# Patient Record
Sex: FEMALE | Race: WHITE | NOT HISPANIC OR LATINO | Employment: PART TIME | URBAN - METROPOLITAN AREA
[De-identification: names, ages, dates, MRNs, and addresses within clinical notes are randomized per-mention and may not be internally consistent; named-entity substitution may affect disease eponyms.]

---

## 2017-01-04 ENCOUNTER — AMBULATORY - HEALTHEAST (OUTPATIENT)
Dept: PHYSICAL MEDICINE AND REHAB | Facility: CLINIC | Age: 52
End: 2017-01-04

## 2017-01-09 ENCOUNTER — HOSPITAL ENCOUNTER (OUTPATIENT)
Dept: PHYSICAL MEDICINE AND REHAB | Facility: CLINIC | Age: 52
Discharge: HOME | End: 2017-01-09
Attending: PHYSICAL MEDICINE & REHABILITATION

## 2017-01-09 DIAGNOSIS — M79.18 MYOFASCIAL MUSCLE PAIN: ICD-10-CM

## 2017-01-09 DIAGNOSIS — M48.061 FORAMINAL STENOSIS OF LUMBAR REGION: ICD-10-CM

## 2017-01-09 DIAGNOSIS — M51.9 ANNULAR DISC TEAR: ICD-10-CM

## 2017-01-09 DIAGNOSIS — M54.50 LUMBAR SPINE PAIN: ICD-10-CM

## 2017-01-09 DIAGNOSIS — M79.604 RIGHT LEG PAIN: ICD-10-CM

## 2017-01-09 ASSESSMENT — MIFFLIN-ST. JEOR: SCORE: 1787.91

## 2017-01-15 ENCOUNTER — COMMUNICATION - HEALTHEAST (OUTPATIENT)
Dept: FAMILY MEDICINE | Facility: CLINIC | Age: 52
End: 2017-01-15

## 2017-01-15 DIAGNOSIS — K21.00 REFLUX ESOPHAGITIS: ICD-10-CM

## 2017-01-18 ENCOUNTER — HOSPITAL ENCOUNTER (OUTPATIENT)
Dept: PHYSICAL MEDICINE AND REHAB | Facility: CLINIC | Age: 52
Discharge: HOME | End: 2017-01-18
Attending: PHYSICAL MEDICINE & REHABILITATION

## 2017-01-18 DIAGNOSIS — M54.50 LUMBAR SPINE PAIN: ICD-10-CM

## 2017-01-18 DIAGNOSIS — M54.16 LUMBAR RADICULAR PAIN: ICD-10-CM

## 2017-01-18 DIAGNOSIS — M48.061 FORAMINAL STENOSIS OF LUMBAR REGION: ICD-10-CM

## 2017-01-18 DIAGNOSIS — M79.605 LEG PAIN, DIFFUSE, LEFT: ICD-10-CM

## 2017-01-18 DIAGNOSIS — M79.18 MYOFASCIAL MUSCLE PAIN: ICD-10-CM

## 2017-01-18 DIAGNOSIS — R09.89 DIMINISHED PULSE: ICD-10-CM

## 2017-01-18 ASSESSMENT — MIFFLIN-ST. JEOR: SCORE: 1787.91

## 2017-01-25 ENCOUNTER — HOSPITAL ENCOUNTER (OUTPATIENT)
Dept: PHYSICAL MEDICINE AND REHAB | Facility: CLINIC | Age: 52
Discharge: HOME | End: 2017-01-25
Attending: PHYSICAL MEDICINE & REHABILITATION

## 2017-01-25 DIAGNOSIS — M79.604 RIGHT LEG PAIN: ICD-10-CM

## 2017-01-25 DIAGNOSIS — M79.605 LEG PAIN, DIFFUSE, LEFT: ICD-10-CM

## 2017-01-25 DIAGNOSIS — M51.9 ANNULAR DISC TEAR: ICD-10-CM

## 2017-01-25 DIAGNOSIS — M79.18 MYOFASCIAL MUSCLE PAIN: ICD-10-CM

## 2017-01-25 DIAGNOSIS — M54.50 LUMBAR SPINE PAIN: ICD-10-CM

## 2017-01-25 ASSESSMENT — MIFFLIN-ST. JEOR: SCORE: 1787.91

## 2017-01-26 ENCOUNTER — HOSPITAL ENCOUNTER (OUTPATIENT)
Dept: ULTRASOUND IMAGING | Facility: HOSPITAL | Age: 52
Discharge: HOME | End: 2017-01-26
Attending: PHYSICAL MEDICINE & REHABILITATION

## 2017-01-26 DIAGNOSIS — M79.605 LEG PAIN, DIFFUSE, LEFT: ICD-10-CM

## 2017-01-26 DIAGNOSIS — R09.89 DIMINISHED PULSE: ICD-10-CM

## 2017-01-27 ENCOUNTER — COMMUNICATION - HEALTHEAST (OUTPATIENT)
Dept: PHYSICAL MEDICINE AND REHAB | Facility: CLINIC | Age: 52
End: 2017-01-27

## 2017-01-27 ENCOUNTER — HOSPITAL ENCOUNTER (OUTPATIENT)
Dept: PHYSICAL MEDICINE AND REHAB | Facility: CLINIC | Age: 52
Discharge: HOME | End: 2017-01-27
Attending: PHYSICAL MEDICINE & REHABILITATION

## 2017-01-27 DIAGNOSIS — M99.83 OTHER BIOMECHANICAL LESIONS OF LUMBAR REGION: ICD-10-CM

## 2017-01-27 DIAGNOSIS — M48.061 FORAMINAL STENOSIS OF LUMBAR REGION: ICD-10-CM

## 2017-01-27 DIAGNOSIS — M51.9 ANNULAR DISC TEAR: ICD-10-CM

## 2017-01-27 DIAGNOSIS — M54.50 LUMBAR SPINE PAIN: ICD-10-CM

## 2017-01-27 ASSESSMENT — MIFFLIN-ST. JEOR: SCORE: 1764.32

## 2017-02-20 ENCOUNTER — HOSPITAL ENCOUNTER (OUTPATIENT)
Dept: PHYSICAL MEDICINE AND REHAB | Facility: CLINIC | Age: 52
Discharge: HOME | End: 2017-02-20
Attending: PHYSICAL MEDICINE & REHABILITATION

## 2017-02-20 DIAGNOSIS — M51.9 ANNULAR DISC TEAR: ICD-10-CM

## 2017-02-20 DIAGNOSIS — M54.50 LUMBAR SPINE PAIN: ICD-10-CM

## 2017-02-20 DIAGNOSIS — G47.9 SLEEP DIFFICULTIES: ICD-10-CM

## 2017-02-20 DIAGNOSIS — M47.816 LUMBAR SPONDYLOSIS: ICD-10-CM

## 2017-02-20 DIAGNOSIS — M79.18 MYOFASCIAL MUSCLE PAIN: ICD-10-CM

## 2017-02-20 ASSESSMENT — MIFFLIN-ST. JEOR: SCORE: 1749.35

## 2017-03-10 ENCOUNTER — OFFICE VISIT - HEALTHEAST (OUTPATIENT)
Dept: FAMILY MEDICINE | Facility: CLINIC | Age: 52
End: 2017-03-10

## 2017-03-10 ENCOUNTER — HOSPITAL ENCOUNTER (OUTPATIENT)
Dept: ULTRASOUND IMAGING | Facility: HOSPITAL | Age: 52
Discharge: HOME | End: 2017-03-10
Attending: FAMILY MEDICINE

## 2017-03-10 DIAGNOSIS — R93.429 ABNORMAL MRI, KIDNEY: ICD-10-CM

## 2017-03-10 DIAGNOSIS — Z00.00 ROUTINE GENERAL MEDICAL EXAMINATION AT A HEALTH CARE FACILITY: ICD-10-CM

## 2017-03-10 DIAGNOSIS — I10 ESSENTIAL HYPERTENSION WITH GOAL BLOOD PRESSURE LESS THAN 140/90: ICD-10-CM

## 2017-03-10 DIAGNOSIS — G89.29 CHRONIC PAIN: ICD-10-CM

## 2017-03-10 DIAGNOSIS — E78.2 MIXED HYPERLIPIDEMIA: ICD-10-CM

## 2017-03-10 LAB
CHOLEST SERPL-MCNC: 196 MG/DL
FASTING STATUS PATIENT QL REPORTED: YES
HDLC SERPL-MCNC: 37 MG/DL
LDLC SERPL CALC-MCNC: 101 MG/DL
TRIGL SERPL-MCNC: 288 MG/DL

## 2017-03-10 ASSESSMENT — MIFFLIN-ST. JEOR: SCORE: 1761.25

## 2017-03-28 ENCOUNTER — COMMUNICATION - HEALTHEAST (OUTPATIENT)
Dept: PHYSICAL MEDICINE AND REHAB | Facility: CLINIC | Age: 52
End: 2017-03-28

## 2017-03-28 DIAGNOSIS — G47.9 SLEEP DIFFICULTIES: ICD-10-CM

## 2017-03-28 DIAGNOSIS — M79.18 MYOFASCIAL MUSCLE PAIN: ICD-10-CM

## 2017-06-04 ENCOUNTER — COMMUNICATION - HEALTHEAST (OUTPATIENT)
Dept: FAMILY MEDICINE | Facility: CLINIC | Age: 52
End: 2017-06-04

## 2017-06-04 DIAGNOSIS — E78.5 HYPERLIPIDEMIA: ICD-10-CM

## 2017-07-03 ENCOUNTER — OFFICE VISIT - HEALTHEAST (OUTPATIENT)
Dept: FAMILY MEDICINE | Facility: CLINIC | Age: 52
End: 2017-07-03

## 2017-07-03 ENCOUNTER — RECORDS - HEALTHEAST (OUTPATIENT)
Dept: MAMMOGRAPHY | Facility: CLINIC | Age: 52
End: 2017-07-03

## 2017-07-03 DIAGNOSIS — Z12.31 ENCOUNTER FOR SCREENING MAMMOGRAM FOR MALIGNANT NEOPLASM OF BREAST: ICD-10-CM

## 2017-07-03 DIAGNOSIS — Z00.00 ROUTINE GENERAL MEDICAL EXAMINATION AT A HEALTH CARE FACILITY: ICD-10-CM

## 2017-07-03 DIAGNOSIS — I10 ESSENTIAL HYPERTENSION WITH GOAL BLOOD PRESSURE LESS THAN 140/90: ICD-10-CM

## 2017-07-03 DIAGNOSIS — M51.379 DEGENERATION OF LUMBAR OR LUMBOSACRAL INTERVERTEBRAL DISC: ICD-10-CM

## 2017-07-03 DIAGNOSIS — E55.9 VITAMIN D DEFICIENCY: ICD-10-CM

## 2017-07-03 DIAGNOSIS — E78.2 MIXED HYPERLIPIDEMIA: ICD-10-CM

## 2017-07-03 DIAGNOSIS — R87.619 ABNORMAL PAP SMEAR OF CERVIX: ICD-10-CM

## 2017-07-03 DIAGNOSIS — K21.00 REFLUX ESOPHAGITIS: ICD-10-CM

## 2017-07-03 LAB
CHOLEST SERPL-MCNC: 300 MG/DL
FASTING STATUS PATIENT QL REPORTED: YES
HDLC SERPL-MCNC: 35 MG/DL
LDLC SERPL CALC-MCNC: 197 MG/DL
TRIGL SERPL-MCNC: 338 MG/DL

## 2017-07-03 ASSESSMENT — MIFFLIN-ST. JEOR: SCORE: 1746.51

## 2017-07-18 ENCOUNTER — HOSPITAL ENCOUNTER (OUTPATIENT)
Dept: PHYSICAL MEDICINE AND REHAB | Facility: CLINIC | Age: 52
Discharge: HOME | End: 2017-07-18
Attending: PHYSICAL MEDICINE & REHABILITATION

## 2017-07-18 ENCOUNTER — HOSPITAL ENCOUNTER (OUTPATIENT)
Dept: PHYSICAL MEDICINE AND REHAB | Facility: CLINIC | Age: 52
Discharge: HOME | End: 2017-07-18
Attending: NURSE PRACTITIONER

## 2017-07-18 DIAGNOSIS — M54.16 RIGHT LUMBAR RADICULITIS: ICD-10-CM

## 2017-07-18 DIAGNOSIS — M99.83 OTHER BIOMECHANICAL LESIONS OF LUMBAR REGION: ICD-10-CM

## 2017-07-18 DIAGNOSIS — M48.061 FORAMINAL STENOSIS OF LUMBAR REGION: ICD-10-CM

## 2017-07-18 DIAGNOSIS — M51.9 ANNULAR DISC TEAR: ICD-10-CM

## 2017-08-01 ENCOUNTER — COMMUNICATION - HEALTHEAST (OUTPATIENT)
Dept: PHYSICAL MEDICINE AND REHAB | Facility: CLINIC | Age: 52
End: 2017-08-01

## 2017-08-07 ENCOUNTER — OFFICE VISIT - HEALTHEAST (OUTPATIENT)
Dept: FAMILY MEDICINE | Facility: CLINIC | Age: 52
End: 2017-08-07

## 2017-08-07 DIAGNOSIS — M76.61 ACHILLES TENDINITIS, RIGHT LEG: ICD-10-CM

## 2017-08-28 ENCOUNTER — COMMUNICATION - HEALTHEAST (OUTPATIENT)
Dept: FAMILY MEDICINE | Facility: CLINIC | Age: 52
End: 2017-08-28

## 2017-08-28 DIAGNOSIS — M54.50 LUMBAGO: ICD-10-CM

## 2017-09-11 ENCOUNTER — RECORDS - HEALTHEAST (OUTPATIENT)
Dept: ADMINISTRATIVE | Facility: OTHER | Age: 52
End: 2017-09-11

## 2017-09-11 ENCOUNTER — OFFICE VISIT - HEALTHEAST (OUTPATIENT)
Dept: PODIATRY | Facility: CLINIC | Age: 52
End: 2017-09-11

## 2017-09-11 DIAGNOSIS — M76.61 ACHILLES TENDINITIS OF RIGHT LOWER EXTREMITY: ICD-10-CM

## 2017-09-11 DIAGNOSIS — M77.31 CALCANEAL SPUR, RIGHT: ICD-10-CM

## 2017-09-11 ASSESSMENT — MIFFLIN-ST. JEOR: SCORE: 1755.01

## 2017-09-17 ENCOUNTER — OFFICE VISIT - HEALTHEAST (OUTPATIENT)
Dept: FAMILY MEDICINE | Facility: CLINIC | Age: 52
End: 2017-09-17

## 2017-09-17 DIAGNOSIS — S02.5XXA BROKEN TOOTH: ICD-10-CM

## 2017-09-17 DIAGNOSIS — K04.7 DENTAL INFECTION: ICD-10-CM

## 2017-10-09 ENCOUNTER — COMMUNICATION - HEALTHEAST (OUTPATIENT)
Dept: ADMINISTRATIVE | Facility: CLINIC | Age: 52
End: 2017-10-09

## 2017-10-20 ENCOUNTER — OFFICE VISIT - HEALTHEAST (OUTPATIENT)
Dept: FAMILY MEDICINE | Facility: CLINIC | Age: 52
End: 2017-10-20

## 2017-10-20 ENCOUNTER — RECORDS - HEALTHEAST (OUTPATIENT)
Dept: GENERAL RADIOLOGY | Facility: CLINIC | Age: 52
End: 2017-10-20

## 2017-10-20 DIAGNOSIS — Z01.818 ENCOUNTER FOR OTHER PREPROCEDURAL EXAMINATION: ICD-10-CM

## 2017-10-20 DIAGNOSIS — I10 ESSENTIAL HYPERTENSION: ICD-10-CM

## 2017-10-20 DIAGNOSIS — Z01.818 PREOP EXAMINATION: ICD-10-CM

## 2017-10-20 DIAGNOSIS — Z01.818 PREOP TESTING: ICD-10-CM

## 2017-10-20 LAB
ATRIAL RATE - MUSE: 82 BPM
DIASTOLIC BLOOD PRESSURE - MUSE: NORMAL MMHG
INTERPRETATION ECG - MUSE: NORMAL
P AXIS - MUSE: 46 DEGREES
PR INTERVAL - MUSE: 188 MS
QRS DURATION - MUSE: 90 MS
QT - MUSE: 366 MS
QTC - MUSE: 427 MS
R AXIS - MUSE: 47 DEGREES
SYSTOLIC BLOOD PRESSURE - MUSE: NORMAL MMHG
T AXIS - MUSE: 30 DEGREES
VENTRICULAR RATE- MUSE: 82 BPM

## 2017-10-20 ASSESSMENT — MIFFLIN-ST. JEOR: SCORE: 1773.72

## 2017-11-02 ENCOUNTER — ANESTHESIA - HEALTHEAST (OUTPATIENT)
Dept: SURGERY | Facility: HOSPITAL | Age: 52
End: 2017-11-02

## 2017-11-02 ASSESSMENT — MIFFLIN-ST. JEOR: SCORE: 1773.72

## 2017-11-03 ENCOUNTER — SURGERY - HEALTHEAST (OUTPATIENT)
Dept: SURGERY | Facility: HOSPITAL | Age: 52
End: 2017-11-03

## 2017-11-04 ENCOUNTER — COMMUNICATION - HEALTHEAST (OUTPATIENT)
Dept: FAMILY MEDICINE | Facility: CLINIC | Age: 52
End: 2017-11-04

## 2017-11-05 ENCOUNTER — COMMUNICATION - HEALTHEAST (OUTPATIENT)
Dept: SCHEDULING | Facility: CLINIC | Age: 52
End: 2017-11-05

## 2017-11-20 ENCOUNTER — OFFICE VISIT - HEALTHEAST (OUTPATIENT)
Dept: PODIATRY | Facility: CLINIC | Age: 52
End: 2017-11-20

## 2017-11-20 DIAGNOSIS — M77.31 CALCANEAL SPUR, RIGHT: ICD-10-CM

## 2017-12-04 ENCOUNTER — OFFICE VISIT - HEALTHEAST (OUTPATIENT)
Dept: PODIATRY | Facility: CLINIC | Age: 52
End: 2017-12-04

## 2017-12-04 DIAGNOSIS — M77.31 CALCANEAL SPUR, RIGHT: ICD-10-CM

## 2017-12-04 ASSESSMENT — MIFFLIN-ST. JEOR: SCORE: 1745.94

## 2018-01-02 ENCOUNTER — OFFICE VISIT - HEALTHEAST (OUTPATIENT)
Dept: PHYSICAL THERAPY | Facility: REHABILITATION | Age: 53
End: 2018-01-02

## 2018-01-02 ENCOUNTER — OFFICE VISIT - HEALTHEAST (OUTPATIENT)
Dept: PODIATRY | Age: 53
End: 2018-01-02

## 2018-01-02 DIAGNOSIS — M76.61 ACHILLES TENDINITIS OF RIGHT LOWER EXTREMITY: ICD-10-CM

## 2018-01-02 DIAGNOSIS — M25.571 PAIN IN JOINT INVOLVING RIGHT ANKLE AND FOOT: ICD-10-CM

## 2018-01-02 DIAGNOSIS — R26.89 ANTALGIC GAIT: ICD-10-CM

## 2018-01-02 DIAGNOSIS — E78.2 MIXED HYPERLIPIDEMIA: ICD-10-CM

## 2018-01-04 ENCOUNTER — OFFICE VISIT - HEALTHEAST (OUTPATIENT)
Dept: PHYSICAL THERAPY | Facility: REHABILITATION | Age: 53
End: 2018-01-04

## 2018-01-04 DIAGNOSIS — M25.571 PAIN IN JOINT INVOLVING RIGHT ANKLE AND FOOT: ICD-10-CM

## 2018-01-04 DIAGNOSIS — R26.89 ANTALGIC GAIT: ICD-10-CM

## 2018-01-09 ENCOUNTER — COMMUNICATION - HEALTHEAST (OUTPATIENT)
Dept: FAMILY MEDICINE | Facility: CLINIC | Age: 53
End: 2018-01-09

## 2018-01-09 ENCOUNTER — OFFICE VISIT - HEALTHEAST (OUTPATIENT)
Dept: PHYSICAL THERAPY | Facility: REHABILITATION | Age: 53
End: 2018-01-09

## 2018-01-09 DIAGNOSIS — E78.2 MIXED HYPERLIPIDEMIA: ICD-10-CM

## 2018-01-09 DIAGNOSIS — R26.89 ANTALGIC GAIT: ICD-10-CM

## 2018-01-09 DIAGNOSIS — M25.571 PAIN IN JOINT INVOLVING RIGHT ANKLE AND FOOT: ICD-10-CM

## 2018-01-09 DIAGNOSIS — I10 ESSENTIAL HYPERTENSION: ICD-10-CM

## 2018-01-12 ENCOUNTER — OFFICE VISIT - HEALTHEAST (OUTPATIENT)
Dept: PHYSICAL THERAPY | Facility: REHABILITATION | Age: 53
End: 2018-01-12

## 2018-01-12 DIAGNOSIS — M25.571 PAIN IN JOINT INVOLVING RIGHT ANKLE AND FOOT: ICD-10-CM

## 2018-01-12 DIAGNOSIS — R26.89 ANTALGIC GAIT: ICD-10-CM

## 2018-01-16 ENCOUNTER — OFFICE VISIT - HEALTHEAST (OUTPATIENT)
Dept: PHYSICAL THERAPY | Facility: REHABILITATION | Age: 53
End: 2018-01-16

## 2018-01-16 DIAGNOSIS — E78.2 MIXED HYPERLIPIDEMIA: ICD-10-CM

## 2018-01-16 DIAGNOSIS — R26.89 ANTALGIC GAIT: ICD-10-CM

## 2018-01-16 DIAGNOSIS — M25.571 PAIN IN JOINT INVOLVING RIGHT ANKLE AND FOOT: ICD-10-CM

## 2018-01-18 ENCOUNTER — OFFICE VISIT - HEALTHEAST (OUTPATIENT)
Dept: PHYSICAL THERAPY | Facility: REHABILITATION | Age: 53
End: 2018-01-18

## 2018-01-18 DIAGNOSIS — M62.89 MUSCLE TIGHTNESS: ICD-10-CM

## 2018-01-18 DIAGNOSIS — M25.571 PAIN IN JOINT INVOLVING RIGHT ANKLE AND FOOT: ICD-10-CM

## 2018-01-18 DIAGNOSIS — R26.89 ANTALGIC GAIT: ICD-10-CM

## 2018-01-23 ENCOUNTER — OFFICE VISIT - HEALTHEAST (OUTPATIENT)
Dept: PHYSICAL THERAPY | Facility: REHABILITATION | Age: 53
End: 2018-01-23

## 2018-01-23 DIAGNOSIS — R26.89 ANTALGIC GAIT: ICD-10-CM

## 2018-01-23 DIAGNOSIS — M62.89 MUSCLE TIGHTNESS: ICD-10-CM

## 2018-01-23 DIAGNOSIS — E78.2 MIXED HYPERLIPIDEMIA: ICD-10-CM

## 2018-01-23 DIAGNOSIS — M25.571 PAIN IN JOINT INVOLVING RIGHT ANKLE AND FOOT: ICD-10-CM

## 2018-01-26 ENCOUNTER — OFFICE VISIT - HEALTHEAST (OUTPATIENT)
Dept: PHYSICAL THERAPY | Facility: REHABILITATION | Age: 53
End: 2018-01-26

## 2018-01-26 DIAGNOSIS — R26.89 ANTALGIC GAIT: ICD-10-CM

## 2018-01-26 DIAGNOSIS — M62.89 MUSCLE TIGHTNESS: ICD-10-CM

## 2018-01-26 DIAGNOSIS — M25.571 PAIN IN JOINT INVOLVING RIGHT ANKLE AND FOOT: ICD-10-CM

## 2018-01-26 DIAGNOSIS — M62.81 MUSCLE WEAKNESS (GENERALIZED): ICD-10-CM

## 2018-01-30 ENCOUNTER — OFFICE VISIT - HEALTHEAST (OUTPATIENT)
Dept: PODIATRY | Age: 53
End: 2018-01-30

## 2018-01-30 DIAGNOSIS — M76.61 ACHILLES TENDINITIS OF RIGHT LOWER EXTREMITY: ICD-10-CM

## 2018-01-30 DIAGNOSIS — M77.31 CALCANEAL SPUR, RIGHT: ICD-10-CM

## 2018-02-25 ENCOUNTER — COMMUNICATION - HEALTHEAST (OUTPATIENT)
Dept: FAMILY MEDICINE | Facility: CLINIC | Age: 53
End: 2018-02-25

## 2018-03-23 ENCOUNTER — COMMUNICATION - HEALTHEAST (OUTPATIENT)
Dept: FAMILY MEDICINE | Facility: CLINIC | Age: 53
End: 2018-03-23

## 2018-03-27 ENCOUNTER — OFFICE VISIT - HEALTHEAST (OUTPATIENT)
Dept: PODIATRY | Age: 53
End: 2018-03-27

## 2018-03-27 DIAGNOSIS — M25.571 ACUTE RIGHT ANKLE PAIN: ICD-10-CM

## 2018-03-27 ASSESSMENT — MIFFLIN-ST. JEOR: SCORE: 1745.94

## 2018-03-29 ENCOUNTER — HOSPITAL ENCOUNTER (OUTPATIENT)
Dept: MRI IMAGING | Facility: HOSPITAL | Age: 53
Discharge: HOME | End: 2018-03-29
Attending: PODIATRIST

## 2018-03-29 ENCOUNTER — COMMUNICATION - HEALTHEAST (OUTPATIENT)
Dept: FAMILY MEDICINE | Facility: CLINIC | Age: 53
End: 2018-03-29

## 2018-03-29 DIAGNOSIS — K21.00 REFLUX ESOPHAGITIS: ICD-10-CM

## 2018-03-29 DIAGNOSIS — M25.571 ACUTE RIGHT ANKLE PAIN: ICD-10-CM

## 2018-03-29 DIAGNOSIS — E78.5 HYPERLIPIDEMIA: ICD-10-CM

## 2018-04-06 ENCOUNTER — COMMUNICATION - HEALTHEAST (OUTPATIENT)
Dept: ADMINISTRATIVE | Facility: CLINIC | Age: 53
End: 2018-04-06

## 2018-04-09 ENCOUNTER — OFFICE VISIT - HEALTHEAST (OUTPATIENT)
Dept: FAMILY MEDICINE | Facility: CLINIC | Age: 53
End: 2018-04-09

## 2018-04-09 ENCOUNTER — RECORDS - HEALTHEAST (OUTPATIENT)
Dept: ADMINISTRATIVE | Facility: OTHER | Age: 53
End: 2018-04-09

## 2018-04-09 DIAGNOSIS — I10 ESSENTIAL HYPERTENSION: ICD-10-CM

## 2018-04-09 DIAGNOSIS — E78.5 HYPERLIPIDEMIA: ICD-10-CM

## 2018-04-09 DIAGNOSIS — K21.00 REFLUX ESOPHAGITIS: ICD-10-CM

## 2018-04-30 ENCOUNTER — OFFICE VISIT - HEALTHEAST (OUTPATIENT)
Dept: PODIATRY | Facility: CLINIC | Age: 53
End: 2018-04-30

## 2018-04-30 DIAGNOSIS — G57.91 NEURITIS OF FOOT, RIGHT: ICD-10-CM

## 2018-04-30 ASSESSMENT — MIFFLIN-ST. JEOR: SCORE: 1832.13

## 2018-05-21 ENCOUNTER — OFFICE VISIT - HEALTHEAST (OUTPATIENT)
Dept: PODIATRY | Facility: CLINIC | Age: 53
End: 2018-05-21

## 2018-05-21 DIAGNOSIS — G57.51 TARSAL TUNNEL SYNDROME OF RIGHT SIDE: ICD-10-CM

## 2018-05-21 DIAGNOSIS — G58.9 COMPRESSION NEUROPATHY: ICD-10-CM

## 2018-05-22 ENCOUNTER — RECORDS - HEALTHEAST (OUTPATIENT)
Dept: ADMINISTRATIVE | Facility: OTHER | Age: 53
End: 2018-05-22

## 2018-05-29 ENCOUNTER — OFFICE VISIT - HEALTHEAST (OUTPATIENT)
Dept: PODIATRY | Facility: CLINIC | Age: 53
End: 2018-05-29

## 2018-05-29 ENCOUNTER — COMMUNICATION - HEALTHEAST (OUTPATIENT)
Dept: PODIATRY | Facility: CLINIC | Age: 53
End: 2018-05-29

## 2018-05-29 ENCOUNTER — RECORDS - HEALTHEAST (OUTPATIENT)
Dept: ADMINISTRATIVE | Facility: OTHER | Age: 53
End: 2018-05-29

## 2018-05-29 DIAGNOSIS — G57.51 TARSAL TUNNEL SYNDROME OF RIGHT SIDE: ICD-10-CM

## 2018-05-29 DIAGNOSIS — G58.9 COMPRESSION NEUROPATHY: ICD-10-CM

## 2018-05-29 DIAGNOSIS — G58.9 MONONEURITIS: ICD-10-CM

## 2018-05-30 ENCOUNTER — OFFICE VISIT - HEALTHEAST (OUTPATIENT)
Dept: FAMILY MEDICINE | Facility: CLINIC | Age: 53
End: 2018-05-30

## 2018-05-30 DIAGNOSIS — Z01.818 PREOP EXAMINATION: ICD-10-CM

## 2018-05-30 DIAGNOSIS — I10 ESSENTIAL HYPERTENSION: ICD-10-CM

## 2018-05-30 DIAGNOSIS — R20.0 NUMBNESS OF RIGHT LOWER EXTREMITY: ICD-10-CM

## 2018-05-30 DIAGNOSIS — Z12.11 SCREEN FOR COLON CANCER: ICD-10-CM

## 2018-05-30 LAB
ANION GAP SERPL CALCULATED.3IONS-SCNC: 10 MMOL/L (ref 5–18)
BUN SERPL-MCNC: 8 MG/DL (ref 8–22)
CALCIUM SERPL-MCNC: 9.9 MG/DL (ref 8.5–10.5)
CHLORIDE BLD-SCNC: 108 MMOL/L (ref 98–107)
CO2 SERPL-SCNC: 24 MMOL/L (ref 22–31)
CREAT SERPL-MCNC: 0.81 MG/DL (ref 0.6–1.1)
GFR SERPL CREATININE-BSD FRML MDRD: >60 ML/MIN/1.73M2
GLUCOSE BLD-MCNC: 95 MG/DL (ref 70–125)
HGB BLD-MCNC: 12.3 G/DL (ref 12–16)
POTASSIUM BLD-SCNC: 4.6 MMOL/L (ref 3.5–5)
SODIUM SERPL-SCNC: 142 MMOL/L (ref 136–145)

## 2018-05-30 ASSESSMENT — MIFFLIN-ST. JEOR: SCORE: 1818.52

## 2018-05-31 ASSESSMENT — MIFFLIN-ST. JEOR: SCORE: 1818.52

## 2018-06-04 ENCOUNTER — ANESTHESIA - HEALTHEAST (OUTPATIENT)
Dept: SURGERY | Facility: CLINIC | Age: 53
End: 2018-06-04

## 2018-06-05 ENCOUNTER — SURGERY - HEALTHEAST (OUTPATIENT)
Dept: SURGERY | Facility: CLINIC | Age: 53
End: 2018-06-05

## 2018-06-05 ASSESSMENT — MIFFLIN-ST. JEOR: SCORE: 1818.52

## 2018-06-12 ENCOUNTER — OFFICE VISIT - HEALTHEAST (OUTPATIENT)
Dept: PODIATRY | Facility: CLINIC | Age: 53
End: 2018-06-12

## 2018-06-12 DIAGNOSIS — G58.9 COMPRESSION NEUROPATHY: ICD-10-CM

## 2018-06-12 DIAGNOSIS — G57.51 TARSAL TUNNEL SYNDROME OF RIGHT SIDE: ICD-10-CM

## 2018-06-19 ENCOUNTER — OFFICE VISIT - HEALTHEAST (OUTPATIENT)
Dept: PODIATRY | Facility: CLINIC | Age: 53
End: 2018-06-19

## 2018-06-19 DIAGNOSIS — G57.51 TARSAL TUNNEL SYNDROME OF RIGHT SIDE: ICD-10-CM

## 2018-06-19 DIAGNOSIS — G58.9 COMPRESSION NEUROPATHY: ICD-10-CM

## 2018-06-26 ENCOUNTER — COMMUNICATION - HEALTHEAST (OUTPATIENT)
Dept: PODIATRY | Facility: CLINIC | Age: 53
End: 2018-06-26

## 2018-06-26 ENCOUNTER — OFFICE VISIT - HEALTHEAST (OUTPATIENT)
Dept: PODIATRY | Facility: CLINIC | Age: 53
End: 2018-06-26

## 2018-06-26 DIAGNOSIS — G58.9 COMPRESSION NEUROPATHY: ICD-10-CM

## 2018-06-26 DIAGNOSIS — G57.51 TARSAL TUNNEL SYNDROME OF RIGHT SIDE: ICD-10-CM

## 2018-07-17 ENCOUNTER — OFFICE VISIT - HEALTHEAST (OUTPATIENT)
Dept: PODIATRY | Facility: CLINIC | Age: 53
End: 2018-07-17

## 2018-07-17 DIAGNOSIS — G58.9 MONONEURITIS: ICD-10-CM

## 2018-07-17 DIAGNOSIS — G58.9 COMPRESSION NEUROPATHY: ICD-10-CM

## 2018-07-17 DIAGNOSIS — G57.51 TARSAL TUNNEL SYNDROME OF RIGHT SIDE: ICD-10-CM

## 2018-08-01 ENCOUNTER — COMMUNICATION - HEALTHEAST (OUTPATIENT)
Dept: FAMILY MEDICINE | Facility: CLINIC | Age: 53
End: 2018-08-01

## 2018-08-01 ENCOUNTER — HOSPITAL ENCOUNTER (OUTPATIENT)
Dept: MRI IMAGING | Facility: HOSPITAL | Age: 53
Discharge: HOME | End: 2018-08-01
Attending: PHYSICAL MEDICINE & REHABILITATION

## 2018-08-01 ENCOUNTER — HOSPITAL ENCOUNTER (OUTPATIENT)
Dept: PHYSICAL MEDICINE AND REHAB | Facility: CLINIC | Age: 53
Discharge: HOME | End: 2018-08-01
Attending: PHYSICAL MEDICINE & REHABILITATION

## 2018-08-01 DIAGNOSIS — R29.898 RIGHT LEG WEAKNESS: ICD-10-CM

## 2018-08-01 DIAGNOSIS — M54.16 LUMBAR RADICULAR PAIN: ICD-10-CM

## 2018-08-01 DIAGNOSIS — M54.50 LUMBAR SPINE PAIN: ICD-10-CM

## 2018-08-01 ASSESSMENT — MIFFLIN-ST. JEOR: SCORE: 1818.52

## 2018-08-02 ENCOUNTER — COMMUNICATION - HEALTHEAST (OUTPATIENT)
Dept: FAMILY MEDICINE | Facility: CLINIC | Age: 53
End: 2018-08-02

## 2018-08-02 ENCOUNTER — COMMUNICATION - HEALTHEAST (OUTPATIENT)
Dept: PHYSICAL MEDICINE AND REHAB | Facility: CLINIC | Age: 53
End: 2018-08-02

## 2018-08-06 ENCOUNTER — AMBULATORY - HEALTHEAST (OUTPATIENT)
Dept: FAMILY MEDICINE | Facility: CLINIC | Age: 53
End: 2018-08-06

## 2018-08-06 DIAGNOSIS — M79.7 FIBROMYALGIA: ICD-10-CM

## 2018-08-16 ENCOUNTER — HOSPITAL ENCOUNTER (OUTPATIENT)
Dept: PHYSICAL MEDICINE AND REHAB | Facility: CLINIC | Age: 53
Discharge: HOME | End: 2018-08-16
Attending: NURSE PRACTITIONER

## 2018-08-16 DIAGNOSIS — M79.604 LEG PAIN, DIFFUSE, RIGHT: ICD-10-CM

## 2018-08-16 DIAGNOSIS — R29.898 RIGHT LEG WEAKNESS: ICD-10-CM

## 2018-08-22 ENCOUNTER — OFFICE VISIT - HEALTHEAST (OUTPATIENT)
Dept: FAMILY MEDICINE | Facility: CLINIC | Age: 53
End: 2018-08-22

## 2018-08-22 ENCOUNTER — COMMUNICATION - HEALTHEAST (OUTPATIENT)
Dept: PODIATRY | Facility: CLINIC | Age: 53
End: 2018-08-22

## 2018-08-22 DIAGNOSIS — M79.671 RIGHT FOOT PAIN: ICD-10-CM

## 2018-08-23 ENCOUNTER — RECORDS - HEALTHEAST (OUTPATIENT)
Dept: GENERAL RADIOLOGY | Facility: CLINIC | Age: 53
End: 2018-08-23

## 2018-08-23 ENCOUNTER — OFFICE VISIT - HEALTHEAST (OUTPATIENT)
Dept: PODIATRY | Facility: CLINIC | Age: 53
End: 2018-08-23

## 2018-08-23 DIAGNOSIS — M79.671 PAIN IN RIGHT FOOT: ICD-10-CM

## 2018-08-23 DIAGNOSIS — M79.671 RIGHT FOOT PAIN: ICD-10-CM

## 2018-08-23 DIAGNOSIS — M76.61 ACHILLES TENDINITIS OF RIGHT LOWER EXTREMITY: ICD-10-CM

## 2018-08-28 ENCOUNTER — HOSPITAL ENCOUNTER (OUTPATIENT)
Dept: NUCLEAR MEDICINE | Facility: HOSPITAL | Age: 53
Discharge: HOME | End: 2018-08-28

## 2018-08-28 DIAGNOSIS — M79.604 LEG PAIN, DIFFUSE, RIGHT: ICD-10-CM

## 2018-08-28 DIAGNOSIS — R29.898 RIGHT LEG WEAKNESS: ICD-10-CM

## 2018-08-29 ENCOUNTER — COMMUNICATION - HEALTHEAST (OUTPATIENT)
Dept: PHYSICAL MEDICINE AND REHAB | Facility: CLINIC | Age: 53
End: 2018-08-29

## 2018-09-06 ENCOUNTER — HOSPITAL ENCOUNTER (OUTPATIENT)
Dept: PALLIATIVE MEDICINE | Facility: OTHER | Age: 53
Discharge: HOME | End: 2018-09-06
Attending: PSYCHIATRY & NEUROLOGY

## 2018-09-06 ENCOUNTER — COMMUNICATION - HEALTHEAST (OUTPATIENT)
Dept: PALLIATIVE MEDICINE | Facility: OTHER | Age: 53
End: 2018-09-06

## 2018-09-06 ENCOUNTER — RECORDS - HEALTHEAST (OUTPATIENT)
Dept: ADMINISTRATIVE | Facility: OTHER | Age: 53
End: 2018-09-06

## 2018-09-06 DIAGNOSIS — M79.671 RIGHT FOOT PAIN: ICD-10-CM

## 2018-09-06 DIAGNOSIS — M47.816 LUMBAR FACET ARTHROPATHY: ICD-10-CM

## 2018-09-06 DIAGNOSIS — G89.4 CHRONIC PAIN SYNDROME: ICD-10-CM

## 2018-09-06 ASSESSMENT — MIFFLIN-ST. JEOR: SCORE: 1827.59

## 2018-09-07 ENCOUNTER — HOSPITAL ENCOUNTER (OUTPATIENT)
Dept: PALLIATIVE MEDICINE | Facility: OTHER | Age: 53
Discharge: HOME | End: 2018-09-07
Attending: PSYCHIATRY & NEUROLOGY | Admitting: PSYCHIATRY & NEUROLOGY

## 2018-09-07 DIAGNOSIS — M12.88 OTHER SPECIFIC ARTHROPATHIES, NOT ELSEWHERE CLASSIFIED, OTHER SPECIFIED SITE: ICD-10-CM

## 2018-09-07 DIAGNOSIS — M47.816 LUMBAR FACET ARTHROPATHY: ICD-10-CM

## 2018-09-07 ASSESSMENT — MIFFLIN-ST. JEOR: SCORE: 1827.59

## 2018-09-10 ENCOUNTER — COMMUNICATION - HEALTHEAST (OUTPATIENT)
Dept: PALLIATIVE MEDICINE | Facility: OTHER | Age: 53
End: 2018-09-10

## 2018-09-18 ENCOUNTER — OFFICE VISIT - HEALTHEAST (OUTPATIENT)
Dept: FAMILY MEDICINE | Facility: CLINIC | Age: 53
End: 2018-09-18

## 2018-09-18 DIAGNOSIS — G57.91 COMPRESSION NEUROPATHY OF RIGHT LOWER EXTREMITY: ICD-10-CM

## 2018-09-18 DIAGNOSIS — M79.7 FIBROMYALGIA: ICD-10-CM

## 2018-09-18 DIAGNOSIS — G57.91 MONONEURITIS LOWER LIMB, RIGHT: ICD-10-CM

## 2018-09-18 DIAGNOSIS — I10 ESSENTIAL HYPERTENSION: ICD-10-CM

## 2018-09-18 DIAGNOSIS — M51.379 DEGENERATION OF LUMBAR OR LUMBOSACRAL INTERVERTEBRAL DISC: ICD-10-CM

## 2018-09-25 ENCOUNTER — OFFICE VISIT - HEALTHEAST (OUTPATIENT)
Dept: PODIATRY | Facility: CLINIC | Age: 53
End: 2018-09-25

## 2018-09-25 DIAGNOSIS — G58.9 COMPRESSION NEUROPATHY: ICD-10-CM

## 2018-09-25 DIAGNOSIS — M79.671 RIGHT FOOT PAIN: ICD-10-CM

## 2018-09-26 ENCOUNTER — AMBULATORY - HEALTHEAST (OUTPATIENT)
Dept: VASCULAR SURGERY | Facility: CLINIC | Age: 53
End: 2018-09-26

## 2018-09-26 ENCOUNTER — RECORDS - HEALTHEAST (OUTPATIENT)
Dept: ADMINISTRATIVE | Facility: OTHER | Age: 53
End: 2018-09-26

## 2018-09-27 ENCOUNTER — RECORDS - HEALTHEAST (OUTPATIENT)
Dept: ADMINISTRATIVE | Facility: OTHER | Age: 53
End: 2018-09-27

## 2018-09-28 ENCOUNTER — COMMUNICATION - HEALTHEAST (OUTPATIENT)
Dept: ADMINISTRATIVE | Facility: CLINIC | Age: 53
End: 2018-09-28

## 2018-10-01 ENCOUNTER — OFFICE VISIT - HEALTHEAST (OUTPATIENT)
Dept: FAMILY MEDICINE | Facility: CLINIC | Age: 53
End: 2018-10-01

## 2018-10-01 DIAGNOSIS — Z01.818 PREOP EXAMINATION: ICD-10-CM

## 2018-10-01 DIAGNOSIS — M51.379 DEGENERATION OF LUMBAR OR LUMBOSACRAL INTERVERTEBRAL DISC: ICD-10-CM

## 2018-10-01 DIAGNOSIS — I10 ESSENTIAL HYPERTENSION: ICD-10-CM

## 2018-10-01 DIAGNOSIS — J01.90 ACUTE SINUSITIS: ICD-10-CM

## 2018-10-01 DIAGNOSIS — G57.91 COMPRESSION NEUROPATHY OF RIGHT LOWER EXTREMITY: ICD-10-CM

## 2018-10-01 DIAGNOSIS — E78.2 MIXED HYPERLIPIDEMIA: ICD-10-CM

## 2018-10-01 DIAGNOSIS — G57.51 TARSAL TUNNEL SYNDROME OF RIGHT SIDE: ICD-10-CM

## 2018-10-01 DIAGNOSIS — F17.200 TOBACCO USE DISORDER: ICD-10-CM

## 2018-10-01 DIAGNOSIS — E55.9 VITAMIN D DEFICIENCY: ICD-10-CM

## 2018-10-01 LAB
ALBUMIN SERPL-MCNC: 4.1 G/DL (ref 3.5–5)
ALP SERPL-CCNC: 62 U/L (ref 45–120)
ALT SERPL W P-5'-P-CCNC: 34 U/L (ref 0–45)
ANION GAP SERPL CALCULATED.3IONS-SCNC: 10 MMOL/L (ref 5–18)
AST SERPL W P-5'-P-CCNC: 27 U/L (ref 0–40)
BILIRUB DIRECT SERPL-MCNC: 0.1 MG/DL
BILIRUB SERPL-MCNC: 0.4 MG/DL (ref 0–1)
BUN SERPL-MCNC: 7 MG/DL (ref 8–22)
CALCIUM SERPL-MCNC: 9.8 MG/DL (ref 8.5–10.5)
CHLORIDE BLD-SCNC: 107 MMOL/L (ref 98–107)
CHOLEST SERPL-MCNC: 192 MG/DL
CO2 SERPL-SCNC: 25 MMOL/L (ref 22–31)
CREAT SERPL-MCNC: 0.76 MG/DL (ref 0.6–1.1)
FASTING STATUS PATIENT QL REPORTED: YES
GFR SERPL CREATININE-BSD FRML MDRD: >60 ML/MIN/1.73M2
GLUCOSE BLD-MCNC: 100 MG/DL (ref 70–125)
HDLC SERPL-MCNC: 33 MG/DL
HGB BLD-MCNC: 13.3 G/DL (ref 12–16)
LDLC SERPL CALC-MCNC: 100 MG/DL
POTASSIUM BLD-SCNC: 4.3 MMOL/L (ref 3.5–5)
PROT SERPL-MCNC: 7 G/DL (ref 6–8)
SODIUM SERPL-SCNC: 142 MMOL/L (ref 136–145)
TRIGL SERPL-MCNC: 294 MG/DL

## 2018-10-01 ASSESSMENT — MIFFLIN-ST. JEOR
SCORE: 1831.68
SCORE: 1815.12

## 2018-10-02 ENCOUNTER — COMMUNICATION - HEALTHEAST (OUTPATIENT)
Dept: ADMINISTRATIVE | Facility: CLINIC | Age: 53
End: 2018-10-02

## 2018-10-02 LAB
25(OH)D3 SERPL-MCNC: 31.1 NG/ML (ref 30–80)
25(OH)D3 SERPL-MCNC: 31.1 NG/ML (ref 30–80)

## 2018-10-05 ENCOUNTER — ANESTHESIA - HEALTHEAST (OUTPATIENT)
Dept: SURGERY | Facility: CLINIC | Age: 53
End: 2018-10-05

## 2018-10-05 ENCOUNTER — SURGERY - HEALTHEAST (OUTPATIENT)
Dept: SURGERY | Facility: CLINIC | Age: 53
End: 2018-10-05

## 2018-10-05 ASSESSMENT — MIFFLIN-ST. JEOR: SCORE: 1806.51

## 2018-10-11 ENCOUNTER — OFFICE VISIT - HEALTHEAST (OUTPATIENT)
Dept: PODIATRY | Facility: CLINIC | Age: 53
End: 2018-10-11

## 2018-10-11 DIAGNOSIS — G58.9 COMPRESSION NEUROPATHY: ICD-10-CM

## 2018-10-11 ASSESSMENT — MIFFLIN-ST. JEOR: SCORE: 1821.92

## 2018-10-18 ENCOUNTER — OFFICE VISIT - HEALTHEAST (OUTPATIENT)
Dept: PODIATRY | Facility: CLINIC | Age: 53
End: 2018-10-18

## 2018-10-18 DIAGNOSIS — G58.9 COMPRESSION NEUROPATHY: ICD-10-CM

## 2018-10-18 ASSESSMENT — MIFFLIN-ST. JEOR: SCORE: 1821.92

## 2018-12-07 ENCOUNTER — COMMUNICATION - HEALTHEAST (OUTPATIENT)
Dept: FAMILY MEDICINE | Facility: CLINIC | Age: 53
End: 2018-12-07

## 2018-12-18 ENCOUNTER — OFFICE VISIT - HEALTHEAST (OUTPATIENT)
Dept: FAMILY MEDICINE | Facility: CLINIC | Age: 53
End: 2018-12-18

## 2018-12-18 DIAGNOSIS — Z12.11 COLON CANCER SCREENING: ICD-10-CM

## 2018-12-18 DIAGNOSIS — I10 ESSENTIAL HYPERTENSION: ICD-10-CM

## 2018-12-18 DIAGNOSIS — Z23 NEED FOR TETANUS BOOSTER: ICD-10-CM

## 2018-12-18 ASSESSMENT — MIFFLIN-ST. JEOR: SCORE: 1858.21

## 2019-01-07 ENCOUNTER — RECORDS - HEALTHEAST (OUTPATIENT)
Dept: ADMINISTRATIVE | Facility: OTHER | Age: 54
End: 2019-01-07

## 2019-01-07 LAB — COLOGUARD-ABSTRACT: NEGATIVE

## 2019-01-15 ENCOUNTER — OFFICE VISIT - HEALTHEAST (OUTPATIENT)
Dept: PODIATRY | Facility: CLINIC | Age: 54
End: 2019-01-15

## 2019-01-15 DIAGNOSIS — I89.0 LYMPHEDEMA OF RIGHT LOWER EXTREMITY: ICD-10-CM

## 2019-01-15 DIAGNOSIS — G58.9 COMPRESSION NEUROPATHY: ICD-10-CM

## 2019-01-15 ASSESSMENT — MIFFLIN-ST. JEOR: SCORE: 1858.21

## 2019-02-05 ENCOUNTER — OFFICE VISIT - HEALTHEAST (OUTPATIENT)
Dept: FAMILY MEDICINE | Facility: CLINIC | Age: 54
End: 2019-02-05

## 2019-02-05 DIAGNOSIS — R05.9 COUGH: ICD-10-CM

## 2019-02-05 DIAGNOSIS — J18.9 PNEUMONIA DUE TO INFECTIOUS ORGANISM, UNSPECIFIED LATERALITY, UNSPECIFIED PART OF LUNG: ICD-10-CM

## 2019-02-05 LAB
FLUAV AG SPEC QL IA: NORMAL
FLUBV AG SPEC QL IA: NORMAL

## 2019-02-05 ASSESSMENT — MIFFLIN-ST. JEOR: SCORE: 1862.75

## 2019-03-11 ENCOUNTER — OFFICE VISIT - HEALTHEAST (OUTPATIENT)
Dept: FAMILY MEDICINE | Facility: CLINIC | Age: 54
End: 2019-03-11

## 2019-03-11 DIAGNOSIS — J20.9 ACUTE BRONCHITIS, UNSPECIFIED ORGANISM: ICD-10-CM

## 2019-03-18 ENCOUNTER — RECORDS - HEALTHEAST (OUTPATIENT)
Dept: HEALTH INFORMATION MANAGEMENT | Facility: CLINIC | Age: 54
End: 2019-03-18

## 2019-04-22 ENCOUNTER — COMMUNICATION - HEALTHEAST (OUTPATIENT)
Dept: FAMILY MEDICINE | Facility: CLINIC | Age: 54
End: 2019-04-22

## 2019-04-23 ENCOUNTER — OFFICE VISIT - HEALTHEAST (OUTPATIENT)
Dept: FAMILY MEDICINE | Facility: CLINIC | Age: 54
End: 2019-04-23

## 2019-04-23 DIAGNOSIS — J44.1 COPD EXACERBATION (H): ICD-10-CM

## 2019-04-23 DIAGNOSIS — F17.200 TOBACCO USE DISORDER: ICD-10-CM

## 2019-04-23 DIAGNOSIS — Z12.31 VISIT FOR SCREENING MAMMOGRAM: ICD-10-CM

## 2019-04-23 DIAGNOSIS — E66.01 MORBID OBESITY (H): ICD-10-CM

## 2019-05-07 ENCOUNTER — OFFICE VISIT - HEALTHEAST (OUTPATIENT)
Dept: FAMILY MEDICINE | Facility: CLINIC | Age: 54
End: 2019-05-07

## 2019-05-07 ENCOUNTER — RECORDS - HEALTHEAST (OUTPATIENT)
Dept: MAMMOGRAPHY | Facility: CLINIC | Age: 54
End: 2019-05-07

## 2019-05-07 DIAGNOSIS — R05.9 COUGH: ICD-10-CM

## 2019-05-07 DIAGNOSIS — J20.9 ACUTE BRONCHITIS, UNSPECIFIED ORGANISM: ICD-10-CM

## 2019-05-07 DIAGNOSIS — Z23 NEED FOR IMMUNIZATION AGAINST MEASLES: ICD-10-CM

## 2019-05-07 DIAGNOSIS — Z12.31 ENCOUNTER FOR SCREENING MAMMOGRAM FOR MALIGNANT NEOPLASM OF BREAST: ICD-10-CM

## 2019-05-08 LAB — MEV IGG SER IA-ACNC: POSITIVE

## 2019-05-09 LAB
GAMMA INTERFERON BACKGROUND BLD IA-ACNC: 0.06 IU/ML
M TB IFN-G BLD-IMP: NEGATIVE
MITOGEN IGNF BCKGRD COR BLD-ACNC: 0.02 IU/ML
MITOGEN IGNF BCKGRD COR BLD-ACNC: 0.06 IU/ML
QTF INTERPRETATION: NORMAL
QTF MITOGEN - NIL: 6.81 IU/ML

## 2019-06-11 ENCOUNTER — COMMUNICATION - HEALTHEAST (OUTPATIENT)
Dept: FAMILY MEDICINE | Facility: CLINIC | Age: 54
End: 2019-06-11

## 2019-06-11 DIAGNOSIS — K21.00 REFLUX ESOPHAGITIS: ICD-10-CM

## 2019-06-11 DIAGNOSIS — M79.7 FIBROMYALGIA: ICD-10-CM

## 2019-06-30 ENCOUNTER — COMMUNICATION - HEALTHEAST (OUTPATIENT)
Dept: FAMILY MEDICINE | Facility: CLINIC | Age: 54
End: 2019-06-30

## 2019-06-30 DIAGNOSIS — E78.5 HYPERLIPIDEMIA: ICD-10-CM

## 2019-07-01 ENCOUNTER — COMMUNICATION - HEALTHEAST (OUTPATIENT)
Dept: FAMILY MEDICINE | Facility: CLINIC | Age: 54
End: 2019-07-01

## 2019-07-01 ENCOUNTER — COMMUNICATION - HEALTHEAST (OUTPATIENT)
Dept: SCHEDULING | Facility: CLINIC | Age: 54
End: 2019-07-01

## 2019-07-01 DIAGNOSIS — R42 DIZZINESS: ICD-10-CM

## 2019-07-09 ENCOUNTER — OFFICE VISIT - HEALTHEAST (OUTPATIENT)
Dept: PODIATRY | Facility: CLINIC | Age: 54
End: 2019-07-09

## 2019-07-09 DIAGNOSIS — G57.51 TARSAL TUNNEL SYNDROME OF RIGHT SIDE: ICD-10-CM

## 2019-07-09 DIAGNOSIS — G58.9 COMPRESSION NEUROPATHY: ICD-10-CM

## 2019-07-09 DIAGNOSIS — G58.9 MONONEURITIS: ICD-10-CM

## 2019-07-09 ASSESSMENT — MIFFLIN-ST. JEOR: SCORE: 1867.28

## 2019-09-13 ENCOUNTER — COMMUNICATION - HEALTHEAST (OUTPATIENT)
Dept: FAMILY MEDICINE | Facility: CLINIC | Age: 54
End: 2019-09-13

## 2019-09-13 DIAGNOSIS — M79.7 FIBROMYALGIA: ICD-10-CM

## 2019-09-16 ENCOUNTER — OFFICE VISIT - HEALTHEAST (OUTPATIENT)
Dept: PODIATRY | Facility: CLINIC | Age: 54
End: 2019-09-16

## 2019-09-16 DIAGNOSIS — M21.6X2 PRONATION DEFORMITY OF BOTH FEET: ICD-10-CM

## 2019-09-16 DIAGNOSIS — G58.9 MONONEURITIS: ICD-10-CM

## 2019-09-16 DIAGNOSIS — G58.9 COMPRESSION NEUROPATHY: ICD-10-CM

## 2019-09-16 DIAGNOSIS — G57.51 TARSAL TUNNEL SYNDROME OF RIGHT SIDE: ICD-10-CM

## 2019-09-16 DIAGNOSIS — M21.6X1 PRONATION DEFORMITY OF BOTH FEET: ICD-10-CM

## 2019-09-16 ASSESSMENT — MIFFLIN-ST. JEOR: SCORE: 1867.28

## 2019-09-18 ENCOUNTER — COMMUNICATION - HEALTHEAST (OUTPATIENT)
Dept: OTHER | Facility: CLINIC | Age: 54
End: 2019-09-18

## 2019-09-19 ENCOUNTER — COMMUNICATION - HEALTHEAST (OUTPATIENT)
Dept: OTHER | Facility: CLINIC | Age: 54
End: 2019-09-19

## 2019-12-17 ENCOUNTER — OFFICE VISIT - HEALTHEAST (OUTPATIENT)
Dept: PODIATRY | Facility: CLINIC | Age: 54
End: 2019-12-17

## 2019-12-17 DIAGNOSIS — G57.51 TARSAL TUNNEL SYNDROME OF RIGHT SIDE: ICD-10-CM

## 2019-12-17 DIAGNOSIS — G58.9 COMPRESSION NEUROPATHY: ICD-10-CM

## 2019-12-17 DIAGNOSIS — G58.9 MONONEURITIS: ICD-10-CM

## 2019-12-17 ASSESSMENT — MIFFLIN-ST. JEOR: SCORE: 1867.28

## 2020-01-21 ENCOUNTER — RECORDS - HEALTHEAST (OUTPATIENT)
Dept: GENERAL RADIOLOGY | Facility: CLINIC | Age: 55
End: 2020-01-21

## 2020-01-21 ENCOUNTER — OFFICE VISIT - HEALTHEAST (OUTPATIENT)
Dept: FAMILY MEDICINE | Facility: CLINIC | Age: 55
End: 2020-01-21

## 2020-01-21 DIAGNOSIS — M25.511 CHRONIC RIGHT SHOULDER PAIN: ICD-10-CM

## 2020-01-21 DIAGNOSIS — G89.29 CHRONIC RIGHT SHOULDER PAIN: ICD-10-CM

## 2020-01-21 DIAGNOSIS — M25.511 PAIN IN RIGHT SHOULDER: ICD-10-CM

## 2020-01-21 DIAGNOSIS — M75.41 IMPINGEMENT SYNDROME, SHOULDER, RIGHT: ICD-10-CM

## 2020-01-21 DIAGNOSIS — G89.29 OTHER CHRONIC PAIN: ICD-10-CM

## 2020-01-27 ENCOUNTER — COMMUNICATION - HEALTHEAST (OUTPATIENT)
Dept: FAMILY MEDICINE | Facility: CLINIC | Age: 55
End: 2020-01-27

## 2020-01-27 ENCOUNTER — AMBULATORY - HEALTHEAST (OUTPATIENT)
Dept: FAMILY MEDICINE | Facility: CLINIC | Age: 55
End: 2020-01-27

## 2020-01-27 DIAGNOSIS — M75.41 IMPINGEMENT SYNDROME OF SHOULDER REGION, RIGHT: ICD-10-CM

## 2020-01-27 DIAGNOSIS — M19.011 PRIMARY OSTEOARTHRITIS OF RIGHT SHOULDER: ICD-10-CM

## 2020-02-17 ENCOUNTER — OFFICE VISIT - HEALTHEAST (OUTPATIENT)
Dept: FAMILY MEDICINE | Facility: CLINIC | Age: 55
End: 2020-02-17

## 2020-02-17 DIAGNOSIS — K57.92 ACUTE DIVERTICULITIS: ICD-10-CM

## 2020-02-17 DIAGNOSIS — Z23 NEED FOR TETANUS BOOSTER: ICD-10-CM

## 2020-02-17 DIAGNOSIS — R50.9 FEVER: ICD-10-CM

## 2020-02-17 LAB
BASOPHILS # BLD AUTO: 0.1 THOU/UL (ref 0–0.2)
BASOPHILS NFR BLD AUTO: 1 % (ref 0–2)
EOSINOPHIL # BLD AUTO: 0.2 THOU/UL (ref 0–0.4)
EOSINOPHIL NFR BLD AUTO: 3 % (ref 0–6)
ERYTHROCYTE [DISTWIDTH] IN BLOOD BY AUTOMATED COUNT: 12.5 % (ref 11–14.5)
FLUAV AG SPEC QL IA: NORMAL
FLUBV AG SPEC QL IA: NORMAL
HCT VFR BLD AUTO: 39.8 % (ref 35–47)
HGB BLD-MCNC: 13.1 G/DL (ref 12–16)
LYMPHOCYTES # BLD AUTO: 3.1 THOU/UL (ref 0.8–4.4)
LYMPHOCYTES NFR BLD AUTO: 39 % (ref 20–40)
MCH RBC QN AUTO: 29.2 PG (ref 27–34)
MCHC RBC AUTO-ENTMCNC: 33 G/DL (ref 32–36)
MCV RBC AUTO: 89 FL (ref 80–100)
MONOCYTES # BLD AUTO: 0.5 THOU/UL (ref 0–0.9)
MONOCYTES NFR BLD AUTO: 6 % (ref 2–10)
NEUTROPHILS # BLD AUTO: 4.2 THOU/UL (ref 2–7.7)
NEUTROPHILS NFR BLD AUTO: 52 % (ref 50–70)
PLATELET # BLD AUTO: 241 THOU/UL (ref 140–440)
PMV BLD AUTO: 8.1 FL (ref 7–10)
RBC # BLD AUTO: 4.49 MILL/UL (ref 3.8–5.4)
WBC: 8 THOU/UL (ref 4–11)

## 2020-04-14 ENCOUNTER — COMMUNICATION - HEALTHEAST (OUTPATIENT)
Dept: FAMILY MEDICINE | Facility: CLINIC | Age: 55
End: 2020-04-14

## 2020-04-16 ENCOUNTER — OFFICE VISIT - HEALTHEAST (OUTPATIENT)
Dept: FAMILY MEDICINE | Facility: CLINIC | Age: 55
End: 2020-04-16

## 2020-04-16 DIAGNOSIS — G58.9 COMPRESSION NEUROPATHY: ICD-10-CM

## 2020-04-16 DIAGNOSIS — M75.41 IMPINGEMENT SYNDROME OF SHOULDER REGION, RIGHT: ICD-10-CM

## 2020-04-21 ENCOUNTER — COMMUNICATION - HEALTHEAST (OUTPATIENT)
Dept: FAMILY MEDICINE | Facility: CLINIC | Age: 55
End: 2020-04-21

## 2020-04-30 ENCOUNTER — COMMUNICATION - HEALTHEAST (OUTPATIENT)
Dept: FAMILY MEDICINE | Facility: CLINIC | Age: 55
End: 2020-04-30

## 2020-04-30 DIAGNOSIS — E78.5 HYPERLIPIDEMIA: ICD-10-CM

## 2020-04-30 DIAGNOSIS — M79.7 FIBROMYALGIA: ICD-10-CM

## 2020-04-30 DIAGNOSIS — K21.00 REFLUX ESOPHAGITIS: ICD-10-CM

## 2020-05-01 ENCOUNTER — COMMUNICATION - HEALTHEAST (OUTPATIENT)
Dept: FAMILY MEDICINE | Facility: CLINIC | Age: 55
End: 2020-05-01

## 2020-05-11 ENCOUNTER — OFFICE VISIT - HEALTHEAST (OUTPATIENT)
Dept: FAMILY MEDICINE | Facility: CLINIC | Age: 55
End: 2020-05-11

## 2020-05-11 DIAGNOSIS — J32.0 CHRONIC MAXILLARY SINUSITIS: ICD-10-CM

## 2020-05-18 ENCOUNTER — OFFICE VISIT - HEALTHEAST (OUTPATIENT)
Dept: FAMILY MEDICINE | Facility: CLINIC | Age: 55
End: 2020-05-18

## 2020-05-18 DIAGNOSIS — R05.9 COUGH: ICD-10-CM

## 2020-05-18 DIAGNOSIS — J01.01 ACUTE RECURRENT MAXILLARY SINUSITIS: ICD-10-CM

## 2020-05-18 ASSESSMENT — MIFFLIN-ST. JEOR: SCORE: 1848.57

## 2020-05-20 ENCOUNTER — COMMUNICATION - HEALTHEAST (OUTPATIENT)
Dept: FAMILY MEDICINE | Facility: CLINIC | Age: 55
End: 2020-05-20

## 2020-06-15 ENCOUNTER — COMMUNICATION - HEALTHEAST (OUTPATIENT)
Dept: FAMILY MEDICINE | Facility: CLINIC | Age: 55
End: 2020-06-15

## 2020-06-15 ENCOUNTER — COMMUNICATION - HEALTHEAST (OUTPATIENT)
Dept: PODIATRY | Facility: CLINIC | Age: 55
End: 2020-06-15

## 2020-06-15 ENCOUNTER — RECORDS - HEALTHEAST (OUTPATIENT)
Dept: ADMINISTRATIVE | Facility: OTHER | Age: 55
End: 2020-06-15

## 2020-06-15 ENCOUNTER — OFFICE VISIT - HEALTHEAST (OUTPATIENT)
Dept: FAMILY MEDICINE | Facility: CLINIC | Age: 55
End: 2020-06-15

## 2020-06-15 DIAGNOSIS — S49.91XA SHOULDER INJURY, RIGHT, INITIAL ENCOUNTER: ICD-10-CM

## 2020-06-16 ENCOUNTER — OFFICE VISIT - HEALTHEAST (OUTPATIENT)
Dept: PODIATRY | Facility: CLINIC | Age: 55
End: 2020-06-16

## 2020-06-16 DIAGNOSIS — G58.9 MONONEURITIS: ICD-10-CM

## 2020-06-16 DIAGNOSIS — G58.9 COMPRESSION NEUROPATHY: ICD-10-CM

## 2020-06-16 DIAGNOSIS — G57.51 TARSAL TUNNEL SYNDROME OF RIGHT SIDE: ICD-10-CM

## 2020-06-25 ENCOUNTER — RECORDS - HEALTHEAST (OUTPATIENT)
Dept: ADMINISTRATIVE | Facility: OTHER | Age: 55
End: 2020-06-25

## 2020-07-12 ENCOUNTER — COMMUNICATION - HEALTHEAST (OUTPATIENT)
Dept: FAMILY MEDICINE | Facility: CLINIC | Age: 55
End: 2020-07-12

## 2020-07-12 DIAGNOSIS — M79.7 FIBROMYALGIA: ICD-10-CM

## 2020-07-24 ENCOUNTER — OFFICE VISIT - HEALTHEAST (OUTPATIENT)
Dept: FAMILY MEDICINE | Facility: CLINIC | Age: 55
End: 2020-07-24

## 2020-07-24 DIAGNOSIS — Z01.818 PREOP EXAMINATION: ICD-10-CM

## 2020-07-24 DIAGNOSIS — K21.00 REFLUX ESOPHAGITIS: ICD-10-CM

## 2020-07-24 DIAGNOSIS — I10 ESSENTIAL HYPERTENSION: ICD-10-CM

## 2020-07-24 DIAGNOSIS — M79.7 FIBROMYALGIA: ICD-10-CM

## 2020-07-24 DIAGNOSIS — E78.2 MIXED HYPERLIPIDEMIA: ICD-10-CM

## 2020-07-24 DIAGNOSIS — S49.91XS RIGHT SHOULDER INJURY, SEQUELA: ICD-10-CM

## 2020-07-24 LAB
ALBUMIN SERPL-MCNC: 4.1 G/DL (ref 3.5–5)
ALP SERPL-CCNC: 53 U/L (ref 45–120)
ALT SERPL W P-5'-P-CCNC: 19 U/L (ref 0–45)
ANION GAP SERPL CALCULATED.3IONS-SCNC: 9 MMOL/L (ref 5–18)
AST SERPL W P-5'-P-CCNC: 19 U/L (ref 0–40)
BILIRUB DIRECT SERPL-MCNC: 0.1 MG/DL
BILIRUB SERPL-MCNC: 0.4 MG/DL (ref 0–1)
BUN SERPL-MCNC: 8 MG/DL (ref 8–22)
CALCIUM SERPL-MCNC: 9.6 MG/DL (ref 8.5–10.5)
CHLORIDE BLD-SCNC: 107 MMOL/L (ref 98–107)
CHOLEST SERPL-MCNC: 175 MG/DL
CO2 SERPL-SCNC: 26 MMOL/L (ref 22–31)
CREAT SERPL-MCNC: 0.8 MG/DL (ref 0.6–1.1)
FASTING STATUS PATIENT QL REPORTED: YES
GFR SERPL CREATININE-BSD FRML MDRD: >60 ML/MIN/1.73M2
GLUCOSE BLD-MCNC: 100 MG/DL (ref 70–125)
HDLC SERPL-MCNC: 33 MG/DL
HGB BLD-MCNC: 12.7 G/DL (ref 12–16)
LDLC SERPL CALC-MCNC: 83 MG/DL
POTASSIUM BLD-SCNC: 4.1 MMOL/L (ref 3.5–5)
PROT SERPL-MCNC: 6.8 G/DL (ref 6–8)
SODIUM SERPL-SCNC: 142 MMOL/L (ref 136–145)
TRIGL SERPL-MCNC: 293 MG/DL

## 2020-07-24 ASSESSMENT — MIFFLIN-ST. JEOR: SCORE: 1827.59

## 2020-07-27 ENCOUNTER — RECORDS - HEALTHEAST (OUTPATIENT)
Dept: ADMINISTRATIVE | Facility: OTHER | Age: 55
End: 2020-07-27

## 2020-08-11 ENCOUNTER — RECORDS - HEALTHEAST (OUTPATIENT)
Dept: ADMINISTRATIVE | Facility: OTHER | Age: 55
End: 2020-08-11

## 2020-08-27 ENCOUNTER — COMMUNICATION - HEALTHEAST (OUTPATIENT)
Dept: FAMILY MEDICINE | Facility: CLINIC | Age: 55
End: 2020-08-27

## 2020-08-27 DIAGNOSIS — K21.00 REFLUX ESOPHAGITIS: ICD-10-CM

## 2020-09-08 ENCOUNTER — RECORDS - HEALTHEAST (OUTPATIENT)
Dept: ADMINISTRATIVE | Facility: OTHER | Age: 55
End: 2020-09-08

## 2020-09-22 ENCOUNTER — COMMUNICATION - HEALTHEAST (OUTPATIENT)
Dept: FAMILY MEDICINE | Facility: CLINIC | Age: 55
End: 2020-09-22

## 2020-09-22 ENCOUNTER — AMBULATORY - HEALTHEAST (OUTPATIENT)
Dept: OTHER | Facility: CLINIC | Age: 55
End: 2020-09-22

## 2020-09-22 ENCOUNTER — RECORDS - HEALTHEAST (OUTPATIENT)
Dept: ADMINISTRATIVE | Facility: OTHER | Age: 55
End: 2020-09-22

## 2020-09-22 DIAGNOSIS — E78.5 HYPERLIPIDEMIA: ICD-10-CM

## 2020-10-20 ENCOUNTER — RECORDS - HEALTHEAST (OUTPATIENT)
Dept: ADMINISTRATIVE | Facility: OTHER | Age: 55
End: 2020-10-20

## 2020-11-17 ENCOUNTER — AMBULATORY - HEALTHEAST (OUTPATIENT)
Dept: FAMILY MEDICINE | Facility: CLINIC | Age: 55
End: 2020-11-17

## 2020-11-17 ENCOUNTER — COMMUNICATION - HEALTHEAST (OUTPATIENT)
Dept: FAMILY MEDICINE | Facility: CLINIC | Age: 55
End: 2020-11-17

## 2020-11-17 DIAGNOSIS — Z20.822 EXPOSURE TO COVID-19 VIRUS: ICD-10-CM

## 2020-11-18 ENCOUNTER — AMBULATORY - HEALTHEAST (OUTPATIENT)
Dept: LAB | Facility: CLINIC | Age: 55
End: 2020-11-18

## 2020-11-18 DIAGNOSIS — Z20.822 EXPOSURE TO COVID-19 VIRUS: ICD-10-CM

## 2020-11-20 ENCOUNTER — COMMUNICATION - HEALTHEAST (OUTPATIENT)
Dept: SCHEDULING | Facility: CLINIC | Age: 55
End: 2020-11-20

## 2020-11-23 ENCOUNTER — OFFICE VISIT - HEALTHEAST (OUTPATIENT)
Dept: FAMILY MEDICINE | Facility: CLINIC | Age: 55
End: 2020-11-23

## 2020-11-23 ENCOUNTER — AMBULATORY - HEALTHEAST (OUTPATIENT)
Dept: FAMILY MEDICINE | Facility: CLINIC | Age: 55
End: 2020-11-23

## 2020-11-23 DIAGNOSIS — Z20.822 EXPOSURE TO COVID-19 VIRUS: ICD-10-CM

## 2020-11-23 DIAGNOSIS — M79.7 FIBROMYALGIA: ICD-10-CM

## 2020-11-23 DIAGNOSIS — K21.00 REFLUX ESOPHAGITIS: ICD-10-CM

## 2020-11-23 DIAGNOSIS — J01.00 ACUTE NON-RECURRENT MAXILLARY SINUSITIS: ICD-10-CM

## 2020-11-23 RX ORDER — GABAPENTIN 300 MG/1
CAPSULE ORAL
Qty: 810 CAPSULE | Refills: 2 | Status: SHIPPED | OUTPATIENT
Start: 2020-11-23 | End: 2021-09-22

## 2020-11-23 ASSESSMENT — PATIENT HEALTH QUESTIONNAIRE - PHQ9: SUM OF ALL RESPONSES TO PHQ QUESTIONS 1-9: 2

## 2020-12-02 ENCOUNTER — RECORDS - HEALTHEAST (OUTPATIENT)
Dept: ADMINISTRATIVE | Facility: OTHER | Age: 55
End: 2020-12-02

## 2020-12-09 ENCOUNTER — RECORDS - HEALTHEAST (OUTPATIENT)
Dept: ADMINISTRATIVE | Facility: OTHER | Age: 55
End: 2020-12-09

## 2020-12-21 ENCOUNTER — COMMUNICATION - HEALTHEAST (OUTPATIENT)
Dept: FAMILY MEDICINE | Facility: CLINIC | Age: 55
End: 2020-12-21

## 2020-12-21 DIAGNOSIS — E78.5 HYPERLIPIDEMIA: ICD-10-CM

## 2020-12-23 ENCOUNTER — RECORDS - HEALTHEAST (OUTPATIENT)
Dept: ADMINISTRATIVE | Facility: OTHER | Age: 55
End: 2020-12-23

## 2021-01-19 ENCOUNTER — RECORDS - HEALTHEAST (OUTPATIENT)
Dept: ADMINISTRATIVE | Facility: OTHER | Age: 56
End: 2021-01-19

## 2021-01-20 ENCOUNTER — COMMUNICATION - HEALTHEAST (OUTPATIENT)
Dept: FAMILY MEDICINE | Facility: CLINIC | Age: 56
End: 2021-01-20

## 2021-01-20 DIAGNOSIS — M79.7 FIBROMYALGIA: ICD-10-CM

## 2021-02-09 ENCOUNTER — RECORDS - HEALTHEAST (OUTPATIENT)
Dept: ADMINISTRATIVE | Facility: OTHER | Age: 56
End: 2021-02-09

## 2021-02-22 ENCOUNTER — COMMUNICATION - HEALTHEAST (OUTPATIENT)
Dept: FAMILY MEDICINE | Facility: CLINIC | Age: 56
End: 2021-02-22

## 2021-02-22 DIAGNOSIS — K21.00 REFLUX ESOPHAGITIS: ICD-10-CM

## 2021-02-23 RX ORDER — OMEPRAZOLE 40 MG/1
CAPSULE, DELAYED RELEASE ORAL
Qty: 90 CAPSULE | Refills: 2 | Status: SHIPPED | OUTPATIENT
Start: 2021-02-23 | End: 2021-11-19

## 2021-02-24 ENCOUNTER — OFFICE VISIT - HEALTHEAST (OUTPATIENT)
Dept: FAMILY MEDICINE | Facility: CLINIC | Age: 56
End: 2021-02-24

## 2021-02-24 DIAGNOSIS — Z00.00 ROUTINE GENERAL MEDICAL EXAMINATION AT A HEALTH CARE FACILITY: ICD-10-CM

## 2021-02-24 DIAGNOSIS — Z11.4 SCREENING FOR HUMAN IMMUNODEFICIENCY VIRUS WITHOUT PRESENCE OF RISK FACTORS: ICD-10-CM

## 2021-02-24 DIAGNOSIS — L98.9 SKIN LESION: ICD-10-CM

## 2021-02-24 DIAGNOSIS — E78.2 MIXED HYPERLIPIDEMIA: ICD-10-CM

## 2021-02-24 DIAGNOSIS — Z11.59 ENCOUNTER FOR HCV SCREENING TEST FOR LOW RISK PATIENT: ICD-10-CM

## 2021-02-24 DIAGNOSIS — S49.91XS RIGHT SHOULDER INJURY, SEQUELA: ICD-10-CM

## 2021-02-24 DIAGNOSIS — G58.9 COMPRESSION NEUROPATHY: ICD-10-CM

## 2021-02-24 DIAGNOSIS — E55.9 VITAMIN D DEFICIENCY: ICD-10-CM

## 2021-02-24 LAB
ALBUMIN SERPL-MCNC: 4.2 G/DL (ref 3.5–5)
ALP SERPL-CCNC: 60 U/L (ref 45–120)
ALT SERPL W P-5'-P-CCNC: 19 U/L (ref 0–45)
ANION GAP SERPL CALCULATED.3IONS-SCNC: 14 MMOL/L (ref 5–18)
AST SERPL W P-5'-P-CCNC: 17 U/L (ref 0–40)
BASOPHILS # BLD AUTO: 0.1 THOU/UL (ref 0–0.2)
BASOPHILS NFR BLD AUTO: 0 % (ref 0–2)
BILIRUB SERPL-MCNC: 0.4 MG/DL (ref 0–1)
BUN SERPL-MCNC: 9 MG/DL (ref 8–22)
CALCIUM SERPL-MCNC: 9.4 MG/DL (ref 8.5–10.5)
CHLORIDE BLD-SCNC: 105 MMOL/L (ref 98–107)
CHOLEST SERPL-MCNC: 189 MG/DL
CO2 SERPL-SCNC: 22 MMOL/L (ref 22–31)
CREAT SERPL-MCNC: 0.78 MG/DL (ref 0.6–1.1)
EOSINOPHIL # BLD AUTO: 0.2 THOU/UL (ref 0–0.4)
EOSINOPHIL NFR BLD AUTO: 1 % (ref 0–6)
ERYTHROCYTE [DISTWIDTH] IN BLOOD BY AUTOMATED COUNT: 14.1 % (ref 11–14.5)
FASTING STATUS PATIENT QL REPORTED: YES
GFR SERPL CREATININE-BSD FRML MDRD: >60 ML/MIN/1.73M2
GLUCOSE BLD-MCNC: 103 MG/DL (ref 70–125)
HCT VFR BLD AUTO: 40.5 % (ref 35–47)
HDLC SERPL-MCNC: 36 MG/DL
HGB BLD-MCNC: 13.1 G/DL (ref 12–16)
HIV 1+2 AB+HIV1 P24 AG SERPL QL IA: NEGATIVE
IMM GRANULOCYTES # BLD: 0 THOU/UL
IMM GRANULOCYTES NFR BLD: 0 %
LDLC SERPL CALC-MCNC: 111 MG/DL
LYMPHOCYTES # BLD AUTO: 3.4 THOU/UL (ref 0.8–4.4)
LYMPHOCYTES NFR BLD AUTO: 24 % (ref 20–40)
MCH RBC QN AUTO: 29.1 PG (ref 27–34)
MCHC RBC AUTO-ENTMCNC: 32.3 G/DL (ref 32–36)
MCV RBC AUTO: 90 FL (ref 80–100)
MONOCYTES # BLD AUTO: 0.8 THOU/UL (ref 0–0.9)
MONOCYTES NFR BLD AUTO: 6 % (ref 2–10)
NEUTROPHILS # BLD AUTO: 9.6 THOU/UL (ref 2–7.7)
NEUTROPHILS NFR BLD AUTO: 69 % (ref 50–70)
PLATELET # BLD AUTO: 152 THOU/UL (ref 140–440)
PMV BLD AUTO: 12.2 FL (ref 7–10)
POTASSIUM BLD-SCNC: 4.3 MMOL/L (ref 3.5–5)
PROT SERPL-MCNC: 7.3 G/DL (ref 6–8)
RBC # BLD AUTO: 4.5 MILL/UL (ref 3.8–5.4)
SODIUM SERPL-SCNC: 141 MMOL/L (ref 136–145)
TRIGL SERPL-MCNC: 208 MG/DL
WBC: 14 THOU/UL (ref 4–11)

## 2021-02-24 RX ORDER — BACLOFEN 10 MG/1
10 TABLET ORAL AT BEDTIME
Status: SHIPPED | COMMUNITY
Start: 2021-02-09 | End: 2021-09-22

## 2021-02-24 ASSESSMENT — MIFFLIN-ST. JEOR: SCORE: 1861.16

## 2021-02-25 LAB
25(OH)D3 SERPL-MCNC: 41.6 NG/ML (ref 30–80)
25(OH)D3 SERPL-MCNC: 41.6 NG/ML (ref 30–80)
HCV AB SERPL QL IA: NEGATIVE
HPV SOURCE: NORMAL
HUMAN PAPILLOMA VIRUS 16 DNA: NEGATIVE
HUMAN PAPILLOMA VIRUS 18 DNA: NEGATIVE
HUMAN PAPILLOMA VIRUS FINAL DIAGNOSIS: NORMAL
HUMAN PAPILLOMA VIRUS OTHER HR: NEGATIVE
LAB AP CHARGES (HE HISTORICAL CONVERSION): NORMAL
PATH REPORT.COMMENTS IMP SPEC: NORMAL
PATH REPORT.FINAL DX SPEC: NORMAL
PATH REPORT.GROSS SPEC: NORMAL
PATH REPORT.MICROSCOPIC SPEC OTHER STN: NORMAL
PATH REPORT.RELEVANT HX SPEC: NORMAL
RESULT FLAG (HE HISTORICAL CONVERSION): NORMAL
SPECIMEN DESCRIPTION: NORMAL

## 2021-03-01 ENCOUNTER — OFFICE VISIT - HEALTHEAST (OUTPATIENT)
Dept: FAMILY MEDICINE | Facility: CLINIC | Age: 56
End: 2021-03-01

## 2021-03-01 DIAGNOSIS — M70.61 TROCHANTERIC BURSITIS OF RIGHT HIP: ICD-10-CM

## 2021-03-01 DIAGNOSIS — S76.211A STRAIN OF RIGHT GROIN: ICD-10-CM

## 2021-03-02 LAB
BKR LAB AP ABNORMAL BLEEDING: NO
BKR LAB AP BIRTH CONTROL/HORMONES: NORMAL
BKR LAB AP CERVICAL APPEARANCE: NORMAL
BKR LAB AP GYN ADEQUACY: NORMAL
BKR LAB AP GYN INTERPRETATION: NORMAL
BKR LAB AP HPV REFLEX: NORMAL
BKR LAB AP LMP: NORMAL
BKR LAB AP PATIENT STATUS: NORMAL
BKR LAB AP PREVIOUS ABNORMAL: NORMAL
BKR LAB AP PREVIOUS NORMAL: 2015
HIGH RISK?: NO
PATH REPORT.COMMENTS IMP SPEC: NORMAL
RESULT FLAG (HE HISTORICAL CONVERSION): NORMAL

## 2021-03-09 ENCOUNTER — RECORDS - HEALTHEAST (OUTPATIENT)
Dept: ADMINISTRATIVE | Facility: OTHER | Age: 56
End: 2021-03-09

## 2021-03-17 ENCOUNTER — RECORDS - HEALTHEAST (OUTPATIENT)
Dept: ADMINISTRATIVE | Facility: OTHER | Age: 56
End: 2021-03-17

## 2021-03-24 ENCOUNTER — COMMUNICATION - HEALTHEAST (OUTPATIENT)
Dept: FAMILY MEDICINE | Facility: CLINIC | Age: 56
End: 2021-03-24

## 2021-03-24 DIAGNOSIS — E78.5 HYPERLIPIDEMIA: ICD-10-CM

## 2021-03-25 RX ORDER — SIMVASTATIN 40 MG
TABLET ORAL
Qty: 90 TABLET | Refills: 3 | Status: SHIPPED | OUTPATIENT
Start: 2021-03-25 | End: 2022-03-24

## 2021-04-05 ENCOUNTER — COMMUNICATION - HEALTHEAST (OUTPATIENT)
Dept: FAMILY MEDICINE | Facility: CLINIC | Age: 56
End: 2021-04-05

## 2021-04-05 DIAGNOSIS — M79.7 FIBROMYALGIA: ICD-10-CM

## 2021-04-06 RX ORDER — IBUPROFEN 800 MG/1
TABLET, FILM COATED ORAL
Qty: 100 TABLET | Refills: 0 | Status: SHIPPED | OUTPATIENT
Start: 2021-04-06 | End: 2021-07-16

## 2021-04-07 ENCOUNTER — RECORDS - HEALTHEAST (OUTPATIENT)
Dept: ADMINISTRATIVE | Facility: OTHER | Age: 56
End: 2021-04-07

## 2021-04-09 ENCOUNTER — RECORDS - HEALTHEAST (OUTPATIENT)
Dept: ADMINISTRATIVE | Facility: OTHER | Age: 56
End: 2021-04-09

## 2021-05-04 ENCOUNTER — RECORDS - HEALTHEAST (OUTPATIENT)
Dept: ADMINISTRATIVE | Facility: OTHER | Age: 56
End: 2021-05-04

## 2021-05-24 ENCOUNTER — RECORDS - HEALTHEAST (OUTPATIENT)
Dept: ADMINISTRATIVE | Facility: CLINIC | Age: 56
End: 2021-05-24

## 2021-05-25 ENCOUNTER — RECORDS - HEALTHEAST (OUTPATIENT)
Dept: ADMINISTRATIVE | Facility: CLINIC | Age: 56
End: 2021-05-25

## 2021-05-26 ENCOUNTER — RECORDS - HEALTHEAST (OUTPATIENT)
Dept: ADMINISTRATIVE | Facility: OTHER | Age: 56
End: 2021-05-26

## 2021-05-27 ENCOUNTER — RECORDS - HEALTHEAST (OUTPATIENT)
Dept: ADMINISTRATIVE | Facility: CLINIC | Age: 56
End: 2021-05-27

## 2021-05-27 ASSESSMENT — PATIENT HEALTH QUESTIONNAIRE - PHQ9: SUM OF ALL RESPONSES TO PHQ QUESTIONS 1-9: 2

## 2021-05-28 NOTE — PROGRESS NOTES
ASSESSMENT/PLAN:  1. Cough  QTF-Mycobacterium tuberculosis by QuantiFERON-TB Gold Plus   2. Acute bronchitis, unspecified organism  predniSONE (DELTASONE) 20 MG tablet    azithromycin (ZITHROMAX) 250 MG tablet   3. Need for immunization against measles  Rubeola Antibody, IgG       This is a 54 yo female with cough - exposed to mom who was also seen today.  Both seem to have some sort of infection.  Will treat aggressively so that both are treated at same time.  Patient's mo has apparently had a positive Mantoux/?TB in past.  Will check Quantiferon testing in [place] to see if there is any evidence of exposure.   Return in about 1 month (around 6/7/2019) for if not getting better in 1 month.      Medications Discontinued During This Encounter   Medication Reason     azithromycin (ZITHROMAX) 250 MG tablet Therapy completed     There are no Patient Instructions on file for this visit.    Chief Complaint:  Chief Complaint   Patient presents with     Follow-up     cough       HPI:   Catherine Farrell is a 53 y.o. female c/o  Mom was sick x 2 this spring -   Mom has acute bronchitis - sounds horrible (seen earlier in day)  Now, Catherine is having more cough and productive  Did start Wellbutrin a couple weeks ago - starting to decrease cigarette smoking    Feels like she's trading it off and on with her mom  Ears are still plugged  She does have inhaler        PMH:   Patient Active Problem List    Diagnosis Date Noted     Obesity (BMI 35.0-39.9) with comorbidity (H) 04/23/2019     Right foot pain 09/27/2018     Compression neuropathy 09/27/2018     Tarsal tunnel syndrome of right side 06/04/2018     Compression neuropathy of right lower extremity 06/04/2018     Mononeuritis lower limb, right 06/04/2018     Numbness of right lower extremity 05/30/2018     Calcaneal spur, right 09/11/2017     Vitamin D deficiency 07/03/2017     Chronic pain 03/10/2017     Costochondritis 07/10/2015     Fatigue 07/10/2015     Obsessive compulsive  disorder 2015     Dermatitis 2015     Patellofemoral Syndrome Of The Right Knee  2015     Tension-type headache 2015     Simple (Specific) Phobia      Cough      Nicotine Dependence      Chronic Reflux Esophagitis      Fibromyalgia      Strain Of The Gastrocnemius Muscle Of The Right Leg      Adjustment Disorder With Depressed Mood      Joint Pain, Localized In The Knee      Pain During Urination (Dysuria)      Abdominal Pain      Diverticulitis Of Colon      Trochanteric Bursitis      Sinusitis      Midback Pain      Mixed hyperlipidemia      Abnormal Weight Loss      Abnormal Pap smear of cervix      Adverse Food Reaction (Not Anaphylactic)      Impingement Of The Right Shoulder      Hypertension      Head Injury      Oral Thrush      Lumbar Disc Degeneration      Lower Back Pain Chronic      Carpal Tunnel Syndrome      Past Medical History:   Diagnosis Date     Anemia      Bilateral calcaneal spurs     surgery 2015     Bursitis of hip      Chronic back pain      DDD (degenerative disc disease)      Depression      Diverticulitis      Environmental allergies      Fibromyalgia      History of transfusion     With      HLD (hyperlipidemia)      Obesity      Past Surgical History:   Procedure Laterality Date     ACHILLES TENDON REPAIR Left 2015    Procedure: WITH SECONDARY ACHILLES TENDON REAPIR;  Surgeon: Rob Marion DPM;  Location: [facility] OR;  Service:      ACHILLES TENDON REPAIR Right 11/3/2017    Procedure: WITH SECONDARY ACHILLES TENDON REPAIR;  Surgeon: Rob Marion DPM;  Location: [facility] OR;  Service:      ENDOMETRIAL ABLATION       CT APPENDECTOMY      Description: Appendectomy;  Recorded: 10/18/2008;     CT  DELIVERY ONLY      Description:  Section;  Recorded: 10/18/2008;     CT REMOVAL OF HEEL SPUR Left 2015    Procedure: LEFT POSTERIOR CALCANEAL SPUR RESECTION;  Surgeon: Rob Marion DPM;  Location: [facility]  OR;  Service: Podiatry     CT REMOVAL OF TONSILS,<13 Y/O      Description: Tonsillectomy;  Recorded: 10/18/2008;     Social History     Socioeconomic History     Marital status:      Spouse name: Not on file     Number of children: Not on file     Years of education: Not on file     Highest education level: Not on file   Occupational History     Occupation: supervisor     Employer: [employer]   Social Needs     Financial resource strain: Not on file     Food insecurity:     Worry: Not on file     Inability: Not on file     Transportation needs:     Medical: Not on file     Non-medical: Not on file   Tobacco Use     Smoking status: Current Every Day Smoker     Packs/day: 0.50     Types: Cigarettes     Smokeless tobacco: Never Used     Tobacco comment: Started Zyban 4/23/19   Substance and Sexual Activity     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     Drug use: No     Sexual activity: Yes     Partners: Male   Lifestyle     Physical activity:     Days per week: Not on file     Minutes per session: Not on file     Stress: Not on file   Relationships     Social connections:     Talks on phone: Not on file     Gets together: Not on file     Attends Tenriism service: Not on file     Active member of club or organization: Not on file     Attends meetings of clubs or organizations: Not on file     Relationship status: Not on file     Intimate partner violence:     Fear of current or ex partner: Not on file     Emotionally abused: Not on file     Physically abused: Not on file     Forced sexual activity: Not on file   Other Topics Concern     Not on file   Social History Narrative     Not on file     Family History   Problem Relation Age of Onset     Lung cancer Father      Alcohol abuse Father      Alcohol abuse Son      Hypertension Mother      Stomach cancer Maternal Aunt      Breast cancer Maternal Aunt      Breast cancer Maternal Aunt        Meds:    Current Outpatient Medications:      albuterol (PROAIR  HFA;PROVENTIL HFA;VENTOLIN HFA) 90 mcg/actuation inhaler, Inhale 2 puffs every 6 (six) hours as needed for wheezing., Disp: 18 g, Rfl: 1     buPROPion (WELLBUTRIN SR) 150 MG 12 hr tablet, Take 1 tablet (150 mg total) by mouth 2 (two) times a day. Start 150 mg once daily for 3 days, then increase to 150 mg twice a day, Disp: 60 tablet, Rfl: 0     cholecalciferol, vitamin D3, (VITAMIN D3) 2,000 unit Tab, Take 2,000 Units by mouth at bedtime. , Disp: , Rfl:      gabapentin (NEURONTIN) 300 MG capsule, Take 3 capsules (900 mg total) by mouth 3 (three) times a day. (Patient taking differently: Take 900 mg by mouth at bedtime. ), Disp: 270 capsule, Rfl: 3     HYDROcodone-acetaminophen (NORCO )  mg per tablet, Take 1 tablet by mouth every 6 (six) hours as needed for pain., Disp: 20 tablet, Rfl: 0     ibuprofen (ADVIL,MOTRIN) 800 MG tablet, Take 1 tablet (800 mg total) by mouth every 6 (six) hours as needed for pain., Disp: 100 tablet, Rfl: 3     omeprazole (PRILOSEC) 40 MG capsule, Take 1 capsule (40 mg total) by mouth daily., Disp: 90 capsule, Rfl: 3     simvastatin (ZOCOR) 40 MG tablet, Take 40 mg by mouth at bedtime., Disp: , Rfl:      azithromycin (ZITHROMAX) 250 MG tablet, Take 500 mg (2 x 250 mg tablets) on day 1 followed by 250 mg (1 tablet) on days 2-5., Disp: 6 tablet, Rfl: 0     meclizine (ANTIVERT) 25 mg tablet, Take 25 mg by mouth 3 (three) times a day as needed for dizziness or nausea., Disp: , Rfl:      predniSONE (DELTASONE) 20 MG tablet, Take 40 mg by mouth daily for 5 days., Disp: 10 tablet, Rfl: 0    Allergies:  Allergies   Allergen Reactions     Amoxicillin Hives     Penicillins Swelling and Itching     Annotation: Swelling of face/eyes, itching       Shellfish Containing Products Anaphylaxis       ROS:  Pertinent positives as noted in HPI; otherwise 12 point ROS negative.      Physical Exam:  EXAM:  /80 (Patient Site: Right Arm, Patient Position: Sitting, Cuff Size: Adult Large)    Pulse 100   Temp 98.2  F (36.8  C) (Oral)   Resp 16   Wt (!) 265 lb (120.2 kg)   SpO2 95%   Breastfeeding? No   BMI 38.02 kg/m     Gen:  NAD, appears well, well-hydrated  HEENT:  TMs nl, oropharynx benign, nasal mucosa nl, conjunctiva clear  Neck:  Supple, no adenopathy, no thyromegaly, no carotid bruits, no JVD  Lungs:  Coarse breath sounds bilaterally with frequent harsh cough  Cor:  RRR no murmur  Abd:  Soft, nontender, BS+, no masses, no guarding or rebound, no HSM  Extr:  Neg.  Neuro:  No asymmetry  Skin:  Warm/dry        Results:  Results for orders placed or performed in visit on 05/07/19   QTF-Mycobacterium tuberculosis by QuantiFERON-TB Gold Plus   Result Value Ref Range    QTF RESULT Negative Negative    QTF INTREPRETATION       No interferon-gamma response to M. tuberculosis antigens was detected.  Infecton with M. tuberculosis is unlikely.  A negative result alone does not exclude infection with M. tuberculosis    QTF NIL 0.06 IU/mL    QTF ANTIGEN TB1-NIL 0.02 IU/mL    QTF ANTIGEN TB2 - NIL 0.06 IU/mL    QTF MITOGEN-NIL 6.81 IU/mL   Rubeola Antibody, IgG   Result Value Ref Range    Rubeola Antibody, IgG Positive

## 2021-05-28 NOTE — PROGRESS NOTES
"Office Visit  [facility]  Date of Service: 04/23/2019      Subjective   Catherine Farrell is a 53 y.o. female who presents for Cough (x3 days - Possible Upper Respiratory Infection)    Patient is here today for evaluation of a cough.  She has had similar problems like in this in the past when she gets respiratory infections.  She is a smoker.  She started with an itchy throat on Saturday, running eyes and runny nose.  Then she developed a \"horrible throat.  \"Now, she has a cough, I can hear herself breathing.  Mild shortness of breath.  Similar to previous episodes.  In the past, prednisone has been helpful as has azithromycin.  She has kids who have asthma and is familiar with use of inhalers.  She has used an inhaler in the past as well.    She is thinking about quitting smoking.  Main barrier is cravings and becoming very angry when she does not smoke.  She cannot use the nicotine patch because it irritates her skin.  She does not like the taste of nicotine gum.  She is afraid that Chantix would worsen her depression.  She would be open to therapy.  Zyban sounds interesting.    Objective   /78 (Patient Site: Left Arm, Patient Position: Sitting, Cuff Size: Adult Large)   Pulse 84   Temp 97.4  F (36.3  C) (Oral)   Wt (!) 264 lb (119.7 kg)   BMI 37.88 kg/m     She reports that she has been smoking cigarettes.  She has a 18.00 pack-year smoking history. She has never used smokeless tobacco.    Gen: Alert, no apparent distress.  Ears: canals clear, tympanic membranes clear with mild serous effusion  Eyes: Mild conjunctivitis.   Sinuses: non-tender.  Nose: Pale and boggy mucosa  Mouth: adequate dentition.   Tonsils/oropharynx: no tonsillar hypertrophy, normal oropharynx.   Neck: no significant lymphadenopathy, thyroid not enlarged, not tender.    Heart: Regular rate and rhythm, no murmurs.  Lungs: Scattered expiratory wheezing,, no rhonchi or rales, no increased work of " breathing.  Abdomen: Soft, non-tender, non-distended, bowel sounds normal.  Extremities: No clubbing, cyanosis, edema.    Results for orders placed or performed in visit on 02/05/19   Influenza A/B Rapid Test   Result Value Ref Range    Influenza  A, Rapid Antigen No Influenza A antigen detected No Influenza A antigen detected    Influenza B, Rapid Antigen No Influenza B antigen detected No Influenza B antigen detected     No results found.    Assessment & Plan     1. COPD exacerbation.  Consider underlying asthma as well due to family history of asthma.  Treat with prednisone 40 mg daily for 5 days, azithromycin 500 mg a day #1, then 250 mg daily for 4 additional days.  Use albuterol as needed for wheezing and cough.  To quit smoking.  2. Nicotine dependence.  See discussion above.  Start Zyban.  Follow-up in 2 weeks.  3. Severe obesity.  BMI 37.8 with complication.  Discussed benefit of Zyban for weight.      Order Summary                                                      1. COPD exacerbation (H)  predniSONE (DELTASONE) 20 MG tablet    azithromycin (ZITHROMAX) 250 MG tablet    albuterol (PROAIR HFA;PROVENTIL HFA;VENTOLIN HFA) 90 mcg/actuation inhaler   2. Nicotine Dependence  buPROPion (WELLBUTRIN SR) 150 MG 12 hr tablet   3. Morbid obesity (H)     4. Visit for screening mammogram  Mammo Screening Bilateral      Future Appointments   Date Time Provider Department Center   5/7/2019 10:00 AM Annetta Hernandez MD [facility]   7/9/2019  1:00 PM Mario Ely, DPM [facility]       Completed by: Helen Em M.D., [facility]. 4/23/2019 3:53 PM.  This transcription uses voice recognition software, which may contain typographical errors.

## 2021-05-29 ENCOUNTER — RECORDS - HEALTHEAST (OUTPATIENT)
Dept: ADMINISTRATIVE | Facility: CLINIC | Age: 56
End: 2021-05-29

## 2021-05-29 NOTE — TELEPHONE ENCOUNTER
RN cannot approve Refill Request    RN can NOT refill this medication med is not covered by policy/route to provider.       Karla Wadsworth, [facility] Triage/Med Refill 6/12/2019    Requested Prescriptions   Pending Prescriptions Disp Refills     ibuprofen (ADVIL,MOTRIN) 800 MG tablet [Pharmacy Med Name: IBUPROFEN 800MG     TAB] 100 tablet 3     Sig: TAKE 1 TABLET BY MOUTH EVERY 6 HOURS AS NEEDED FOR PAIN       There is no refill protocol information for this order      Signed Prescriptions Disp Refills    omeprazole (PRILOSEC) 40 MG capsule 90 capsule 3     Sig: TAKE 1 CAPSULE BY MOUTH ONCE DAILY       GI Medications Refill Protocol Passed - 6/11/2019 10:29 PM        Passed - PCP or prescribing provider visit in last 12 or next 3 months.     Last office visit with prescriber/PCP: 5/7/2019 Annetta Hernandez MD OR same dept: 5/7/2019 Annetta Hernandez MD OR same specialty: 5/7/2019 Annetta Hernandez MD  Last physical: 10/1/2018 Last MTM visit: Visit date not found   Next visit within 3 mo: Visit date not found  Next physical within 3 mo: Visit date not found  Prescriber OR PCP: Annetta Hernandez MD  Last diagnosis associated with med order: 1. Chronic Reflux Esophagitis  - omeprazole (PRILOSEC) 40 MG capsule; TAKE 1 CAPSULE BY MOUTH ONCE DAILY  Dispense: 90 capsule; Refill: 3    If protocol passes may refill for 12 months if within 3 months of last provider visit (or a total of 15 months).

## 2021-05-29 NOTE — TELEPHONE ENCOUNTER
Refill Approved    Rx renewed per Medication Renewal Policy. Medication was last renewed on 4/15/18.    Karla Wadsworth, [facility] Triage/Med Refill 6/12/2019     Requested Prescriptions   Pending Prescriptions Disp Refills     ibuprofen (ADVIL,MOTRIN) 800 MG tablet [Pharmacy Med Name: IBUPROFEN 800MG     TAB] 100 tablet 3     Sig: TAKE 1 TABLET BY MOUTH EVERY 6 HOURS AS NEEDED FOR PAIN       There is no refill protocol information for this order        omeprazole (PRILOSEC) 40 MG capsule [Pharmacy Med Name: OMEPRAZOLE DR 40MG  CAP] 90 capsule 3     Sig: TAKE 1 CAPSULE BY MOUTH ONCE DAILY       GI Medications Refill Protocol Passed - 6/11/2019 10:29 PM        Passed - PCP or prescribing provider visit in last 12 or next 3 months.     Last office visit with prescriber/PCP: 5/7/2019 Annetta Hernandez MD OR same dept: 5/7/2019 Annetta Hernandez MD OR same specialty: 5/7/2019 Annetta Hernandez MD  Last physical: 10/1/2018 Last MTM visit: Visit date not found   Next visit within 3 mo: Visit date not found  Next physical within 3 mo: Visit date not found  Prescriber OR PCP: Annetat Hernandez MD  Last diagnosis associated with med order: 1. Chronic Reflux Esophagitis  - omeprazole (PRILOSEC) 40 MG capsule [Pharmacy Med Name: OMEPRAZOLE DR 40MG  CAP]; TAKE 1 CAPSULE BY MOUTH ONCE DAILY  Dispense: 90 capsule; Refill: 3    If protocol passes may refill for 12 months if within 3 months of last provider visit (or a total of 15 months).

## 2021-05-30 ENCOUNTER — RECORDS - HEALTHEAST (OUTPATIENT)
Dept: ADMINISTRATIVE | Facility: CLINIC | Age: 56
End: 2021-05-30

## 2021-05-30 VITALS — HEIGHT: 69 IN | BODY MASS INDEX: 37.18 KG/M2 | WEIGHT: 251 LBS

## 2021-05-30 VITALS — HEIGHT: 70 IN | BODY MASS INDEX: 34.69 KG/M2 | WEIGHT: 242.3 LBS

## 2021-05-30 VITALS — HEIGHT: 70 IN | BODY MASS INDEX: 34.22 KG/M2 | WEIGHT: 239 LBS

## 2021-05-30 VITALS — BODY MASS INDEX: 34.72 KG/M2 | WEIGHT: 242.5 LBS | HEIGHT: 70 IN

## 2021-05-30 VITALS — BODY MASS INDEX: 37.18 KG/M2 | WEIGHT: 251 LBS | HEIGHT: 69 IN

## 2021-05-30 NOTE — TELEPHONE ENCOUNTER
Refilled per protocol, OTC. LAst OV 5/7/19  Marce Ortiz, RN, [facility] Nurse Triage/Med Refills RN

## 2021-05-30 NOTE — PROGRESS NOTES
FOOT AND ANKLE SURGERY/PODIATRY Progress Note        ASSESSMENT:   Compression neuropathy, tarsal tunnel, mononeuritis    HPI: Catherine Farrell was seen again today for follow-up evaluation of right lower extremity.  The patient is 8 months status post nerve decompression of the right lower extremity.  The patient stated that she continues to have some symptoms.  She rates her pain a 6 out of 10 on the VAS.  She stated that prior to the surgery she rated her symptoms a 10 out of 10 on the VAS.  She stated that her symptoms are worse in the evenings.  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  This aggravates her condition.      Past Medical History:   Diagnosis Date     Anemia      Bilateral calcaneal spurs     surgery 2015     Bursitis of hip      Chronic back pain      DDD (degenerative disc disease)      Depression      Diverticulitis      Environmental allergies      Fibromyalgia      History of transfusion     With      HLD (hyperlipidemia)      Obesity        Past Surgical History:   Procedure Laterality Date     ACHILLES TENDON REPAIR Left 2015    Procedure: WITH SECONDARY ACHILLES TENDON REAPIR;  Surgeon: Rob Marion DPM;  Location: [facility];  Service:      ACHILLES TENDON REPAIR Right 11/3/2017    Procedure: WITH SECONDARY ACHILLES TENDON REPAIR;  Surgeon: Rob Marion DPM;  Location: [facility] OR;  Service:      ENDOMETRIAL ABLATION       OH APPENDECTOMY      Description: Appendectomy;  Recorded: 10/18/2008;     OH  DELIVERY ONLY      Description:  Section;  Recorded: 10/18/2008;     OH REMOVAL OF HEEL SPUR Left 2015    Procedure: LEFT POSTERIOR CALCANEAL SPUR RESECTION;  Surgeon: Rob Marion DPM;  Location: [facility];  Service: Podiatry     OH REMOVAL OF TONSILS,<11 Y/O      Description: Tonsillectomy;  Recorded: 10/18/2008;       Allergies   Allergen Reactions     Amoxicillin Hives     Penicillins Swelling and  Itching     Annotation: Swelling of face/eyes, itching       Shellfish Containing Products Anaphylaxis         Current Outpatient Medications:      albuterol (PROAIR HFA;PROVENTIL HFA;VENTOLIN HFA) 90 mcg/actuation inhaler, Inhale 2 puffs every 6 (six) hours as needed for wheezing., Disp: 18 g, Rfl: 1     azithromycin (ZITHROMAX) 250 MG tablet, Take 500 mg (2 x 250 mg tablets) on day 1 followed by 250 mg (1 tablet) on days 2-5., Disp: 6 tablet, Rfl: 0     buPROPion (WELLBUTRIN SR) 150 MG 12 hr tablet, Take 1 tablet (150 mg total) by mouth 2 (two) times a day. Start 150 mg once daily for 3 days, then increase to 150 mg twice a day, Disp: 60 tablet, Rfl: 0     cholecalciferol, vitamin D3, (VITAMIN D3) 2,000 unit Tab, Take 2,000 Units by mouth at bedtime. , Disp: , Rfl:      gabapentin (NEURONTIN) 300 MG capsule, Take 3 capsules (900 mg total) by mouth 3 (three) times a day. (Patient taking differently: Take 900 mg by mouth at bedtime. ), Disp: 270 capsule, Rfl: 3     HYDROcodone-acetaminophen (NORCO )  mg per tablet, Take 1 tablet by mouth every 6 (six) hours as needed for pain., Disp: 20 tablet, Rfl: 0     ibuprofen (ADVIL,MOTRIN) 800 MG tablet, TAKE 1 TABLET BY MOUTH EVERY 6 HOURS AS NEEDED FOR PAIN, Disp: 100 tablet, Rfl: 3     meclizine (ANTIVERT) 25 mg tablet, Take 1 tablet (25 mg total) by mouth 3 (three) times a day as needed for dizziness or nausea., Disp: 100 tablet, Rfl: 0     omeprazole (PRILOSEC) 40 MG capsule, TAKE 1 CAPSULE BY MOUTH ONCE DAILY, Disp: 90 capsule, Rfl: 3     simvastatin (ZOCOR) 40 MG tablet, TAKE 1 TABLET BY MOUTH ONCE DAILY, Disp: 90 tablet, Rfl: 3    Family History   Problem Relation Age of Onset     Lung cancer Father      Alcohol abuse Father      Alcohol abuse Son      Hypertension Mother      Stomach cancer Maternal Aunt      Breast cancer Maternal Aunt      Breast cancer Maternal Aunt        Social History     Socioeconomic History     Marital status:      Spouse  name: Not on file     Number of children: Not on file     Years of education: Not on file     Highest education level: Not on file   Occupational History     Occupation: supervisor     Employer: [employer]   Social Needs     Financial resource strain: Not on file     Food insecurity:     Worry: Not on file     Inability: Not on file     Transportation needs:     Medical: Not on file     Non-medical: Not on file   Tobacco Use     Smoking status: Current Every Day Smoker     Packs/day: 0.50     Types: Cigarettes     Smokeless tobacco: Never Used     Tobacco comment: Started Zyban 4/23/19   Substance and Sexual Activity     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     Drug use: No     Sexual activity: Yes     Partners: Male   Lifestyle     Physical activity:     Days per week: Not on file     Minutes per session: Not on file     Stress: Not on file   Relationships     Social connections:     Talks on phone: Not on file     Gets together: Not on file     Attends Religion service: Not on file     Active member of club or organization: Not on file     Attends meetings of clubs or organizations: Not on file     Relationship status: Not on file     Intimate partner violence:     Fear of current or ex partner: Not on file     Emotionally abused: Not on file     Physically abused: Not on file     Forced sexual activity: Not on file   Other Topics Concern     Not on file   Social History Narrative     Not on file       10 point Review of Systems is negative       Vitals:    07/09/19 1252   Pulse: 79   Temp: 97.8  F (36.6  C)   SpO2: 97%       BMI= Body mass index is 38.02 kg/m .    OBJECTIVE:  General appearance: Patient is alert and fully cooperative with history & exam.  No sign of distress is noted during the visit.  Vascular: Dorsalis pedis and posterior tibial pulses are palpable. There is pedal hair growth bilaterally.  CFT < 3 sec from anterior tibial surface to distal digits bilaterally. There is no appreciable  edema noted.  Dermatologic: Turgor and texture are within normal limits. No coloration or temperature changes. No primary or secondary lesions noted.  Neurologic: All epicritic and proprioceptive sensations are grossly intact bilaterally.  Negative Tinel sign bilaterally  Musculoskeletal: All active and passive ankle, subtalar, midtarsal, and 1st MPJ range of motion are grossly intact without pain or crepitus, with the exception of none. Manual muscle strength is within normal limits bilaterally. All dorsiflexors, plantarflexors, invertors, evertors are intact bilaterally.  No tenderness present to right lower extremity on palpation.  No tenderness to right lower extremity with range of motion. Calf is soft/non-tender without warmth/induration    Imaging:     No results found.         TREATMENT:  I informed the patient that the average patient following this particular procedure typically goes from a 10 out of 10 on the VAS to a 2/10 on the VAS.  She was told that patients continue to improve up to 12 to 18 months following the procedure.  I would like the patient to return to clinic in 5 months for a follow-up visit.        Mario Ely; MARISSA  [facility] Foot & Ankle Surgery/Podiatry

## 2021-05-30 NOTE — TELEPHONE ENCOUNTER
RN cannot approve Refill Request    RN can NOT refill this medication historical medication requested.         Karla Wadsworth, [facility] Triage/Med Refill 7/1/2019    Requested Prescriptions   Pending Prescriptions Disp Refills     simvastatin (ZOCOR) 40 MG tablet [Pharmacy Med Name: SIMVASTATIN 40MG    TAB] 90 tablet 3     Sig: TAKE 1 TABLET BY MOUTH ONCE DAILY       Statins Refill Protocol (Hmg CoA Reductase Inhibitors) Passed - 6/30/2019  9:04 PM        Passed - PCP or prescribing provider visit in past 12 months      Last office visit with prescriber/PCP: 5/7/2019 Annetta Hernandez MD OR same dept: 5/7/2019 Annetta Hernandez MD OR same specialty: 5/7/2019 Annetta Hernandez MD  Last physical: 10/1/2018 Last MTM visit: Visit date not found   Next visit within 3 mo: Visit date not found  Next physical within 3 mo: Visit date not found  Prescriber OR PCP: Annetta Hernandez MD  Last diagnosis associated with med order: 1. Hyperlipidemia  - simvastatin (ZOCOR) 40 MG tablet [Pharmacy Med Name: SIMVASTATIN 40MG    TAB]; TAKE 1 TABLET BY MOUTH ONCE DAILY  Dispense: 90 tablet; Refill: 3    If protocol passes may refill for 12 months if within 3 months of last provider visit (or a total of 15 months).

## 2021-05-31 VITALS — WEIGHT: 241 LBS | BODY MASS INDEX: 35.08 KG/M2

## 2021-05-31 VITALS — HEIGHT: 69 IN | BODY MASS INDEX: 36.58 KG/M2 | WEIGHT: 247 LBS

## 2021-05-31 VITALS — BODY MASS INDEX: 35.83 KG/M2 | WEIGHT: 246.19 LBS

## 2021-05-31 VITALS — WEIGHT: 247 LBS | BODY MASS INDEX: 36.58 KG/M2 | HEIGHT: 69 IN

## 2021-05-31 VITALS — WEIGHT: 241 LBS | BODY MASS INDEX: 35.7 KG/M2 | HEIGHT: 69 IN

## 2021-05-31 VITALS — BODY MASS INDEX: 34.36 KG/M2 | HEIGHT: 70 IN | WEIGHT: 240 LBS

## 2021-05-31 VITALS — WEIGHT: 242.9 LBS | BODY MASS INDEX: 35.36 KG/M2

## 2021-05-31 VITALS — HEIGHT: 70 IN | WEIGHT: 242 LBS | BODY MASS INDEX: 34.65 KG/M2

## 2021-06-01 ENCOUNTER — RECORDS - HEALTHEAST (OUTPATIENT)
Dept: ADMINISTRATIVE | Facility: CLINIC | Age: 56
End: 2021-06-01

## 2021-06-01 VITALS — HEIGHT: 70 IN | WEIGHT: 258 LBS | BODY MASS INDEX: 36.94 KG/M2

## 2021-06-01 VITALS — HEIGHT: 70 IN | BODY MASS INDEX: 37.08 KG/M2 | WEIGHT: 259 LBS

## 2021-06-01 VITALS — WEIGHT: 254.5 LBS | BODY MASS INDEX: 37.04 KG/M2

## 2021-06-01 VITALS — WEIGHT: 255 LBS | BODY MASS INDEX: 36.51 KG/M2 | HEIGHT: 70 IN

## 2021-06-01 VITALS — BODY MASS INDEX: 36.87 KG/M2 | HEIGHT: 70 IN | WEIGHT: 257.56 LBS

## 2021-06-01 VITALS — BODY MASS INDEX: 37.63 KG/M2 | WEIGHT: 258.5 LBS

## 2021-06-01 VITALS — HEIGHT: 69 IN | BODY MASS INDEX: 37.78 KG/M2 | WEIGHT: 255.1 LBS

## 2021-06-01 VITALS — BODY MASS INDEX: 36.65 KG/M2 | WEIGHT: 256 LBS | HEIGHT: 70 IN

## 2021-06-01 VITALS — WEIGHT: 254 LBS | BODY MASS INDEX: 36.97 KG/M2

## 2021-06-01 VITALS — BODY MASS INDEX: 36.65 KG/M2 | HEIGHT: 70 IN | WEIGHT: 256 LBS

## 2021-06-01 VITALS — BODY MASS INDEX: 34.36 KG/M2 | HEIGHT: 70 IN | WEIGHT: 240 LBS

## 2021-06-01 VITALS — WEIGHT: 259 LBS | BODY MASS INDEX: 37.7 KG/M2

## 2021-06-01 VITALS — HEIGHT: 70 IN | BODY MASS INDEX: 36.65 KG/M2 | WEIGHT: 256 LBS

## 2021-06-01 VITALS — HEIGHT: 70 IN | WEIGHT: 255 LBS | BODY MASS INDEX: 36.51 KG/M2

## 2021-06-01 NOTE — PATIENT INSTRUCTIONS - HE
Please call one of the [facility] locations below to schedule an appointment. If you received a prescription please bring it with you to your appointment. Some locations are limited to what they carry.    Office Locations    [facility]  [address] [place] [address]   Orthotics and Prosthetics, [address]   Phone: [phone]    [facility]  Orthotics and Prosthetics ([facility])    [address], [address], [place] [address]  Phone: [phone]    [facility]  [address]   [place] [address]   Phone: [phone]    [facility]ld[facility]  [address]  [place] [address]  Phone: [phone]    [facility]ld[facility]   [address]  [place] [address]  Phone: [phone]    [facility]  [address] [place]  [place] [address]  Phone: [phone]    [facility]  [address]  [address]  [place] [address]  Phone: [phone]    [place]   [address] [place] [address]   Phone: [phone]

## 2021-06-01 NOTE — TELEPHONE ENCOUNTER
Refill Approved    Rx renewed per Medication Renewal Policy. Medication was last renewed on 9/18/18.    Karla Wadsworth, [facility] Triage/Med Refill 9/13/2019     Requested Prescriptions   Pending Prescriptions Disp Refills     gabapentin (NEURONTIN) 300 MG capsule [Pharmacy Med Name: GABAPENTIN 300MG    CAP] 270 capsule 3     Sig: TAKE 3 CAPSULES BY MOUTH THREE TIMES DAILY       Gabapentin/Levetiracetam/Tiagabine Refill Protocol  Passed - 9/13/2019  8:25 PM        Passed - PCP or prescribing provider visit in past 12 months or next 3 months     Last office visit with prescriber/PCP: 5/7/2019 Annetta Hernandez MD OR same dept: 5/7/2019 Annetta Hernandez MD OR same specialty: 5/7/2019 Annetta Hernandez MD  Last physical: 10/1/2018 Last MTM visit: Visit date not found   Next visit within 3 mo: Visit date not found  Next physical within 3 mo: Visit date not found  Prescriber OR PCP: Annetta Hernandez MD  Last diagnosis associated with med order: 1. Fibromyalgia  - gabapentin (NEURONTIN) 300 MG capsule [Pharmacy Med Name: GABAPENTIN 300MG    CAP]; TAKE 3 CAPSULES BY MOUTH THREE TIMES DAILY  Dispense: 270 capsule; Refill: 3    If protocol passes may refill for 12 months if within 3 months of last provider visit (or a total of 15 months).

## 2021-06-01 NOTE — PROGRESS NOTES
FOOT AND ANKLE SURGERY/PODIATRY Progress Note        ASSESSMENT:   Compression neuropathy  Tarsal tunnel  Mononeuropathy     HPI: Catherine Farrell was seen again today for follow-up evaluation of pain involving right lower extremity.  The patient is approximately 1 year status post Dellon quadruple nerve decompression of the right lower extremity.  She stated that she continues to have similar symptoms.  She has tingling on the bottom of her right foot.  The patient stated she continues to have swelling involving the right ankle.  Her pain is aggravated with prolonged weightbearing and ambulation.  She does take one 300 mg tablet of gabapentin at bedtime.  The patient indicated that she cannot tolerate increasing the dosage of the gabapentin.  It makes her very sleepy.    Past Medical History:   Diagnosis Date     Anemia      Bilateral calcaneal spurs     surgery 2015     Bursitis of hip      Chronic back pain      DDD (degenerative disc disease)      Depression      Diverticulitis      Environmental allergies      Fibromyalgia      History of transfusion     With      HLD (hyperlipidemia)      Obesity        Past Surgical History:   Procedure Laterality Date     ACHILLES TENDON REPAIR Left 2015    Procedure: WITH SECONDARY ACHILLES TENDON REAPIR;  Surgeon: Rob Marion DPM;  Location: [facility];  Service:      ACHILLES TENDON REPAIR Right 11/3/2017    Procedure: WITH SECONDARY ACHILLES TENDON REPAIR;  Surgeon: Rob Marion DPM;  Location: [facility] OR;  Service:      ENDOMETRIAL ABLATION       [place] APPENDECTOMY      Description: Appendectomy;  Recorded: 10/18/2008;     [place]  DELIVERY ONLY      Description:  Section;  Recorded: 10/18/2008;     [place] REMOVAL OF HEEL SPUR Left 2015    Procedure: LEFT POSTERIOR CALCANEAL SPUR RESECTION;  Surgeon: Rob Marion DPM;  Location: [facility];  Service: Podiatry     [place] REMOVAL OF TONSILS,<13 Y/O      Description:  Tonsillectomy;  Recorded: 10/18/2008;       Allergies   Allergen Reactions     Amoxicillin Hives     Penicillins Swelling and Itching     Annotation: Swelling of face/eyes, itching       Shellfish Containing Products Anaphylaxis         Current Outpatient Medications:      albuterol (PROAIR HFA;PROVENTIL HFA;VENTOLIN HFA) 90 mcg/actuation inhaler, Inhale 2 puffs every 6 (six) hours as needed for wheezing., Disp: 18 g, Rfl: 1     azithromycin (ZITHROMAX) 250 MG tablet, Take 500 mg (2 x 250 mg tablets) on day 1 followed by 250 mg (1 tablet) on days 2-5., Disp: 6 tablet, Rfl: 0     buPROPion (WELLBUTRIN SR) 150 MG 12 hr tablet, Take 1 tablet (150 mg total) by mouth 2 (two) times a day. Start 150 mg once daily for 3 days, then increase to 150 mg twice a day, Disp: 60 tablet, Rfl: 0     cholecalciferol, vitamin D3, (VITAMIN D3) 2,000 unit Tab, Take 2,000 Units by mouth at bedtime. , Disp: , Rfl:      gabapentin (NEURONTIN) 300 MG capsule, TAKE 3 CAPSULES BY MOUTH THREE TIMES DAILY, Disp: 810 capsule, Rfl: 2     HYDROcodone-acetaminophen (NORCO )  mg per tablet, Take 1 tablet by mouth every 6 (six) hours as needed for pain., Disp: 20 tablet, Rfl: 0     ibuprofen (ADVIL,MOTRIN) 800 MG tablet, TAKE 1 TABLET BY MOUTH EVERY 6 HOURS AS NEEDED FOR PAIN, Disp: 100 tablet, Rfl: 3     meclizine (ANTIVERT) 25 mg tablet, Take 1 tablet (25 mg total) by mouth 3 (three) times a day as needed for dizziness or nausea., Disp: 100 tablet, Rfl: 0     omeprazole (PRILOSEC) 40 MG capsule, TAKE 1 CAPSULE BY MOUTH ONCE DAILY, Disp: 90 capsule, Rfl: 3     simvastatin (ZOCOR) 40 MG tablet, TAKE 1 TABLET BY MOUTH ONCE DAILY, Disp: 90 tablet, Rfl: 3    Family History   Problem Relation Age of Onset     Lung cancer Father      Alcohol abuse Father      Alcohol abuse Son      Hypertension Mother      Stomach cancer Maternal Aunt      Breast cancer Maternal Aunt      Breast cancer Maternal Aunt        Social History     Socioeconomic  History     Marital status:      Spouse name: Not on file     Number of children: Not on file     Years of education: Not on file     Highest education level: Not on file   Occupational History     Occupation: supervisor     Employer: [employer]   Social Needs     Financial resource strain: Not on file     Food insecurity:     Worry: Not on file     Inability: Not on file     Transportation needs:     Medical: Not on file     Non-medical: Not on file   Tobacco Use     Smoking status: Current Every Day Smoker     Packs/day: 0.50     Types: Cigarettes     Smokeless tobacco: Never Used     Tobacco comment: Started Zyban 4/23/19   Substance and Sexual Activity     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     Drug use: No     Sexual activity: Yes     Partners: Male   Lifestyle     Physical activity:     Days per week: Not on file     Minutes per session: Not on file     Stress: Not on file   Relationships     Social connections:     Talks on phone: Not on file     Gets together: Not on file     Attends [employer] service: Not on file     Active member of club or organization: Not on file     Attends meetings of clubs or organizations: Not on file     Relationship status: Not on file     Intimate partner violence:     Fear of current or ex partner: Not on file     Emotionally abused: Not on file     Physically abused: Not on file     Forced sexual activity: Not on file   Other Topics Concern     Not on file   Social History Narrative     Not on file       10 point Review of Systems is negative.       Vitals:    09/16/19 1340   BP: 116/78   SpO2: 97%       BMI= Body mass index is 38.02 kg/m .    OBJECTIVE:  General appearance: Patient is alert and fully cooperative with history & exam.  No sign of distress is noted during the visit.  Vascular: Dorsalis pedis and posterior tibial pulses are palpable. There is no pedal hair growth bilaterally.  CFT < 3 sec from anterior tibial surface to distal digits bilaterally.  There is no appreciable edema noted.  Dermatologic: Turgor and texture are within normal limits. No coloration or temperature changes. No primary or secondary lesions noted.  Neurologic: All epicritic and proprioceptive sensations are grossly managed bilaterally.    Musculoskeletal: All active and passive ankle, subtalar, midtarsal, and 1st MPJ range of motion are grossly intact without pain or crepitus, with the exception of none. Manual muscle strength is within normal limits bilaterally. All dorsiflexors, plantarflexors, invertors, evertors are intact bilaterally. Tenderness present to right lower extremity and right foot on palpation.  Normal tenderness to right foot and right ankle with range of motion. Calf is soft/non-tender without warmth/induration.  There is flattening of the medial longitudinal arch noted bilaterally.    Imaging:     No results found.         TREATMENT:  I informed the patient that if she does not improve within the next 6 months do not believe she will improve much beyond that point.  I am however recommending she try to increase her gabapentin to at least 1 tablet twice daily.  I have also recommended orthotics.        Mario Ely; MARISSA  [facility] Foot & Ankle Surgery/Podiatry

## 2021-06-02 ENCOUNTER — RECORDS - HEALTHEAST (OUTPATIENT)
Dept: ADMINISTRATIVE | Facility: CLINIC | Age: 56
End: 2021-06-02

## 2021-06-02 VITALS — HEIGHT: 70 IN | WEIGHT: 263 LBS | BODY MASS INDEX: 37.65 KG/M2

## 2021-06-02 VITALS — BODY MASS INDEX: 37.74 KG/M2 | WEIGHT: 263 LBS

## 2021-06-02 VITALS — HEIGHT: 70 IN | BODY MASS INDEX: 37.8 KG/M2 | WEIGHT: 264 LBS

## 2021-06-02 VITALS — BODY MASS INDEX: 37.65 KG/M2 | WEIGHT: 263 LBS | HEIGHT: 70 IN

## 2021-06-02 VITALS — BODY MASS INDEX: 38.02 KG/M2 | WEIGHT: 265 LBS

## 2021-06-02 VITALS — WEIGHT: 264 LBS | BODY MASS INDEX: 37.88 KG/M2

## 2021-06-03 VITALS
WEIGHT: 265 LBS | HEART RATE: 90 BPM | DIASTOLIC BLOOD PRESSURE: 88 MMHG | HEIGHT: 70 IN | SYSTOLIC BLOOD PRESSURE: 120 MMHG | BODY MASS INDEX: 37.94 KG/M2 | OXYGEN SATURATION: 99 %

## 2021-06-03 VITALS
HEIGHT: 70 IN | BODY MASS INDEX: 37.94 KG/M2 | SYSTOLIC BLOOD PRESSURE: 116 MMHG | WEIGHT: 265 LBS | DIASTOLIC BLOOD PRESSURE: 78 MMHG | OXYGEN SATURATION: 97 %

## 2021-06-03 VITALS — WEIGHT: 265 LBS | BODY MASS INDEX: 37.94 KG/M2 | HEIGHT: 70 IN

## 2021-06-04 VITALS
HEIGHT: 70 IN | RESPIRATION RATE: 16 BRPM | WEIGHT: 260 LBS | HEART RATE: 72 BPM | TEMPERATURE: 98.7 F | DIASTOLIC BLOOD PRESSURE: 80 MMHG | BODY MASS INDEX: 37.22 KG/M2 | SYSTOLIC BLOOD PRESSURE: 132 MMHG

## 2021-06-04 VITALS
RESPIRATION RATE: 16 BRPM | HEART RATE: 83 BPM | TEMPERATURE: 97.2 F | OXYGEN SATURATION: 98 % | HEIGHT: 70 IN | BODY MASS INDEX: 36.94 KG/M2 | SYSTOLIC BLOOD PRESSURE: 126 MMHG | WEIGHT: 258 LBS | DIASTOLIC BLOOD PRESSURE: 81 MMHG

## 2021-06-04 VITALS
TEMPERATURE: 97.5 F | BODY MASS INDEX: 36.01 KG/M2 | WEIGHT: 251 LBS | DIASTOLIC BLOOD PRESSURE: 90 MMHG | HEART RATE: 74 BPM | RESPIRATION RATE: 16 BRPM | SYSTOLIC BLOOD PRESSURE: 130 MMHG

## 2021-06-04 VITALS
DIASTOLIC BLOOD PRESSURE: 81 MMHG | WEIGHT: 259 LBS | RESPIRATION RATE: 20 BRPM | BODY MASS INDEX: 37.16 KG/M2 | SYSTOLIC BLOOD PRESSURE: 150 MMHG | HEART RATE: 75 BPM

## 2021-06-04 NOTE — PROGRESS NOTES
FOOT AND ANKLE SURGERY/PODIATRY Progress Note        ASSESSMENT:   Compression neuropathy  Tarsal tunnel  Mononeuritis right lower extremity    HPI: Catherine Farrell was seen again today complaining of pain and muscle weakness of the right lower extremity.  The patient was previously surgically treated in an attempt to alleviate her symptoms of neuropathy.  She stated that her symptoms improved from a 10 on the VAS to an 8 on the VAS.  He noticed this she has some cramping of the muscles in the arch of her right foot.  She also has some muscle weakness of the right lower extremity.  She is currently taking 2 300 mg tabs of gabapentin at noon and 3 tablets at bedtime.  She has greater sensation on the bottom of her right foot as a result of her surgical procedures.    Past Medical History:   Diagnosis Date     Anemia      Bilateral calcaneal spurs     surgery 2015     Bursitis of hip      Chronic back pain      DDD (degenerative disc disease)      Depression      Diverticulitis      Environmental allergies      Fibromyalgia      History of transfusion     With      HLD (hyperlipidemia)      Obesity        Past Surgical History:   Procedure Laterality Date     ACHILLES TENDON REPAIR Left 2015    Procedure: WITH SECONDARY ACHILLES TENDON REAPIR;  Surgeon: Rob Marion DPM;  Location: [facility];  Service:      ACHILLES TENDON REPAIR Right 11/3/2017    Procedure: WITH SECONDARY ACHILLES TENDON REPAIR;  Surgeon: Rob Marion DPM;  Location: [facility];  Service:      ENDOMETRIAL ABLATION       IN APPENDECTOMY      Description: Appendectomy;  Recorded: 10/18/2008;     IN  DELIVERY ONLY      Description:  Section;  Recorded: 10/18/2008;     IN REMOVAL OF HEEL SPUR Left 2015    Procedure: LEFT POSTERIOR CALCANEAL SPUR RESECTION;  Surgeon: Rob Marion DPM;  Location: [facility];  Service: Podiatry     IN REMOVAL OF TONSILS,<13 Y/O      Description:  Tonsillectomy;  Recorded: 10/18/2008;       Allergies   Allergen Reactions     Amoxicillin Hives     Penicillins Swelling and Itching     Annotation: Swelling of face/eyes, itching       Shellfish Containing Products Anaphylaxis         Current Outpatient Medications:      acetaminophen-codeine (TYLENOL #3) 300-30 mg per tablet, , Disp: , Rfl:      albuterol (PROAIR HFA;PROVENTIL HFA;VENTOLIN HFA) 90 mcg/actuation inhaler, Inhale 2 puffs every 6 (six) hours as needed for wheezing., Disp: 18 g, Rfl: 1     azithromycin (ZITHROMAX) 250 MG tablet, Take 500 mg (2 x 250 mg tablets) on day 1 followed by 250 mg (1 tablet) on days 2-5., Disp: 6 tablet, Rfl: 0     buPROPion (WELLBUTRIN SR) 150 MG 12 hr tablet, Take 1 tablet (150 mg total) by mouth 2 (two) times a day. Start 150 mg once daily for 3 days, then increase to 150 mg twice a day, Disp: 60 tablet, Rfl: 0     cholecalciferol, vitamin D3, (VITAMIN D3) 2,000 unit Tab, Take 2,000 Units by mouth at bedtime. , Disp: , Rfl:      gabapentin (NEURONTIN) 300 MG capsule, TAKE 3 CAPSULES BY MOUTH THREE TIMES DAILY, Disp: 810 capsule, Rfl: 2     HYDROcodone-acetaminophen (NORCO )  mg per tablet, Take 1 tablet by mouth every 6 (six) hours as needed for pain., Disp: 20 tablet, Rfl: 0     ibuprofen (ADVIL,MOTRIN) 800 MG tablet, TAKE 1 TABLET BY MOUTH EVERY 6 HOURS AS NEEDED FOR PAIN, Disp: 100 tablet, Rfl: 3     meclizine (ANTIVERT) 25 mg tablet, Take 1 tablet (25 mg total) by mouth 3 (three) times a day as needed for dizziness or nausea., Disp: 100 tablet, Rfl: 0     omeprazole (PRILOSEC) 40 MG capsule, TAKE 1 CAPSULE BY MOUTH ONCE DAILY, Disp: 90 capsule, Rfl: 3     simvastatin (ZOCOR) 40 MG tablet, TAKE 1 TABLET BY MOUTH ONCE DAILY, Disp: 90 tablet, Rfl: 3     simvastatin (ZOCOR) 40 MG tablet, , Disp: , Rfl:     Family History   Problem Relation Age of Onset     Lung cancer Father      Alcohol abuse Father      Alcohol abuse Son      Hypertension Mother      Stomach  cancer Maternal Aunt      Breast cancer Maternal Aunt      Breast cancer Maternal Aunt        Social History     Socioeconomic History     Marital status:      Spouse name: Not on file     Number of children: Not on file     Years of education: Not on file     Highest education level: Not on file   Occupational History     Occupation: supervisor     Employer: [employer]   Social Needs     Financial resource strain: Not on file     Food insecurity:     Worry: Not on file     Inability: Not on file     Transportation needs:     Medical: Not on file     Non-medical: Not on file   Tobacco Use     Smoking status: Current Every Day Smoker     Packs/day: 0.50     Types: Cigarettes     Smokeless tobacco: Never Used     Tobacco comment: Started Zyban 4/23/19   Substance and Sexual Activity     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     Drug use: No     Sexual activity: Yes     Partners: Male   Lifestyle     Physical activity:     Days per week: Not on file     Minutes per session: Not on file     Stress: Not on file   Relationships     Social connections:     Talks on phone: Not on file     Gets together: Not on file     Attends Hinduism service: Not on file     Active member of club or organization: Not on file     Attends meetings of clubs or organizations: Not on file     Relationship status: Not on file     Intimate partner violence:     Fear of current or ex partner: Not on file     Emotionally abused: Not on file     Physically abused: Not on file     Forced sexual activity: Not on file   Other Topics Concern     Not on file   Social History Narrative     Not on file       10 point Review of Systems is negative        Vitals:    12/17/19 1328   BP: 120/88   Pulse: 90   SpO2: 99%       BMI= Body mass index is 38.02 kg/m .    OBJECTIVE:  General appearance: Patient is alert and fully cooperative with history & exam.  No sign of distress is noted during the visit.  Vascular: Dorsalis pedis and posterior  tibial pulses are palpable. There is no pedal hair growth bilaterally.  CFT < 3 sec from anterior tibial surface to distal digits bilaterally. There is no appreciable edema noted.  Dermatologic: Turgor and texture are within normal limits. No coloration or temperature changes. No primary or secondary lesions noted.  Neurologic: All epicritic and proprioceptive sensations are grossly diminished bilaterally.    Musculoskeletal: All active and passive ankle, subtalar, midtarsal, and 1st MPJ range of motion are grossly intact without pain or crepitus, with the exception of none. Manual muscle strength is diminished right lower extremity in all compartments. All dorsiflexors, plantarflexors, invertors, evertors are intact bilaterally. Tenderness present to tarsal tunnel right side on palpation.  No tenderness to right foot or ankle with range of motion. Calf is soft/non-tender without warmth/induration    Imaging:     No results found.         TREATMENT:  I informed the patient that I would like to see her in 6 months.  She was told that some patients improve over a 12 to 18-month.  I told the patient that after 18 months if she has not improved she would probably not significantly improve after that point.  I recommend she increase her gabapentin to 600 mg at noon and 600 mg at bedtime.    Mario Ely; MARISSA  [facility] Foot & Ankle Surgery/Podiatry

## 2021-06-05 VITALS
HEART RATE: 99 BPM | WEIGHT: 268 LBS | DIASTOLIC BLOOD PRESSURE: 84 MMHG | BODY MASS INDEX: 37.94 KG/M2 | SYSTOLIC BLOOD PRESSURE: 157 MMHG | RESPIRATION RATE: 12 BRPM | TEMPERATURE: 98 F

## 2021-06-05 VITALS
RESPIRATION RATE: 15 BRPM | HEART RATE: 101 BPM | WEIGHT: 262 LBS | TEMPERATURE: 97.7 F | BODY MASS INDEX: 37.51 KG/M2 | SYSTOLIC BLOOD PRESSURE: 131 MMHG | DIASTOLIC BLOOD PRESSURE: 85 MMHG | HEIGHT: 70 IN

## 2021-06-05 NOTE — PROGRESS NOTES
"ASSESSMENT/PLAN:  1. Chronic right shoulder pain  XR Shoulder Right 2 or More VWS   2. Impingement syndrome, shoulder, right  lidocaine 1%-EPINEPHrine 1:100,000 1 %-1:100,000 injection 1 mL (XYLOCAINE W/EPI)    methylPREDNISolone acetate injection 40 mg (DEPO-MEDROL)    methylPREDNISolone acetate injection 40 mg (DEPO-MEDROL)       This is a 53 yo female with:  1.  Acute on chronic right shoulder pain - now with impingement symptoms - an xray was obtained and personally reviewed with the patient - no sign of significant arthritis - joint space looks well preserved.  WE discussed options - offered cortisone injection.  See procedure note.  WE will keep her out of work the next couple days - and allow her to improve with this injection.       Return in about 3 months (around 4/21/2020) for Recheck.  Administrations This Visit     lidocaine 1%-EPINEPHrine 1:100,000 1 %-1:100,000 injection 1 mL (XYLOCAINE W/EPI)     Admin Date  01/21/2020 Action  Given Dose  1 mL Route  Other Administered By  Michaela Garnett MA          methylPREDNISolone acetate injection 40 mg (DEPO-MEDROL)     Admin Date  01/21/2020 Action  Given Dose  40 mg Route  Intramuscular Administered By  Michaela Garnett MA    Admin Date  01/21/2020 Action  Given Dose  40 mg Route  Intramuscular Administered By  Michaela Garnett MA                There are no discontinued medications.  There are no Patient Instructions on file for this visit.    Chief Complaint:  Chief Complaint   Patient presents with     Shoulder Pain     RIGHT SHOULDER       HPI:   Catherine Farrell is a 54 y.o. female c/o  1. Hand swelling - bilateral    Right shoulder - hurts a lot  Across the deltoid  No specific injury  Does lift at work    2.  Foot stuff - \"nothing else to do\" for the nerve pain  Can't walk right  Doesn't sleep well  Takes Gabapentin in afternoon after work and evening - 300 mg each time  Can't do the morning thing - goes to work at 4:30-5:00 am!  Still working -   Very " velasquez     PMH:   Patient Active Problem List    Diagnosis Date Noted     Obesity (BMI 35.0-39.9) with comorbidity (H) 2019     Right foot pain 2018     Compression neuropathy 2018     Tarsal tunnel syndrome of right side 2018     Compression neuropathy of right lower extremity 2018     Mononeuritis lower limb, right 2018     Numbness of right lower extremity 2018     Calcaneal spur, right 2017     Vitamin D deficiency 2017     Chronic pain 03/10/2017     Costochondritis 07/10/2015     Fatigue 07/10/2015     Obsessive compulsive disorder 2015     Dermatitis 2015     Patellofemoral Syndrome Of The Right Knee  2015     Tension-type headache 2015     Simple (Specific) Phobia      Cough      Nicotine Dependence      Chronic Reflux Esophagitis      Fibromyalgia      Strain Of The Gastrocnemius Muscle Of The Right Leg      Adjustment Disorder With Depressed Mood      Joint Pain, Localized In The Knee      Pain During Urination (Dysuria)      Abdominal Pain      Diverticulitis Of Colon      Trochanteric Bursitis      Sinusitis      Midback Pain      Mixed hyperlipidemia      Abnormal Weight Loss      Abnormal Pap smear of cervix      Adverse Food Reaction (Not Anaphylactic)      Impingement Of The Right Shoulder      Hypertension      Head Injury      Oral Thrush      Lumbar Disc Degeneration      Lower Back Pain Chronic      Carpal Tunnel Syndrome      Past Medical History:   Diagnosis Date     Anemia      Bilateral calcaneal spurs     surgery 2015     Bursitis of hip      Chronic back pain      DDD (degenerative disc disease)      Depression      Diverticulitis      Environmental allergies      Fibromyalgia      History of transfusion     With      HLD (hyperlipidemia)      Obesity      Past Surgical History:   Procedure Laterality Date     ACHILLES TENDON REPAIR Left 2015    Procedure: WITH SECONDARY ACHILLES TENDON  REAPIR;  Surgeon: Rob Marion DPM;  Location: [facility];  Service:      ACHILLES TENDON REPAIR Right 11/3/2017    Procedure: WITH SECONDARY ACHILLES TENDON REPAIR;  Surgeon: Rob Marion DPM;  Location: [facility];  Service:      ENDOMETRIAL ABLATION       MT APPENDECTOMY      Description: Appendectomy;  Recorded: 10/18/2008;     MT  DELIVERY ONLY      Description:  Section;  Recorded: 10/18/2008;     MT REMOVAL OF HEEL SPUR Left 2015    Procedure: LEFT POSTERIOR CALCANEAL SPUR RESECTION;  Surgeon: Rob Marion DPM;  Location: [facility];  Service: Podiatry     MT REMOVAL OF TONSILS,<11 Y/O      Description: Tonsillectomy;  Recorded: 10/18/2008;     Social History     Socioeconomic History     Marital status:      Spouse name: Not on file     Number of children: Not on file     Years of education: Not on file     Highest education level: Not on file   Occupational History     Occupation: supervisor     Employer: [employer]   Social Needs     Financial resource strain: Not on file     Food insecurity:     Worry: Not on file     Inability: Not on file     Transportation needs:     Medical: Not on file     Non-medical: Not on file   Tobacco Use     Smoking status: Current Every Day Smoker     Packs/day: 0.50     Types: Cigarettes     Smokeless tobacco: Never Used     Tobacco comment: Started Zyban 17   Substance and Sexual Activity     Alcohol use: No     Comment: sober since      Drug use: No     Sexual activity: Yes     Partners: Male   Lifestyle     Physical activity:     Days per week: Not on file     Minutes per session: Not on file     Stress: Not on file   Relationships     Social connections:     Talks on phone: Not on file     Gets together: Not on file     Attends Protestant service: Not on file     Active member of club or organization: Not on file     Attends meetings of clubs or organizations: Not on file     Relationship status: Not on file      Intimate partner violence:     Fear of current or ex partner: Not on file     Emotionally abused: Not on file     Physically abused: Not on file     Forced sexual activity: Not on file   Other Topics Concern     Not on file   Social History Narrative     Not on file     Family History   Problem Relation Age of Onset     Lung cancer Father      Alcohol abuse Father      Alcohol abuse Son      Hypertension Mother      Stomach cancer Maternal Aunt      Breast cancer Maternal Aunt      Breast cancer Maternal Aunt        Meds:    Current Outpatient Medications:      acetaminophen-codeine (TYLENOL #3) 300-30 mg per tablet, , Disp: , Rfl:      albuterol (PROAIR HFA;PROVENTIL HFA;VENTOLIN HFA) 90 mcg/actuation inhaler, Inhale 2 puffs every 6 (six) hours as needed for wheezing., Disp: 18 g, Rfl: 1     azithromycin (ZITHROMAX) 250 MG tablet, Take 500 mg (2 x 250 mg tablets) on day 1 followed by 250 mg (1 tablet) on days 2-5., Disp: 6 tablet, Rfl: 0     buPROPion (WELLBUTRIN SR) 150 MG 12 hr tablet, Take 1 tablet (150 mg total) by mouth 2 (two) times a day. Start 150 mg once daily for 3 days, then increase to 150 mg twice a day, Disp: 60 tablet, Rfl: 0     cholecalciferol, vitamin D3, (VITAMIN D3) 2,000 unit Tab, Take 2,000 Units by mouth at bedtime. , Disp: , Rfl:      gabapentin (NEURONTIN) 300 MG capsule, TAKE 3 CAPSULES BY MOUTH THREE TIMES DAILY, Disp: 810 capsule, Rfl: 2     HYDROcodone-acetaminophen (NORCO )  mg per tablet, Take 1 tablet by mouth every 6 (six) hours as needed for pain., Disp: 20 tablet, Rfl: 0     ibuprofen (ADVIL,MOTRIN) 800 MG tablet, TAKE 1 TABLET BY MOUTH EVERY 6 HOURS AS NEEDED FOR PAIN, Disp: 100 tablet, Rfl: 3     meclizine (ANTIVERT) 25 mg tablet, Take 1 tablet (25 mg total) by mouth 3 (three) times a day as needed for dizziness or nausea., Disp: 100 tablet, Rfl: 0     omeprazole (PRILOSEC) 40 MG capsule, TAKE 1 CAPSULE BY MOUTH ONCE DAILY, Disp: 90 capsule, Rfl: 3      simvastatin (ZOCOR) 40 MG tablet, TAKE 1 TABLET BY MOUTH ONCE DAILY, Disp: 90 tablet, Rfl: 3     simvastatin (ZOCOR) 40 MG tablet, , Disp: , Rfl:     Allergies:  Allergies   Allergen Reactions     Amoxicillin Hives     Penicillins Swelling and Itching     Annotation: Swelling of face/eyes, itching       Shellfish Containing Products Anaphylaxis       ROS:  Pertinent positives as noted in HPI; otherwise 12 point ROS negative.      Physical Exam:  EXAM:  /81 (Patient Site: Left Arm, Patient Position: Sitting, Cuff Size: Adult Large)   Pulse 75   Resp 20   Wt (!) 259 lb (117.5 kg)   BMI 37.16 kg/m     Gen:  NAD, appears well, well-hydrated  HEENT:  TMs nl, oropharynx benign, nasal mucosa nl, conjunctiva clear  Neck:  Supple, no adenopathy, no thyromegaly, no carotid bruits, no JVD  Lungs:  Clear to auscultation bilaterally  Cor:  RRR no murmur  Abd:  Soft, nontender, BS+, no masses, no guarding or rebound, no HSM  Extr:  decreased ROM right shoulder with decreased abduction, decreased int/ext rotation .; chronic changes right foot s/p surgery   Neuro:  No asymmetry, Nl motor tone/strength, nl sensation, reflexes =, gait nl, nl coordination, CN intact,   Skin:  Warm/dry        Injection for Right shoulder impingement/pain -  Procedure Note    Catherine Farrell is a 54 y.o. female is here today for right shoulder pain   Indications:   Pain/ right shoulder impingement  Procedure Details   Subacromial bursa identified by palpation as well as anterior shoulder joint.  Both areas swabbed with Betadine swab x 3  With 22 g needle, 2 ml DepoMedrol (total 80 mg) + 1 ml Lidocaine injected - with half from subacromial space and half from anterior joint space    Findings:  None    Specimens: None    Complications:   None    Plan:   Follow up as needed.      Annetta Hernandez

## 2021-06-06 NOTE — PROGRESS NOTES
"ASSESSMENT/PLAN:  1. Fever  Influenza A/B Rapid Test- Nasal Swab    HM1(CBC and Differential)    HM1 (CBC with Diff)   2. Acute diverticulitis  metroNIDAZOLE (FLAGYL) 500 MG tablet    ciprofloxacin HCl (CIPRO) 500 MG tablet   3. Need for tetanus booster  Tdap vaccine,  8yo or older,  IM       This is a 53 yo female with:  1.  Fever, abdominal pain, diarrhea.  Influenza testing is negative.  Hemogram is unremarkable.  Pain is mostly LLQ - patient has h/o diverticulitis -   2.  Acute diverticulitis - will treat for acute diverticulitis - with Metronidazole, Cipro  3.  Due for tetanus booster - ordered today    Return in about 1 week (around 2/24/2020) for if not getting better.      There are no discontinued medications.  There are no Patient Instructions on file for this visit.    Chief Complaint:  Chief Complaint   Patient presents with     Diarrhea     Nausea     Fatigue     Fever       HPI:   Catherine Farrell is a 54 y.o. female c/o  Has been exposed to Norovirus, has been exposed to Influenza A and B  Doing modified BRAT diet - otherwise not able to eat anything  hsa been sick x 1 week - tired, rice, yogurt, toast, applesauce -   Mom is sick, too -   [place] all started - since then people have posted that they are sick    Had a fever - has kept it down   Wore multiple layers to keep her body warm  \"I haven't been this tired in years\"  Can't keep her right foot warm - turning purple              PMH:   Patient Active Problem List    Diagnosis Date Noted     Obesity (BMI 35.0-39.9) with comorbidity (H) 04/23/2019     Right foot pain 09/27/2018     Compression neuropathy 09/27/2018     Tarsal tunnel syndrome of right side 06/04/2018     Compression neuropathy of right lower extremity 06/04/2018     Mononeuritis lower limb, right 06/04/2018     Numbness of right lower extremity 05/30/2018     Calcaneal spur, right 09/11/2017     Vitamin D deficiency 07/03/2017     Chronic pain " 03/10/2017     Costochondritis 07/10/2015     Fatigue 07/10/2015     Obsessive compulsive disorder 2015     Dermatitis 2015     Patellofemoral Syndrome Of The Right Knee  2015     Tension-type headache 2015     Simple (Specific) Phobia      Cough      Nicotine Dependence      Chronic Reflux Esophagitis      Fibromyalgia      Strain Of The Gastrocnemius Muscle Of The Right Leg      Adjustment Disorder With Depressed Mood      Joint Pain, Localized In The Knee      Pain During Urination (Dysuria)      Abdominal Pain      Diverticulitis Of Colon      Trochanteric Bursitis      Sinusitis      Midback Pain      Mixed hyperlipidemia      Abnormal Weight Loss      Abnormal Pap smear of cervix      Adverse Food Reaction (Not Anaphylactic)      Impingement Of The Right Shoulder      Hypertension      Head Injury      Oral Thrush      Lumbar Disc Degeneration      Lower Back Pain Chronic      Carpal Tunnel Syndrome      Past Medical History:   Diagnosis Date     Anemia      Bilateral calcaneal spurs     surgery 2015     Bursitis of hip      Chronic back pain      DDD (degenerative disc disease)      Depression      Diverticulitis      Environmental allergies      Fibromyalgia      History of transfusion     With      HLD (hyperlipidemia)      Obesity      Past Surgical History:   Procedure Laterality Date     ACHILLES TENDON REPAIR Left 2015    Procedure: WITH SECONDARY ACHILLES TENDON REAPIR;  Surgeon: Rob Marion DPM;  Location: [facility];  Service:      ACHILLES TENDON REPAIR Right 11/3/2017    Procedure: WITH SECONDARY ACHILLES TENDON REPAIR;  Surgeon: Rob Marion DPM;  Location: [facility];  Service:      ENDOMETRIAL ABLATION       NM APPENDECTOMY      Description: Appendectomy;  Recorded: 10/18/2008;     NM  DELIVERY ONLY      Description:  Section;  Recorded: 10/18/2008;     NM REMOVAL OF HEEL SPUR Left 2015    Procedure: LEFT  POSTERIOR CALCANEAL SPUR RESECTION;  Surgeon: Rob Marion DPM;  Location: [facility];  Service: Podiatry     RI REMOVAL OF TONSILS,<11 Y/O      Description: Tonsillectomy;  Recorded: 10/18/2008;     Social History     Socioeconomic History     Marital status:      Spouse name: Not on file     Number of children: Not on file     Years of education: Not on file     Highest education level: Not on file   Occupational History     Occupation: supervisor     Employer: [employer]   Social Needs     Financial resource strain: Not on file     Food insecurity:     Worry: Not on file     Inability: Not on file     Transportation needs:     Medical: Not on file     Non-medical: Not on file   Tobacco Use     Smoking status: Current Every Day Smoker     Packs/day: 0.50     Types: Cigarettes     Smokeless tobacco: Never Used     Tobacco comment: Started Zyban 4/23/19   Substance and Sexual Activity     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     Drug use: No     Sexual activity: Yes     Partners: Male   Lifestyle     Physical activity:     Days per week: Not on file     Minutes per session: Not on file     Stress: Not on file   Relationships     Social connections:     Talks on phone: Not on file     Gets together: Not on file     Attends Voodoo service: Not on file     Active member of club or organization: Not on file     Attends meetings of clubs or organizations: Not on file     Relationship status: Not on file     Intimate partner violence:     Fear of current or ex partner: Not on file     Emotionally abused: Not on file     Physically abused: Not on file     Forced sexual activity: Not on file   Other Topics Concern     Not on file   Social History Narrative     Not on file     Family History   Problem Relation Age of Onset     Lung cancer Father      Alcohol abuse Father      Alcohol abuse Son      Hypertension Mother      Stomach cancer Maternal Aunt      Breast cancer Maternal Aunt      Breast  cancer Maternal Aunt        Meds:    Current Outpatient Medications:      acetaminophen-codeine (TYLENOL #3) 300-30 mg per tablet, , Disp: , Rfl:      albuterol (PROAIR HFA;PROVENTIL HFA;VENTOLIN HFA) 90 mcg/actuation inhaler, Inhale 2 puffs every 6 (six) hours as needed for wheezing., Disp: 18 g, Rfl: 1     azithromycin (ZITHROMAX) 250 MG tablet, Take 500 mg (2 x 250 mg tablets) on day 1 followed by 250 mg (1 tablet) on days 2-5., Disp: 6 tablet, Rfl: 0     buPROPion (WELLBUTRIN SR) 150 MG 12 hr tablet, Take 1 tablet (150 mg total) by mouth 2 (two) times a day. Start 150 mg once daily for 3 days, then increase to 150 mg twice a day, Disp: 60 tablet, Rfl: 0     cholecalciferol, vitamin D3, (VITAMIN D3) 2,000 unit Tab, Take 2,000 Units by mouth at bedtime. , Disp: , Rfl:      ciprofloxacin HCl (CIPRO) 500 MG tablet, Take 1 tablet (500 mg total) by mouth 2 (two) times a day for 10 days., Disp: 20 tablet, Rfl: 0     gabapentin (NEURONTIN) 300 MG capsule, TAKE 3 CAPSULES BY MOUTH THREE TIMES DAILY, Disp: 810 capsule, Rfl: 2     HYDROcodone-acetaminophen (NORCO )  mg per tablet, Take 1 tablet by mouth every 6 (six) hours as needed for pain., Disp: 20 tablet, Rfl: 0     ibuprofen (ADVIL,MOTRIN) 800 MG tablet, TAKE 1 TABLET BY MOUTH EVERY 6 HOURS AS NEEDED FOR PAIN, Disp: 100 tablet, Rfl: 3     meclizine (ANTIVERT) 25 mg tablet, Take 1 tablet (25 mg total) by mouth 3 (three) times a day as needed for dizziness or nausea., Disp: 100 tablet, Rfl: 0     metroNIDAZOLE (FLAGYL) 500 MG tablet, Take 1 tablet (500 mg total) by mouth 2 (two) times a day for 10 days., Disp: 20 tablet, Rfl: 0     omeprazole (PRILOSEC) 40 MG capsule, TAKE 1 CAPSULE BY MOUTH ONCE DAILY, Disp: 90 capsule, Rfl: 3     predniSONE (DELTASONE) 10 mg tablet, 6 tabs po daily x 2 d, 5 tabs po daily x 2 d, 4 tabs po daily x 2 d,  3 tabs po daily x 2 d,  2 tabs po daily x 2 d,  1 tab po daily x 2 d., Disp: 42 tablet, Rfl: 0     simvastatin (ZOCOR) 40  MG tablet, TAKE 1 TABLET BY MOUTH ONCE DAILY, Disp: 90 tablet, Rfl: 3     simvastatin (ZOCOR) 40 MG tablet, , Disp: , Rfl:     Allergies:  Allergies   Allergen Reactions     Amoxicillin Hives     Penicillins Swelling and Itching     Annotation: Swelling of face/eyes, itching       Shellfish Containing Products Anaphylaxis       ROS:  Pertinent positives as noted in HPI; otherwise 12 point ROS negative.      Physical Exam:  EXAM:  /90 (Patient Site: Left Arm, Patient Position: Sitting, Cuff Size: Adult Large)   Pulse 74   Temp 97.5  F (36.4  C) (Oral)   Resp 16   Wt (!) 251 lb (113.9 kg)   BMI 36.01 kg/m     Gen:  NAD, appears well, well-hydrated  HEENT:  TMs nl, oropharynx benign, nasal mucosa nl, conjunctiva clear  Neck:  Supple, no adenopathy, no thyromegaly, no carotid bruits, no JVD  Lungs:  Clear to auscultation bilaterally  Cor:  RRR no murmur  Abd:  Soft, tender to palpation at LLQ, BS+, no masses, no guarding or rebound, no HSM  Extr:  Neg.  Neuro:  No asymmetry  Skin:  Warm/dry        Results:  Results for orders placed or performed in visit on 02/17/20   Influenza A/B Rapid Test- Nasal Swab   Result Value Ref Range    Influenza  A, Rapid Antigen No Influenza A antigen detected No Influenza A antigen detected    Influenza B, Rapid Antigen No Influenza B antigen detected No Influenza B antigen detected   HM1 (CBC with Diff)   Result Value Ref Range    WBC 8.0 4.0 - 11.0 thou/uL    RBC 4.49 3.80 - 5.40 mill/uL    Hemoglobin 13.1 12.0 - 16.0 g/dL    Hematocrit 39.8 35.0 - 47.0 %    MCV 89 80 - 100 fL    MCH 29.2 27.0 - 34.0 pg    MCHC 33.0 32.0 - 36.0 g/dL    RDW 12.5 11.0 - 14.5 %    Platelets 241 140 - 440 thou/uL    MPV 8.1 7.0 - 10.0 fL    Neutrophils % 52 50 - 70 %    Lymphocytes % 39 20 - 40 %    Monocytes % 6 2 - 10 %    Eosinophils % 3 0 - 6 %    Basophils % 1 0 - 2 %    Neutrophils Absolute 4.2 2.0 - 7.7 thou/uL    Lymphocytes Absolute 3.1 0.8 - 4.4 thou/uL    Monocytes Absolute 0.5 0.0 -  0.9 thou/uL    Eosinophils Absolute 0.2 0.0 - 0.4 thou/uL    Basophils Absolute 0.1 0.0 - 0.2 thou/uL

## 2021-06-07 NOTE — PROGRESS NOTES
"Catherine Farrell is a 54 y.o. female who is being evaluated via a billable telephone visit.      The patient has been notified of following:     \"This telephone visit will be conducted via a call between you and your physician/provider. We have found that certain health care needs can be provided without the need for a physical exam.  This service lets us provide the care you need with a short phone conversation.  If a prescription is necessary we can send it directly to your pharmacy.  If lab work is needed we can place an order for that and you can then stop by our lab to have the test done at a later time.    Telephone visits are billed at different rates depending on your insurance coverage. During this emergency period, for some insurers they may be billed the same as an in-person visit.  Please reach out to your insurance provider with any questions.    If during the course of the call the physician/provider feels a telephone visit is not appropriate, you will not be charged for this service.\"    Patient has given verbal consent to a Telephone visit? Yes    Patient would like to receive their AVS by AVS Preference: Sree.    Additional provider notes:     Assessment/Plan:      Phone call duration:  15 minutes    [place]  DUDLEY Staples  10:38 AM     Threw out back.   This morning.  Going down leg.    Old problem.  Has had injections     Something as simple as picking something off floor will do it.  Similar to past.   Goes to rear end and right hip.  No stool or urine control issues.    Is worker.  .   [employer]ur[employer]   Limited number of people there.     and needs to move around more.   Needs to bend awkward as is tall      Usually steroid couple wks and pain med.  Vicodin.      8/ 2018  CONCLUSION:  1.  Mild multilevel degenerative changes in the lumbar spine as described, including mild spinal canal stenosis at L4-L5 and mild bilateral foraminal stenosis at L4-L5 and " L5-S1.      ASSESSMENT/PLAN:    Flare of chronic problem/ rx as in past and expect return to baseline.  otherwise follow up     1. Impingement syndrome of shoulder region, right     2. Compression neuropathy  predniSONE (DELTASONE) 10 mg tablet    HYDROcodone-acetaminophen (NORCO )  mg per tablet    Added automatically from request for surgery [id number]     Problem number of new, unstable, or uncontrolled problems above (others are stable):  Exacerbation of 2    Chronic issues stable/ same treatment  Current Outpatient Medications on File Prior to Visit   Medication Sig Dispense Refill     cholecalciferol, vitamin D3, (VITAMIN D3) 2,000 unit Tab Take 2,000 Units by mouth at bedtime.        gabapentin (NEURONTIN) 300 MG capsule TAKE 3 CAPSULES BY MOUTH THREE TIMES DAILY 810 capsule 2     ibuprofen (ADVIL,MOTRIN) 800 MG tablet TAKE 1 TABLET BY MOUTH EVERY 6 HOURS AS NEEDED FOR PAIN 100 tablet 3     simvastatin (ZOCOR) 40 MG tablet TAKE 1 TABLET BY MOUTH ONCE DAILY 90 tablet 3     acetaminophen-codeine (TYLENOL #3) 300-30 mg per tablet        albuterol (PROAIR HFA;PROVENTIL HFA;VENTOLIN HFA) 90 mcg/actuation inhaler Inhale 2 puffs every 6 (six) hours as needed for wheezing. 18 g 1     azithromycin (ZITHROMAX) 250 MG tablet Take 500 mg (2 x 250 mg tablets) on day 1 followed by 250 mg (1 tablet) on days 2-5. 6 tablet 0     buPROPion (WELLBUTRIN SR) 150 MG 12 hr tablet Take 1 tablet (150 mg total) by mouth 2 (two) times a day. Start 150 mg once daily for 3 days, then increase to 150 mg twice a day 60 tablet 0     meclizine (ANTIVERT) 25 mg tablet Take 1 tablet (25 mg total) by mouth 3 (three) times a day as needed for dizziness or nausea. 100 tablet 0     omeprazole (PRILOSEC) 40 MG capsule TAKE 1 CAPSULE BY MOUTH ONCE DAILY 90 capsule 3     simvastatin (ZOCOR) 40 MG tablet        [DISCONTINUED] HYDROcodone-acetaminophen (NORCO )  mg per tablet Take 1 tablet by mouth every 6 (six) hours as needed for  pain. 20 tablet 0     [DISCONTINUED] predniSONE (DELTASONE) 10 mg tablet 6 tabs po daily x 2 d, 5 tabs po daily x 2 d, 4 tabs po daily x 2 d,  3 tabs po daily x 2 d,  2 tabs po daily x 2 d,  1 tab po daily x 2 d. 42 tablet 0     No current facility-administered medications on file prior to visit.

## 2021-06-08 NOTE — PROGRESS NOTES
"Catherine Farrell is a 54 y.o. female who is being evaluated via a billable video visit.      The patient has been notified of following:     \"This video visit will be conducted via a call between you and your physician/provider. We have found that certain health care needs can be provided without the need for an in-person physical exam.  This service lets us provide the care you need with a video conversation.  If a prescription is necessary we can send it directly to your pharmacy.  If lab work is needed we can place an order for that and you can then stop by our lab to have the test done at a later time.    Video visits are billed at different rates depending on your insurance coverage. Please reach out to your insurance provider with any questions.    If during the course of the call the physician/provider feels a video visit is not appropriate, you will not be charged for this service.\"    Patient has given verbal consent to a Video visit? Yes    Will anyone else be joining your video visit? No            Video Start Time: 1319    Additional provider notes:       Video-Visit Details    Type of service:  Video Visit    Video End Time (time video stopped): 1330  Originating Location (pt. Location): Home    Distant Location (provider location):  [facility]     Platform used for Video Visit: Delma Montes DPM    "

## 2021-06-08 NOTE — PROGRESS NOTES
"Catherine Farrell is a 54 y.o. female who is being evaluated via a billable telephone visit.      The patient has been notified of following:     \"This telephone visit will be conducted via a call between you and your physician/provider. We have found that certain health care needs can be provided without the need for a physical exam.  This service lets us provide the care you need with a short phone conversation.  If a prescription is necessary we can send it directly to your pharmacy.  If lab work is needed we can place an order for that and you can then stop by our lab to have the test done at a later time.    Telephone visits are billed at different rates depending on your insurance coverage. During this emergency period, for some insurers they may be billed the same as an in-person visit.  Please reach out to your insurance provider with any questions.    If during the course of the call the physician/provider feels a telephone visit is not appropriate, you will not be charged for this service.\"    Patient has given verbal consent to a Telephone visit? Yes    What phone number would you like to be contacted at? [phone]    Patient would like to receive their AVS by AVS Preference: Sree.    Additional provider notes:    ASSESSMENT/PLAN:  1. Shoulder injury, right, initial encounter  Ambulatory referral to Orthopedics       This is a 55 yo female with right shoulder injury.  This occurred yesterday - while trying to cut down a tree.  She has sent photos to me for review.  With her description, I believe she has internal derangement of the shoulder - perhaps a labral tear or a rotator cuff tear.  She needs Orthopedic evaluation - I have recommended that she be seen in [facility] today - she should remain out of work until she has been seen.    Return in about 2 weeks (around 6/29/2020) for Recheck.      There are no discontinued medications.  There are no Patient Instructions on file for this visit.    Chief " Complaint:  Chief Complaint   Patient presents with     right shoulder pain       HPI:   Catherine Farrell is a 54 y.o. female c/o  Was clipping a tree - pulling down on the tree - but felt a stab and tear in shoulder when she tugged - instant pain  Continues to have severe pain  Has been wearing Lidocaine patches   Hurts   Happened yesterday  Hasn't been seen yet      PMH:   Patient Active Problem List    Diagnosis Date Noted     Obesity (BMI 35.0-39.9) with comorbidity (H) 04/23/2019     Right foot pain 09/27/2018     Compression neuropathy 09/27/2018     Tarsal tunnel syndrome of right side 06/04/2018     Compression neuropathy of right lower extremity 06/04/2018     Mononeuritis lower limb, right 06/04/2018     Numbness of right lower extremity 05/30/2018     Calcaneal spur, right 09/11/2017     Vitamin D deficiency 07/03/2017     Chronic pain 03/10/2017     Costochondritis 07/10/2015     Fatigue 07/10/2015     Obsessive compulsive disorder 03/09/2015     Dermatitis 02/05/2015     Patellofemoral Syndrome Of The Right Knee  02/05/2015     Tension-type headache 02/05/2015     Simple (Specific) Phobia      Cough      Nicotine Dependence      Chronic Reflux Esophagitis      Fibromyalgia      Strain Of The Gastrocnemius Muscle Of The Right Leg      Adjustment Disorder With Depressed Mood      Joint Pain, Localized In The Knee      Pain During Urination (Dysuria)      Abdominal Pain      Diverticulitis Of Colon      Trochanteric Bursitis      Sinusitis      Midback Pain      Mixed hyperlipidemia      Abnormal Weight Loss      Abnormal Pap smear of cervix      Adverse Food Reaction (Not Anaphylactic)      Impingement Of The Right Shoulder      Hypertension      Head Injury      Oral Thrush      Lumbar Disc Degeneration      Lower Back Pain Chronic      Carpal Tunnel Syndrome      Past Medical History:   Diagnosis Date     Anemia      Bilateral calcaneal spurs     surgery Nov. 2015     Bursitis of hip      Chronic back pain       DDD (degenerative disc disease)      Depression      Diverticulitis      Environmental allergies      Fibromyalgia      History of transfusion 1984    With      HLD (hyperlipidemia)      Obesity      Past Surgical History:   Procedure Laterality Date     ACHILLES TENDON REPAIR Left 2015    Procedure: WITH SECONDARY ACHILLES TENDON REAPIR;  Surgeon: Rob Marion DPM;  Location: [facility];  Service:      ACHILLES TENDON REPAIR Right 11/3/2017    Procedure: WITH SECONDARY ACHILLES TENDON REPAIR;  Surgeon: Rob Marion DPM;  Location: [facility];  Service:      ENDOMETRIAL ABLATION       DE APPENDECTOMY      Description: Appendectomy;  Recorded: 10/18/2008;     DE  DELIVERY ONLY      Description:  Section;  Recorded: 10/18/2008;     DE REMOVAL OF HEEL SPUR Left 2015    Procedure: LEFT POSTERIOR CALCANEAL SPUR RESECTION;  Surgeon: Rob Marion DPM;  Location: [facility];  Service: Podiatry     DE REMOVAL OF TONSILS,<13 Y/O      Description: Tonsillectomy;  Recorded: 10/18/2008;     Social History     Socioeconomic History     Marital status:      Spouse name: Not on file     Number of children: Not on file     Years of education: Not on file     Highest education level: Not on file   Occupational History     Occupation: supervisor     Employer: [employer]   Social Needs     Financial resource strain: Not on file     Food insecurity     Worry: Not on file     Inability: Not on file     Transportation needs     Medical: Not on file     Non-medical: Not on file   Tobacco Use     Smoking status: Current Every Day Smoker     Packs/day: 0.50     Types: Cigarettes     Smokeless tobacco: Never Used     Tobacco comment: Started Zyban 19   Substance and Sexual Activity     Alcohol use: No     Comment: sober since      Drug use: No     Sexual activity: Yes     Partners: Male   Lifestyle     Physical activity     Days per week: Not on file      Minutes per session: Not on file     Stress: Not on file   Relationships     Social connections     Talks on phone: Not on file     Gets together: Not on file     Attends Confucianism service: Not on file     Active member of club or organization: Not on file     Attends meetings of clubs or organizations: Not on file     Relationship status: Not on file     Intimate partner violence     Fear of current or ex partner: Not on file     Emotionally abused: Not on file     Physically abused: Not on file     Forced sexual activity: Not on file   Other Topics Concern     Not on file   Social History Narrative     Not on file     Family History   Problem Relation Age of Onset     Lung cancer Father      Alcohol abuse Father      Alcohol abuse Son      Hypertension Mother      Stomach cancer Maternal Aunt      Breast cancer Maternal Aunt      Breast cancer Maternal Aunt        Meds:    Current Outpatient Medications:      cholecalciferol, vitamin D3, (VITAMIN D3) 2,000 unit Tab, Take 2,000 Units by mouth at bedtime. , Disp: , Rfl:      gabapentin (NEURONTIN) 300 MG capsule, TAKE 3 CAPSULES BY MOUTH THREE TIMES DAILY, Disp: 810 capsule, Rfl: 2     HYDROcodone-acetaminophen (NORCO )  mg per tablet, Take 1 tablet by mouth every 6 (six) hours as needed for pain., Disp: 20 tablet, Rfl: 0     ibuprofen (ADVIL,MOTRIN) 800 MG tablet, TAKE 1 TABLET BY MOUTH EVERY 6 HOURS AS NEEDED FOR PAIN, Disp: 100 tablet, Rfl: 0     simvastatin (ZOCOR) 40 MG tablet, Take 1 tablet by mouth once daily, Disp: 90 tablet, Rfl: 0    Allergies:  Allergies   Allergen Reactions     Amoxicillin Hives     Penicillins Swelling and Itching     Annotation: Swelling of face/eyes, itching       Shellfish Containing Products Anaphylaxis       ROS:  Pertinent positives as noted in HPI; otherwise 12 point ROS negative.      Physical Exam:  EXAM:  There were no vitals taken for this visit.     This was a telephone visit   Pictures in SpreadShout message,  however, indicate swelling overlying deltoid in right shoulder      Results:  Results for orders placed or performed in visit on 05/18/20   COVID-19 Virus PCR MRF    Specimen: Oropharengeal Swab; Respiratory   Result Value Ref Range    COVID-19 VIRUS SPECIMEN SOURCE Nasopharyngeal     2019-nCOV Not Detected      Phone call duration: 8 minutes  2:14 - 2:22

## 2021-06-08 NOTE — PROGRESS NOTES
Assessment/Plan:      Diagnoses and all orders for this visit:    Lumbar spine pain  -     OPS TFESI Lumbar Sacral Unilateral; Future; Expected date: 1/9/17  -     diclofenac (VOLTAREN) 50 MG EC tablet; Take 1 tablet (50 mg total) by mouth 3 (three) times a day as needed.  Dispense: 90 tablet; Refill: 1    Right leg pain    Foraminal stenosis of lumbar region  -     OPS TFESI Lumbar Sacral Unilateral; Future; Expected date: 1/9/17  -     TX LSO CORSET FRONT    Myofascial muscle pain  -     TX LSO CORSET FRONT    Annular disc tear  -     OPS TFESI Lumbar Sacral Unilateral; Future; Expected date: 1/9/17  -     TX LSO CORSET FRONT  -     diclofenac (VOLTAREN) 50 MG EC tablet; Take 1 tablet (50 mg total) by mouth 3 (three) times a day as needed.  Dispense: 90 tablet; Refill: 1        Assessment:    1.  Persistent significant right sided lumbar gluteal and lower extremity pain after a fall.  This was at her place of work but she was off duty.  Symptoms are in the low back down the back of the right leg and are radicular in nature.  She has an annular tear at L4-5 centrally with no high-grade disc herniation central stenosis or nerve impingement.  She also has moderate foraminal stenosis on the right at L5-S1 with possible far lateral disc bulge and broad-based component on MRI review today.    2.  Myofascial pain in the lumbar spine and gluteal region.  Carries a diagnosis of fibromyalgia.    3.  Lumbar degenerative disc disease at L4-5 and L5-S1 on MRI.    4.  Poor sleep related to pain      Discussion:    1. I discussed the diagnosis and treatment options.  It has been only 9 days since her injection on the right at L4-5.  She feels that she was in the right spot as she did have some cramping in the distribution of pain after the injection.  She is back to baseline but is still having difficulty with increasing pain.  We discussed options of further injections, therapy, bracing, surgical referral.  She is interested in  "conservative options and not interested in surgery.    2.  I reviewed the MRI today.  There is a broad-based disc bulge at L4-5 with annular tear but no lateral recess stenosis.  At L5-S1 there is potential broad-based disc bulge and far lateral component approximating the exiting L5 nerve and there is moderate foraminal stenosis.  Recommend a right L5-S1 transforaminal epidural steroid injection to see if this will give her some benefit.  If this is not helpful we'll hold off on further lumbar injections    3.  Etodolac was not covered by her insurance.  Will trial diclofenac 50 mg 3 times daily as needed for pain.    4.  We'll provide lumbosacral corset with stays to help with pain.  She can wear this when she is standing at work for extended periods of time.    5.  Will continue work restrictions of 20 pound lift limit and no sitting or standing for more than 2 hours consecutively to give her some more relative rest.  This will be in place until follow-up 1 month.    6.  Follow-up 1 month to reevaluate.  We'll consider pain clinic referral for second opinion or surgical referral for second opinion at that time.      It was our pleasure caring for your patient today, if there any questions or concerns please do not hesitate to contact us.      Subjective:   Patient ID: Catherine Farrell is a 51 y.o. female.    History of Present Illness:Patient presents for evaluation of lumbar spine pain and right leg pain and paresthesias.  Symptoms are not changed since last visit.  She did have a  pain flare after her injection.  The injection was a right L4-5 transforaminal epidural done 9 days ago.  Did well immediately after the injection had increased pain with the injection for a couple of days with spasm and feels that we were in the \"right spot \".  Her symptoms have returned to baseline 6/10 today 10/10 at worst.  Worse with any standing along with twisting bending and reaching.  Nothing really makes it better other than " heat.  She is still working but having a difficult time with lifting more than 20 pounds and the reaching.  She is currently on 2 hours of sitting or standing consecutively without change of position or sitting and a 20 pound lift limit.  Etodolac was not filled as it was not covered by her insurance.    She is having no new symptoms a little painful travels down the leg.  Difficult for her to do any activity.  She is wondering what other options there are.  She would like to keep working.    Imaging:MRI report and images were personally reviewed and discussed with the patient.  A plastic model was utilized during the discussion.  MRI of the lumbar spine from December 2016 reveals multilevel degenerative disc disease most significant in the lower lumbar spine.  At L5-S1 mild broad based disc bulge on my review there is possible right far lateral component.  This results in moderate bilateral foraminal stenosis.  At L4-5 there is a posterior disc bulge with annular tear mild to moderate foraminal stenosis.  No high-grade central stenosis.  No high-grade foraminal stenosis.  On  imaging hips appear unremarkable.    Review of systems:No numbness, tingling or weakness.  No bowel or bladder incontinence.  No headaches, dizziness, nausea, vomiting, blurred vision or balance deficits.    Past Medical History   Diagnosis Date     Anemia      Bilateral calcaneal spurs      surgery Nov. 2015     Bursitis of hip      Chronic back pain      DDD (degenerative disc disease)      Depression      Environmental allergies      Fibromyalgia      HLD (hyperlipidemia)      Obesity        The following portions of the patient's history were reviewed and updated as appropriate: allergies, current medications, past family history, past medical history, past social history, past surgical history and problem list.      Objective:   Physical Exam:    General: Alert and oriented with normal affect. Attention, knowledge, memory, and  language are intact. No acute distress.   Gait:  Nonantalgic.  Full range of motion of the hips and flexion and internal rotation and external rotation with some pain in the low back although negative Parth's test bilaterally today.  Sensation is intact to light touch throughout the   lower extremities.  Reflexes are 1+ patellar and Achilles  .    Manual muscle testing reveals:  Right /Left out of 5     5/5 ankle plantar flexors  5/5 ankle dorsiflexors  5/5  EHL

## 2021-06-08 NOTE — PROGRESS NOTES
"Catherine Farrell is a 54 y.o. female who is being evaluated via a billable video visit.      The patient has been notified of following:     \"This video visit will be conducted via a call between you and your physician/provider. We have found that certain health care needs can be provided without the need for an in-person physical exam.  This service lets us provide the care you need with a video conversation.  If a prescription is necessary we can send it directly to your pharmacy.  If lab work is needed we can place an order for that and you can then stop by our lab to have the test done at a later time.    Video visits are billed at different rates depending on your insurance coverage. Please reach out to your insurance provider with any questions.    If during the course of the call the physician/provider feels a video visit is not appropriate, you will not be charged for this service.\"    Patient has given verbal consent to a Video visit? Yes    Will anyone else be joining your video visit? No    Shi Bangura LPN          Video Start Time: 1319          Video-Visit Details    Type of service:  Video Visit    Video End Time (time video stopped): 1330  Originating Location (pt. Location): Home    Distant Location (provider location):  [facility]     Platform used for Video Visit: DoxOnShift     Subjective findings: The patient return to the clinic today via video visit 2 years status post nerve decompression with external neural lysis of multiple nerves of the right lower extremity.  The patient stated that she has had a significant improvement in her preoperative symptoms.  She indicated that her pain level on the VAS has gone from a 10 to a 6.  She continues to state to take gabapentin.  She stated that she has discontinued taking her narcotic analgesics because she has had some significant improvement following the surgical procedure.  She is struggling to control swelling of her right foot.  She " does wear compression stockings periodically.  She does some ice and elevation at the end of the day to help with her swelling.  She feels this also benefits her remaining symptoms.  She has mostly stiffness and tightness on the bottom of her foot.  She has minimal tingling and shooting pains.  She has been wearing her orthotics consistently.  Overall she stated she is pleased with the outcome of her surgical procedure.  She stated her left lower extremity is doing remarkably well.    Assessment/plan:  Compression neuropathy  Carpal tunnel  Mononeuropathy    Plan: Informed the patient that I feel she has done well.  I was hoping to get a bit more improvement as far as her pain level is concerned.  I told the patient to monitor her condition.  If she feels the need I would recommend increasing her gabapentin.  She is to return to the clinic as needed.

## 2021-06-08 NOTE — PROGRESS NOTES
Assessment/Plan:      Diagnoses and all orders for this visit:    Lumbar spine pain    Leg pain, diffuse, left    Annular disc tear    Right leg pain    Myofascial muscle pain      Assessment:    1.  Lumbar spine pain with right greater than left lower extremity pain and paresthesias.  She was having left leg pain following lumbar epidural that is improved    2. Right sided lumbar gluteal and lower extremity pain after a fall. This was at her place of work but she was off duty. Symptoms are in the low back down the back of the right leg and are radicular in nature. She has an annular tear at L4-5 centrally with no high-grade disc herniation central stenosis or nerve impingement. She also has moderate foraminal stenosis on the right at L5-S1 with possible far lateral disc bulge and broad-based component on MRI.         3. Myofascial pain in the lumbar spine and gluteal region. Carries a diagnosis of fibromyalgia.      4 Lumbar degenerative disc disease at L4-5 and L5-S1 on MRI.      5. Poor sleep related to pain      Discussion:    1.  Her left leg pain has improved some and overall she does not appear to be in as much pain as she was 1-2 weeks ago at her last visit.  We discussed her treatment plan in the upcoming week.    2.  Continue with ankle brachial indexes as planned to evaluate.    3.  Continue with plan for lumbar epidural to help with persistent pain in the right leg.    4.  Has tramadol that she can take at bedtime as needed use this sparingly.    5.  Continue with lumbar brace as needed.    6.  I discussed option of surgical referral for the L4-5 disc.  She'll like to hold off for now.    7.  Continue to work.  She currently is work restrictions of no lifting 20 pounds and no sitting or standing more than 2 hours consecutively ideally in the future I could release her without restrictions.    8.  Can potentially provide a second opinion or surgical consultation would be depending on how she does with the  epidural.  We'll follow-up with her in 2-4 weeks after the injection..       It was our pleasure caring for your patient today, if there any questions or concerns please do not hesitate to contact us.      Subjective:   Patient ID: Catherine Farrell is a 51 y.o. female.    History of Present Illness: *Patient presents for evaluation of low back and a record of left leg pain and paresthesias.  Pain is 4/10 today over 10/10 at worst.  Overall the left leg has calm down since last visit.  She is having a constant pain in the back and down the back of the right greater than left leg.  Taking tramadol only at bedtime as it does help with sleeping makes her drowsy.  Not taking it during the day.  Continues to work during the day but is a little frustrated as if she works she has a flare for pain and is unable to do other activities.  She modifies her activities at home such as elevating her laundry basket to do any work at home.  No new symptoms.  Ankle-brachial indexes will be done tomorrow and she does have epidural scheduled for Friday.    Imaging: MRI of the lumbar spine reviewed.  Degenerative disc disease in the lower lumbar spine most significant finding broad-based L4-5 disc bulge with annular tear.    Review of systems: No bowel or bladder incontinence headache dizziness nausea vomiting blurred vision or balance changes.    Past Medical History   Diagnosis Date     Anemia      Bilateral calcaneal spurs      surgery Nov. 2015     Bursitis of hip      Chronic back pain      DDD (degenerative disc disease)      Depression      Environmental allergies      Fibromyalgia      HLD (hyperlipidemia)      Obesity        The following portions of the patient's history were reviewed and updated as appropriate: allergies, current medications, past family history, past medical history, past social history, past surgical history and problem list.      Objective:   Physical Exam:    General: Alert and oriented with normal affect.  Attention, knowledge, memory, and language are intact. No acute distress.   Gait:  Nonantalgic  Sensation is intact to light touch throughout the   lower extremities.       Manual muscle testing reveals:  Right /Left out of 5     5/5 knee extensors  5/5 ankle plantar flexors  5/5 ankle dorsiflexors  5/5  EHL

## 2021-06-08 NOTE — PROGRESS NOTES
Assessment/Plan:      Diagnoses and all orders for this visit:    Leg pain, diffuse, left  -     traMADol (ULTRAM) 50 mg tablet; Take 1 tablet (50 mg total) by mouth 3 (three) times a day as needed for pain.  Dispense: 30 tablet; Refill: 0  -     US Ankle Brachial Indices; Future; Expected date: 1/18/17    Lumbar spine pain  -     traMADol (ULTRAM) 50 mg tablet; Take 1 tablet (50 mg total) by mouth 3 (three) times a day as needed for pain.  Dispense: 30 tablet; Refill: 0    Lumbar radicular pain  -     traMADol (ULTRAM) 50 mg tablet; Take 1 tablet (50 mg total) by mouth 3 (three) times a day as needed for pain.  Dispense: 30 tablet; Refill: 0    Foraminal stenosis of lumbar region    Myofascial muscle pain    Diminished pulse  -     US Ankle Brachial Indices; Future; Expected date: 1/18/17        Assessment:    1.  Severe lumbar spine pain with left leg pain, decreased pulses and equal sensation.  She does have decreased reflex left versus the right at the Achilles and no significant that she has had Achilles surgery in the past.  This pain has been present for 1 day.    2. right sided lumbar gluteal and lower extremity pain after a fall. This was at her place of work but she was off duty. Symptoms are in the low back down the back of the right leg and are radicular in nature. She has an annular tear at L4-5 centrally with no high-grade disc herniation central stenosis or nerve impingement. She also has moderate foraminal stenosis on the right at L5-S1 with possible far lateral disc bulge and broad-based component on MRI.  Some improvement after her lumbar epidural.     3. Myofascial pain in the lumbar spine and gluteal region. Carries a diagnosis of fibromyalgia.     4 Lumbar degenerative disc disease at L4-5 and L5-S1 on MRI.     5. Poor sleep related to pain      Discussion:    1. I discussed the diagnosis and treatment options.  She has had significant increase in left leg pain today which is new for her.  She  has cold sensation in the foot.  Some decreased dorsalis pedis pulse on examination.  We discussed treatment options.    2.  I will obtain ankle-brachial indexes to see if there is some vascular phenomenon resulting in her pain complaints in both legs.    3.  We'll provide a short course of tramadol for severe pain.    4.  Continue to wear the brace as needed.    5.  She can return to work with her current restrictions.    6.  Follow-up 1-2 weeks    It was our pleasure caring for your patient today, if there any questions or concerns please do not hesitate to contact us.      Subjective:   Patient ID: Catherine Farrell is a 51 y.o. female.    History of Present Illness: Patient presents for evaluation of lumbar spine pain and left leg pain.  Was having low back and right leg pain.  Underwent right lumbar epidural was doing fairly well.  This was 2 weeks ago.  Wearing a lumbar corset and did find work.  Yesterday began having severe pain on the left leg with no trauma.  This started at the end of the day after work.  Her left leg is feeling cold.  Starts in the low back down the back of the left leg to the lateral ankle and entire foot.  Severe pain 8/10 today 10/10 at worst.  Worse with any standing and walking.  Does not feel that she can work today and had to call in to work secondary to the severe pain.  Her left leg feels weak and going on her toes.  She has had left ankle surgery in the past.    Imaging: MRI of the lumbar spine reviewed.  Showed L4-5 broad-based central disc bulge and annular tear.  At L5-S1 mild broad based disc bulge with possible far lateral component on the right.  No high-grade central stenosis.    Review of systems: No bowel or bladder incontinence, headache, dizziness, nausea, vomiting, blurred vision or bowel changes.    Past Medical History   Diagnosis Date     Anemia      Bilateral calcaneal spurs      surgery Nov. 2015     Bursitis of hip      Chronic back pain      DDD (degenerative  disc disease)      Depression      Environmental allergies      Fibromyalgia      HLD (hyperlipidemia)      Obesity      Social history: Patient does smoke.    The following portions of the patient's history were reviewed and updated as appropriate: allergies, current medications, past family history, past medical history, past social history, past surgical history and problem list.      Objective:   Physical Exam:    General: Alert and oriented with normal affect. Attention, knowledge, memory, and language are intact. No acute distress.   Gait:  Cautious.  Difficult to toe stand on the left..   Sensation is decreased to light touch throughout medial and lateral malleolus of the left leg, mild..  Reflexes are 2+ and symmetric in the biceps triceps and brachioradialis with negative Hoffmans. 2+ patellar and trace right, 0 left Achilles  .  Distal pulses: Trace right dorsalis pedis absent left, trace bilateral posterior tibials.  Manual muscle testing reveals:  Right /Left out of 5     5/5 hip flexors  5/5 knee flexors  5/5 knee extensors  5/5 ankle plantar flexors from seated although difficult for her to toe stand.  Unknown if this is from pain.  5/5 ankle dorsiflexors  5/5  EHL

## 2021-06-08 NOTE — PROGRESS NOTES
"Catherine Farrell is a 54 y.o. female who is being evaluated via a billable telephone visit.      The patient has been notified of following:     \"This telephone visit will be conducted via a call between you and your physician/provider. We have found that certain health care needs can be provided without the need for a physical exam.  This service lets us provide the care you need with a short phone conversation.  If a prescription is necessary we can send it directly to your pharmacy.  If lab work is needed we can place an order for that and you can then stop by our lab to have the test done at a later time.    Telephone visits are billed at different rates depending on your insurance coverage. During this emergency period, for some insurers they may be billed the same as an in-person visit.  Please reach out to your insurance provider with any questions.    If during the course of the call the physician/provider feels a telephone visit is not appropriate, you will not be charged for this service.\"    Patient has given verbal consent to a Telephone visit? Yes    What phone number would you like to be contacted at? [phone]    Patient would like to receive their AVS by AVS Preference: Mail a copy.    Additional provider notes:   ASSESSMENT/PLAN:  1. Chronic maxillary sinusitis  DISCONTINUED: azithromycin (ZITHROMAX) 250 MG tablet       This is a 53 yo female with sinus infection symptoms - started after wearing masks at work.  Has symptoms of sinusitis.  Start Azithromycin.    Return in about 2 weeks (around 5/25/2020) for if not getting better.      There are no discontinued medications.  There are no Patient Instructions on file for this visit.    Chief Complaint:  Chief Complaint   Patient presents with     possible sinus infection       HPI:   Catherine Farrell is a 54 y.o. female c/o  Pressure hurts on face  OTC sinus stuff isn't working right now  Sounds horrible  Can't breathe through mask at work  Has pain " "behind eyes  Has 2 people out for testing right now    Allergic to the blue masks - face broke out  Came home with ugly rash on face  They \"okayed\" her to make her own masks - with Pellon in between    Was scared - didn't want it to turn into \"upper respiratory infection\"  Still working - only protection is mask/gloves -     Patient is worried due to her mom - she is the only one that cares for her mother - lives with her mother    \"my whole family is essential employees\" -     Allergic to Penicillins  Using allergy medication - has scratchy/ itchy eyes          PMH:   Patient Active Problem List    Diagnosis Date Noted     Obesity (BMI 35.0-39.9) with comorbidity (H) 04/23/2019     Right foot pain 09/27/2018     Compression neuropathy 09/27/2018     Tarsal tunnel syndrome of right side 06/04/2018     Compression neuropathy of right lower extremity 06/04/2018     Mononeuritis lower limb, right 06/04/2018     Numbness of right lower extremity 05/30/2018     Calcaneal spur, right 09/11/2017     Vitamin D deficiency 07/03/2017     Chronic pain 03/10/2017     Costochondritis 07/10/2015     Fatigue 07/10/2015     Obsessive compulsive disorder 03/09/2015     Dermatitis 02/05/2015     Patellofemoral Syndrome Of The Right Knee  02/05/2015     Tension-type headache 02/05/2015     Simple (Specific) Phobia      Cough      Nicotine Dependence      Chronic Reflux Esophagitis      Fibromyalgia      Strain Of The Gastrocnemius Muscle Of The Right Leg      Adjustment Disorder With Depressed Mood      Joint Pain, Localized In The Knee      Pain During Urination (Dysuria)      Abdominal Pain      Diverticulitis Of Colon      Trochanteric Bursitis      Sinusitis      Midback Pain      Mixed hyperlipidemia      Abnormal Weight Loss      Abnormal Pap smear of cervix      Adverse Food Reaction (Not Anaphylactic)      Impingement Of The Right Shoulder      Hypertension      Head Injury      Oral Thrush      Lumbar Disc Degeneration      " Lower Back Pain Chronic      Carpal Tunnel Syndrome      Past Medical History:   Diagnosis Date     Anemia      Bilateral calcaneal spurs     surgery 2015     Bursitis of hip      Chronic back pain      DDD (degenerative disc disease)      Depression      Diverticulitis      Environmental allergies      Fibromyalgia      History of transfusion     With      HLD (hyperlipidemia)      Obesity      Past Surgical History:   Procedure Laterality Date     ACHILLES TENDON REPAIR Left 2015    Procedure: WITH SECONDARY ACHILLES TENDON REAPIR;  Surgeon: Rob Marion DPM;  Location: [facility];  Service:      ACHILLES TENDON REPAIR Right 11/3/2017    Procedure: WITH SECONDARY ACHILLES TENDON REPAIR;  Surgeon: Rob Marion DPM;  Location: [facility];  Service:      ENDOMETRIAL ABLATION       DC APPENDECTOMY      Description: Appendectomy;  Recorded: 10/18/2008;     DC  DELIVERY ONLY      Description:  Section;  Recorded: 10/18/2008;     DC REMOVAL OF HEEL SPUR Left 2015    Procedure: LEFT POSTERIOR CALCANEAL SPUR RESECTION;  Surgeon: Rob Marion DPM;  Location: [facility];  Service: Podiatry     DC REMOVAL OF TONSILS,<11 Y/O      Description: Tonsillectomy;  Recorded: 10/18/2008;     Social History     Socioeconomic History     Marital status:      Spouse name: Not on file     Number of children: Not on file     Years of education: Not on file     Highest education level: Not on file   Occupational History     Occupation: supervisor     Employer: [employer]   Social Needs     Financial resource strain: Not on file     Food insecurity     Worry: Not on file     Inability: Not on file     Transportation needs     Medical: Not on file     Non-medical: Not on file   Tobacco Use     Smoking status: Current Every Day Smoker     Packs/day: 0.50     Types: Cigarettes     Smokeless tobacco: Never Used     Tobacco comment: Started Zyban 19   Substance  and Sexual Activity     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     Drug use: No     Sexual activity: Yes     Partners: Male   Lifestyle     Physical activity     Days per week: Not on file     Minutes per session: Not on file     Stress: Not on file   Relationships     Social connections     Talks on phone: Not on file     Gets together: Not on file     Attends [employer] service: Not on file     Active member of club or organization: Not on file     Attends meetings of clubs or organizations: Not on file     Relationship status: Not on file     Intimate partner violence     Fear of current or ex partner: Not on file     Emotionally abused: Not on file     Physically abused: Not on file     Forced sexual activity: Not on file   Other Topics Concern     Not on file   Social History Narrative     Not on file     Family History   Problem Relation Age of Onset     Lung cancer Father      Alcohol abuse Father      Alcohol abuse Son      Hypertension Mother      Stomach cancer Maternal Aunt      Breast cancer Maternal Aunt      Breast cancer Maternal Aunt        Meds:    Current Outpatient Medications:      cholecalciferol, vitamin D3, (VITAMIN D3) 2,000 unit Tab, Take 2,000 Units by mouth at bedtime. , Disp: , Rfl:      ibuprofen (ADVIL,MOTRIN) 800 MG tablet, TAKE 1 TABLET BY MOUTH EVERY 6 HOURS AS NEEDED FOR PAIN, Disp: 100 tablet, Rfl: 0     meclizine (ANTIVERT) 25 mg tablet, Take 1 tablet (25 mg total) by mouth 3 (three) times a day as needed for dizziness or nausea., Disp: 100 tablet, Rfl: 0     omeprazole (PRILOSEC) 40 MG capsule, Take 1 capsule by mouth once daily, Disp: 90 capsule, Rfl: 0     simvastatin (ZOCOR) 40 MG tablet, Take 1 tablet by mouth once daily, Disp: 90 tablet, Rfl: 0     buPROPion (WELLBUTRIN SR) 150 MG 12 hr tablet, Take 1 tablet (150 mg total) by mouth 2 (two) times a day. Start 150 mg once daily for 3 days, then increase to 150 mg twice a day, Disp: 60 tablet, Rfl: 0     cefdinir  (OMNICEF) 300 MG capsule, Take 1 capsule (300 mg total) by mouth 2 (two) times a day for 10 days., Disp: 20 capsule, Rfl: 0     gabapentin (NEURONTIN) 300 MG capsule, TAKE 3 CAPSULES BY MOUTH THREE TIMES DAILY, Disp: 810 capsule, Rfl: 2     HYDROcodone-acetaminophen (NORCO )  mg per tablet, Take 1 tablet by mouth every 6 (six) hours as needed for pain., Disp: 20 tablet, Rfl: 0     predniSONE (DELTASONE) 10 mg tablet, 6 tabs po daily x 2 d, 5 tabs po daily x 2 d, 4 tabs po daily x 2 d,  3 tabs po daily x 2 d,  2 tabs po daily x 2 d,  1 tab po daily x 2 d., Disp: 42 tablet, Rfl: 0    Allergies:  Allergies   Allergen Reactions     Amoxicillin Hives     Penicillins Swelling and Itching     Annotation: Swelling of face/eyes, itching       Shellfish Containing Products Anaphylaxis       ROS:  Pertinent positives as noted in HPI; otherwise 12 point ROS negative.      Physical Exam:  EXAM:  There were no vitals taken for this visit.     This is a telephone visit      Results:  Results for orders placed or performed in visit on 02/17/20   Influenza A/B Rapid Test- Nasal Swab    Specimen: Nasal Swab; Nasopharyngeal (Inpt/ED) or Nasal Mucosa (Outpt)   Result Value Ref Range    Influenza  A, Rapid Antigen No Influenza A antigen detected No Influenza A antigen detected    Influenza B, Rapid Antigen No Influenza B antigen detected No Influenza B antigen detected   HM1 (CBC with Diff)   Result Value Ref Range    WBC 8.0 4.0 - 11.0 thou/uL    RBC 4.49 3.80 - 5.40 mill/uL    Hemoglobin 13.1 12.0 - 16.0 g/dL    Hematocrit 39.8 35.0 - 47.0 %    MCV 89 80 - 100 fL    MCH 29.2 27.0 - 34.0 pg    MCHC 33.0 32.0 - 36.0 g/dL    RDW 12.5 11.0 - 14.5 %    Platelets 241 140 - 440 thou/uL    MPV 8.1 7.0 - 10.0 fL    Neutrophils % 52 50 - 70 %    Lymphocytes % 39 20 - 40 %    Monocytes % 6 2 - 10 %    Eosinophils % 3 0 - 6 %    Basophils % 1 0 - 2 %    Neutrophils Absolute 4.2 2.0 - 7.7 thou/uL    Lymphocytes Absolute 3.1 0.8 - 4.4  thou/uL    Monocytes Absolute 0.5 0.0 - 0.9 thou/uL    Eosinophils Absolute 0.2 0.0 - 0.4 thou/uL    Basophils Absolute 0.1 0.0 - 0.2 thou/uL         Phone call duration:  9 minutes  1627   - 1634

## 2021-06-08 NOTE — PROGRESS NOTES
"ASSESSMENT/PLAN:  1. Cough  Symptomatic COVID-19 Virus (CORONAVIRUS) PCR   2. Acute recurrent maxillary sinusitis  predniSONE (DELTASONE) 10 mg tablet    cefdinir (OMNICEF) 300 MG capsule       This is a 53 yo female with:  1.  Cough - this has been fairly persistent - she blames it on allergies (lives with her mom and mom also has slight cough).  She works at a fast [employer] and has had co-workers positive for COVID-19.  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  She has been treated for acute sinusitis - seemed to get better, but 2 days after antibiotic treatment, she got worse again.  She looks miserable and is coughing frequently.  Due to exposure history, I would recommend COVID-19 testing.  I will order.  Reviewed process with patient.  2.  Acute sinusitis.  Patient did respond to antibiotic therapy - will re-treat (watch for signs of allergy to Cefdinir - has tolerated cephalosporins in the past); will add Prednisone (to start only after COVID-19 testing is complete).      Return in about 2 weeks (around 6/1/2020) for if not getting better.     I wore face shield, mask, isolation gown, gloves for this visit.      Medications Discontinued During This Encounter   Medication Reason     albuterol (PROAIR HFA;PROVENTIL HFA;VENTOLIN HFA) 90 mcg/actuation inhaler Therapy completed     azithromycin (ZITHROMAX) 250 MG tablet Therapy completed     acetaminophen-codeine (TYLENOL #3) 300-30 mg per tablet Therapy completed     predniSONE (DELTASONE) 10 mg tablet Reorder     There are no Patient Instructions on file for this visit.    Chief Complaint:  Chief Complaint   Patient presents with     Follow-up     sinus infection       HPI:   Catherine Farrell is a 54 y.o. 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    Feels miserable   Has " been treated for sinuses, but 2 days after antibiotic, feels miserable again  Wears a mask to work  Lives with mother (who is vulnerable, medically)  Fever, ?chills      PMH:   Patient Active Problem List    Diagnosis Date Noted     Obesity (BMI 35.0-39.9) with comorbidity (H) 2019     Right foot pain 2018     Compression neuropathy 2018     Tarsal tunnel syndrome of right side 2018     Compression neuropathy of right lower extremity 2018     Mononeuritis lower limb, right 2018     Numbness of right lower extremity 2018     Calcaneal spur, right 2017     Vitamin D deficiency 2017     Chronic pain 03/10/2017     Costochondritis 07/10/2015     Fatigue 07/10/2015     Obsessive compulsive disorder 2015     Dermatitis 2015     Patellofemoral Syndrome Of The Right Knee  2015     Tension-type headache 2015     Simple (Specific) Phobia      Cough      Nicotine Dependence      Chronic Reflux Esophagitis      Fibromyalgia      Strain Of The Gastrocnemius Muscle Of The Right Leg      Adjustment Disorder With Depressed Mood      Joint Pain, Localized In The Knee      Pain During Urination (Dysuria)      Abdominal Pain      Diverticulitis Of Colon      Trochanteric Bursitis      Sinusitis      Midback Pain      Mixed hyperlipidemia      Abnormal Weight Loss      Abnormal Pap smear of cervix      Adverse Food Reaction (Not Anaphylactic)      Impingement Of The Right Shoulder      Hypertension      Head Injury      Oral Thrush      Lumbar Disc Degeneration      Lower Back Pain Chronic      Carpal Tunnel Syndrome      Past Medical History:   Diagnosis Date     Anemia      Bilateral calcaneal spurs     surgery 2015     Bursitis of hip      Chronic back pain      DDD (degenerative disc disease)      Depression      Diverticulitis      Environmental allergies      Fibromyalgia      History of transfusion     With      HLD (hyperlipidemia)       Obesity      Past Surgical History:   Procedure Laterality Date     ACHILLES TENDON REPAIR Left 2015    Procedure: WITH SECONDARY ACHILLES TENDON REAPIR;  Surgeon: Rob Marion DPM;  Location: [facility];  Service:      ACHILLES TENDON REPAIR Right 11/3/2017    Procedure: WITH SECONDARY ACHILLES TENDON REPAIR;  Surgeon: Rob Marion DPM;  Location: [facility];  Service:      ENDOMETRIAL ABLATION       MO APPENDECTOMY      Description: Appendectomy;  Recorded: 10/18/2008;     MO  DELIVERY ONLY      Description:  Section;  Recorded: 10/18/2008;     MO REMOVAL OF HEEL SPUR Left 2015    Procedure: LEFT POSTERIOR CALCANEAL SPUR RESECTION;  Surgeon: Rob Marion DPM;  Location: [facility];  Service: Podiatry     MO REMOVAL OF TONSILS,<13 Y/O      Description: Tonsillectomy;  Recorded: 10/18/2008;     Social History     Socioeconomic History     Marital status:      Spouse name: Not on file     Number of children: Not on file     Years of education: Not on file     Highest education level: Not on file   Occupational History     Occupation: supervisor     Employer: [employer]   Social Needs     Financial resource strain: Not on file     Food insecurity     Worry: Not on file     Inability: Not on file     Transportation needs     Medical: Not on file     Non-medical: Not on file   Tobacco Use     Smoking status: Current Every Day Smoker     Packs/day: 0.50     Types: Cigarettes     Smokeless tobacco: Never Used     Tobacco comment: Started Zyban 17   Substance and Sexual Activity     Alcohol use: No     Comment: sober since      Drug use: No     Sexual activity: Yes     Partners: Male   Lifestyle     Physical activity     Days per week: Not on file     Minutes per session: Not on file     Stress: Not on file   Relationships     Social connections     Talks on phone: Not on file     Gets together: Not on file     Attends [employer]ism service: Not on file      Active member of club or organization: Not on file     Attends meetings of clubs or organizations: Not on file     Relationship status: Not on file     Intimate partner violence     Fear of current or ex partner: Not on file     Emotionally abused: Not on file     Physically abused: Not on file     Forced sexual activity: Not on file   Other Topics Concern     Not on file   Social History Narrative     Not on file     Family History   Problem Relation Age of Onset     Lung cancer Father      Alcohol abuse Father      Alcohol abuse Son      Hypertension Mother      Stomach cancer Maternal Aunt      Breast cancer Maternal Aunt      Breast cancer Maternal Aunt        Meds:    Current Outpatient Medications:      cholecalciferol, vitamin D3, (VITAMIN D3) 2,000 unit Tab, Take 2,000 Units by mouth at bedtime. , Disp: , Rfl:      gabapentin (NEURONTIN) 300 MG capsule, TAKE 3 CAPSULES BY MOUTH THREE TIMES DAILY, Disp: 810 capsule, Rfl: 2     HYDROcodone-acetaminophen (NORCO )  mg per tablet, Take 1 tablet by mouth every 6 (six) hours as needed for pain., Disp: 20 tablet, Rfl: 0     ibuprofen (ADVIL,MOTRIN) 800 MG tablet, TAKE 1 TABLET BY MOUTH EVERY 6 HOURS AS NEEDED FOR PAIN, Disp: 100 tablet, Rfl: 0     omeprazole (PRILOSEC) 40 MG capsule, Take 1 capsule by mouth once daily, Disp: 90 capsule, Rfl: 0     predniSONE (DELTASONE) 10 mg tablet, 6 tabs po daily x 2 d, 5 tabs po daily x 2 d, 4 tabs po daily x 2 d,  3 tabs po daily x 2 d,  2 tabs po daily x 2 d,  1 tab po daily x 2 d., Disp: 42 tablet, Rfl: 0     simvastatin (ZOCOR) 40 MG tablet, Take 1 tablet by mouth once daily, Disp: 90 tablet, Rfl: 0     buPROPion (WELLBUTRIN SR) 150 MG 12 hr tablet, Take 1 tablet (150 mg total) by mouth 2 (two) times a day. Start 150 mg once daily for 3 days, then increase to 150 mg twice a day, Disp: 60 tablet, Rfl: 0     cefdinir (OMNICEF) 300 MG capsule, Take 1 capsule (300 mg total) by mouth 2 (two) times a day for 10  "days., Disp: 20 capsule, Rfl: 0     meclizine (ANTIVERT) 25 mg tablet, Take 1 tablet (25 mg total) by mouth 3 (three) times a day as needed for dizziness or nausea., Disp: 100 tablet, Rfl: 0    Allergies:  Allergies   Allergen Reactions     Amoxicillin Hives     Penicillins Swelling and Itching     Annotation: Swelling of face/eyes, itching       Shellfish Containing Products Anaphylaxis       ROS:  Pertinent positives as noted in HPI; otherwise 12 point ROS negative.      Physical Exam:  /80 (Patient Site: Right Arm, Patient Position: Sitting, Cuff Size: Adult Regular)   Pulse 72   Temp 98.7  F (37.1  C) (Tympanic)   Resp 16   Ht 5' 10.25\" (1.784 m)   Wt (!) 260 lb (117.9 kg)   BMI 37.04 kg/m     Gen:  NAD, appears ill, hydration okay  HEENT:  TMs nl, oropharynx benign, nasal mucosa congestion; tender to palpation at maxillary sinuses, conjunctiva clear  Neck:  Supple, no adenopathy, no thyromegaly, no carotid bruits, no JVD  Lungs:  Clear to auscultation bilaterally, frequent cough, no dyspnea  Cor:  RRR no murmur  Abd:  Soft, nontender, BS+, no masses, no guarding or rebound, no HSM  Extr:  Neg.  Neuro:  No asymmetry  Skin:  Warm/dry        Results:  Results for orders placed or performed in visit on 02/17/20   Influenza A/B Rapid Test- Nasal Swab    Specimen: Nasal Swab; Nasopharyngeal (Inpt/ED) or Nasal Mucosa (Outpt)   Result Value Ref Range    Influenza  A, Rapid Antigen No Influenza A antigen detected No Influenza A antigen detected    Influenza B, Rapid Antigen No Influenza B antigen detected No Influenza B antigen detected   HM1 (CBC with Diff)   Result Value Ref Range    WBC 8.0 4.0 - 11.0 thou/uL    RBC 4.49 3.80 - 5.40 mill/uL    Hemoglobin 13.1 12.0 - 16.0 g/dL    Hematocrit 39.8 35.0 - 47.0 %    MCV 89 80 - 100 fL    MCH 29.2 27.0 - 34.0 pg    MCHC 33.0 32.0 - 36.0 g/dL    RDW 12.5 11.0 - 14.5 %    Platelets 241 140 - 440 thou/uL    MPV 8.1 7.0 - 10.0 fL    Neutrophils % 52 50 - 70 %    " Lymphocytes % 39 20 - 40 %    Monocytes % 6 2 - 10 %    Eosinophils % 3 0 - 6 %    Basophils % 1 0 - 2 %    Neutrophils Absolute 4.2 2.0 - 7.7 thou/uL    Lymphocytes Absolute 3.1 0.8 - 4.4 thou/uL    Monocytes Absolute 0.5 0.0 - 0.9 thou/uL    Eosinophils Absolute 0.2 0.0 - 0.4 thou/uL    Basophils Absolute 0.1 0.0 - 0.2 thou/uL

## 2021-06-09 NOTE — PROGRESS NOTES
Assessment:      Healthy female exam.    1. Routine general medical examination at a health care facility     2. Abnormal MRI, kidney  US Abdomen Complete   3. Mixed hyperlipidemia  Lipid Profile    Hepatic Profile   4. Essential hypertension with goal blood pressure less than 140/90  Basic Metabolic Panel   5. Chronic pain  CK Total          Plan:      This is a 51-year-old female, seen today for physical exam.  Overall she is doing reasonably well, except for ongoing pain syndrome.  Indeed she sees the spine center, and has been quite reluctant to pursue any sort of surgical interventions although she tells me there becoming quite insistent about her seeing a surgeon.  She tells me they cannot guarantee her surgery is going to do anything, and she does not want to pursue anything about this.  She is very frustrated by her pain, but continues to work more and more hours all the time.  She does note that she gets crabby year with this, and particularly having to work multiple days a week without a break.  She is otherwise reasonably doing well.  On a recent MRI scan of her back, they noted a possible mass or cyst in her kidney.  This deserves follow-up, and we will do an ultrasound of the kidneys to rule out lesions.  Beyond that, her spine folks had suggested that she stop her statin therapy.  She told them that she was not about to do that without light blessing.  We discussed this, and discussed the fact that statins can cause some muscle pain.  We discussed that it may be worthwhile to at least try this, although her symptoms are more spine/radicular related than diffuse myalgias.  She is quite reluctant to stop her statin therapy despite my blessing to do so.  We will check a CK total just to rule this out.  Other labs as indicated above.  We will get back to her with these results once they are available.    Subjective:      Catherine Farrell is a 51 y.o. female who presents for an annual exam. The patient reports  "that there is not domestic violence in her life.     Had slipped and fallen  Had disc  \"torn\"  Has been getting injections for pain  Spine Clinic thinks she should stop her Simvastatin  Off her Gabapentin, now on Topamax and Diclofenac  Will not give pain meds (opiates)    Working 7 days/week    Pap smear done 7/31/15  Mammogram 16  Colonoscopy - still debating -         Healthy Habits:   Regular Exercise: No and does some cultural dance;   Sunscreen Use: Yes  Healthy Diet: Yes  Dental Visits Regularly: No  Seat Belt: Yes  Sexually active: No  Self Breast Exam Monthly:Yes        Immunization History   Administered Date(s) Administered     DT (pediatric) 1998     Td, historic 2008     Tdap 2008     Immunization status: as noted.    No exam data present    Gynecologic History  No LMP recorded. Patient has had an ablation.  Contraception: none  Last Pap: 7/31/15. Results were: normal  Last mammogram: 16. Results were: normal      OB History    Para Term  AB SAB TAB Ectopic Multiple Living   3 2 2  1 1    2      # Outcome Date GA Lbr Davidson/2nd Weight Sex Delivery Anes PTL Lv   3 SAB            2 Term            1 Term                   Current Outpatient Prescriptions   Medication Sig Dispense Refill     cholecalciferol, vitamin D3, 1,000 unit tablet Take 1,000 Units by mouth 2 times a day at 6:00 am and 4:00 pm.       diclofenac (VOLTAREN) 50 MG EC tablet Take 1 tablet (50 mg total) by mouth 3 (three) times a day as needed. 90 tablet 1     meclizine (ANTIVERT) 25 mg tablet TAKE ONE TABLET BY MOUTH THREE TIMES DAILY AS NEEDED FOR DIZZINESS OR NAUSEA 45 tablet 0     omeprazole (PRILOSEC) 40 MG capsule TAKE ONE CAPSULE BY MOUTH ONCE DAILY 90 capsule 2     simvastatin (ZOCOR) 40 MG tablet TAKE ONE TABLET BY MOUTH ONCE DAILY 30 tablet 6     topiramate (TOPAMAX) 25 MG tablet 1 tab at bedtime for 5 days then 1 tab twice daily 60 tablet 1     No current facility-administered medications " for this visit.      Past Medical History:   Diagnosis Date     Anemia      Bilateral calcaneal spurs     surgery 2015     Bursitis of hip      Chronic back pain      DDD (degenerative disc disease)      Depression      Environmental allergies      Fibromyalgia      HLD (hyperlipidemia)      Obesity      Past Surgical History:   Procedure Laterality Date     ACHILLES TENDON REPAIR Left 2015    Procedure: WITH SECONDARY ACHILLES TENDON REAPIR;  Surgeon: Rob Marion DPM;  Location: [facility];  Service:      ENDOMETRIAL ABLATION       CT APPENDECTOMY      Description: Appendectomy;  Recorded: 10/18/2008;     CT  DELIVERY ONLY      Description:  Section;  Recorded: 10/18/2008;     CT REMOVAL OF HEEL SPUR Left 2015    Procedure: LEFT POSTERIOR CALCANEAL SPUR RESECTION;  Surgeon: Rob Marion DPM;  Location: [facility];  Service: Podiatry     CT REMOVAL OF TONSILS,<13 Y/O      Description: Tonsillectomy;  Recorded: 10/18/2008;     Amoxicillin; Penicillins; and Shellfish containing products  Family History   Problem Relation Age of Onset     Lung cancer Father      Alcohol abuse Father      Alcohol abuse Son      Hypertension Mother      Stomach cancer Maternal Aunt      Social History     Social History     Marital status:      Spouse name: N/A     Number of children: N/A     Years of education: N/A     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[employer]     Social History Main Topics     Smoking status: Current Every Day Smoker     Packs/day: 0.50     Years: 36.00     Types: Cigarettes     Smokeless tobacco: Never Used     Alcohol use No      Comment: sober since      Drug use: No     Sexual activity: Yes     Partners: Male     Other Topics Concern     Not on file     Social History Narrative       Review of Systems  Review of Systems     Pertinent positives as noted in HPI; otherwise 12 point ROS negative.  Right radiculopathy          Objective:         Vitals:     "03/10/17 0733   BP: 110/70   Pulse: 80   Resp: 14   Temp: 97.7  F (36.5  C)   TempSrc: Oral   Weight: (!) 242 lb 8 oz (110 kg)   Height: 5' 9.75\" (1.772 m)     Body mass index is 35.05 kg/(m^2).    Physical  Physical Exam     EXAM:  Visit Vitals     /70     Pulse 80     Temp 97.7  F (36.5  C) (Oral)     Resp 14     Ht 5' 9.75\" (1.772 m)     Wt (!) 242 lb 8 oz (110 kg)     BMI 35.05 kg/m2      Gen:  NAD, appears well, well-hydrated  HEENT:  TMs nl, oropharynx benign, nasal mucosa nl, conjunctiva clear  Neck:  Supple, no adenopathy, no thyromegaly, no carotid bruits, no JVD  Lungs:  Clear to auscultation bilaterally  Breast exam:  No breast lumps, no skin changes, no nipple discharge, no axillary adenopathy  Cor:  RRR no murmur  Abd:  Soft, nontender, BS+, no masses, no guarding or rebound, no HSM  Extr:  Neg., pulses full to extremity;   Neuro:  No asymmetry, Nl motor tone/strength, nl sensation, reflexes =, gait nl, nl coordination, CN intact,   Skin:  Warm/dry      "

## 2021-06-09 NOTE — PROGRESS NOTES
Preoperative Exam    Scheduled Procedure: Right Shoulder Arthroscopy, Acromioplasty, DCE, RCR, possible Bicep Tenodesis  Surgery Date:  7/27/2020  Surgery Location: [facility], fax [phone]    Surgeon:  Dr. Kang    Assessment/Plan:     1. Preop examination  Hemoglobin   2. Right shoulder injury, sequela     3. Hypertension  Basic Metabolic Panel   4. Mixed hyperlipidemia  Lipid Cascade FASTING    Hepatic Profile   5. Fibromyalgia  ibuprofen (ADVIL,MOTRIN) 800 MG tablet   6. Chronic Reflux Esophagitis  omeprazole (PRILOSEC) 20 MG capsule     This is a 55 yo female with recent right shoulder injury. She was evaluated by Ortho and has significant derangement of this joint.  She is scheduled for surgery on 7/27/2020.  She is generally low risk for surgery.  Labs ordered as noted.  Reviewed personally by me.  OK for surgery.    1.  Preop - right shoulder injury - as noted.  2.  Hypertension - blood pressure is well controlled.  Continue same regimen.  3.  Hyperlipidemia - check cholesterol numbers - on Simvastatin  4.  Fibromyalgia - chronic pain syndrome  5.  Chronic Reflux esophagitis  - takes Omeprazole    Surgical Procedure Risk: Low (reported cardiac risk generally < 1%)  Have you had prior anesthesia?: Yes  Have you or any family members had a previous anesthesia reaction:  No  Do you or any family members have a history of a clotting or bleeding disorder?: Yes: mother have a blod clot  Cardiac Risk Assessment: no increased risk for major cardiac complications    APPROVAL GIVEN to proceed with proposed procedure, without further diagnostic evaluation    Please Note:  patient is right hand dominant - has difficulty using just her left side    Functional Status: Partially Dependent: family  Patient plans to recover at home with family.     Subjective:      Catherine Farrell is a 54 y.o. female who presents for a preoperative consultation.    Patient was trimming a tree -  "June 13, 2020  Was using a \"pull saw\" - limb snapped and right arm pulled back suddenly  Was seen by Ortho - now scheduled for surgical repair    Hasn't been arranged for COVID testing  Did not request pregnancy test (\"because I haven't had an appointment in 7 years\") nor a hemoglobin      All other systems reviewed and are negative, other than those listed in the HPI.    Pertinent History  Do you have difficulty breathing or chest pain after walking up a flight of stairs: No  History of obstructive sleep apnea: No  Steroid use in the last 6 months: Yes: March or April  Frequent Aspirin/NSAID use: No  Prior Blood Transfusion: Yes: 36 years  Prior Blood Transfusion Reaction: No  If for some reason prior to, during or after the procedure, if it is medically indicated, would you be willing to have a blood transfusion?:  There is no transfusion refusal.    Current Outpatient Medications   Medication Sig Dispense Refill     cholecalciferol, vitamin D3, (VITAMIN D3) 2,000 unit Tab Take 2,000 Units by mouth at bedtime.        gabapentin (NEURONTIN) 300 MG capsule TAKE 3 CAPSULES BY MOUTH THREE TIMES DAILY 810 capsule 2     simvastatin (ZOCOR) 40 MG tablet Take 1 tablet by mouth once daily 90 tablet 0     ibuprofen (ADVIL,MOTRIN) 800 MG tablet Take 1 tablet (800 mg total) by mouth every 6 (six) hours as needed for pain. 100 tablet 0     omeprazole (PRILOSEC) 20 MG capsule Take 1 capsule (20 mg total) by mouth daily before breakfast. 30 capsule 0     No current facility-administered medications for this visit.         Allergies   Allergen Reactions     Amoxicillin Hives     Penicillins Swelling and Itching     Annotation: Swelling of face/eyes, itching       Shellfish Containing Products Anaphylaxis       Patient Active Problem List   Diagnosis     Cough     Nicotine Dependence     Chronic Reflux Esophagitis     Fibromyalgia     Strain Of The Gastrocnemius Muscle Of The Right Leg     Adjustment Disorder With Depressed Mood "     Joint Pain, Localized In The Knee     Pain During Urination (Dysuria)     Abdominal Pain     Diverticulitis Of Colon     Trochanteric Bursitis     Sinusitis     Midback Pain     Mixed hyperlipidemia     Abnormal Weight Loss     Abnormal Pap smear of cervix     Adverse Food Reaction (Not Anaphylactic)     Impingement Of The Right Shoulder     Hypertension     Head Injury     Oral Thrush     Lumbar Disc Degeneration     Lower Back Pain Chronic     Carpal Tunnel Syndrome     Simple (Specific) Phobia     Dermatitis     Patellofemoral Syndrome Of The Right Knee      Tension-type headache     Obsessive compulsive disorder     Costochondritis     Fatigue     Chronic pain     Vitamin D deficiency     Calcaneal spur, right     Numbness of right lower extremity     Tarsal tunnel syndrome of right side     Compression neuropathy of right lower extremity     Mononeuritis lower limb, right     Right foot pain     Compression neuropathy     Obesity (BMI 35.0-39.9) with comorbidity (H)     Right shoulder injury, sequela       Past Medical History:   Diagnosis Date     Anemia      Bilateral calcaneal spurs     surgery 2015     Bursitis of hip      Chronic back pain      DDD (degenerative disc disease)      Depression      Diverticulitis      Environmental allergies      Fibromyalgia      History of transfusion     With      HLD (hyperlipidemia)      Obesity        Past Surgical History:   Procedure Laterality Date     ACHILLES TENDON REPAIR Left 2015    Procedure: WITH SECONDARY ACHILLES TENDON REAPIR;  Surgeon: Rob Marion DPM;  Location: [place] [facility] OR;  Service:      ACHILLES TENDON REPAIR Right 11/3/2017    Procedure: WITH SECONDARY ACHILLES TENDON REPAIR;  Surgeon: Rob Marion DPM;  Location: [facility] Main OR;  Service:      ENDOMETRIAL ABLATION       PA APPENDECTOMY      Description: Appendectomy;  Recorded: 10/18/2008;     PA  DELIVERY ONLY      Description:  Section;   Recorded: 10/18/2008;     [place] REMOVAL OF HEEL SPUR Left 11/6/2015    Procedure: LEFT POSTERIOR CALCANEAL SPUR RESECTION;  Surgeon: Rob Marion DPM;  Location: [place] [facility];  Service: Podiatry     [place] REMOVAL OF TONSILS,<13 Y/O      Description: Tonsillectomy;  Recorded: 10/18/2008;       Social History     Socioeconomic History     Marital status:      Spouse name: Not on file     Number of children: Not on file     Years of education: Not on file     Highest education level: Not on file   Occupational History     Occupation: supervisor     Employer: [employer]   Social Needs     Financial resource strain: Not on file     Food insecurity     Worry: Not on file     Inability: Not on file     Transportation needs     Medical: Not on file     Non-medical: Not on file   Tobacco Use     Smoking status: Current Every Day Smoker     Packs/day: 0.50     Types: Cigarettes     Smokeless tobacco: Never Used     Tobacco comment: Started Zyban 4/23/19   Substance and Sexual Activity     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     Drug use: No     Sexual activity: Yes     Partners: Male   Lifestyle     Physical activity     Days per week: Not on file     Minutes per session: Not on file     Stress: Not on file   Relationships     Social connections     Talks on phone: Not on file     Gets together: Not on file     Attends Confucianism service: Not on file     Active member of club or organization: Not on file     Attends meetings of clubs or organizations: Not on file     Relationship status: Not on file     Intimate partner violence     Fear of current or ex partner: Not on file     Emotionally abused: Not on file     Physically abused: Not on file     Forced sexual activity: Not on file   Other Topics Concern     Not on file   Social History Narrative     Not on file       Patient Care Team:  Annetta Hernandez MD as PCP - General  Annetta Hernandez MD as Assigned PCP          Objective:  "    Vitals:    07/24/20 0806   BP: 126/81   Pulse: 83   Resp: 16   Temp: 97.2  F (36.2  C)   TempSrc: Tympanic   SpO2: 98%   Weight: (!) 258 lb (117 kg)   Height: 5' 9.5\" (1.765 m)         Physical Exam:  Physical Exam     EXAM:  /81 (Patient Site: Right Arm, Patient Position: Sitting, Cuff Size: Adult Large)   Pulse 83   Temp 97.2  F (36.2  C) (Tympanic)   Resp 16   Ht 5' 9.5\" (1.765 m)   Wt (!) 258 lb (117 kg)   SpO2 98%   BMI 37.55 kg/m     Gen:  NAD, appears well, well-hydrated  HEENT:  TMs nl, oropharynx benign, nasal mucosa nl, conjunctiva clear  Neck:  Supple, no adenopathy, no thyromegaly, no carotid bruits, no JVD  Lungs:  Clear to auscultation bilaterally  Cor:  RRR no murmur  Abd:  Soft, nontender, BS+, no masses, no guarding or rebound, no HSM  Extr:  Significantly decreased ROM right shoulder -   Neuro:  No asymmetry  Skin:  Warm/dry        There are no Patient Instructions on file for this visit.        Labs:  Needs COVID-19 testing - to be arranged by surgeon  No other labs requested by surgeon    Results for orders placed or performed in visit on 07/24/20   Hemoglobin   Result Value Ref Range    Hemoglobin 12.7 12.0 - 16.0 g/dL   Lipid Cascade FASTING   Result Value Ref Range    Cholesterol 175 <=199 mg/dL    Triglycerides 293 (H) <=149 mg/dL    HDL Cholesterol 33 (L) >=50 mg/dL    LDL Calculated 83 <=129 mg/dL    Patient Fasting > 8hrs? Yes    Hepatic Profile   Result Value Ref Range    Bilirubin, Total 0.4 0.0 - 1.0 mg/dL    Bilirubin, Direct 0.1 <=0.5 mg/dL    Protein, Total 6.8 6.0 - 8.0 g/dL    Albumin 4.1 3.5 - 5.0 g/dL    Alkaline Phosphatase 53 45 - 120 U/L    AST 19 0 - 40 U/L    ALT 19 0 - 45 U/L   Basic Metabolic Panel   Result Value Ref Range    Sodium 142 136 - 145 mmol/L    Potassium 4.1 3.5 - 5.0 mmol/L    Chloride 107 98 - 107 mmol/L    CO2 26 22 - 31 mmol/L    Anion Gap, Calculation 9 5 - 18 mmol/L    Glucose 100 70 - 125 mg/dL    Calcium 9.6 8.5 - 10.5 mg/dL    BUN 8 8 " - 22 mg/dL    Creatinine 0.80 0.60 - 1.10 mg/dL    GFR MDRD Af Amer >60 >60 mL/min/1.73m2    GFR MDRD Non Af Amer >60 >60 mL/min/1.73m2           Immunization History   Administered Date(s) Administered     DT (pediatric) 04/01/1998     Influenza, inj, historic,unspecified 10/25/2019     Td,adult,historic,unspecified 02/20/2008     Tdap 02/20/2008, 02/17/2020           Electronically signed by Annetta Hernandez MD 07/26/20 8:09 AM

## 2021-06-09 NOTE — TELEPHONE ENCOUNTER
RN cannot approve Refill Request    RN can NOT refill this medication med is not covered by policy/route to provider. Last office visit: 5/18/2020 Annetta Hernandez MD Last Physical: 10/1/2018 Last MTM visit: Visit date not found Last visit same specialty: 5/18/2020 Annetta Hernandez MD.  Next visit within 3 mo: Visit date not found  Next physical within 3 mo: Visit date not found      Rachel Torres, [facility] Triage/Med Refill 7/12/2020    Requested Prescriptions   Pending Prescriptions Disp Refills     ibuprofen (ADVIL,MOTRIN) 800 MG tablet [Pharmacy Med Name: Ibuprofen 800 MG Oral Tablet] 100 tablet 0     Sig: TAKE 1 TABLET BY MOUTH EVERY 6 HOURS AS NEEDED FOR PAIN       There is no refill protocol information for this order

## 2021-06-09 NOTE — PROGRESS NOTES
Assessment/Plan:      Diagnoses and all orders for this visit:    Lumbar spine pain    Annular disc tear    Myofascial muscle pain    Lumbar spondylosis    Sleep difficulties        Assessment:    1.  Right sided lumbar gluteal and lower extremity pain after a fall. This was at her place of work but she was off duty. Symptoms are in the low back down the back of the right leg and are radicular in nature. She has an annular tear at L4-5 centrally with no high-grade disc herniation central stenosis or nerve impingement. She also has moderate foraminal stenosis on the right at L5-S1 with possible far lateral disc bulge and broad-based component on MRI.  I suspect flare of myofascial pain and possibly aggravation of lumbar disc annular tear.  She is back to baseline after 2 lumbar epidurals and physical therapy.      2. Myofascial pain in the lumbar spine and gluteal region. Carries a diagnosis of fibromyalgia.      3. Lumbar degenerative disc disease at L4-5 and L5-S1 on MRI.      4.  There may be a component of statin induced myalgia.    5. Poor sleep related to pain.  Improve the Topamax      Discussion:    1.  Overall doing well after lumbar epidural.  She is back to baseline.  She can continue with the lumbar brace as needed along with her current medications.    2.  Continue Topamax 25 mg at bedtime.    3.  I have restarted her primary care provider as a trial of stopping simvastatin could be helpful for her overall myalgias.  This could be transitioned to a different statin if necessary based on her cholesterol numbers.  I will leave this at the discretion of her primary care provider.    4.  I will release her to work with no restrictions at this time.    5.  I suspect she is back to baseline and she may have flared her underlying fibromyalgia and some of her degenerative changes.  I suspect she will continue to wax and wane just given her natural course with fibromyalgia and myofascial pain.  Return to see me  on an as-needed basis.      It was our pleasure caring for your patient today, if there any questions or concerns please do not hesitate to contact us.      Subjective:   Patient ID: Catherine Farrell is a 51 y.o. female.    History of Present Illness: Patient presents for follow-up evaluation of lumbar spine pain and right leg pain.  Symptoms are back to baseline.  Pain is across the lumbar spine at this time rated at 4/10 today over 10/10 at worst.  Still worse with activities.  She was working in her yard yesterday and has had somewhat of a flare today but during the day she takes diclofenac and Tylenol which seems to help.  She is still taking Topamax at bedtime which helps her sleep.  Uses a lumbar support as needed.  Feels overall she can do her normal activities.  No issues with the injection.    She does carry a diagnosis of fibromyalgia.  She has been on simvastatin for quite some time for cholesterol.    Imaging: I personally reviewed MRI images of the lumbar spine.  This shows degenerative disc disease multiple levels.  Broad based disc bulge L4-5 centrally with annular tear.    Review of systems:No numbness, tingling or weakness.  No bowel or bladder incontinence.  No headaches, dizziness, nausea, vomiting, blurred vision or balance deficits.    Past Medical History:   Diagnosis Date     Anemia      Bilateral calcaneal spurs     surgery Nov. 2015     Bursitis of hip      Chronic back pain      DDD (degenerative disc disease)      Depression      Environmental allergies      Fibromyalgia      HLD (hyperlipidemia)      Obesity        The following portions of the patient's history were reviewed and updated as appropriate: allergies, current medications, past family history, past medical history, past social history, past surgical history and problem list.      Objective:   Physical Exam:    Vitals:    02/20/17 1355   BP: 144/69   Pulse: 73       General: Alert and oriented with normal affect. Attention,  knowledge, memory, and language are intact. No acute distress.  CV: No longer show any edema.  Some generalized tenderness throughout the muscle bellies of the legs.  None in the arms.    Gait: Nonantalgic  Sensation is intact to light touch throughout the lower extremities.       Manual muscle testing reveals:  Right /Left out of 5      5/5 knee extensors  5/5 ankle plantar flexors  5/5 ankle dorsiflexors  5/5 EHL

## 2021-06-11 NOTE — PROGRESS NOTES
Assessment:     Diagnoses and all orders for this visit:    Annular disc tear    Right lumbar radiculitis  -     OPS TFESI Lumbar Sacral Unilateral    Foraminal stenosis of lumbar region  -     OPS TFESI Lumbar Sacral Unilateral       Catherine Farrell is a 51 y.o. y.o. female patient of Dr. Chamorro with past medical history significant for nicotine dependence, fibromyalgia, adjustment disorder with depressed mood, obsessive-compulsive disorder, vitamin D deficiency, trochanteric bursitis, diverticulitis, hypertension, plantar fasciitis, chronic low back pain, patellofemoral syndrome right knee, tension type headaches, who presents today for follow-up regarding ongoing right low back pain that radiates to the right posterior buttock and posterior thigh that is significant ongoing for the last 2 weeks that brought her to the ED on 7/16/2017.  Patient is neurologically intact on exam but does have positive straight leg raise on the right.  She does have moderate foraminal stenosis on the right at L5-S1 from far lateral disc bulge and broad-based component on MRI.     Plan:     A shared decision making plan was used. The patient's values and choices were respected. Prior medical records from 2/20/2017 were reviewed today. The following represents what was discussed and decided upon by the provider and the patient.        -DIAGNOSTIC TESTS: Images were personally reviewed and interpreted.   --Spine MRI December 2016 does reveal multilevel degenerative changes with small diffuse posterior disc bulge at L4-5 with annular fissure, mild to moderate bilateral neuroforaminal stenosis and mild central canal stenosis.  Also patient has moderate bilateral foraminal stenosis at L5-S1 with far lateral disc protrusion on the right.  --Right lower extremity EMG 3/2/2015 unremarkable.    -INTERVENTIONS: Ordered repeat right L5-S1 transfemoral epidural steroid injection and she did have significant relief from January injection.   Recurrent severe pain is been ongoing for 2 weeks.  --Did discuss that if she gets no relief with this injection we could consider surgical referral and updated MRI.    -MEDICATIONS: Advised patient to increase gabapentin up to 3 tablets 3 times a day as tolerated for radicular pain.  -20 tablets Percocet given to patient 7/16/2017 by the ED which she is only taken 1 tablet, advised to continue as needed for severe breakthrough pain if needed.  Discussed side effects of medications and proper use. Patient verbalized understanding.    -PHYSICAL THERAPY: Encouraged patient to continue physical therapy from prior sessions in 2016.  Discussed the importance of core strengthening, ROM, stretching exercises with the patient and how each of these entities is important in decreasing pain.  Explained to the patient that the purpose of physical therapy is to teach the patient a home exercise program.  These exercises need to be performed every day in order to decrease pain and prevent future occurrences of pain.        -PATIENT EDUCATION:  20 minutes of total visit time was spent face to face with the patient today, 60 % of the visit was spent on counseling, education, and coordinating care.   -10 minutes spent outside of visit time, non-face-to-face time, reviewing chart.    -FOLLOW UP: Follow-up for injection with Dr. Guzman than 2 weeks postinjection  Advised to contact clinic if symptoms worsen or change.    Subjective:     Catherine Farrell is a 51 y.o. female who presents today for follow-up regarding return of her right low back pain that radiates to the right posterior buttock and posterior thigh that stops at the knee that has been severe for the last 2 weeks.  She reports that this is the same pain that she has had in the past in which she did get significant relief from right L5-S1 TF LARON.  Patient denies any new trauma, denies any recent trips or falls or balance changes, denies bowel or bladder dysfunction, does  report perceived weakness right lower extremity.  Currently her pain is an 8/10 up to a 10/10 at its worst.    Minimal relief with Medrol Dosepak.    Treatment to Date: No prior spinal surgery.  Previous physical therapy December 2016 low back pain.  She does have lumbar brace that she occasionally uses activity that aggravates her pain with some relief.    She has seen Heather Telles LCSW psychotherapy in the past in 2015.    Right L4-5 TF LARON 3/13/2015 with no relief.  Bilateral L4-5 medial branch block 5/22/2015, unable to find pain diary.  Right L4-5 TF LARON 12/30/2016 with minimal relief.  Right L5-S1 TF LARON 1/27/2017 with significant relief ×6 months.    Medications:  Gabapentin 300 mg 2 tablets at nighttime  Medrol Dosepak ×2 last 7/16/2017 with some relief.  Ibuprofen  Percocet prescribed by ED 7/16/2017 x 20 tablets that she takes infrequently for severe breakthrough pain.    Patient Active Problem List   Diagnosis     Cough     Nicotine Dependence     Chronic Reflux Esophagitis     Fibromyalgia     Fracture Of The Calcaneus     Strain Of The Gastrocnemius Muscle Of The Right Leg     Adjustment Disorder With Depressed Mood     Joint Pain, Localized In The Knee     Edema     Pain During Urination (Dysuria)     Abdominal Pain     Diverticulitis Of Colon     Trochanteric Bursitis     Sinusitis     Midback Pain     Mixed hyperlipidemia     Abnormal Weight Loss     Abnormal Pap smear of cervix     Adverse Food Reaction (Not Anaphylactic)     Impingement Of The Right Shoulder     Hypertension     Head Injury     Oral Thrush     Lumbar Disc Degeneration     Lower Back Pain Chronic     Carpal Tunnel Syndrome     Plantar Fasciitis     Foot Pain (Soft Tissue)     Simple (Specific) Phobia     Dermatitis     Patellofemoral Syndrome Of The Right Knee      Tension-type headache     Obsessive compulsive disorder     Costochondritis     Fatigue     Tendonitis, Achilles, left     Calcaneal spur of left foot     Chronic pain      Vitamin D deficiency       Current Outpatient Prescriptions on File Prior to Encounter   Medication Sig Dispense Refill     cholecalciferol, vitamin D3, 1,000 unit tablet Take 1,000 Units by mouth 2 times a day at 6:00 am and 4:00 pm.       gabapentin (NEURONTIN) 300 MG capsule Take 1 capsule (300 mg total) by mouth daily. Increase by 1 tab every 3 days 30 capsule 0     ibuprofen (ADVIL,MOTRIN) 600 MG tablet Take 1 tablet (600 mg total) by mouth every 6 (six) hours as needed for pain. 30 tablet 0     meclizine (ANTIVERT) 25 mg tablet TAKE ONE TABLET BY MOUTH THREE TIMES DAILY AS NEEDED FOR DIZZINESS OR NAUSEA 45 tablet 0     methylPREDNISolone (MEDROL DOSEPACK) 4 mg tablet follow package directions 21 tablet 0     omeprazole (PRILOSEC) 40 MG capsule TAKE ONE CAPSULE BY MOUTH ONCE DAILY 90 capsule 2     oxyCODONE-acetaminophen (PERCOCET) 5-325 mg per tablet Take 1-2 tablets by mouth every 6 (six) hours as needed. 20 tablet 0     simvastatin (ZOCOR) 40 MG tablet TAKE ONE TABLET BY MOUTH ONCE DAILY 90 tablet 2     topiramate (TOPAMAX) 25 MG tablet Take 1 tablet (25 mg total) by mouth 2 (two) times a day. 60 tablet 2     [DISCONTINUED] predniSONE (DELTASONE) 10 mg tablet 6 tabs po daily x 2 d, 5 tabs po daily x 2 d, 4 tabs po daily x 2 d,  3 tabs po daily x 2 d,  2 tabs po daily x 2 d,  1 tab po daily x 2 d 42 tablet 1     [DISCONTINUED] diclofenac (VOLTAREN) 50 MG EC tablet Take 1 tablet (50 mg total) by mouth 3 (three) times a day as needed. 90 tablet 1     No current facility-administered medications on file prior to encounter.        Allergies   Allergen Reactions     Amoxicillin Hives     Penicillins Swelling and Itching     Annotation: Swelling of face/eyes, itching       Shellfish Containing Products Anaphylaxis       Past Medical History:   Diagnosis Date     Anemia      Bilateral calcaneal spurs     surgery Nov. 2015     Bursitis of hip      Chronic back pain      DDD (degenerative disc disease)       Depression      Environmental allergies      Fibromyalgia      HLD (hyperlipidemia)      Obesity         Review of Systems  ROS: Positive for perceived weakness, nausea/vomiting due to increased pain.  Specifically negative for bowel/bladder dysfunction, balance changes, headache, dizziness, foot drop, fevers, chills, appetite changes, unexplained weight loss. Otherwise 13 systems reviewed are negative. Please see the patient's intake questionnaire from today for details.    Reviewed Social, Family, Past Medical and Past Surgical history with patient, no significant changes noted since prior visit.     Objective:     /73 (Patient Site: Left Arm, Patient Position: Sitting)  Pulse 80  Temp 98.2  F (36.8  C) (Oral)   Wt (!) 241 lb (109.3 kg)  LMP 07/03/2010  SpO2 98%  BMI 35.08 kg/m2    PHYSICAL EXAMINATION:    --CONSTITUTIONAL: Well developed, well nourished, healthy appearing individual.  --PSYCHIATRIC: Appropriate mood and affect. No difficulty interacting due to temper, social withdrawal, or memory issues.  --SKIN: Lumbar region is dry and intact. Sensation to light touch is intact in the bilateral L4, L5, and S1 dermatomes.  --RESPIRATORY: Normal rhythm and effort. No abnormal accessory muscle breathing patterns noted.   --MUSCULOSKELETAL:  Normal lumbar lordosis noted, no lateral shift.  --GROSS MOTOR: Easily arises from a seated position.   --LUMBAR SPINE:  Inspection reveals no evidence of deformity. Range of motion is not limited in lumbar flexion, extension, or lateral rotation. No tenderness to palpation. Straight leg raising in the seated position is negative to radicular pain on the left, positive on the right. Sciatic notch non-tender.   --LOWER EXTREMITY MOTOR TESTING:  Plantar flexion left 5/5, right 5/5   Dorsiflexion left 5/5, right 5/5   Great toe MTP extension left 5/5, right 5/5  Knee flexion left 5/5, right 5/5  Knee extension left 5/5, right 5/5   Hip flexion left 5/5, right 5/5  Hip  abduction left 5/5, right 5/5  Hip adduction left 5/5, right 5/5   --HIPS: Full range of motion bilaterally. Negative FABERs on the involved lower extremity.   --NEUROLOGIC: Bilateral patellar and achilles reflexes are physiologic and symmetric. Lower extremities are intact to light touch.     RESULTS:   Imaging: MRI of the lumbar spine was reviewed today. The images were shown to the patient and the findings were explained using a spine model.        MR LUMBAR SPINE WO CONTRAST  12/27/2016  CONCLUSION:  1. Multilevel degenerative change in the lumbar spine, as described above.  2. Small diffuse posterior disc bulge at L4-L5 with a small centrally located annular fissure. Mild to moderate bilateral neural foraminal stenosis at this level with mild spinal canal stenosis.  3. Moderate bilateral neural foraminal stenosis at L5-S1.

## 2021-06-11 NOTE — PROGRESS NOTES
F/U  --Dr. Chamorro pt  --ED 7/16/17  --C/O midline low back pain, radiates into the right buttock, right anterior/posterior/lateral thigh, mild left inner thigh x 3-4 weeks  --PT x 5 sessions [facility], 12/20/16 for back  --Rates pain 8/10    Medication  --Medrol pack, day 2 (min relief)  --Gabapentin 300 mg 2 caps QHS  --Ibuprofen 600 mg 1 tab Q6H PRN  --Percocet PRN  --Completed Prednisone 10 mg x 12 days

## 2021-06-11 NOTE — PROGRESS NOTES
Assessment:      Healthy female exam.    1. Routine general medical examination at a health care facility     2. Chronic Reflux Esophagitis  omeprazole (PRILOSEC) 40 MG capsule   3. Mixed hyperlipidemia  Lipid Profile    Hepatic Profile   4. Essential hypertension with goal blood pressure less than 140/90  Basic Metabolic Panel   5. Abnormal Pap smear of cervix     6. Vitamin D deficiency  Vitamin D, Total (25-Hydroxy)   7. Lumbar Disc Degeneration  predniSONE (DELTASONE) 10 mg tablet          Plan:      This is a 51-year-old female, seen today for a physical exam.  She is having increasing problems with her low back, with increasing pain and decreasing abilities at work.  She requests some time off of work to see if she can improve symptomatically at this time.  We have given her some prednisone to see if we can decrease her neurologic symptoms, she is having more radicular symptoms today.  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  She has had no specific injury at this time, and he has been working with the spine clinic, but did not find the epidural remotely helpful.  She does have the spine folks looking after her, and will return if symptoms worsen.    She has a history of underlying hypertension, blood pressure is controlled today.  We will continue to follow as she does have family history (mother) of significant hypertension.  She does have a history of hyperlipidemia, and does take simvastatin, although stopped taking this because she thought some of her pain was related to the statin.  We will check labs and make sure that her cholesterol has remained stable, as well as side effects.    We will check labs as indicated above.  She will have a mammogram today in concert with this visit.    Subjective:      Catherine Farrell is a 51 y.o. female who presents for an annual exam. The patient reports that there  is not domestic violence in her life.     Limping gait  Can't feel right toes sometimes  Spine center - thinks she needs fusion; but she doesn't want that  But, doesn't want more shots -     Healthy Habits:   Regular Exercise: no regular exercise; has very physical job  Sunscreen Use: No  Healthy Diet: No  Dental Visits Regularly: No  Seat Belt: Yes  Sexually active: No  Self Breast Exam Monthly:No  Hemoccults: No  Flex Sig: No  Colonoscopy: no - declines        Immunization History   Administered Date(s) Administered     DT (pediatric) 1998     Td, historic 2008     Tdap 2008     Immunization status: reviewed - due for tetanus shot in next year.    No exam data present    Gynecologic History  Patient's last menstrual period was 2010.  Contraception: post menopausal status  Last Pap: 2015. Results were: normal  Last mammogram: 16; today. Results were: normal/pending      OB History    Para Term  AB Living   3 2 2  1 2   SAB TAB Ectopic Multiple Live Births   1          # Outcome Date GA Lbr Davidson/2nd Weight Sex Delivery Anes PTL Lv   3 SAB            2 Term            1 Term                   Current Outpatient Prescriptions   Medication Sig Dispense Refill     omeprazole (PRILOSEC) 40 MG capsule TAKE ONE CAPSULE BY MOUTH ONCE DAILY 90 capsule 2     topiramate (TOPAMAX) 25 MG tablet Take 1 tablet (25 mg total) by mouth 2 (two) times a day. 60 tablet 2     cholecalciferol, vitamin D3, 1,000 unit tablet Take 1,000 Units by mouth 2 times a day at 6:00 am and 4:00 pm.       diclofenac (VOLTAREN) 50 MG EC tablet Take 1 tablet (50 mg total) by mouth 3 (three) times a day as needed. 90 tablet 1     meclizine (ANTIVERT) 25 mg tablet TAKE ONE TABLET BY MOUTH THREE TIMES DAILY AS NEEDED FOR DIZZINESS OR NAUSEA 45 tablet 0     predniSONE (DELTASONE) 10 mg tablet 6 tabs po daily x 2 d, 5 tabs po daily x 2 d, 4 tabs po daily x 2 d,  3 tabs po daily x 2 d,  2 tabs po daily x 2 d,  1  tab po daily x 2 d 42 tablet 1     simvastatin (ZOCOR) 40 MG tablet TAKE ONE TABLET BY MOUTH ONCE DAILY 90 tablet 2     No current facility-administered medications for this visit.      Past Medical History:   Diagnosis Date     Anemia      Bilateral calcaneal spurs     surgery 2015     Bursitis of hip      Chronic back pain      DDD (degenerative disc disease)      Depression      Environmental allergies      Fibromyalgia      HLD (hyperlipidemia)      Obesity      Past Surgical History:   Procedure Laterality Date     ACHILLES TENDON REPAIR Left 2015    Procedure: WITH SECONDARY ACHILLES TENDON REAPIR;  Surgeon: Rob Marion DPM;  Location: [facility];  Service:      ENDOMETRIAL ABLATION       [place] APPENDECTOMY      Description: Appendectomy;  Recorded: 10/18/2008;     [place]  DELIVERY ONLY      Description:  Section;  Recorded: 10/18/2008;     [place] REMOVAL OF HEEL SPUR Left 2015    Procedure: LEFT POSTERIOR CALCANEAL SPUR RESECTION;  Surgeon: Rob Marion DPM;  Location: [facility];  Service: Podiatry     [place] REMOVAL OF TONSILS,<11 Y/O      Description: Tonsillectomy;  Recorded: 10/18/2008;     Amoxicillin; Penicillins; and Shellfish containing products  Family History   Problem Relation Age of Onset     Lung cancer Father      Alcohol abuse Father      Alcohol abuse Son      Hypertension Mother      Stomach cancer Maternal Aunt      Breast cancer Maternal Aunt      Breast cancer Maternal Aunt      Social History     Social History     Marital status:      Spouse name: N/A     Number of children: N/A     Years of education: N/A     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[employer]     Social History Main Topics     Smoking status: Current Every Day Smoker     Packs/day: 0.50     Years: 36.00     Types: Cigarettes     Smokeless tobacco: Never Used     Alcohol use No      Comment: sober since      Drug use: No     Sexual activity: Yes     Partners: Male     Other  "Topics Concern     Not on file     Social History Narrative       Review of Systems  Review of Systems     Pertinent positives as noted in HPI; otherwise 12 point ROS negative.        Objective:         Vitals:    07/03/17 1331   BP: 138/78   Pulse: 80   Resp: 18   Temp: 97.8  F (36.6  C)   TempSrc: Oral   Weight: (!) 241 lb (109.3 kg)   Height: 5' 9.25\" (1.759 m)     Body mass index is 35.33 kg/(m^2).    Physical  Physical Exam    EXAM:  /78 (Patient Site: Right Arm, Patient Position: Sitting, Cuff Size: Adult Large)  Pulse 80  Temp 97.8  F (36.6  C) (Oral)   Resp 18  Ht 5' 9.25\" (1.759 m)  Wt (!) 241 lb (109.3 kg)  LMP 07/03/2010  BMI 35.33 kg/m2   Gen:  NAD, appears well, well-hydrated  HEENT:  TMs nl, oropharynx benign, nasal mucosa nl, conjunctiva clear  Neck:  Supple, no adenopathy, no thyromegaly, no carotid bruits, no JVD  Lungs:  Clear to auscultation bilaterally  Cor:  RRR no murmur  Abd:  Soft, nontender, BS+, no masses, no guarding or rebound, no HSM  Back:  Decreased ROM lumbar spine; limping/lumbering gait  Extr:  Neg.  Neuro:  No asymmetry, Nl motor tone/strength, nl sensation, reflexes =, gait nl, nl coordination, CN intact,   Skin:  Warm/dry        "

## 2021-06-12 NOTE — PROGRESS NOTES
"ASSESSMENT/PLAN:  1. Achilles tendinitis, right leg  Ambulatory referral to Podiatry       This is a 52-year-old female, with previous history of Achilles tendinitis, mostly in the left foot.  Now presents with pain in the right ankle, with pain localizing to the area overlying the right Achilles tendon.  No nodules are appreciated on exam.  She has been treated by podiatry in the past, and it is reasonable to send her back to podiatry for further evaluation.  She is comfortable with this.  Work note was provided as well.        Medications Discontinued During This Encounter   Medication Reason     methylPREDNISolone (MEDROL DOSEPACK) 4 mg tablet Therapy completed     There are no Patient Instructions on file for this visit.    Chief Complaint:  Chief Complaint   Patient presents with     Ankle Pain     right ankle swelling x2 weeks       HPI:   Catherine Farrell is a 52 y.o. female c/o  No injury, pain just started spontaneously  Still working - on her feet a lot at work  No ankle sprains, no inversion/eversion history  \"limping\" makes her back hurt worse      PMH:   Patient Active Problem List    Diagnosis Date Noted     Vitamin D deficiency 07/03/2017     Chronic pain 03/10/2017     Calcaneal spur of left foot 11/06/2015     Tendonitis, Achilles, left 09/28/2015     Costochondritis 07/10/2015     Fatigue 07/10/2015     Obsessive compulsive disorder 03/09/2015     Dermatitis 02/05/2015     Patellofemoral Syndrome Of The Right Knee  02/05/2015     Tension-type headache 02/05/2015     Plantar Fasciitis      Foot Pain (Soft Tissue)      Simple (Specific) Phobia      Cough      Nicotine Dependence      Chronic Reflux Esophagitis      Fibromyalgia      Fracture Of The Calcaneus      Strain Of The Gastrocnemius Muscle Of The Right Leg      Adjustment Disorder With Depressed Mood      Joint Pain, Localized In The Knee      Edema      Pain During Urination (Dysuria)      Abdominal Pain      Diverticulitis Of Colon      " Trochanteric Bursitis      Sinusitis      Midback Pain      Mixed hyperlipidemia      Abnormal Weight Loss      Abnormal Pap smear of cervix      Adverse Food Reaction (Not Anaphylactic)      Impingement Of The Right Shoulder      Hypertension      Head Injury      Oral Thrush      Lumbar Disc Degeneration      Lower Back Pain Chronic      Carpal Tunnel Syndrome      Past Medical History:   Diagnosis Date     Anemia      Bilateral calcaneal spurs     surgery 2015     Bursitis of hip      Chronic back pain      DDD (degenerative disc disease)      Depression      Environmental allergies      Fibromyalgia      HLD (hyperlipidemia)      Obesity      Past Surgical History:   Procedure Laterality Date     ACHILLES TENDON REPAIR Left 2015    Procedure: WITH SECONDARY ACHILLES TENDON REAPIR;  Surgeon: Rob Marion DPM;  Location: [facility];  Service:      ENDOMETRIAL ABLATION       [place] APPENDECTOMY      Description: Appendectomy;  Recorded: 10/18/2008;     [place]  DELIVERY ONLY      Description:  Section;  Recorded: 10/18/2008;     [place] REMOVAL OF HEEL SPUR Left 2015    Procedure: LEFT POSTERIOR CALCANEAL SPUR RESECTION;  Surgeon: Rob Marion DPM;  Location: [facility];  Service: Podiatry     [place] REMOVAL OF TONSILS,<11 Y/O      Description: Tonsillectomy;  Recorded: 10/18/2008;     Social History     Social History     Marital status:      Spouse name: N/A     Number of children: N/A     Years of education: N/A     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[employer]     Social History Main Topics     Smoking status: Current Every Day Smoker     Packs/day: 0.50     Years: 36.00     Types: Cigarettes     Smokeless tobacco: Never Used     Alcohol use No      Comment: sober since      Drug use: No     Sexual activity: Yes     Partners: Male     Other Topics Concern     Not on file     Social History Narrative       Meds:    Current Outpatient Prescriptions:      cholecalciferol,  vitamin D3, 1,000 unit tablet, Take 1,000 Units by mouth 2 times a day at 6:00 am and 4:00 pm., Disp: , Rfl:      gabapentin (NEURONTIN) 300 MG capsule, Take 1 capsule (300 mg total) by mouth daily. Increase by 1 tab every 3 days, Disp: 30 capsule, Rfl: 0     meclizine (ANTIVERT) 25 mg tablet, TAKE ONE TABLET BY MOUTH THREE TIMES DAILY AS NEEDED FOR DIZZINESS OR NAUSEA, Disp: 45 tablet, Rfl: 0     omeprazole (PRILOSEC) 40 MG capsule, TAKE ONE CAPSULE BY MOUTH ONCE DAILY, Disp: 90 capsule, Rfl: 2     simvastatin (ZOCOR) 40 MG tablet, TAKE ONE TABLET BY MOUTH ONCE DAILY, Disp: 90 tablet, Rfl: 2     topiramate (TOPAMAX) 25 MG tablet, Take 1 tablet (25 mg total) by mouth 2 (two) times a day., Disp: 60 tablet, Rfl: 2    Allergies:  Allergies   Allergen Reactions     Amoxicillin Hives     Penicillins Swelling and Itching     Annotation: Swelling of face/eyes, itching       Shellfish Containing Products Anaphylaxis       ROS:  Pertinent positives as noted in HPI; otherwise 12 point ROS negative.      Physical Exam:  EXAM:  /82 (Patient Site: Left Arm, Patient Position: Sitting, Cuff Size: Adult Large)  Pulse 74  Temp 98.3  F (36.8  C) (Oral)   Resp 14  Wt (!) 242 lb 14.4 oz (110.2 kg)  SpO2 99%  BMI 35.36 kg/m2   Gen:  NAD, appears well, well-hydrated  HEENT:  TMs nl, oropharynx benign, nasal mucosa nl, conjunctiva clear  Neck:  Supple, no adenopathy, no thyromegaly, no carotid bruits, no JVD  Lungs:  Clear to auscultation bilaterally  Cor:  RRR no murmur  Abd:  Soft, nontender, BS+, no masses, no guarding or rebound, no HSM  Extr:  Right ankle - swollen santino laterally, able to bear weight, tender overlying Achilles tendon  Neuro:  No asymmetry  Skin:  Warm/dry

## 2021-06-12 NOTE — PROGRESS NOTES
"DIAGNOSIS: Right posterior calcaneal spur with associated Achilles tendinitis    PLAN: No improvement with home exercise program from physical therapy, immobilization or oral steroid treatment.  She is ready to proceed with surgery to remove the spurs.  She will be scheduled for November 3 for a right posterior calcaneal spur resection with secondary repair of the Achilles tendon.  We reviewed what to expect during the procedure and the recovery.  She has been through this before with the left foot.  She will see Dr. Medellin for a preoperative history and physical.  Fifteen minute visit, greater than half our time spent counseling and coordinating medical care.     SUBJECTIVE: The patient returns to the [facility] with right posterior heel pain.  This has been slowly getting worse for a number of months.  X-ray in 2014 showed a posterior calcaneal spur in the area.  Pain is worse with standing and walking.  She has a history of left posterior heel spur resection with secondary Achilles repair.  She was off work for 3 months after that surgery.  She has tried doing physical therapy exercises for her new right heel pain without much improvement.  Immobilization with a walking cast and oral steroids have not been helpful.  She stands at work.    PHYSICAL EXAM:  /80  Pulse 88  Resp 18  Ht 5' 9.5\" (1.765 m)  Wt (!) 242 lb (109.8 kg)  BMI 35.22 kg/m2  General: Pleasant 52 y.o. female in no acute distress.  Vascular: DP pulses are palpable. PT pulses are palpable. Pedal hair is present. Feet are warm to the touch.  Cardiac: Pulse is regular.  Lymphatic: No edema at the ankles.  Neuro: Sensation in the feet is grossly intact to light touch.  Derm: Left heel incision has healed nicely.  No bruising, discoloration or open lesion at the right heel.  Musculoskeletal: Palpable and visible enlargement at the posterior right heel.  Previous x-rays show a large calcaneal spur which is almost certainly larger 3 years " later.

## 2021-06-13 NOTE — ANESTHESIA PROCEDURE NOTES
Peripheral Block    Patient location during procedure: pre-op  Start time: 11/3/2017 7:15 AM  End time: 11/3/2017 7:18 AM  post-op analgesia per surgeon order as noted in medical record  Staffing:  Performing  Anesthesiologist: ZEINAB PONCE  Preanesthetic Checklist  Completed: patient identified, site marked, risks, benefits, and alternatives discussed, timeout performed, consent obtained, airway assessed, oxygen available, suction available, emergency drugs available and hand hygiene performed  Peripheral Block  Block type: saphenous, adductor canal block  Prep: ChloraPrep  Patient position: supine  Patient monitoring: cardiac monitor, continuous pulse oximetry, heart rate and blood pressure  Laterality: right  Injection technique: ultrasound guided    Ultrasound used to visualize needle placement in proximity to nerve being blocked: yes   Permanent ultrasound image captured for medical record    Needle  Needle type: echogenic   Needle gauge: 20G  Needle length: 4 in  no peripheral nerve catheter placed  Assessment  Injection assessment: no difficulty with injection, negative aspiration for heme, no paresthesia on injection and incremental injection  Additional Notes    Right adductor canal block with bupivacaine 0.5% 15 cc.    Zeinab Ponce MD  Staff Anesthesiologist  [facility]

## 2021-06-13 NOTE — TELEPHONE ENCOUNTER
Requested Prescriptions     Pending Prescriptions Disp Refills     simvastatin (ZOCOR) 40 MG tablet [Pharmacy Med Name: Simvastatin 40 MG Oral Tablet] 90 tablet 0     Sig: Take 1 tablet by mouth once daily

## 2021-06-13 NOTE — PROGRESS NOTES
"Catherine Farrell is a 55 y.o. female who is being evaluated via a billable video visit.      The patient has been notified of following:     \"This video visit will be conducted via a call between you and your physician/provider. We have found that certain health care needs can be provided without the need for an in-person physical exam.  This service lets us provide the care you need with a video conversation.  If a prescription is necessary we can send it directly to your pharmacy.  If lab work is needed we can place an order for that and you can then stop by our lab to have the test done at a later time.    Video visits are billed at different rates depending on your insurance coverage. Please reach out to your insurance provider with any questions.    If during the course of the call the physician/provider feels a video visit is not appropriate, you will not be charged for this service.\"    Patient has given verbal consent to a Video visit? Yes  How would you like to obtain your AVS? AVS Preference: [email or web address].  If dropped by the video visit, the video invitation should be sent to: Text to cell phone: [phone]  Will anyone else be joining your video visit? No        Video Start Time: 9:18 AM    Additional provider notes:     Concern for COVID-19  About how many days ago did these symptoms start? > 2 weeks ago  Is this your first visit for this illness? No   How would you describe your symptoms since your last visit? My symptoms have worsened  In the 14 days before your symptoms started, have you had close contact with someone with COVID-19 (Coronavirus)? Yes, I have been in contact with someone who has COVID-19/Coronavirus (confirmed by lab test).  Do you have a fever or chills? No  Are you having new or worsening difficulty breathing? No  Do you have new or worsening cough? Yes, I am coughing up mucus. - green mucus \"from my nose\"  Have you had any new or unexplained body aches? No  Have you experienced any of the " following NEW symptoms?    Headache: YES, by her eyes    Sore throat: No    Loss of taste or smell: No    Chest pain: No    Diarrhea: No    Rash: No  What treatments have you tried? Tylenol  Who do you live with? Elderly mother  Are you, or a household member, a healthcare worker or a ? No  Do you live in a nursing home, group home, or shelter? No  Do you have a way to get food/medications if quarantined? Yes, I have a friend or family member who can help me.        Video-Visit Details    ASSESSMENT/PLAN:  1. Exposure to COVID-19 virus  Symptomatic COVID-19 Virus (CORONAVIRUS) PCR   2. Acute non-recurrent maxillary sinusitis  cefdinir (OMNICEF) 300 MG capsule   3. Fibromyalgia  gabapentin (NEURONTIN) 300 MG capsule   4. Chronic Reflux Esophagitis  omeprazole (PRILOSEC) 40 MG capsule       This is a 54 yo female with:  1.  Exposure to COVID-19 virus - with current URI symptoms - will check COVID-19 testing.   2.  Acute sinusitis - patient does have URI symptoms with concern for acute sinusitis - will treat with Cefdinir  3.  Fibromyalgia - patient has chronic pain related to trigger points/muscle pain - uses Gabapentin - due for refill  4.  Chronic Reflux Esophagitis - uses Omeprazole for symptoms  Return in about 2 weeks (around 12/7/2020) for if not getting better.      Medications Discontinued During This Encounter   Medication Reason     omeprazole (PRILOSEC) 20 MG capsule Formulary change     gabapentin (NEURONTIN) 300 MG capsule Reorder     omeprazole (PRILOSEC) 40 MG capsule Reorder     There are no Patient Instructions on file for this visit.    Chief Complaint:  Chief Complaint   Patient presents with     Sinusitis     pressure around eyes, noses, left ear plug, and a lot of mucus for 2 weeks.        HPI:   Catherine Farrell is a 55 y.o. female c/o  Had negative COVID-19 test on Tuesday 11/17  Last exposure to COVID-19 positive individual would have been 11/13  Has been quarantining    Now with  congestion/stuffiness/cough  No fever, no chills    Had sinus symptoms due to working at [employer] at work - but then when other people were positive -     Doing in home therapy for shoulder - takes Tylenol, Motrin for that  Taking tylenol sinus at night    PMH:   Patient Active Problem List    Diagnosis Date Noted     Right shoulder injury, sequela 07/24/2020     Obesity (BMI 35.0-39.9) with comorbidity (H) 04/23/2019     Right foot pain 09/27/2018     Compression neuropathy 09/27/2018     Tarsal tunnel syndrome of right side 06/04/2018     Compression neuropathy of right lower extremity 06/04/2018     Mononeuritis lower limb, right 06/04/2018     Numbness of right lower extremity 05/30/2018     Calcaneal spur, right 09/11/2017     Vitamin D deficiency 07/03/2017     Chronic pain 03/10/2017     Costochondritis 07/10/2015     Fatigue 07/10/2015     Obsessive compulsive disorder 03/09/2015     Dermatitis 02/05/2015     Patellofemoral Syndrome Of The Right Knee  02/05/2015     Tension-type headache 02/05/2015     Simple (Specific) Phobia      Cough      Nicotine Dependence      Chronic Reflux Esophagitis      Fibromyalgia      Strain Of The Gastrocnemius Muscle Of The Right Leg      Adjustment Disorder With Depressed Mood      Joint Pain, Localized In The Knee      Pain During Urination (Dysuria)      Abdominal Pain      Diverticulitis Of Colon      Trochanteric Bursitis      Sinusitis      Midback Pain      Mixed hyperlipidemia      Abnormal Weight Loss      Abnormal Pap smear of cervix      Adverse Food Reaction (Not Anaphylactic)      Impingement Of The Right Shoulder      Hypertension      Head Injury      Oral Thrush      Lumbar Disc Degeneration      Lower Back Pain Chronic      Carpal Tunnel Syndrome      Past Medical History:   Diagnosis Date     Anemia      Bilateral calcaneal spurs     surgery Nov. 2015     Bursitis of hip      Chronic back pain      DDD (degenerative disc disease)      Depression       Diverticulitis      Environmental allergies      Fibromyalgia      History of transfusion     With      HLD (hyperlipidemia)      Obesity      Past Surgical History:   Procedure Laterality Date     ACHILLES TENDON REPAIR Left 2015    Procedure: WITH SECONDARY ACHILLES TENDON REAPIR;  Surgeon: Rob Marion DPM;  Location: [facility];  Service:      ACHILLES TENDON REPAIR Right 11/3/2017    Procedure: WITH SECONDARY ACHILLES TENDON REPAIR;  Surgeon: Rob Marion DPM;  Location: [facility] OR;  Service:      ENDOMETRIAL ABLATION       SD APPENDECTOMY      Description: Appendectomy;  Recorded: 10/18/2008;     SD  DELIVERY ONLY      Description:  Section;  Recorded: 10/18/2008;     SD REMOVAL OF HEEL SPUR Left 2015    Procedure: LEFT POSTERIOR CALCANEAL SPUR RESECTION;  Surgeon: Rob Marion DPM;  Location: [facility];  Service: Podiatry     SD REMOVAL OF TONSILS,<11 Y/O      Description: Tonsillectomy;  Recorded: 10/18/2008;     Social History     Socioeconomic History     Marital status:      Spouse name: Not on file     Number of children: Not on file     Years of education: Not on file     Highest education level: Not on file   Occupational History     Occupation: supervisor     Employer: [employer]   Social Needs     Financial resource strain: Not on file     Food insecurity     Worry: Not on file     Inability: Not on file     Transportation needs     Medical: Not on file     Non-medical: Not on file   Tobacco Use     Smoking status: Current Every Day Smoker     Packs/day: 0.50     Types: Cigarettes     Smokeless tobacco: Never Used     Tobacco comment: Started Zyban 19   Substance and Sexual Activity     Alcohol use: No     Comment: sober since      Drug use: No     Sexual activity: Yes     Partners: Male   Lifestyle     Physical activity     Days per week: Not on file     Minutes per session: Not on file     Stress: Not on file    Relationships     Social connections     Talks on phone: Not on file     Gets together: Not on file     Attends Mosque service: Not on file     Active member of club or organization: Not on file     Attends meetings of clubs or organizations: Not on file     Relationship status: Not on file     Intimate partner violence     Fear of current or ex partner: Not on file     Emotionally abused: Not on file     Physically abused: Not on file     Forced sexual activity: Not on file   Other Topics Concern     Not on file   Social History Narrative     Not on file     Family History   Problem Relation Age of Onset     Lung cancer Father      Alcohol abuse Father      Alcohol abuse Son      Hypertension Mother      Stomach cancer Maternal Aunt      Breast cancer Maternal Aunt      Breast cancer Maternal Aunt        Meds:    Current Outpatient Medications:      cholecalciferol, vitamin D3, (VITAMIN D3) 2,000 unit Tab, Take 2,000 Units by mouth at bedtime. , Disp: , Rfl:      gabapentin (NEURONTIN) 300 MG capsule, TAKE 3 CAPSULES BY MOUTH THREE TIMES DAILY, Disp: 810 capsule, Rfl: 2     ibuprofen (ADVIL,MOTRIN) 800 MG tablet, Take 1 tablet (800 mg total) by mouth every 6 (six) hours as needed for pain., Disp: 100 tablet, Rfl: 0     simvastatin (ZOCOR) 40 MG tablet, Take 1 tablet by mouth once daily, Disp: 90 tablet, Rfl: 0     cefdinir (OMNICEF) 300 MG capsule, Take 1 capsule (300 mg total) by mouth 2 (two) times a day for 10 days., Disp: 20 capsule, Rfl: 0     omeprazole (PRILOSEC) 40 MG capsule, Take 1 capsule (40 mg total) by mouth daily., Disp: 90 capsule, Rfl: 0    Allergies:  Allergies   Allergen Reactions     Amoxicillin Hives     Penicillins Swelling and Itching     Annotation: Swelling of face/eyes, itching       Shellfish Containing Products Anaphylaxis       ROS:  Pertinent positives as noted in HPI; otherwise 12 point ROS negative.      Physical Exam:  EXAM:  There were no vitals taken for this visit.   Gen:   NAD, appears ill, blowing nose, coughing,   HEENT:  nasal mucosa nl, conjunctiva clear  Neck:  Supple,   Lungs: frequent loose cough  Extr:  Neg.  Neuro:  No asymmetry  Skin:  Warm/dry        Results:  Results for orders placed or performed in visit on 11/18/20   COVID-19 Virus PCR MRF    Specimen: Respiratory   Result Value Ref Range    COVID-19 VIRUS SPECIMEN SOURCE Nasopharyngeal     2019-nCOV Not Detected                Type of service:  Video Visit    Video End Time (time video stopped): 9:31 AM  Originating Location (pt. Location): Home    Distant Location (provider location):  [facility]     Platform used for Video Visit: Liudmila Hernandez MD

## 2021-06-13 NOTE — PROGRESS NOTES
Patient reports close contact to 2 individuals at work with COVID-19 positive tests/symptoms.  She will get COVID testing and quarantine for 14 days.

## 2021-06-13 NOTE — ANESTHESIA PREPROCEDURE EVALUATION
Anesthesia Evaluation      Patient summary reviewed   No history of anesthetic complications     Airway   Mallampati: II  Neck ROM: full   Pulmonary     breath sounds clear to auscultation  (+) a smoker (0.5 ppd x >30 yrs)                         Cardiovascular   Exercise tolerance: > or = 4 METS  (+) hypertension, ,     Rhythm: regular        Neuro/Psych    (+) neuromuscular disease (CTS, chronic back pain),  depression,     Endo/Other    (+) obesity,      GI/Hepatic/Renal    (+) GERD poorly controlled,        Other findings:     NPO 6:30 PM                 Cough     Nicotine Dependence    Chronic Reflux Esophagitis    Fibromyalgia    Strain Of The Gastrocnemius Muscle Of The Right Leg    Adjustment Disorder With Depressed Mood    Joint Pain, Localized In The Knee    Pain During Urination (Dysuria)    Abdominal Pain    Diverticulitis Of Colon    Trochanteric Bursitis    Sinusitis    Midback Pain    Mixed hyperlipidemia    Abnormal Weight Loss    Abnormal Pap smear of cervix    Adverse Food Reaction (Not Anaphylactic)    Impingement Of The Right Shoulder    Hypertension    Head Injury    Oral Thrush    Lumbar Disc Degeneration    Lower Back Pain Chronic    Carpal Tunnel Syndrome    Simple (Specific) Phobia    Dermatitis    Patellofemoral Syndrome Of The Right Knee     Tension-type headache    Obsessive compulsive disorder    Costochondritis    Fatigue    Chronic pain    Vitamin D deficiency    Calcaneal spur, right               Dental    (+) poor dentition    Comment: Multiple teeth missing                       Anesthesia Plan  Planned anesthetic: general endotracheal and peripheral nerve block      Sciatic and adductor canal blocks for post-op pain at surgeons request.      ASA 3   Induction: intravenous   Anesthetic plan and risks discussed with: patient  Anesthesia plan special considerations: antiemetics,   Post-op plan: routine recovery        Zeinab Ponce MD  Staff Anesthesiologist  [facility]  11/3/17

## 2021-06-13 NOTE — PROGRESS NOTES
Assessment/Plan:   Dental infection  Broken tooth, longstanding right upper along with generally poor dentition.  Awaiting affordable dental options.  Onset today of swelling right cheek and tender surrounding gums.  No fluctuance or purulent drainage.  No fever.  Penicillin allergy.   - clindamycin (CLEOCIN) 300 MG capsule; Take 1 capsule (300 mg total) by mouth 4 (four) times a day for 10 days. Take with food. Take probiotic while on antibiotic.  Dispense: 40 capsule; Refill: 0  - HYDROcodone-acetaminophen 5-325 mg per tablet; Take 1 tablet by mouth every 6 (six) hours as needed for pain.  Dispense: 12 tablet; Refill: 0    Ice or warm packs to face as needed  Ibuprofen 600mg eery 6 hours with food  Vicodin at night for severe pain if needed  Clindamycin 3-4 times a day for dental infection with food  Take probiotic or eat yogurt  Follow up with dentist this week    Subjective:      Catherine Farrell is a 52 y.o. female who presents tooth pain.  She actually noticed swelling over her right cheek when she woke this morning which persisted after getting up and washing and eating.  It has also started becoming a little red and warm.  The right upper gum is tender to touch and a little swollen but the face/cheek swelling is not tender. No fever.  No purulent drainage from gums.  She has two broken right upper teeth which is not recent.  She has seen a variety of dentists for her broken teeth and is waiting to find an affordable option.  Unfortunately this happened. She does have some seasonal allergies but no sinus pain or nasal drainage.  No headache, no N/V or vertigo.     Allergies   Allergen Reactions     Amoxicillin Hives     Penicillins Swelling and Itching     Annotation: Swelling of face/eyes, itching       Shellfish Containing Products Anaphylaxis     Patient Active Problem List   Diagnosis     Cough     Nicotine Dependence     Chronic Reflux Esophagitis     Fibromyalgia     Strain Of The Gastrocnemius Muscle Of  The Right Leg     Adjustment Disorder With Depressed Mood     Joint Pain, Localized In The Knee     Pain During Urination (Dysuria)     Abdominal Pain     Diverticulitis Of Colon     Trochanteric Bursitis     Sinusitis     Midback Pain     Mixed hyperlipidemia     Abnormal Weight Loss     Abnormal Pap smear of cervix     Adverse Food Reaction (Not Anaphylactic)     Impingement Of The Right Shoulder     Hypertension     Head Injury     Oral Thrush     Lumbar Disc Degeneration     Lower Back Pain Chronic     Carpal Tunnel Syndrome     Simple (Specific) Phobia     Dermatitis     Patellofemoral Syndrome Of The Right Knee      Tension-type headache     Obsessive compulsive disorder     Costochondritis     Fatigue     Chronic pain     Vitamin D deficiency     Calcaneal spur, right     Current Outpatient Prescriptions on File Prior to Visit   Medication Sig Dispense Refill     cholecalciferol, vitamin D3, 1,000 unit tablet Take 1,000 Units by mouth 2 times a day at 6:00 am and 4:00 pm.       gabapentin (NEURONTIN) 300 MG capsule Take 1 capsule (300 mg total) by mouth daily. Increase by 1 tab every 3 days 30 capsule 0     omeprazole (PRILOSEC) 40 MG capsule TAKE ONE CAPSULE BY MOUTH ONCE DAILY 90 capsule 2     simvastatin (ZOCOR) 40 MG tablet TAKE ONE TABLET BY MOUTH ONCE DAILY 90 tablet 2     meclizine (ANTIVERT) 25 mg tablet TAKE ONE TABLET BY MOUTH THREE TIMES DAILY AS NEEDED FOR DIZZINESS OR NAUSEA 45 tablet 0     No current facility-administered medications on file prior to visit.        Objective:     /78 (Patient Site: Right Arm, Patient Position: Sitting, Cuff Size: Adult Large)  Pulse 100  Temp 98.3  F (36.8  C) (Oral)   Resp 16  Wt (!) 246 lb 3 oz (111.7 kg)  SpO2 96%  BMI 35.83 kg/m2    Physical  General Appearance: Alert, cooperative, no distress  Head: Normocephalic, without obvious abnormality, atraumatic  Eyes: Conjunctivae are normal.  Ears: Normal TMs and external ear canals, both ears  Nose: No  significant congestion, no drainage, no sinus pain with percussion.  There is mild swelling and redness over the right cheek, no pain with palpation, no fluctuance.   Throat: Throat is normal.  No exudate.  Poor dentition.  There are two broken teeth right upper, adjacent.  There gum here is swollen and tender, no fluctuance or purulent drainage.   Neck: No adenopathy  Lungs: Clear to auscultation bilaterally, respirations unlabored  Heart: Regular rate and rhythm  Skin:  no rashes or lesions  Psychiatric: Patient has a normal mood and affect.

## 2021-06-13 NOTE — ANESTHESIA POSTPROCEDURE EVALUATION
Patient: Catherine Farrell  RIGHT POSTERIOR CALCANEAL SPUR RESECTION, WITH SECONDARY ACHILLES TENDON REPAIR  Anesthesia type: general    Patient location: Phase II Recovery  Last vitals:   Vitals:    11/03/17 1008   BP: (P) 131/74   Pulse: (P) 97   Resp: (P) 18   Temp:    SpO2: (P) 97%     Post vital signs: stable  Level of consciousness: awake and responds to simple questions  Post-anesthesia pain: pain controlled  Post-anesthesia nausea and vomiting: no  Pulmonary: unassisted, return to baseline  Cardiovascular: stable and blood pressure at baseline  Hydration: adequate  Anesthetic events: no    QCDR Measures:  ASA# 11 - Annie-op Cardiac Arrest: ASA11B - Patient did NOT experience unanticipated cardiac arrest  ASA# 12 - Annie-op Mortality Rate: ASA12B - Patient did NOT die  ASA# 13 - PACU Re-Intubation Rate: ASA13B - Patient did NOT require a new airway mgmt  ASA# 10 - Composite Anes Safety: ASA10A - No serious adverse event    Additional Notes:

## 2021-06-13 NOTE — ANESTHESIA PROCEDURE NOTES
Peripheral Block    Patient location during procedure: pre-op  Start time: 11/3/2017 7:11 AM  End time: 11/3/2017 7:15 AM  post-op analgesia per surgeon order as noted in medical record  Staffing:  Performing  Anesthesiologist: ZEINAB PONCE  Preanesthetic Checklist  Completed: patient identified, site marked, risks, benefits, and alternatives discussed, timeout performed, consent obtained, airway assessed, oxygen available, suction available, emergency drugs available and hand hygiene performed  Peripheral Block  Block type: sciatic, popliteal  Prep: ChloraPrep  Patient position: supine  Patient monitoring: cardiac monitor, continuous pulse oximetry, heart rate and blood pressure  Laterality: right  Injection technique: ultrasound guided    Ultrasound used to visualize needle placement in proximity to nerve being blocked: yes   Permanent ultrasound image captured for medical record    Needle  Needle type: echogenic   Needle gauge: 20G  Needle length: 4 in  no peripheral nerve catheter placed  Assessment  Injection assessment: no difficulty with injection, negative aspiration for heme, no paresthesia on injection and incremental injection  Additional Notes    Right popliteal block (sciatic) with bupivacaine 0.5% 15 cc.    Zeinab Ponce MD  Staff Anesthesiologist  [facility]

## 2021-06-13 NOTE — PROGRESS NOTES
Assessment/Plan:      Visit for Preoperative Exam.    1. Preop examination  Hemoglobin    XR Chest PA and Lateral   2. Preop testing  Electrocardiogram Perform and Read   3. Hypertension  Basic Metabolic Panel         51 yo female with chronic right heel pain - has large spurs involving the Achilles tendon.  Has failed conservative therapy/injections.  Scheduled for surgery - here for preop.  Labs stable, EKG nl, CXR clear.  OK for surgery as planned.    Subjective:     Scheduled Procedure: right posterior calcaneal spur resection with secondary repair of the Achilles tendon  Surgery Date:  11/3/2017  Surgery Location:  [facility]  Surgeon:  Dr. Marion    51 yo female with right heel pain - swelling /painful to touch - failed conservative therapy  Has had similar surgery (already) on the left heel.  Doing well with left heel now.     Current Outpatient Prescriptions   Medication Sig Dispense Refill     cholecalciferol, vitamin D3, 1,000 unit tablet Take 1,000 Units by mouth 2 times a day at 6:00 am and 4:00 pm.       gabapentin (NEURONTIN) 300 MG capsule Take 1 capsule (300 mg total) by mouth daily. Increase by 1 tab every 3 days 30 capsule 0     ibuprofen (ADVIL,MOTRIN) 200 MG tablet Take 200 mg by mouth every 6 (six) hours as needed for pain.       meclizine (ANTIVERT) 25 mg tablet TAKE ONE TABLET BY MOUTH THREE TIMES DAILY AS NEEDED FOR DIZZINESS OR NAUSEA 45 tablet 0     omeprazole (PRILOSEC) 40 MG capsule TAKE ONE CAPSULE BY MOUTH ONCE DAILY 90 capsule 2     simvastatin (ZOCOR) 40 MG tablet TAKE ONE TABLET BY MOUTH ONCE DAILY 90 tablet 2     No current facility-administered medications for this visit.        Allergies   Allergen Reactions     Amoxicillin Hives     Penicillins Swelling and Itching     Annotation: Swelling of face/eyes, itching       Shellfish Containing Products Anaphylaxis       Immunization History   Administered Date(s) Administered     DT (pediatric) 04/01/1998     Td, historic 02/20/2008      Tdap 02/20/2008       Patient Active Problem List   Diagnosis     Cough     Nicotine Dependence     Chronic Reflux Esophagitis     Fibromyalgia     Strain Of The Gastrocnemius Muscle Of The Right Leg     Adjustment Disorder With Depressed Mood     Joint Pain, Localized In The Knee     Pain During Urination (Dysuria)     Abdominal Pain     Diverticulitis Of Colon     Trochanteric Bursitis     Sinusitis     Midback Pain     Mixed hyperlipidemia     Abnormal Weight Loss     Abnormal Pap smear of cervix     Adverse Food Reaction (Not Anaphylactic)     Impingement Of The Right Shoulder     Hypertension     Head Injury     Oral Thrush     Lumbar Disc Degeneration     Lower Back Pain Chronic     Carpal Tunnel Syndrome     Simple (Specific) Phobia     Dermatitis     Patellofemoral Syndrome Of The Right Knee      Tension-type headache     Obsessive compulsive disorder     Costochondritis     Fatigue     Chronic pain     Vitamin D deficiency     Calcaneal spur, right       Past Medical History:   Diagnosis Date     Anemia      Bilateral calcaneal spurs     surgery Nov. 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     Drug use: No     Sexual activity: Yes     Partners: Male     Other Topics Concern     Not on file     Social History Narrative       Past Surgical History:   Procedure Laterality Date     ACHILLES TENDON REPAIR Left 11/6/2015    Procedure: WITH SECONDARY ACHILLES  TENDON REAPIR;  Surgeon: Rob Marion DPM;  Location: [facility];  Service:      ENDOMETRIAL ABLATION       ID APPENDECTOMY      Description: Appendectomy;  Recorded: 10/18/2008;     ID  DELIVERY ONLY      Description:  Section;  Recorded: 10/18/2008;     ID REMOVAL OF HEEL SPUR Left 2015    Procedure: LEFT POSTERIOR CALCANEAL SPUR RESECTION;  Surgeon: Rob Marion DPM;  Location: [facility];  Service: Podiatry     ID REMOVAL OF TONSILS,<11 Y/O      Description: Tonsillectomy;  Recorded: 10/18/2008;       History of Present Illness  Recent Health  Fever: no  Chills: no  Fatigue: yes  Chest Pain: no  Cough: no  Dyspnea: no  Urinary Frequency: no  Nausea: no  Vomiting: no  Diarrhea: no  Abdominal Pain: no  Easy Bruising: yes  Lower Extremity Swelling: yes  Poor Exercise Tolerance: yes    Most recent Health Maintenance Visit:  3 month(s) ago    Pertinent History  Prior Anesthesia: yes  Previous Anesthesia Reaction:  no  Diabetes: no  Cardiovascular Disease: no  Pulmonary Disease: no  Renal Disease: no  GI Disease: no  Sleep Apnea: no  Thromboembolic Problems: no  Clotting Disorder: no  Bleeding Disorder: no  Transfusion Reaction: yes  Impaired Immunity: no  Steroid use in the last 6 months: no  Frequent Aspirin use: no    Family history of anesthesia reaction, mother,   Family History   Problem Relation Age of Onset     Lung cancer Father      Alcohol abuse Father      Alcohol abuse Son      Hypertension Mother      Stomach cancer Maternal Aunt      Breast cancer Maternal Aunt      Breast cancer Maternal Aunt          Social history of patient does not wear denture or partial plates, there is no transfusion refusal and there are no concerns regarding care after surgery    After surgery, the patient plans to recover at home with family.    Review of Systems  Pertinent positives as noted in HPI; otherwise 12 point ROS negative.              Objective:         Vitals:    10/20/17  "1357   BP: 122/80   Pulse: 93   Resp: 14   Temp: 98.2  F (36.8  C)   TempSrc: Oral   SpO2: 98%   Weight: (!) 247 lb (112 kg)   Height: 5' 9.25\" (1.759 m)       Physical Exam:  EXAM:  /80 (Patient Site: Right Arm, Patient Position: Sitting, Cuff Size: Adult Large)  Pulse 93  Temp 98.2  F (36.8  C) (Oral)   Resp 14  Ht 5' 9.25\" (1.759 m)  Wt (!) 247 lb (112 kg)  SpO2 98%  BMI 36.21 kg/m2   Gen:  NAD, appears well, well-hydrated  HEENT:  TMs nl, oropharynx benign, nasal mucosa nl, conjunctiva clear  Neck:  Supple, no adenopathy, no thyromegaly, no carotid bruits, no JVD  Lungs:  Clear to auscultation bilaterally  Cor:  RRR no murmur  Abd:  Soft, nontender, BS+, no masses, no guarding or rebound, no HSM  Extr:  Right heel - swelling/tender  Neuro:  No asymmetry  Skin:  Warm/dry         Results for orders placed or performed in visit on 10/20/17   Hemoglobin   Result Value Ref Range    Hemoglobin 12.0 12.0 - 16.0 g/dL   Basic Metabolic Panel   Result Value Ref Range    Sodium 142 136 - 145 mmol/L    Potassium 3.8 3.5 - 5.0 mmol/L    Chloride 104 98 - 107 mmol/L    CO2 28 22 - 31 mmol/L    Anion Gap, Calculation 10 5 - 18 mmol/L    Glucose 101 70 - 125 mg/dL    Calcium 10.2 8.5 - 10.5 mg/dL    BUN 10 8 - 22 mg/dL    Creatinine 0.79 0.60 - 1.10 mg/dL    GFR MDRD Af Amer >60 >60 mL/min/1.73m2    GFR MDRD Non Af Amer >60 >60 mL/min/1.73m2   Electrocardiogram Perform and Read   Result Value Ref Range    SYSTOLIC BLOOD PRESSURE  mmHg    DIASTOLIC BLOOD PRESSURE  mmHg    VENTRICULAR RATE 82 BPM    ATRIAL RATE 82 BPM    P-R INTERVAL 188 ms    QRS DURATION 90 ms    Q-T INTERVAL 366 ms    QTC CALCULATION (BEZET) 427 ms    P Axis 46 degrees    R AXIS 47 degrees    T AXIS 30 degrees    MUSE DIAGNOSIS       Normal sinus rhythm  Normal ECG  No previous ECGs available  Confirmed by PEDRO GRAY MD LOC: ([id number]) on 10/20/2017 3:26:18 PM         XR CHEST PA AND LATERAL  10/20/2017 2:58 PM     INDICATION: Encounter for " other preprocedural examination  COMPARISON: XR 07/10/2015     FINDINGS: Negative chest.     This report was electronically interpreted by: Dr. Rubens Glover MD ON 10/20/2017 at 15:27

## 2021-06-13 NOTE — ANESTHESIA CARE TRANSFER NOTE
Last vitals:   Vitals:    11/03/17 0607   BP: 127/77   Pulse: 82   Resp: 18   Temp: 36.9  C (98.5  F)   SpO2: 99%     Patient's level of consciousness is drowsy  Spontaneous respirations: yes  Maintains airway independently: yes  Dentition unchanged: yes  Oropharynx: oropharynx clear of all foreign objects    QCDR Measures:  ASA# 20 - Surgical Safety Checklist: WHO surgical safety checklist completed prior to induction  PQRS# 430 - Adult PONV Prevention: 4558F - Pt received => 2 anti-emetic agents (different classes) preop & intraop  ASA# 8 - Peds PONV Prevention: NA - Not pediatric patient, not GA or 2 or more risk factors NOT present  PQRS# 424 - Annie-op Temp Management: 4559F - At least one body temp DOCUMENTED => 35.5C or 95.9F within required timeframe  PQRS# 426 - PACU Transfer Protocol: - Transfer of care checklist used  ASA# 14 - Acute Post-op Pain: ASA14B - Patient did NOT experience pain >= 7 out of 10

## 2021-06-14 NOTE — TELEPHONE ENCOUNTER
RN cannot approve Refill Request    RN can NOT refill this medication med is not covered by policy/route to provider. Last office visit: 5/18/2020 Annetta Hernandez MD Last Physical: 7/24/2020 Last MTM visit: Visit date not found Last visit same specialty: 5/18/2020 Annetta Hernandez MD.  Next visit within 3 mo: Visit date not found  Next physical within 3 mo: Visit date not found      Karla Wadsworth, [facility] Triage/Med Refill 1/21/2021    Requested Prescriptions   Pending Prescriptions Disp Refills     ibuprofen (ADVIL,MOTRIN) 800 MG tablet [Pharmacy Med Name: Ibuprofen 800 MG Oral Tablet] 100 tablet 0     Sig: TAKE 1 TABLET BY MOUTH EVERY 6 HOURS AS NEEDED FOR PAIN       There is no refill protocol information for this order

## 2021-06-14 NOTE — PROGRESS NOTES
Assessment: 4 weeks status post right posterior calcaneal spur resection with secondary repair of the Achilles tendon.    Plan: Orders written for physical therapy to improve ambulation and balance.  Hopefully they can help foster some nerve healing.  Okay to wean out of the postoperative boot as pain and function allow. I will see her back in 4 weeks.      Subjective: The patient returns to the [facility] for a second postoperative visit. Surgery was about 4 weeks ago on November 3, 2017 at [facility]. The patient denies any falls or new injuries.  She had a popliteal block at the time of surgery.  Numbness has change slightly, but still significant altered sensation and paresthesias in the leg and foot.  She takes gabapentin for back pain, and is not sure it is changing her right foot nerve issues.  She can bear weight at home in the postoperative boot, and out of the boot.  Swelling by the end of the day.    Objective: The incision has healed nicely. Alignment of the foot is good. Minimal edema or erythema noted.  Good plantar flexion against resistance today.

## 2021-06-14 NOTE — PROGRESS NOTES
Assessment: 2 weeks status post right posterior calcaneal spur resection with secondary repair of the Achilles tendon.    Plan: Sutures removed today.  Incisions dressed with Band-Aids.  She can get this wet again.  She was fitted with a postoperative boot.  She will start doing some protected weightbearing in the boot.  She will start doing the homework from previous physical therapy sessions and work her way back to a regular shoe over the next few weeks.  I will see her back at the end of January when it is time to go back to work.      Subjective: The patient returns to the [facility] for a first postoperative visit. Surgery was about 2 weeks ago on November 3, 2017 at [facility]. The patient denies any falls or new injuries.  She had a popliteal block at the time of surgery.  Numbness has persisted since then.  She describes altered sensation on the lateral side of her calf and down to the heel    Objective: The incision is intact without gapping or drainage. Alignment of the foot is good. Minimal edema or erythema noted.  She describes paresthesias.

## 2021-06-15 ENCOUNTER — RECORDS - HEALTHEAST (OUTPATIENT)
Dept: ADMINISTRATIVE | Facility: OTHER | Age: 56
End: 2021-06-15

## 2021-06-15 PROBLEM — G89.29 CHRONIC PAIN: Status: ACTIVE | Noted: 2017-03-10

## 2021-06-15 NOTE — PROGRESS NOTES
Assessment: 3 months status post right posterior calcaneal spur resection with secondary repair of the Achilles tendon; neuritis after popliteal block    Plan: The Achilles is in good shape, but the neuritis is still a significant problem.  Note written for her to work 4 hours per day, 3 days per week.  That expires April 30, 2018.  Reassess then.        Subjective: The patient returns to the [facility] for a 3 month postoperative visit. Surgery was on November 3, 2017 at [facility]. She had a popliteal block at the time of surgery.  Numbness, pain and paresthesias persist.  A little better after a few sessions of physical therapy.  Swelling is better.  Strength and range of motion are good.  She is usually able to wear a regular shoe; some difficulty with swelling.  She is working 4 hours per day, 2 days per week.      Objective: The incision has healed nicely. Alignment of the foot is good. Minimal edema or erythema noted.  Good plantar flexion against resistance today.  Paresthesias down the lateral side of the leg and in the forefoot.

## 2021-06-15 NOTE — PROGRESS NOTES
[facility] Daily Progress     Patient Name: Catherine Farrell  Date: 1/9/2018  Visit #: 3  PTA visit #:    Pronunciation: MARCUS  Date of evaluation: 1/2/2018  Referral Diagnosis: Achilles tendonitis of R LE  Referring provider: Rob Marion DPM  Visit Diagnosis:     ICD-10-CM    1. Pain in joint involving right ankle and foot M25.571    2. Antalgic gait R26.89      Initial Assessment  Catherine Farrell is a 52 y.o. female who presents to therapy today with chief complaints of impaired sensation of her R foot with nn pain. Onset date of sx was 11/3/17.  Pt reports 11/3/17.  Pain symptoms are nerve pain and foot feels like it is a pin cushion.  Functional impairments include walking, stairs, getting out of the bathtub, driving, housework, and getting up off the floor.  Pt demo's signs and sx consistent with impaired sensation R foot/Achilles tendonitis.     Assessment:     HEP/POC compliance is  good .  The patient has a good understanding of her HEP.  She reports improvements in the level of sensitivity when something hits her foot.  She is appropriate to continue with PT services at this time.    Goal Status:  Pt. will be independent with home exercise program in : 6 weeks  Pt will: report an increase in the feeling of her foot by >50%; in 8 weeks  Pt will: be able to wear regular tennis shoes without increase in pain; in 8 weeks  Pt will: walk with a normal gait pattern without pain; in 8 weeks  Pt will: be able to sleep through the night without waking d/t pain; in 8 weeks    Plan / Patient Education:     Continue with initial plan of care.  Progress with home program as tolerated.  Plan for next visit: continue Nn glides.  Progress strengthening for ankle and foot intrinsics, IASTM to calf and plantar surface of the foot as tolerated,   Subjective:     Pain Rating: no pain; no feeling  The patient reports that she is able to move her big toe.  There has not been any change in her sensation.  She will be  starting work tomorrow.    Objective:     Mild positive test for tension of the peroneal nerve on the right side    Appt time: 2:01PM - 2:31PM    Treatment Today     TREATMENT MINUTES COMMENTS   Evaluation     Self-care/ Home management     Manual therapy 15 Prone IASTM to R calf mm with Rockblade   Neuromuscular Re-education     Therapeutic Activity     Therapeutic Exercises 15 NUSTEP x 5 minutes WL 5.0; subjective measures taken  See flowsheet  Review of nn glides as well   Gait training     Modality__________________                Total 30    Blank areas are intentional and mean the treatment did not include these items.       Suyapa Roy, PT, DPT   1/9/2018

## 2021-06-15 NOTE — PROGRESS NOTES
[facility] Daily Progress     Patient Name: Catherine Farrell  Date: 1/4/2018  Visit #: 2  PTA visit #:    Pronunciation: MARCUS  Date of evaluation: 1/2/2018  Referral Diagnosis: Achilles tendonitis of R LE  Referring provider: Rob Marion DPM  Visit Diagnosis:     ICD-10-CM    1. Pain in joint involving right ankle and foot M25.571    2. Antalgic gait R26.89      Initial Assessment  Catherine Farrell is a 52 y.o. female who presents to therapy today with chief complaints of impaired sensation of her R foot with nn pain. Onset date of sx was 11/3/17.  Pt reports 11/3/17.  Pain symptoms are nerve pain and foot feels like it is a pin cushion.  Functional impairments include walking, stairs, getting out of the bathtub, driving, housework, and getting up off the floor.  Pt demo's signs and sx consistent with impaired sensation R foot/Achilles tendonitis.     Assessment:     HEP/POC compliance is  good .  Patient demonstrates understanding/independence with home program.  Patient is benefitting from skilled physical therapy and is making steady progress toward functional goals.  Patient is appropriate to continue with skilled physical therapy intervention, as indicated by initial plan of care.    Goal Status:  Pt. will be independent with home exercise program in : 6 weeks  Pt will: report an increase in the feeling of her foot by >50%; in 8 weeks  Pt will: be able to wear regular tennis shoes without increase in pain; in 8 weeks  Pt will: walk with a normal gait pattern without pain; in 8 weeks  Pt will: be able to sleep through the night without waking d/t pain; in 8 weeks    Plan / Patient Education:     Continue with initial plan of care.  Progress with home program as tolerated.  Plan for next visit: contibue Nn glides.  Progress strengthening for ankle and foot intrinsics, IASTM to calf and plantar surface of the foot as tolerated,   Subjective:     Pain Rating: the same  Patient reports she has been  trying to do the same exercises from the last session on PT when she had the left side done in the past but they are more difficult and a little painful.   She will be going back to work on 1/8/18 - restrictions for 4 hour shifts and 2 days per week initially but she is still waiting to hear from work to see when and wear.       Objective:     Mild positive test for tension of the peroneal nerve on the right side    HEP  - added great toe lifts  - added toe towel curls today     Treatment Today   1/4/17 treatment time 8:00 - 8: 35 am   TREATMENT MINUTES COMMENTS   Evaluation     Self-care/ Home management     Manual therapy     Neuromuscular Re-education 20 Instruction on long sitting peroneal nerve glide - was difficult for pateint and was changed to supine 2 x 15 reps completed today  Instruction on supine tibial nerve flossing with assist for band/towel at home x 15 reps completed today    Discussed using different textures to stimulate the foot and ankle such as soft cotton ball, fleece fabric and progressing to a bristle brush to decrease the sensitivity of the skin on the foot   Therapeutic Activity     Therapeutic Exercises 15 Discussed HEP she is currently doing - instructed patient to continue to roll foot on tennis ball but to decrease the pressure she is applying  Reviewed exercises from last session   - ankle pumps x 10  - ankle alphabets x 1  - ankle circles x 10 each way  Added   - toe towel curls x 10   - great toe lifts x 10 reps   Gait training     Modality__________________                Total 35    Blank areas are intentional and mean the treatment did not include these items.       Gena Winchester, PT, DPT   1/4/2018

## 2021-06-15 NOTE — PROGRESS NOTES
ASSESSMENT/PLAN:  1. Trochanteric bursitis of right hip  Ambulatory referral to PT/OT   2. Strain of right groin  Ambulatory referral to PT/OT       This is a 56 yo female with:  1.  Pain in right hip - consistent with increased trochanteric bursitis - declines injection today  2.  Right groin strain - due to fall on ice - will need to follow symptoms closely ; treat symptomatically, will refer to PT for evaluation///treatment.     Return in about 2 weeks (around 3/15/2021) for if not getting better.      There are no discontinued medications.  There are no Patient Instructions on file for this visit.    Chief Complaint:  Chief Complaint   Patient presents with     Muscle Pain     Right Groin/Hip       HPI:   Catherine Farrell is a 55 y.o. female c/o  Saturday - was near a vehicle - slipped on the ice - twisted at right hip  Has iced/used heat -   [place] -       PMH:   Patient Active Problem List    Diagnosis Date Noted     Right shoulder injury, sequela 07/24/2020     Obesity (BMI 35.0-39.9) with comorbidity (H) 04/23/2019     Right foot pain 09/27/2018     Compression neuropathy 09/27/2018     Tarsal tunnel syndrome of right side 06/04/2018     Compression neuropathy of right lower extremity 06/04/2018     Mononeuritis lower limb, right 06/04/2018     Numbness of right lower extremity 05/30/2018     Calcaneal spur, right 09/11/2017     Vitamin D deficiency 07/03/2017     Chronic pain 03/10/2017     Costochondritis 07/10/2015     Fatigue 07/10/2015     Obsessive compulsive disorder 03/09/2015     Dermatitis 02/05/2015     Patellofemoral Syndrome Of The Right Knee  02/05/2015     Tension-type headache 02/05/2015     Simple (Specific) Phobia      Cough      Nicotine Dependence      Chronic Reflux Esophagitis      Fibromyalgia      Strain Of The Gastrocnemius Muscle Of The Right Leg      Adjustment Disorder With Depressed Mood      Joint Pain, Localized In The Knee      Pain During Urination (Dysuria)      Abdominal Pain       Diverticulitis Of Colon      Trochanteric Bursitis      Sinusitis      Midback Pain      Mixed hyperlipidemia      Abnormal Weight Loss      Abnormal Pap smear of cervix      Adverse Food Reaction (Not Anaphylactic)      Impingement Of The Right Shoulder      Hypertension      Head Injury      Oral Thrush      Lumbar Disc Degeneration      Lower Back Pain Chronic      Carpal Tunnel Syndrome      Past Medical History:   Diagnosis Date     Anemia      Bilateral calcaneal spurs     surgery 2015     Bursitis of hip      Chronic back pain      DDD (degenerative disc disease)      Depression      Diverticulitis      Environmental allergies      Fibromyalgia      History of transfusion     With      HLD (hyperlipidemia)      Obesity      Past Surgical History:   Procedure Laterality Date     ACHILLES TENDON REPAIR Left 2015    Procedure: WITH SECONDARY ACHILLES TENDON REAPIR;  Surgeon: Rob Marion DPM;  Location: [facility];  Service:      ACHILLES TENDON REPAIR Right 11/3/2017    Procedure: WITH SECONDARY ACHILLES TENDON REPAIR;  Surgeon: Rob Marion DPM;  Location: [facility];  Service:      ENDOMETRIAL ABLATION       TX APPENDECTOMY      Description: Appendectomy;  Recorded: 10/18/2008;     TX  DELIVERY ONLY      Description:  Section;  Recorded: 10/18/2008;     TX REMOVAL OF HEEL SPUR Left 2015    Procedure: LEFT POSTERIOR CALCANEAL SPUR RESECTION;  Surgeon: Rob Marion DPM;  Location: [facility];  Service: Podiatry     TX REMOVAL OF TONSILS,<13 Y/O      Description: Tonsillectomy;  Recorded: 10/18/2008;     Social History     Socioeconomic History     Marital status:      Spouse name: Not on file     Number of children: Not on file     Years of education: Not on file     Highest education level: Not on file   Occupational History     Occupation: supervisor     Employer: [employer]   Social Needs     Financial resource strain: Not on  file     Food insecurity     Worry: Not on file     Inability: Not on file     Transportation needs     Medical: Not on file     Non-medical: Not on file   Tobacco Use     Smoking status: Current Every Day Smoker     Packs/day: 0.50     Types: Cigarettes     Smokeless tobacco: Never Used     Tobacco comment: Started Zyban 4/23/19   Substance and Sexual Activity     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     Drug use: No     Sexual activity: Yes     Partners: Male   Lifestyle     Physical activity     Days per week: Not on file     Minutes per session: Not on file     Stress: Not on file   Relationships     Social connections     Talks on phone: Not on file     Gets together: Not on file     Attends Yarsanism service: Not on file     Active member of club or organization: Not on file     Attends meetings of clubs or organizations: Not on file     Relationship status: Not on file     Intimate partner violence     Fear of current or ex partner: Not on file     Emotionally abused: Not on file     Physically abused: Not on file     Forced sexual activity: Not on file   Other Topics Concern     Not on file   Social History Narrative     Not on file     Family History   Problem Relation Age of Onset     Lung cancer Father      Alcohol abuse Father      Alcohol abuse Son      Hypertension Mother      Stomach cancer Maternal Aunt      Breast cancer Maternal Aunt      Breast cancer Maternal Aunt        Meds:    Current Outpatient Medications:      baclofen (LIORESAL) 10 MG tablet, Take 10 mg by mouth at bedtime., Disp: , Rfl:      cholecalciferol, vitamin D3, (VITAMIN D3) 2,000 unit Tab, Take 2,000 Units by mouth at bedtime. , Disp: , Rfl:      gabapentin (NEURONTIN) 300 MG capsule, TAKE 3 CAPSULES BY MOUTH THREE TIMES DAILY, Disp: 810 capsule, Rfl: 2     ibuprofen (ADVIL,MOTRIN) 800 MG tablet, TAKE 1 TABLET BY MOUTH EVERY 6 HOURS AS NEEDED FOR PAIN, Disp: 100 tablet, Rfl: 0     omeprazole (PRILOSEC) 40 MG capsule,  Take 1 capsule by mouth once daily, Disp: 90 capsule, Rfl: 2     simvastatin (ZOCOR) 40 MG tablet, Take 1 tablet by mouth once daily, Disp: 90 tablet, Rfl: 0    Allergies:  Allergies   Allergen Reactions     Amoxicillin Hives     Penicillins Swelling and Itching     Annotation: Swelling of face/eyes, itching       Shellfish Containing Products Anaphylaxis       ROS:  Pertinent positives as noted in HPI; otherwise 12 point ROS negative.      Physical Exam:  EXAM:  /84 (Patient Site: Right Arm, Patient Position: Sitting, Cuff Size: Adult Regular)   Pulse 99   Temp 98  F (36.7  C) (Temporal)   Resp 12   Wt (!) 268 lb (121.6 kg)   BMI 37.94 kg/m     Gen:  NAD, appears well, well-hydrated  HEENT:  TMs nl, oropharynx benign, nasal mucosa nl, conjunctiva clear  Neck:  Supple, no adenopathy, no thyromegaly, no carotid bruits, no JVD  Lungs:  Clear to auscultation bilaterally  Cor:  RRR no murmur  Abd:  Soft, nontender, BS+, no masses, no guarding or rebound, no HSM  Extr:  Tender to palpation at right lateral hip; pain with palpation at right groin but no swelling - pain with flexion/e/xtension right hip  Neuro:  No asymmetry  Skin:  Warm/dry        Results:  Results for orders placed or performed in visit on 02/24/21   Lipid [place] FASTING   Result Value Ref Range    Cholesterol 189 <=199 mg/dL    Triglycerides 208 (H) <=149 mg/dL    HDL Cholesterol 36 (L) >=50 mg/dL    LDL Calculated 111 <=129 mg/dL    Patient Fasting > 8hrs? Yes    Comprehensive Metabolic Panel   Result Value Ref Range    Sodium 141 136 - 145 mmol/L    Potassium 4.3 3.5 - 5.0 mmol/L    Chloride 105 98 - 107 mmol/L    CO2 22 22 - 31 mmol/L    Anion Gap, Calculation 14 5 - 18 mmol/L    Glucose 103 70 - 125 mg/dL    BUN 9 8 - 22 mg/dL    Creatinine 0.78 0.60 - 1.10 mg/dL    GFR MDRD Af Amer >60 >60 mL/min/1.73m2    GFR MDRD Non Af Amer >60 >60 mL/min/1.73m2    Bilirubin, Total 0.4 0.0 - 1.0 mg/dL    Calcium 9.4 8.5 - 10.5 mg/dL    Protein, Total  7.3 6.0 - 8.0 g/dL    Albumin 4.2 3.5 - 5.0 g/dL    Alkaline Phosphatase 60 45 - 120 U/L    AST 17 0 - 40 U/L    ALT 19 0 - 45 U/L   Vitamin D, Total (25-Hydroxy)   Result Value Ref Range    Vitamin D, Total (25-Hydroxy) 41.6 30.0 - 80.0 ng/mL   HM1 (CBC with Diff)   Result Value Ref Range    WBC 14.0 (H) 4.0 - 11.0 thou/uL    RBC 4.50 3.80 - 5.40 mill/uL    Hemoglobin 13.1 12.0 - 16.0 g/dL    Hematocrit 40.5 35.0 - 47.0 %    MCV 90 80 - 100 fL    MCH 29.1 27.0 - 34.0 pg    MCHC 32.3 32.0 - 36.0 g/dL    RDW 14.1 11.0 - 14.5 %    Platelets 152 140 - 440 thou/uL    MPV 12.2 (H) 7.0 - 10.0 fL    Neutrophils % 69 50 - 70 %    Lymphocytes % 24 20 - 40 %    Monocytes % 6 2 - 10 %    Eosinophils % 1 0 - 6 %    Basophils % 0 0 - 2 %    Immature Granulocyte % 0 <=0 %    Neutrophils Absolute 9.6 (H) 2.0 - 7.7 thou/uL    Lymphocytes Absolute 3.4 0.8 - 4.4 thou/uL    Monocytes Absolute 0.8 0.0 - 0.9 thou/uL    Eosinophils Absolute 0.2 0.0 - 0.4 thou/uL    Basophils Absolute 0.1 0.0 - 0.2 thou/uL    Immature Granulocyte Absolute 0.0 <=0.0 thou/uL   Hepatitis C Antibody (Anti-HCV)   Result Value Ref Range    Hepatitis C Ab Negative Negative   HIV Antigen/Antibody Screening [place]   Result Value Ref Range    HIV Antigen / Antibody Negative Negative   Gynecologic Cytology (PAP Smear)   Result Value Ref Range    Case Report       Gynecologic Cytology Report                       Case: [id number]                                   Authorizing Provider:  Mary,         Collected:           02/24/2021 0938                                     MD Annetta                                                                 Ordering Location:     [facility]   Received:            02/24/2021 0939                                     [facility]                                                                  First Screen:          Imelda Loyola, CT                                                                       "         (ASCP)                                                                       Specimen:    SUREPATH PAP, SCREENING, Endocervical/cervical                                             Interpretation  Negative for squamous intraepithelial lesion or malignancy.      Negative for squamous intraepithelial lesion or malignancy    Result Flag Normal Normal    Specimen Adequacy       Satisfactory for evaluation, endocervical/transformation zone component present    HPV Reflex? Yes regardless of result     HIGH RISK No     LMP/Menopause Date ablation - 10 years ago     Abnormal Bleeding No     Pt Status na     Birth Control/Hormones None     Previous Normal/Date 2015     Prev Abn Date/Dx yes -     Cervical Appearance normal    Surgical Pathology Exam   Result Value Ref Range    Case Report       Surgical Pathology Report                         Case: [id number]                                    Authorizing Provider:  Mary,         Collected:           02/24/2021 0938                                     MD Annetta                                                                 Ordering Location:     [facility]   Received:            02/24/2021 [address] [place] [address]                                                                  Pathologist:           Negrito Resendiz MD                                                      Specimen:    Chin, Left chin                                                                            Final Diagnosis       SKIN BIOPSY, LEFT CHIN:     -  VERRUCOID SEBORRHEIC KERATOSIS    Microscopic Description       Microscopic examination performed, substantiating the above diagnosis.    Clinical Information       Clinical history: New lesion left chin    Reason for procedure: Suspicious lesion    Gross Description       The specimen is received in formalin, labeled with the patient's name and designated \"left chin,\" and consists of " two thin strips of granular tan tissue, measuring 5 x 3 x 1 mm and 3 x 1 x 1 mm. The apparent base margin of each tissue fragment is inked blue. The larger fragment is bisected. All tissue is submitted in one cassette.  JPL:aka    Charges CPT: 57194  ICD-10: L82.1     Result Flag     HPV High Risk DNA Cervical   Result Value Ref Range    HPV Source SurePath     HPV16 DNA Negative NEG    HPV18 DNA Negative NEG    Other HR HPV Negative NEG    Final Diagnosis SEE NOTES     Specimen Description Cervical Cells

## 2021-06-15 NOTE — PROGRESS NOTES
"Assessment:      Healthy female exam.    1. Routine general medical examination at a health care facility  Gynecologic Cytology (PAP Smear)    HM1(CBC and Differential)    HPV High Risk DNA Cervical   2. Mixed hyperlipidemia  Lipid [place] FASTING    Comprehensive Metabolic Panel   3. Vitamin D deficiency  Vitamin D, Total (25-Hydroxy)   4. Screening for human immunodeficiency virus without presence of risk factors  HIV Antigen/Antibody Screening [place]   5. Encounter for HCV screening test for low risk patient  Hepatitis C Antibody (Anti-HCV)   6. Skin lesion  Surgical Pathology Exam   7. Right shoulder injury, sequela     8. Compression neuropathy            Plan:       This is a 56 yo female here for physical exam:  1.  Hyperlipidemia - on Simvastatin - tolerating well - check lipids//liver  2.  Vitamin d deficiency - takes supplement - check levels  3.  Right shoulder injury - was healing from surgery, doing well - then fell again on outstretched arm - still trying to re-recover.    4.  Compression neuropathy - right lower extremity - s//p previous surgeries to that foot/ankle.  Continues to have chronic symptoms.  5.  Skin lesion - has lesion - new - unusual - on left chin - hyperkeratotic - will perform shave excision and send for pathology - see procedure note  6.  Health Maintenance - due for cervical cancer screening - pap//HPV done//sent  Discussed recommendation for HIV and hepatitis C screening - ordered      Subjective:      Catherine Farrell is a 55 y.o. female who presents for an annual exam.  The patient reports that there is not domestic violence in her life.     Hasn't quit working -   Transferred to [place] - \"bored out of my mind\"    Had right shoulder surgery - July 2020 -   Doing well - getting ready for release - scheduled for last appointment in December  Fell on the shoulder in early December - fell on extended right arm - hip was black/blue x weeks; right elbow was black/blue x " weeks  So, now with problems in right shoulder again -   Fell at work - slipped on the floor     Healthy Habits:   Regular Exercise: No  Sunscreen Use: No  Healthy Diet: tries  Dental Visits Regularly: No  Seat Belt: Yes  Sexually active: No  Self Breast Exam Monthly:No  Hemoccults: No  Flex Sig: No  Colonoscopy: Cologuard done         Immunization History   Administered Date(s) Administered     DT (pediatric) 1998     Influenza, inj, historic,unspecified 10/25/2019     Td,adult,historic,unspecified 2008     Tdap 2008, 2020     Immunization status: reviewed - declines pneumococcal, shingles - discussed.    No exam data present    Gynecologic History  No LMP recorded. Patient has had an ablation.  Contraception: post menopausal status  Last Pap: 2015. Results were: normal  Last mammogram: 2019. Results were: normal      OB History    Para Term  AB Living   3 2 2   1 2   SAB TAB Ectopic Multiple Live Births   1              # Outcome Date GA Lbr Davidson/2nd Weight Sex Delivery Anes PTL Lv   3 SAB            2 Term            1 Term                Current Outpatient Medications   Medication Sig Dispense Refill     cholecalciferol, vitamin D3, (VITAMIN D3) 2,000 unit Tab Take 2,000 Units by mouth at bedtime.        gabapentin (NEURONTIN) 300 MG capsule TAKE 3 CAPSULES BY MOUTH THREE TIMES DAILY 810 capsule 2     ibuprofen (ADVIL,MOTRIN) 800 MG tablet TAKE 1 TABLET BY MOUTH EVERY 6 HOURS AS NEEDED FOR PAIN 100 tablet 0     omeprazole (PRILOSEC) 40 MG capsule Take 1 capsule by mouth once daily 90 capsule 2     simvastatin (ZOCOR) 40 MG tablet Take 1 tablet by mouth once daily 90 tablet 0     baclofen (LIORESAL) 10 MG tablet Take 10 mg by mouth at bedtime.       No current facility-administered medications for this visit.      Past Medical History:   Diagnosis Date     Anemia      Bilateral calcaneal spurs     surgery 2015     Bursitis of hip      Chronic back pain      DDD  (degenerative disc disease)      Depression      Diverticulitis      Environmental allergies      Fibromyalgia      History of transfusion     With      HLD (hyperlipidemia)      Obesity      Past Surgical History:   Procedure Laterality Date     ACHILLES TENDON REPAIR Left 2015    Procedure: WITH SECONDARY ACHILLES TENDON REAPIR;  Surgeon: Rob Marion DPM;  Location: [facility];  Service:      ACHILLES TENDON REPAIR Right 11/3/2017    Procedure: WITH SECONDARY ACHILLES TENDON REPAIR;  Surgeon: Rob Marion DPM;  Location: [facility];  Service:      ENDOMETRIAL ABLATION       CT APPENDECTOMY      Description: Appendectomy;  Recorded: 10/18/2008;     CT  DELIVERY ONLY      Description:  Section;  Recorded: 10/18/2008;     CT REMOVAL OF HEEL SPUR Left 2015    Procedure: LEFT POSTERIOR CALCANEAL SPUR RESECTION;  Surgeon: Rob Marion DPM;  Location: [facility];  Service: Podiatry     CT REMOVAL OF TONSILS,<13 Y/O      Description: Tonsillectomy;  Recorded: 10/18/2008;     Amoxicillin, Penicillins, and Shellfish containing products  Family History   Problem Relation Age of Onset     Lung cancer Father      Alcohol abuse Father      Alcohol abuse Son      Hypertension Mother      Stomach cancer Maternal Aunt      Breast cancer Maternal Aunt      Breast cancer Maternal Aunt      Social History     Socioeconomic History     Marital status:      Spouse name: Not on file     Number of children: Not on file     Years of education: Not on file     Highest education level: Not on file   Occupational History     Occupation: supervisor     Employer: [employer]   Social Needs     Financial resource strain: Not on file     Food insecurity     Worry: Not on file     Inability: Not on file     Transportation needs     Medical: Not on file     Non-medical: Not on file   Tobacco Use     Smoking status: Current Every Day Smoker     Packs/day: 0.50     Types:  "Cigarettes     Smokeless tobacco: Never Used     Tobacco comment: Started Zyban 4/23/19   Substance and Sexual Activity     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     Drug use: No     Sexual activity: Yes     Partners: Male   Lifestyle     Physical activity     Days per week: Not on file     Minutes per session: Not on file     Stress: Not on file   Relationships     Social connections     Talks on phone: Not on file     Gets together: Not on file     Attends Evangelical service: Not on file     Active member of club or organization: Not on file     Attends meetings of clubs or organizations: Not on file     Relationship status: Not on file     Intimate partner violence     Fear of current or ex partner: Not on file     Emotionally abused: Not on file     Physically abused: Not on file     Forced sexual activity: Not on file   Other Topics Concern     Not on file   Social History Narrative     Not on file       Review of Systems  Review of Systems     Pertinent positives as noted in HPI; otherwise 12 point ROS negative.        Objective:         Vitals:    02/24/21 0834   BP: 131/85   Pulse: (!) 101   Resp: 15   Temp: 97.7  F (36.5  C)   TempSrc: Temporal   Weight: (!) 262 lb (118.8 kg)   Height: 5' 10.47\" (1.79 m)     Body mass index is 37.09 kg/m .    Physical  Physical Exam    EXAM:  /85 (Patient Site: Left Arm, Patient Position: Sitting, Cuff Size: Adult Large)   Pulse (!) 101   Temp 97.7  F (36.5  C) (Temporal)   Resp 15   Ht 5' 10.47\" (1.79 m)   Wt (!) 262 lb (118.8 kg)   BMI 37.09 kg/m     Gen:  NAD, appears well, well-hydrated  HEENT:  TMs nl, oropharynx benign, nasal mucosa nl, conjunctiva clear  Neck:  Supple, no adenopathy, no thyromegaly, no carotid bruits, no JVD  Lungs:  Clear to auscultation bilaterally  Breast exam:  No breast lumps, no skin changes, no nipple discharge, no axillary adenopathy  Cor:  RRR no murmur  Abd:  Soft, nontender, BS+, no masses, no guarding or rebound, no " HSM  Back:  Decreased ROM lumbar spine  Extr:  Decreased ROM right shoulder;   Neuro: decreased sensation right ankle//foot  Skin:  Warm/dry; hyperkeratotic lesion left chin measuring 3 mm diameter      Shave Biopsy Procedure Note    Pre-operative Diagnosis: Suspicious lesion    Post-operative Diagnosis: same    Locations:left chin    Indications: new lesion - suspicious in nature    Anesthesia: Lidocaine 1% with epinephrine     Procedure Details   History of allergy to iodine: no    Patient informed of the risks (including bleeding and infection) and benefits of the   procedure and Verbal informed consent obtained.    The lesion and surrounding area were given a sterile prep using betadyne and draped in the usual sterile fashion. A scalpel was used to shave an area of skin approximately 0.3cm by 0.3cm.  Hemostasis achieved with hyfrecation. Antibiotic ointment and a sterile dressing applied.  The specimen was sent for pathologic examination. The patient tolerated the procedure well.    EBL: <0.5 ml    Findings:  As above - hyperkeratotic lesion left chin    Condition:  Stable    Complications:  none.    Plan:  1. Instructed to keep the wound dry and covered for 24-48h and clean thereafter.  2. Warning signs of infection were reviewed.    3. Recommended that the patient use OTC analgesics as needed for pain.   4. Return in prn

## 2021-06-15 NOTE — PROGRESS NOTES
[facility] Daily Progress     Patient Name: Catherine Farrell  Date: 2018  Visit #: 6  PTA visit #:    Pronunciation: MARCUS  Date of evaluation: 2018  Referral Diagnosis: Achilles tendonitis of R LE  Referring provider: Rob Marion DPM  Visit Diagnosis:     ICD-10-CM    1. Pain in joint involving right ankle and foot M25.571    2. Antalgic gait R26.89    3. Muscle tightness M62.89      Initial Assessment  Catherine Farrell is a 52 y.o. female who presents to therapy today with chief complaints of impaired sensation of her R foot with nn pain. Onset date of sx was 11/3/17.  Pt reports 11/3/17.  Pain symptoms are nerve pain and foot feels like it is a pin cushion.  Functional impairments include walking, stairs, getting out of the bathtub, driving, housework, and getting up off the floor.  Pt demo's signs and sx consistent with impaired sensation R foot/Achilles tendonitis.     Assessment:     HEP/POC compliance is  good .  she continues to be very sore after working 4 hours, sensation remains the same. She has been compliant with her HEP     Goal Status:  Pt. will be independent with home exercise program in : 6 weeks  Pt will: report an increase in the feeling of her foot by >50%; in 8 weeks  Pt will: be able to wear regular tennis shoes without increase in pain; in 8 weeks  Pt will: walk with a normal gait pattern without pain; in 8 weeks  Pt will: be able to sleep through the night without waking d/t pain; in 8 weeks    Plan / Patient Education:     Continue with initial plan of care.  Progress with home program as tolerated.  Plan for next visit: continue Nn glides.  Progress strengthening for ankle and foot intrinsics, IASTM to calf and plantar surface of the foot as tolerated,   Subjective:     Pain Ratin   Patient came from work today and is sore today and sensitive. Patient reports 4 hours is a lot on her foot for working, Pain is not too bad in the morning.     Objective:     Tightness  R proximal medial calf.    Appt time: 12:00PM - 12:29PM    Treatment Today     TREATMENT MINUTES COMMENTS   Evaluation     Self-care/ Home management     Manual therapy 15 Prone IASTM to R calf mm and plantar fascia with Rockblade   Neuromuscular Re-education     Therapeutic Activity     Therapeutic Exercises 14 Subjective measures taken  See flowsheet   Gait training     Modality__________________                Total 29    Blank areas are intentional and mean the treatment did not include these items.     Gena Winchester, PT, DPT   1/18/2018

## 2021-06-15 NOTE — PATIENT INSTRUCTIONS - HE
Call your insurance company (number on back of your card) and ask them:  1.  Does my insurance cover the Shingrix (the new shingles shot)? , and if so, then  2.  Do I need to get that at my doctor's office or at my pharmacy - does it matter?

## 2021-06-15 NOTE — PROGRESS NOTES
[facility]   Foot/Ankle Initial Evaluation    Patient Name: Catherine Farrell   Pronunciation: MARCUS  Date of evaluation: 1/2/2018  Referral Diagnosis: Achilles tendonitis of R LE  Referring provider: Rob Marion DPM  Visit Diagnosis:     ICD-10-CM    1. Pain in joint involving right ankle and foot M25.571    2. Antalgic gait R26.89        Assessment:     Catherine Farrell is a 52 y.o. female who presents to therapy today with chief complaints of impaired sensation of her R foot with nn pain. Onset date of sx was 11/3/17.  Pt reports 11/3/17.  Pain symptoms are nerve pain and foot feels like it is a pin cushion.  Functional impairments include walking, stairs, getting out of the bathtub, driving, housework, and getting up off the floor.  Pt demo's signs and sx consistent with impaired sensation R foot/Achilles tendonitis.     Goals:  Pt. will be independent with home exercise program in : 6 weeks  Pt will: report an increase in the feeling of her foot by >50%; in 8 weeks  Pt will: be able to wear regular tennis shoes without increase in pain; in 8 weeks  Pt will: walk with a normal gait pattern without pain; in 8 weeks  Pt will: be able to sleep through the night without waking d/t pain; in 8 weeks    Barriers to Learning or Achieving Goals:  No Barriers.    Patient's expectations/goals are realistic.       Plan / Patient Instructions:        Plan of Care:   Communication with: Referral Source  Patient Related Instruction: Nature of Condition;Treatment plan and rationale;Self Care instruction;Basis of treatment;Body mechanics;Posture  Times per Week: 1-2  Number of Weeks: 6-12  Number of Visits: 12  Precautions / Restrictions : None  Therapeutic Exercise: ROM;Stretching;Strengthening  Neuromuscular Reeducation: kinesio tape;posture;core;balance/proprioception  Manual Therapy: soft tissue mobilization;myofascial release;joint mobilization;muscle energy;strain counterstrain  Modalities: electrical  stimulation;ultrasound;iontophoresis    POC and pathology of condition were reviewed with patient.  Pt. is in agreement with the Plan of Care  A Home Exercise Program (HEP) was initiated today.  Pt. was instructed in exercises by PT and patient was given a handout with detailed instructions.    Plan for next visit: [place].  Progress strengthening.     Subjective:       The patient reports that she has had this surgery on the other foot as well.  She now can only feel part of her foot.  The surgery was on her Achilles tendon with bone spur removal.  The anesthesia has not worn off since the surgery.  Her toe color varies depending on the temperature.  She uses a cane when she needs to because of the loss of feeling in her foot.  She continue to get swelling in her foot and ankle like she did before the surgery.  Everything seems to affect her foot; the weight of her blankets at night, compression stockings, etc.  She has been FWB since right before [place], around 6 weeks.  She has rolled her ankle quite a few times since her surgery.  She does seem to trip on her feet as well.    Social information:   Living Situation:single family home   Occupation:  at [employer] - been off work since 17    Pain Ratin  Pain rating at best: 3  Pain rating at worst: 10  Pain description: nerve pain, foot is a pin cushion    Functional limitations include:   walking, stairs, getting out of the bathtub, driving, housework, getting up off the floor    Patient reports no benefit from  rest  , movement or exercise , anti-inflammatory, pain medication     Objective:      Note: Items left blank indicates the item was not performed or not indicated at the time of the evaluation.    Patient Outcome Measures :    Lower Extremity Functional Scale (_/80): 30   Scores range from 0-80, where a score of 80 represents maximum function. The minimal clinically important difference is a positive change of 9  points.    Ankle/Foot Examination  1. Pain in joint involving right ankle and foot     2. Antalgic gait       Precautions: None  Involved Side: Right    Posture Observation:   Gait Observation: Antalgic gait R.    Hip assessment: WNL    Knee assessment: WNL    Foot/Ankle ROM:    Date: 01/02/18     Ankle ROM( ) AROM in degrees AROM in degrees AROM in degrees    Right Left Right Left Right Left   Dorsiflexion, gastroc (10 ) 5 5       Dorsiflexion, soleus (20 )         Plantar Flexion (50 ) 34 35       Inversion (45-60 ) 37 32       Eversion (15-30 ) 21 20       Great Toe Extension (70 )         Great Toe Flexion (MTP45 , IP90 )          PROM in degrees PROM in degrees PROM in degrees    Right Left Right Left Right Left   Dorsiflexion, gastroc (10 )         Dorsiflexion, soleus (20 )         Plantar Flexion (50 )         Inversion (45-60 )         Eversion (15-30 )         Great Toe Extension (70 )         Great Toe Flexion (MTP45 , IP90 )           Foot/Ankle Strength:    Date: 01/02/18     Ankle/Foot Strength (/5) MMT MMT MMT    Right Left Right Left Right Left   Dorsiflexion 5 5       Plantarflexion 5 5       Inversion 5 5       Eversion 5 5       Great Toe Extension             Palpation: Tenderness R calf mm significant.  Swelling around lateral malleolus and anterior talocrural joint.  Mild tenderness over Achilles tendon.    Foot/Ankle Special Tests:   Ligament Tests Right (+/-) Left (+/-) Fracture Tests Right (+/-) Left (+/-)   Anterior Drawer Test   [place] Ankle Rule     [place] Tilt Test   [place] Foot Rule       Impingement Tests   Heel Tap ( Bump ) Test       Impingement sign   Squeeze Test     Achilles Tests Right (+/-) Left (+/-) Plantar Fasciitis Tests Right (+/-) Left (+/-)   sIaon s Calf Squeeze test    Windlass (non-WB ing) for plantar fasciitis     Arc Sign    Windlass (WB ing) for plantar fasciitis     Other   Other     Other   Other       Sensation:   Decreased sensation plantar aspect digits 3-5,  along 5th MET, lateral  plantar surface, around fibular head     Appt time: 1:37PM - 2:30PM    Treatment Today:  TREATMENT MINUTES COMMENTS   Evaluation 25 Low complexity foot/ankle evaluation   Self-care/ Home management 8 Patient educated on areas to ice on her R LE; posterior/lateral knee, lateral ankle, and calf mm   Manual therapy     Neuromuscular Re-education     Therapeutic Activity     Therapeutic Exercises 20 Demo/performance of HEP  Patient educated on pathology  Discussed POC   Gait training     Modality__________________                Total 53    Blank areas are intentional and mean the treatment did not include these items.       PT Evaluation Code: (Please list factors)  Patient History/Comorbidities: Fibromyalgia, mixed hyperlipidemia, HTN, OCD, hx of L foot surgery  Examination: Impaired sensation R foot, impaired gait  Clinical Presentation: Stable  Clinical Decision Making: Low complexity    Patient History/  Comorbidities Examination  (body structures and functions, activity limitations, and/or participation restrictions) Clinical Presentation Clinical Decision Making (Complexity)   No documented Comorbidities or personal factors 1-2 Elements Stable and/or uncomplicated Low   1-2 documented comorbidities or personal factor 3 Elements Evolving clinical presentation with changing characteristics Moderate   3-4 documented comorbidities or personal factors 4 or more Unstable and unpredictable High            Suyapa Roy, PT, DPT  1/2/2018  1:35 PM

## 2021-06-15 NOTE — PROGRESS NOTES
"[facility] Daily Progress     Patient Name: Catherine Farrell  Date: 1/23/2018  Visit #: 7  PTA visit #:  NA  Pronunciation: MARCUS  Date of evaluation: 1/2/2018  Referral Diagnosis: Achilles tendonitis of R LE  Referring provider: Rob Marion DPM  Visit Diagnosis:     ICD-10-CM    1. Pain in joint involving right ankle and foot M25.571    2. Antalgic gait R26.89    3. Muscle tightness M62.89      Initial Assessment  Catherine Farrell is a 52 y.o. female who presents to therapy today with chief complaints of impaired sensation of her R foot with nn pain. Onset date of sx was 11/3/17.  Pt reports 11/3/17.  Pain symptoms are nerve pain and foot feels like it is a pin cushion.  Functional impairments include walking, stairs, getting out of the bathtub, driving, housework, and getting up off the floor.  Pt demo's signs and sx consistent with impaired sensation R foot/Achilles tendonitis.     Assessment:     HEP/POC compliance is  good .  The patient demonstrates no significant improvements since last session.  She was able to tolerate treatment well and has one more visit before seeing Dr. Marion.    Goal Status:  Pt. will be independent with home exercise program in : 6 weeks  Pt will: report an increase in the feeling of her foot by >50%; in 8 weeks  Pt will: be able to wear regular tennis shoes without increase in pain; in 8 weeks  Pt will: walk with a normal gait pattern without pain; in 8 weeks  Pt will: be able to sleep through the night without waking d/t pain; in 8 weeks    Plan / Patient Education:     Continue with initial plan of care.  Progress with home program as tolerated.  Plan for next visit: continue Nn glides.  Progress strengthening for ankle and foot intrinsics, IASTM to calf and plantar surface of the foot as tolerated,   Subjective:     Pain Rating: tolerable, \"we're good if I don't bump or touch it\"   Patient came in sore today d/t having to shovel last night and this morning.  There " seems to be swelling at times on the lateral ankle and on top of her foot.    Objective:     Tightness R proximal medial calf.    Exercise #1: Ankle pumps  Comment #1: HEP  Exercise #2: Ankle alphabet   Comment #2: HEP  Exercise #3: Ankle circles  Comment #3: HEP CW/CCW  Exercise #4: Great toe lifts  Comment #4: digits 2-5 toe lifts also HEP  Exercise #5: Towel scrunch  Comment #5: HEP  Exercise #6: Toe splaying  Comment #6: HEP  Exercise #7: Calf stretch  Comment #7: HEP  Exercise #8: BAPS Board L3  Comment #8: Not in clinic  Exercise #9: isometrics   Comment #9: HEP  Exercise #10: Heel raises to neutral on step  Comment #10: x 5    Appt time: 8:30AM - 8:58AM    Treatment Today     TREATMENT MINUTES COMMENTS   Evaluation     Self-care/ Home management     Manual therapy 15 Prone IASTM to R calf mm and medial lower leg with Rockblade   Neuromuscular Re-education     Therapeutic Activity     Therapeutic Exercises 13 NUSTEP x 5 minutes WL 5.0; Subjective measures taken  See flowsheet   Gait training     Modality__________________                Total 28    Blank areas are intentional and mean the treatment did not include these items.     Suyapa Roy, PT, DPT   1/23/2018

## 2021-06-15 NOTE — PROGRESS NOTES
[facility] Daily Progress     Patient Name: Catherine Farrell  Date: 1/12/2018  Visit #: 4  PTA visit #:    Pronunciation: MARCUS  Date of evaluation: 1/2/2018  Referral Diagnosis: Achilles tendonitis of R LE  Referring provider: Rob Marion DPM  Visit Diagnosis:     ICD-10-CM    1. Pain in joint involving right ankle and foot M25.571    2. Antalgic gait R26.89      Initial Assessment  Catherine Farrell is a 52 y.o. female who presents to therapy today with chief complaints of impaired sensation of her R foot with nn pain. Onset date of sx was 11/3/17.  Pt reports 11/3/17.  Pain symptoms are nerve pain and foot feels like it is a pin cushion.  Functional impairments include walking, stairs, getting out of the bathtub, driving, housework, and getting up off the floor.  Pt demo's signs and sx consistent with impaired sensation R foot/Achilles tendonitis.     Assessment:     HEP/POC compliance is  good .  Patient demonstrates understanding/independence with home program.  The patient has a good understanding of her HEP.  She reports improvements in the level of sensitivity when something hits her foot.  She is appropriate to continue with PT services at this time.    Goal Status:  Pt. will be independent with home exercise program in : 6 weeks  Pt will: report an increase in the feeling of her foot by >50%; in 8 weeks  Pt will: be able to wear regular tennis shoes without increase in pain; in 8 weeks  Pt will: walk with a normal gait pattern without pain; in 8 weeks  Pt will: be able to sleep through the night without waking d/t pain; in 8 weeks    Plan / Patient Education:     Continue with initial plan of care.  Progress with home program as tolerated.  Plan for next visit: continue Nn glides.  Progress strengthening for ankle and foot intrinsics, IASTM to calf and plantar surface of the foot as tolerated,   Subjective:     Pain Rating: sore   She is sore today due to having worked the last 2 days. She  states about 30 min into working her foot feels like dead weight.   She states the overall sensitivity of the foot is better but sensation remains the same.     Objective:     Mild positive test for tension of the peroneal nerve on the right side    Appt time: 09:05 - 9:30 AM    Treatment Today     TREATMENT MINUTES COMMENTS   Evaluation     Self-care/ Home management     Manual therapy 15 Prone IASTM to R calf mm and plantar fascia  with Rockblade   Neuromuscular Re-education     Therapeutic Activity     Therapeutic Exercises 10  subjective measures taken  Review of nn glides as well  Reviewed great toe and little toe lifts x 10 reps each  Added toe splaying to HEP    Gait training     Modality__________________                Total 25    Blank areas are intentional and mean the treatment did not include these items.     Gena Winchester, PT, DPT   1/12/2018

## 2021-06-15 NOTE — PROGRESS NOTES
[facility] Daily Progress     Patient Name: Catherine Farrell  Date: 2018  Visit #: 5  PTA visit #:    Pronunciation: MARCUS  Date of evaluation: 2018  Referral Diagnosis: Achilles tendonitis of R LE  Referring provider: Rob Marion DPM  Visit Diagnosis:     ICD-10-CM    1. Pain in joint involving right ankle and foot M25.571    2. Antalgic gait R26.89      Initial Assessment  Catherine Farrell is a 52 y.o. female who presents to therapy today with chief complaints of impaired sensation of her R foot with nn pain. Onset date of sx was 11/3/17.  Pt reports 11/3/17.  Pain symptoms are nerve pain and foot feels like it is a pin cushion.  Functional impairments include walking, stairs, getting out of the bathtub, driving, housework, and getting up off the floor.  Pt demo's signs and sx consistent with impaired sensation R foot/Achilles tendonitis.     Assessment:     HEP/POC compliance is  good .  The patient has not noticed any improvements in her foot sensation.  She has been able to tolerate treatments well and is appropriate to continue with PT services at this time.    Goal Status:  Pt. will be independent with home exercise program in : 6 weeks  Pt will: report an increase in the feeling of her foot by >50%; in 8 weeks  Pt will: be able to wear regular tennis shoes without increase in pain; in 8 weeks  Pt will: walk with a normal gait pattern without pain; in 8 weeks  Pt will: be able to sleep through the night without waking d/t pain; in 8 weeks    Plan / Patient Education:     Continue with initial plan of care.  Progress with home program as tolerated.  Plan for next visit: continue Nn glides.  Progress strengthening for ankle and foot intrinsics, IASTM to calf and plantar surface of the foot as tolerated,   Subjective:     Pain Ratin   The patient reports that the pain from the mm manipulation has been bad.  Sometimes it feels like someone is putting a skewer in her foot.  There has been  change yet in her sensation of her foot.    Objective:     Tightness R proximal medial calf.    Appt time: 12:00PM - 12:27PM    Treatment Today     TREATMENT MINUTES COMMENTS   Evaluation     Self-care/ Home management     Manual therapy 17 Prone IASTM to R calf mm and plantar fascia with Rockblade   Neuromuscular Re-education     Therapeutic Activity     Therapeutic Exercises 10 Subjective measures taken  See flowsheet   Gait training     Modality__________________                Total 27    Blank areas are intentional and mean the treatment did not include these items.     Suyapa Roy, PT, DPT   1/16/2018

## 2021-06-15 NOTE — PROGRESS NOTES
Assessment: 8 weeks status post right posterior calcaneal spur resection with secondary repair of the Achilles tendon.    Plan: Orders written for physical therapy to improve ambulation and balance.  Hopefully they can help foster some nerve healing.  Note written for her to return to work on Monday, January 8, 2018.  Limited to 4 hours of work per day.  Reassess in a month.      Subjective: The patient returns to the [facility] for a third postoperative visit. Surgery was about 8 weeks ago on November 3, 2017 at [facility]. She had a popliteal block at the time of surgery.  Numbness, pain and paresthesias persist.  Physical therapy was ordered at her last visit, but was never scheduled. She takes gabapentin for back pain, and is not sure it is changing her right foot nerve issues.  Swelling makes it difficult to fit in regular shoes.    Objective: The incision has healed nicely. Alignment of the foot is good. Minimal edema or erythema noted.  Good plantar flexion against resistance today.

## 2021-06-15 NOTE — TELEPHONE ENCOUNTER
Refill Approved    Rx renewed per Medication Renewal Policy. Medication was last renewed on 11/23/20.    Travis Hamilton, [facility] Triage/Med Refill 2/23/2021     Requested Prescriptions   Pending Prescriptions Disp Refills     omeprazole (PRILOSEC) 40 MG capsule [Pharmacy Med Name: Omeprazole 40 MG Oral Capsule Delayed Release] 90 capsule 0     Sig: Take 1 capsule by mouth once daily       GI Medications Refill Protocol Passed - 2/22/2021  9:50 PM        Passed - PCP or prescribing provider visit in last 12 or next 3 months.     Last office visit with prescriber/PCP: 5/18/2020 Annetta Hernandez MD OR same dept: 5/18/2020 Annetta Hernandez MD OR same specialty: 5/18/2020 Annetta Hernandez MD  Last physical: 7/24/2020 Last MTM visit: Visit date not found   Next visit within 3 mo: Visit date not found  Next physical within 3 mo: Visit date not found  Prescriber OR PCP: Annetta Hernandez MD  Last diagnosis associated with med order: 1. Chronic Reflux Esophagitis  - omeprazole (PRILOSEC) 40 MG capsule [Pharmacy Med Name: Omeprazole 40 MG Oral Capsule Delayed Release]; Take 1 capsule by mouth once daily  Dispense: 90 capsule; Refill: 0    If protocol passes may refill for 12 months if within 3 months of last provider visit (or a total of 15 months).

## 2021-06-16 PROBLEM — G57.51 TARSAL TUNNEL SYNDROME OF RIGHT SIDE: Status: ACTIVE | Noted: 2018-06-04

## 2021-06-16 PROBLEM — R20.0 NUMBNESS OF RIGHT LOWER EXTREMITY: Status: ACTIVE | Noted: 2018-05-30

## 2021-06-16 PROBLEM — E55.9 VITAMIN D DEFICIENCY: Status: ACTIVE | Noted: 2017-07-03

## 2021-06-16 PROBLEM — G58.9 COMPRESSION NEUROPATHY: Status: ACTIVE | Noted: 2018-09-27

## 2021-06-16 PROBLEM — G57.91 COMPRESSION NEUROPATHY OF RIGHT LOWER EXTREMITY: Status: ACTIVE | Noted: 2018-06-04

## 2021-06-16 PROBLEM — G57.91 MONONEURITIS LOWER LIMB, RIGHT: Status: ACTIVE | Noted: 2018-06-04

## 2021-06-16 PROBLEM — E66.01 MORBID OBESITY (H): Status: ACTIVE | Noted: 2019-04-23

## 2021-06-16 PROBLEM — M77.31 CALCANEAL SPUR, RIGHT: Status: ACTIVE | Noted: 2017-09-11

## 2021-06-16 PROBLEM — M79.671 RIGHT FOOT PAIN: Status: ACTIVE | Noted: 2018-09-27

## 2021-06-16 PROBLEM — S49.91XS RIGHT SHOULDER INJURY, SEQUELA: Status: ACTIVE | Noted: 2020-07-24

## 2021-06-16 NOTE — TELEPHONE ENCOUNTER
RN cannot approve Refill Request    RN can NOT refill this medication med is not covered by policy/route to provider. Last office visit: 3/1/2021 Annetta Hernandez MD Last Physical: 2/24/2021 Last MTM visit: Visit date not found Last visit same specialty: 3/1/2021 Annetta Hernandez MD.  Next visit within 3 mo: Visit date not found  Next physical within 3 mo: Visit date not found      Laura Hartman, [facility] Triage/Med Refill 4/6/2021    Requested Prescriptions   Pending Prescriptions Disp Refills     ibuprofen (ADVIL,MOTRIN) 800 MG tablet [Pharmacy Med Name: Ibuprofen 800 MG Oral Tablet] 100 tablet 0     Sig: TAKE 1 TABLET BY MOUTH EVERY 6 HOURS AS NEEDED FOR PAIN       There is no refill protocol information for this order

## 2021-06-16 NOTE — TELEPHONE ENCOUNTER
Refill Approved    Rx renewed per Medication Renewal Policy. Medication was last renewed on 12/21/20.    Travis Hamilton, [facility] Triage/Med Refill 3/25/2021     Requested Prescriptions   Pending Prescriptions Disp Refills     simvastatin (ZOCOR) 40 MG tablet [Pharmacy Med Name: Simvastatin 40 MG Oral Tablet] 90 tablet 0     Sig: Take 1 tablet by mouth once daily       Statins Refill Protocol (Hmg CoA Reductase Inhibitors) Passed - 3/24/2021  5:27 AM        Passed - PCP or prescribing provider visit in past 12 months      Last office visit with prescriber/PCP: 3/1/2021 Annetta Hernandez MD OR same dept: 3/1/2021 Annetta Hernandez MD OR same specialty: 3/1/2021 Annetta Hernandez MD  Last physical: 2/24/2021 Last MTM visit: Visit date not found   Next visit within 3 mo: Visit date not found  Next physical within 3 mo: Visit date not found  Prescriber OR PCP: Annetta Hernandez MD  Last diagnosis associated with med order: 1. Hyperlipidemia  - simvastatin (ZOCOR) 40 MG tablet [Pharmacy Med Name: Simvastatin 40 MG Oral Tablet]; Take 1 tablet by mouth once daily  Dispense: 90 tablet; Refill: 0    If protocol passes may refill for 12 months if within 3 months of last provider visit (or a total of 15 months).

## 2021-06-16 NOTE — PROGRESS NOTES
"DIAGNOSIS: Right ankle pain    PLAN: X-ray is unremarkable.  I will send her for an MRI.  The Tri-Lock ankle brace was comfortable today.  I will call her with MRI results when they are available.  The Achilles repair feels okay by exam today.  Most of her pain is on the anterior ankle.  Note written to be off work from March 25 - April 7.  Fifteen minute visit, greater than half our time spent counseling and coordinating medical care.     SUBJECTIVE: The patient returns to the [facility] with sharp increase in right ankle pain.  This increased about 2 days ago without injury.  She continues to recover from her right posterior calcaneal spur resection with secondary repair of the Achilles tendon which was done in November 2017.  Prolonged neuritis, likely from the popliteal block persists.  She has been back at work, 3 days a week doing 4 hour shifts.  That seems to exacerbate pain.  She was seen yesterday in the emergency room.  Foot and ankle x-rays showed plantar calcaneal spur and early degenerative changes at the anterior ankle, but no fracture.  She tried wearing the postoperative boot, but the weight of it caused more pain than her regular shoe.  She was given Vicodin in the ER, but only has taken 1.  She continues with gabapentin and ibuprofen.      PHYSICAL EXAM:  /84  Ht 5' 9.5\" (1.765 m)  Wt (!) 240 lb (108.9 kg)  BMI 34.93 kg/m2  General: Pleasant 52 y.o. female in no acute distress.  Vascular: DP pulses are diminished. PT pulses are diminished. Pedal hair is present. Feet are warm to the touch.  Cardiac: Pulse is regular.  Lymphatic: No edema at the ankles.  Neuro: Sensation in the feet is altered. Patient describes paresthesias.  Derm: No open lesions.  Musculoskeletal: Some tenderness posteriorly around the Achilles insertion of the calcaneus.  Increased tenderness with palpation of the anterior ankle.  She can plantar flex against resistance, and the Achilles is intact by " palpation.  X-ray from the ER is reviewed.

## 2021-06-17 NOTE — PROGRESS NOTES
"DIAGNOSIS: Right leg neuritis following popliteal block and right posterior calcaneal spur resection with Achilles tendon repair done in November 2017.    PLAN: The Achilles repair seemed solid, but she still has difficulty with any prolonged standing or walking because of right lower extremity neuritis.  I would like her to see Dr. Ely to see if nerve decompression surgery would be an option here.  I do not expect a visit with neurology to be useful.  She has been through physical therapy, bracing and orthotics without much improvement.  Note written to be off work through the end of May 2018.  Fifteen minute visit, greater than half our time spent counseling and coordinating medical care.     SUBJECTIVE: The patient returns to the [facility] with altered sensation in the right lower leg and foot.  She had a popliteal block with her right posterior calcaneal spur resection and secondary repair of the Achilles tendon in November 2017.  Since the surgery, she has been through physical therapy.  Muscle strength has been improved.  Achilles is functioning well.  She continues to have numbness and pain down the lateral side of the leg and the ball of her foot.  This has prevented her from returning to her standing job.  Sudden increase in ankle pain about a month ago; that has settled down, and she is left with just the nerve pain.  MRI has shown chronic ankle sprains medially and laterally.    PHYSICAL EXAM:  Resp 16  Ht 5' 9.5\" (1.765 m)  Wt (!) 259 lb (117.5 kg)  BMI 37.7 kg/m2  General: Pleasant 52 y.o. female in no acute distress.  Vascular: DP pulses are diminished. PT pulses are diminished. Pedal hair is present. Feet are warm to the touch.  Cardiac: Pulse is regular.  Lymphatic: No edema at the ankles.  Neuro: Sensation in the feet is altered. Patient describes paresthesias.  Derm: No open lesions.  Musculoskeletal: She can plantar flex against resistance, and the Achilles is intact by palpation.  " She walks with a limp.

## 2021-06-17 NOTE — PROGRESS NOTES
"ASSESSMENT/PLAN:  1. Hypertension     2. Chronic Reflux Esophagitis  omeprazole (PRILOSEC) 40 MG capsule   3. Hyperlipidemia  simvastatin (ZOCOR) 40 MG tablet       This is a 53 yo female with:  1.  Hypertension - complains that her BP was noted too high on recent ER visits \"because of pain\" - we discussed that if her blood pressure is that high, then it is too high.    2.  Chronic Reflux - will refill Omeprazole  3.  Hyperlipidemia - refill Simvastatin        Medications Discontinued During This Encounter   Medication Reason     gabapentin (NEURONTIN) 300 MG capsule Reorder     ibuprofen (ADVIL,MOTRIN) 800 MG tablet Reorder     omeprazole (PRILOSEC) 40 MG capsule Reorder     simvastatin (ZOCOR) 40 MG tablet Reorder     There are no Patient Instructions on file for this visit.    Chief Complaint:  Chief Complaint   Patient presents with     Follow-up     blood pressure       HPI:   Catherine Farrell is a 52 y.o. female c/o  Not mobile due to foot pain -   Also has numbness in right foot - \"nerve block never wore off\"  Had (Achilles) tendon surgery November 1, 2017 - now nearly 6 months later, still numb/painful  Went back to work 2/1/18 - 4 hours/day (12 hours/week) - standing makes everything swell -   Numb from proximal fibula to foot - partial feeling in foot  Just had MRI done a week ago    Hasn't worked since October 2017 (except brief stint in February)  Can't do her dancing as well    Has been through therapy - \"medieval torture\"  Wants to try \"natural remedies\" for pain - wants to try turmeric  Can't drink sumac - cross reactivity to penicillin    Constant state of \"in between\" - sometimes pins/needles, sometimes can't feel anything    Can't collect any disability due to pre-existing condition    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 (can't do implants due to bone loss)      Blood pressure high when she has pain  At home, reports BP of 110/70 when she checks          PMH:   Patient " Active Problem List    Diagnosis Date Noted     Calcaneal spur, right 2017     Vitamin D deficiency 2017     Chronic pain 03/10/2017     Costochondritis 07/10/2015     Fatigue 07/10/2015     Obsessive compulsive disorder 2015     Dermatitis 2015     Patellofemoral Syndrome Of The Right Knee  2015     Tension-type headache 2015     Simple (Specific) Phobia      Cough      Nicotine Dependence      Chronic Reflux Esophagitis      Fibromyalgia      Strain Of The Gastrocnemius Muscle Of The Right Leg      Adjustment Disorder With Depressed Mood      Joint Pain, Localized In The Knee      Pain During Urination (Dysuria)      Abdominal Pain      Diverticulitis Of Colon      Trochanteric Bursitis      Sinusitis      Midback Pain      Mixed hyperlipidemia      Abnormal Weight Loss      Abnormal Pap smear of cervix      Adverse Food Reaction (Not Anaphylactic)      Impingement Of The Right Shoulder      Hypertension      Head Injury      Oral Thrush      Lumbar Disc Degeneration      Lower Back Pain Chronic      Carpal Tunnel Syndrome      Past Medical History:   Diagnosis Date     Anemia      Bilateral calcaneal spurs     surgery 2015     Bursitis of hip      Chronic back pain      DDD (degenerative disc disease)      Depression      Environmental allergies      Fibromyalgia      HLD (hyperlipidemia)      Obesity      Past Surgical History:   Procedure Laterality Date     ACHILLES TENDON REPAIR Left 2015    Procedure: WITH SECONDARY ACHILLES TENDON REAPIR;  Surgeon: Rob Marion DPM;  Location: [facility];  Service:      ACHILLES TENDON REPAIR Right 11/3/2017    Procedure: WITH SECONDARY ACHILLES TENDON REPAIR;  Surgeon: Rob Marion DPM;  Location: [facility];  Service:      ENDOMETRIAL ABLATION       MD APPENDECTOMY      Description: Appendectomy;  Recorded: 10/18/2008;     MD  DELIVERY ONLY      Description:  Section;  Recorded:  10/18/2008;     MT REMOVAL OF HEEL SPUR Left 11/6/2015    Procedure: LEFT POSTERIOR CALCANEAL SPUR RESECTION;  Surgeon: Rob Marion DPM;  Location: [facility];  Service: Podiatry     MT REMOVAL OF TONSILS,<13 Y/O      Description: Tonsillectomy;  Recorded: 10/18/2008;     Social History     Social History     Marital status:      Spouse name: N/A     Number of children: N/A     Years of education: N/A     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[employer]     Social History Main Topics     Smoking status: Current Every Day Smoker     Packs/day: 0.50     Years: 36.00     Types: Cigarettes     Smokeless tobacco: Never Used     Alcohol use 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 Take 1 capsule (300 mg total) by mouth daily. Increase by 1 tab every 3 days, Disp: 90 capsule, Rfl: 3     ibuprofen (ADVIL,MOTRIN) 800 MG tablet, Take 1 tablet (800 mg total) by mouth every 6 (six) hours as needed for pain., Disp: 100 tablet, Rfl: 3     omeprazole (PRILOSEC) 40 MG capsule, Take 1 capsule (40 mg total) by mouth daily., Disp: 90 capsule, Rfl: 3     simvastatin (ZOCOR) 40 MG tablet, Take 40 mg by mouth at bedtime., Disp: , Rfl:      HYDROcodone-acetaminophen 5-325 mg per tablet, Take 1 tablet by mouth every 6 (six) hours as needed., Disp: 8 tablet, Rfl: 0     meclizine (ANTIVERT) 25 mg tablet, Take 25 mg by mouth 3 (three) times a day as needed for dizziness or nausea., Disp: , Rfl:      simvastatin (ZOCOR) 40 MG tablet, Take 1 tablet (40 mg total) by mouth daily., Disp: 90 tablet, Rfl: 3    Allergies:  Allergies   Allergen Reactions      Amoxicillin Hives     Penicillins Swelling and Itching     Annotation: Swelling of face/eyes, itching       Shellfish Containing Products Anaphylaxis       ROS:  Pertinent positives as noted in HPI; otherwise 12 point ROS negative.      Physical Exam:  EXAM:  /74 (Patient Site: Right Arm, Patient Position: Sitting, Cuff Size: Adult Large)  Pulse 88  Temp 98  F (36.7  C) (Oral)   Wt (!) 259 lb (117.5 kg)  Breastfeeding? No  BMI 37.7 kg/m2   Gen:  NAD, appears well, well-hydrated  HEENT:  TMs nl, oropharynx benign, nasal mucosa nl, conjunctiva clear  Neck:  Supple, no adenopathy, no thyromegaly, no carotid bruits, no JVD  Lungs:  Clear to auscultation bilaterally  Cor:  RRR no murmur  Abd:  Soft, nontender, BS+, no masses, no guarding or rebound, no HSM  Extr:  Foot/ankle scarring - decreased ROM ankles  Neuro:  No asymmetry  Skin:  Warm/dry        Results:  Results for orders placed or performed during the hospital encounter of 11/03/17   Pregnancy, urine   Result Value Ref Range    Pregnancy Test, Urine Negative Negative    Specific Gravity, UA 1.021 1.001 - 1.030

## 2021-06-18 NOTE — ANESTHESIA CARE TRANSFER NOTE
Last vitals:   Vitals:    06/05/18 0931   BP: 142/65   Pulse: 91   Resp: 14   Temp: 36.6  C (97.8  F)   SpO2: 98%     Patient's level of consciousness is drowsy  Spontaneous respirations: yes  Maintains airway independently: yes  Dentition unchanged: yes  Oropharynx: oropharynx clear of all foreign objects    QCDR Measures:  ASA# 20 - Surgical Safety Checklist: WHO surgical safety checklist completed prior to induction  PQRS# 430 - Adult PONV Prevention: 4558F - Pt received => 2 anti-emetic agents (different classes) preop & intraop  ASA# 8 - Peds PONV Prevention: NA - Not pediatric patient, not GA or 2 or more risk factors NOT present  PQRS# 424 - Annie-op Temp Management: 4559F - At least one body temp DOCUMENTED => 35.5C or 95.9F within required timeframe  PQRS# 426 - PACU Transfer Protocol: - Transfer of care checklist used  ASA# 14 - Acute Post-op Pain: ASA14B - Patient did NOT experience pain >= 7 out of 10

## 2021-06-18 NOTE — ANESTHESIA POSTPROCEDURE EVALUATION
Patient: Catherine PAULA Bud  TARSAL TUNNEL RELEASE WITH NERVE DECOMPRESSION AND EXTERNAL NEUROLYSIS OF THE MEDIAL AND LATERAL PLANTAR NERVES AND CALCANEAL NERVE OF RIGHT FOOT ALSO A NERVE DECOMPRESSION WITH EXTERNAL NEUROLYSIS OF RIGHT SUPERFICIAL AND DEEP PERONEAL NERVES RIGHT SIDE  Anesthesia type: general    Patient location: Phase II Recovery  Last vitals:   Vitals:    06/05/18 1040   BP:    Pulse: 71   Resp:    Temp:    SpO2: 97%     Post vital signs: stable  Level of consciousness: awake and responds to simple questions  Post-anesthesia pain: pain controlled  Post-anesthesia nausea and vomiting: no  Pulmonary: unassisted, return to baseline  Cardiovascular: stable and blood pressure at baseline  Hydration: adequate  Anesthetic events: no    QCDR Measures:  ASA# 11 - Annie-op Cardiac Arrest: ASA11B - Patient did NOT experience unanticipated cardiac arrest  ASA# 12 - Annie-op Mortality Rate: ASA12B - Patient did NOT die  ASA# 13 - PACU Re-Intubation Rate: ASA13B - Patient did NOT require a new airway mgmt  ASA# 10 - Composite Anes Safety: ASA10A - No serious adverse event    Additional Notes:

## 2021-06-18 NOTE — PROGRESS NOTES
Subjective findings: The patient presented to the clinic today complaining of numbness and tingling and burning on the bottom of her right foot.  She has had this problem for several months. Her symptoms are more pronounced at night when lying down in bed.  Symptoms are quite aggravating.There are no factors which give her any relief.     Objective findings: Nails bilateral feet are normal length and color.  Skin bilateral feet warm and intact.  DP and PT pulses +2/4 bilateral feet.  Capillary refill less than 2 seconds bilateral feet.  Negative clonus, negative Babinski bilateral feet.  There is a very pronounced positive Tinel sign when percussing the tarsal tunnel and the deep and superficial peroneals nerve of the right lower extremity.  There is also positive Tinel sign when compressing the posterior tibial nerve at the level of the soleal sling.  Range of motion within normal limits bilateral feet.  Muscle power +5/5 bilaterally in all compartments.     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      Plan: I informed the patient that her EMGs did show that she has some changes in responses to the right superficial peroneal nerves right medial right lateral plantar nerves also showed no response.  After examining the patient clinically today and correlating her findings with her EMGs I am going to recommend a tarsal tunnel release with nerve decompression and external neurolysis of the medial and lateral plantar nerves and calcaneal nerve of the right foot.  I am also going to perform a nerve decompression with external neurolysis of the right superficial deep peroneal nerves.  The patient was told that this should eliminate her symptoms of the tingling and shooting pains.  She was told that the procedures will be done on an outpatient basis under general anesthesia.  She was told that the procedures will take approximately 1 hour to  perform and she would then be discharged and able to weight-bear immediately following the procedure.  She was given a written list of potential postoperative complications.  She was asked to go n.p.o. at midnight prior to the procedure and she was asked to see her primary care physician for preoperative consultation.

## 2021-06-18 NOTE — LETTER
Letter by Annetta Hernandez MD at      Author: Annetta Hernandez MD Service: -- Author Type: --    Filed:  Encounter Date: 3/11/2019 Status: (Other)         03/11/19    RE: Catherine Farrell  1965    To Whom It May Concern:    Catherine Farrell was seen for evaluation of illness.  She missed work today and will remain out of work tomorrow as well (Monday and Tuesday, March 11 and 12, 2019).      Thank you for your attention to this matter.     Sincerely,        Annetta Hernandez MD

## 2021-06-18 NOTE — PROGRESS NOTES
Subjective findings: The patient return to the clinic today for postop visit #1, 1 week status post tarsal tunnel release with nerve decompression and external neurolysis of the medial and lateral plantar nerves and calcaneal nerves.  Patient also underwent nerve decompression with external neurolysis of the superficial and deep peroneal nerve right lower extremity.  The patient is in good spirits and she had no complaints.         Objective findings: The dressings were removed and the wound margins are well coaptated and maintained.  There is no edema, erythema, cellulitis, drainage or bleeding noted.  Neurovascular status is intact and vital signs are stable.  Increased protective sensation noted plantar aspect of the right foot.      Assessment: Tarsal tunnel, compression neuropathy right lower extremity       Plan: A sterile dressing was applied.  The patient was instructed to return to the clinic in 1 week for postop visit #2.

## 2021-06-18 NOTE — PROGRESS NOTES
"Preoperative Exam    Scheduled Procedure: Nerve decompression, right foot/ankle  Surgery Date:  06/05/2018  Surgery Location: [facility], fax [phone]    Surgeon:  Dr. Ely    Assessment/Plan:     1. Preop examination  Basic Metabolic Panel    Hemoglobin   2. Numbness of right lower extremity     3. Screen for colon cancer  Ambulatory referral for Colonoscopy   4. Hypertension  Basic Metabolic Panel     This is a 51 yo female with multiple procedures done for foot/ankle problems.  Since last procedure on right foot, she has had numbness in that foot.  She has been \"waiting\" for it to get better on advice from surgeon.  Has been seen for 2nd opinion and is scheduled for surgical nerve decompression.  Here for preop evaluation today.  Medical conditions generally stable - has tolerated recent surgeries.  Workup as noted.  OK for surgery.  No pregnancy test due to age 52, no periods for 8 years (had endometrial ablation 8 years ago).  Patient has h/o hypertension - well controlled today.    Also - noted patient is due for colon cancer screening - referral made today.     Surgical Procedure Risk: Low (reported cardiac risk generally < 1%)  Have you had prior anesthesia?: Yes  Have you or any family members had a previous anesthesia reaction:  No  Do you or any family members have a history of a clotting or bleeding disorder?: No  Cardiac Risk Assessment: no increased risk for major cardiac complications    Patient approved for surgery with general or local anesthesia.    Please Note:  no specific limitations    Functional Status: Independent  Patient plans to recover at home with family.     Subjective:      Catherine Farrell is a 52 y.o. female who presents for a preoperative consultation.    See above :    Had surgery on right ankle 11/1/17 - for tendon repair  Since then, has had numbness/tingling in right lower extremity  This has not improved since surgery   Hasn't been able to work due to foot problem; " has had swelling       All other systems reviewed and are negative, other than those listed in the HPI.    Pertinent History  Do you have difficulty breathing or chest pain after walking up a flight of stairs: No  History of obstructive sleep apnea: No  Steroid use in the last 6 months: No  Frequent Aspirin/NSAID use: Yes: ibuprofen  Prior Blood Transfusion: Yes: 30 years ago  Prior Blood Transfusion Reaction: No  If for some reason prior to, during or after the procedure, if it is medically indicated, would you be willing to have a blood transfusion?:  There is no transfusion refusal.    Current Outpatient Prescriptions   Medication Sig Dispense Refill     cholecalciferol, vitamin D3, (VITAMIN D3) 2,000 unit Tab Take 1 tablet by mouth every morning.       gabapentin (NEURONTIN) 300 MG capsule Take 2 capsules (600 mg total) by mouth at bedtime. Take 1 capsule (300 mg total) by mouth daily. Increase by 1 tab every 3 days 90 capsule 3     ibuprofen (ADVIL,MOTRIN) 800 MG tablet Take 1 tablet (800 mg total) by mouth every 6 (six) hours as needed for pain. 100 tablet 3     meclizine (ANTIVERT) 25 mg tablet Take 25 mg by mouth 3 (three) times a day as needed for dizziness or nausea.       omeprazole (PRILOSEC) 40 MG capsule Take 1 capsule (40 mg total) by mouth daily. 90 capsule 3     HYDROcodone-acetaminophen 5-325 mg per tablet Take 1 tablet by mouth every 6 (six) hours as needed. 8 tablet 0     simvastatin (ZOCOR) 40 MG tablet Take 40 mg by mouth at bedtime.       No current facility-administered medications for this visit.         Allergies   Allergen Reactions     Amoxicillin Hives     Penicillins Swelling and Itching     Annotation: Swelling of face/eyes, itching       Shellfish Containing Products Anaphylaxis       Patient Active Problem List   Diagnosis     Cough     Nicotine Dependence     Chronic Reflux Esophagitis     Fibromyalgia     Strain Of The Gastrocnemius Muscle Of The Right Leg     Adjustment Disorder  With Depressed Mood     Joint Pain, Localized In The Knee     Pain During Urination (Dysuria)     Abdominal Pain     Diverticulitis Of Colon     Trochanteric Bursitis     Sinusitis     Midback Pain     Mixed hyperlipidemia     Abnormal Weight Loss     Abnormal Pap smear of cervix     Adverse Food Reaction (Not Anaphylactic)     Impingement Of The Right Shoulder     Hypertension     Head Injury     Oral Thrush     Lumbar Disc Degeneration     Lower Back Pain Chronic     Carpal Tunnel Syndrome     Simple (Specific) Phobia     Dermatitis     Patellofemoral Syndrome Of The Right Knee      Tension-type headache     Obsessive compulsive disorder     Costochondritis     Fatigue     Chronic pain     Vitamin D deficiency     Calcaneal spur, right     Numbness of right lower extremity       Past Medical History:   Diagnosis Date     Anemia      Bilateral calcaneal spurs     surgery 2015     Bursitis of hip      Chronic back pain      DDD (degenerative disc disease)      Depression      Environmental allergies      Fibromyalgia      HLD (hyperlipidemia)      Obesity        Past Surgical History:   Procedure Laterality Date     ACHILLES TENDON REPAIR Left 2015    Procedure: WITH SECONDARY ACHILLES TENDON REAPIR;  Surgeon: Rob Marion DPM;  Location: [facility] OR;  Service:      ACHILLES TENDON REPAIR Right 11/3/2017    Procedure: WITH SECONDARY ACHILLES TENDON REPAIR;  Surgeon: Rob Marion DPM;  Location: [facility] OR;  Service:      ENDOMETRIAL ABLATION       VT APPENDECTOMY      Description: Appendectomy;  Recorded: 10/18/2008;     VT  DELIVERY ONLY      Description:  Section;  Recorded: 10/18/2008;     VT REMOVAL OF HEEL SPUR Left 2015    Procedure: LEFT POSTERIOR CALCANEAL SPUR RESECTION;  Surgeon: Rob Marion DPM;  Location: [facility] OR;  Service: Podiatry     VT REMOVAL OF TONSILS,<11 Y/O      Description: Tonsillectomy;  Recorded: 10/18/2008;       Social History  "    Social History     Marital status:      Spouse name: N/A     Number of children: N/A     Years of education: N/A     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[employer]     Social History Main Topics     Smoking status: Current Every Day Smoker     Packs/day: 0.50     Years: 36.00     Types: Cigarettes     Smokeless tobacco: Never Used     Alcohol use 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     Drug use: No     Sexual activity: Yes     Partners: Male     Other Topics Concern     Not on file     Social History Narrative       Patient Care Team:  Annetta Hernandez MD as PCP - General          Objective:     Vitals:    05/30/18 1027   BP: 120/70   Pulse: 78   Resp: 14   Temp: 98.3  F (36.8  C)   TempSrc: Oral   SpO2: 95%   Weight: (!) 256 lb (116.1 kg)   Height: 5' 9.5\" (1.765 m)         Physical Exam:  EXAM:  /70 (Patient Site: Right Arm, Patient Position: Sitting, Cuff Size: Adult Large)  Pulse 78  Temp 98.3  F (36.8  C) (Oral)   Resp 14  Ht 5' 9.5\" (1.765 m)  Wt (!) 256 lb (116.1 kg)  SpO2 95%  BMI 37.26 kg/m2   Gen:  NAD, appears well, well-hydrated  HEENT:  TMs nl, oropharynx benign, nasal mucosa nl, conjunctiva clear  Neck:  Supple, no adenopathy, no thyromegaly, no carotid bruits, no JVD  Lungs:  Clear to auscultation bilaterally  Cor:  RRR no murmur  Abd:  Soft, nontender, BS+, no masses, no guarding or rebound, no HSM  Extr: decreased ROM bilateral ankles - with decreased sensation at right foot  Neuro:  No asymmetry, Nl motor tone/strength, nl sensation, reflexes =, gait nl, nl coordination, CN intact,   Skin:  Warm/dry        There are no Patient Instructions on file for this visit.    EKG:  No EKG done at this visit  Last EKG:  Electrocardiogram Perform and Read   Order: [id number]   Status:  Final result   Visible to patient:  Yes (MyChart)   Next appt:  06/12/2018 at 01:00 PM in Podiatry (Mario Ely DPM)   Dx:  Preop testing      Ref Range & Units 10/20/17  2:46 PM     SYSTOLIC " BLOOD PRESSURE mmHg    DIASTOLIC BLOOD PRESSURE mmHg    VENTRICULAR RATE BPM 82   ATRIAL RATE BPM 82   P-R INTERVAL ms 188   QRS DURATION ms 90   Q-T INTERVAL ms 366   QTC CALCULATION (BEZET) ms 427   P Axis degrees 46   R AXIS degrees 47   T AXIS degrees 30   MUSE DIAGNOSIS  Normal sinus rhythm   Normal ECG   No previous ECGs available   Confirmed by PEDRO GRAY MD LOC:[facility] ([id number]) on 10/20/2017 3:26:18 PM         Resulting Agency  [facility]      Specimen Collected: 10/20/17  2:46 PM   Last Resulted: 10/20/17  3:26 PM                  Labs:  Results for orders placed or performed in visit on 05/30/18   Basic Metabolic Panel   Result Value Ref Range    Sodium 142 136 - 145 mmol/L    Potassium 4.6 3.5 - 5.0 mmol/L    Chloride 108 (H) 98 - 107 mmol/L    CO2 24 22 - 31 mmol/L    Anion Gap, Calculation 10 5 - 18 mmol/L    Glucose 95 70 - 125 mg/dL    Calcium 9.9 8.5 - 10.5 mg/dL    BUN 8 8 - 22 mg/dL    Creatinine 0.81 0.60 - 1.10 mg/dL    GFR MDRD Af Amer >60 >60 mL/min/1.73m2    GFR MDRD Non Af Amer >60 >60 mL/min/1.73m2   Hemoglobin   Result Value Ref Range    Hemoglobin 12.3 12.0 - 16.0 g/dL         Immunization History   Administered Date(s) Administered     DT (pediatric) 04/01/1998     Td,adult,historic,unspecified 02/20/2008     Tdap 02/20/2008           Electronically signed by Annetta Hernandez MD 06/03/18 10:28 AM

## 2021-06-18 NOTE — PROGRESS NOTES
Subjective findings: The patient presented to the clinic today complaining of numbness and tingling and burning on the bottom of her right foot.  She has had this problem for several months.  The patient stated that she does traditional native dancing and this seems to aggravate her condition.  Her symptoms are more pronounced at night when lying down in bed.  Symptoms are quite aggravating.  She has had excision retrocalcaneal spurs both feet.  She stated that she recovered well from no surgeries.  She does not recall any particular trauma to her feet.  There are no factors which give her any relief.    Objective findings: Nails bilateral feet are normal length and color.  Skin bilateral feet warm and intact.  DP and PT pulses +2/4 bilateral feet.  Capillary refill less than 2 seconds bilateral feet.  Negative clonus, negative Babinski bilateral feet.  There is a very pronounced positive Tinel sign when percussing the tarsal tunnel and the deep peroneal nerve of the right foot.  There is also positive Tinel sign when compressing the posterior tibial nerve at the level of the soleal sling.  Range of motion within normal limits bilateral feet.  Muscle power +5/5 bilaterally in all compartments.    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  I informed the patient she may require a tarsal tunnel release with nerve decompression and external neural lysis of the medial and lateral plantar nerves as well as a calcaneal nerve right side and also nerve decompression with external neural lysis of the deep peroneal nerve right foot in an attempt to alleviate her symptoms.  I informed the patient we could try placing her on gabapentin but the patient stated she did not want to take the medication.  The patient is to return to the clinic in 1 week to discuss the findings of the EMG.

## 2021-06-18 NOTE — PROGRESS NOTES
Subjective findings: The patient return to the clinic today for postop visit #3, 3 weeks status post tarsal tunnel release with nerve decompression and external neurolysis of the medial and lateral plantar nerves and calcaneal nerves.  Patient also underwent nerve decompression with external neurolysis of the superficial and deep peroneal nerve right lower extremity.  The patient is in good spirits and she had no complaints.         Objective findings: The dressings were removed and the wound margins are well healed. There is no edema, erythema, cellulitis, drainage or bleeding noted.  Neurovascular status is intact and vital signs are stable.  Increased protective sensation noted plantar aspect of the right foot.      Assessment: Tarsal tunnel, compression neuropathy right lower extremity       Plan: All sutures were removed today.  I have instructed the patient to begin bathing and wearing normal shoe gear.  She is to return to the clinic in 1 month.

## 2021-06-18 NOTE — LETTER
Letter by Mario Ely DPM at      Author: Mario Ely DPM Service: -- Author Type: --    Filed:  Encounter Date: 1/15/2019 Status: (Other)       January 15, 2019     Patient: Catherine Farrell   YOB: 1965   Date of Visit: 1/15/2019       To Whom It May Concern:    It is my medical opinion that Catherine Farrell may return to work on 01/15/2019 with the following restrictions, limited to 4 hours a day 2 shifts per week until she returns on 07/15/2019 to be further evaluated    If you have any questions or concerns, please don't hesitate to call.    Sincerely,        Electronically signed by Mario Ely DPM

## 2021-06-18 NOTE — PROGRESS NOTES
Subjective findings: The patient return to the clinic today for postop visit #2, 2 weeks status post tarsal tunnel release with nerve decompression and external neurolysis of the medial and lateral plantar nerves and calcaneal nerves.  Patient also underwent nerve decompression with external neurolysis of the superficial and deep peroneal nerve right lower extremity.  The patient is in good spirits and she had no complaints.         Objective findings: The dressings were removed and the wound margins are well coaptated and maintained.  There is no edema, erythema, cellulitis, drainage or bleeding noted.  Neurovascular status is intact and vital signs are stable.  Increased protective sensation noted plantar aspect of the right foot.      Assessment: Tarsal tunnel, compression neuropathy right lower extremity       Plan: A sterile dressing was applied.  The patient was instructed to return to the clinic in 1 week for postop visit #3 which time the remaining sutures will be removed.

## 2021-06-19 NOTE — LETTER
Letter by Mario Ely DPM at      Author: Mario Ely DPM Service: -- Author Type: --    Filed:  Encounter Date: 7/9/2019 Status: (Other)         July 9, 2019     Patient: Catherine Farrell   YOB: 1965   Date of Visit: 7/9/2019       To Whom It May Concern:    It is my medical opinion that Catherine Farrell may return to work on 07/08/209 with the following restrictions, limited to 4 hours a day 2 shifts per week until she returns on 12/15/2019 to be further evaluated.    If you have any questions or concerns, please don't hesitate to call.    Sincerely,        Electronically signed by Mario Ely DPM

## 2021-06-19 NOTE — PROGRESS NOTES
Assessment:     Diagnoses and all orders for this visit:    Right leg weakness  -     NM Bone Scan 3 Phase; Future; Expected date: 8/16/18  -     Ambulatory referral to Pain Clinic    Leg pain, diffuse, right  -     NM Bone Scan 3 Phase; Future; Expected date: 8/16/18  -     Ambulatory referral to Pain Clinic     Catherine Farrell is a 53 y.o. y.o. female with past medical history significant for anemia, depression, hypertension, hyperlipidemia who presents today for follow-up regarding:    -Worsening right-sided low back pain with paresthesias and weakness following Achilles tendon repair November 2017.  EMG did show denervation at L5-S1 distribution with sensory findings at the knee.  MRI however are unremarkable with mild L4-5 disc bulge and mild stenosis but no significant stenosis that would explain her symptoms.     Plan:     A shared decision making plan was used. The patient's values and choices were respected. Prior medical records from 8/1/18 were reviewed today. The following represents what was discussed and decided upon by the provider and the patient.        -DIAGNOSTIC TESTS: Images were personally reviewed and interpreted.   --Ordered three-phase bone scan right lower extremity to evaluate possible CRPS/complex regional pain syndrome.  --Again updated lumbar spine MRI does show L4-5 mild degenerative changes with mild central canal stenosis and bilateral foraminal stenosis at L4-5 and mild bilateral foraminal stenosis L5-S1.     -INTERVENTIONS: No recommendations for injections.    -Referral: Question complex regional pain syndrome therefore will refer to Dr. Torres at the pain clinic.  Case was discussed with Dr. Chamorro today.    -MEDICATIONS: No change in medications advised patient continue amitriptyline.  Discussed side effects of medications and proper use. Patient verbalized understanding.    -PHYSICAL THERAPY: Did discuss physical therapy however she would prefer to further discuss with the  pain clinic first.  She is open to trialing physical therapy however.  Patient may benefit from desensitization type therapy.  Discussed the importance of core strengthening, ROM, stretching exercises with the patient and how each of these entities is important in decreasing pain.  Explained to the patient that the purpose of physical therapy is to teach the patient a home exercise program.  These exercises need to be performed every day in order to decrease pain and prevent future occurrences of pain.        -PATIENT EDUCATION:  25 minutes of total visit time was spent face to face with the patient today, 60 % of the visit was spent on counseling, education, and coordinating care.   -5 minutes spent outside of visit time, non-face-to-face time, reviewing chart.    -FOLLOW UP: Follow-up with the pain clinic.  Advised to contact clinic if symptoms worsen or change.    Subjective:     Catherine Farrell is a 53 y.o. female who presents today for follow-up regarding ongoing chronic progressive bilateral low back pain rating to the right buttock with numbness and tingling and perceived weakness entire right lower extremity that initially started after ankle surgery on the right in November 2017.  Patient is here to go over MRI results that was ordered by Dr. Chamorro recently.    Patient Active Problem List   Diagnosis     Cough     Nicotine Dependence     Chronic Reflux Esophagitis     Fibromyalgia     Strain Of The Gastrocnemius Muscle Of The Right Leg     Adjustment Disorder With Depressed Mood     Joint Pain, Localized In The Knee     Pain During Urination (Dysuria)     Abdominal Pain     Diverticulitis Of Colon     Trochanteric Bursitis     Sinusitis     Midback Pain     Mixed hyperlipidemia     Abnormal Weight Loss     Abnormal Pap smear of cervix     Adverse Food Reaction (Not Anaphylactic)     Impingement Of The Right Shoulder     Hypertension     Head Injury     Oral Thrush     Lumbar Disc Degeneration     Lower Back  Pain Chronic     Carpal Tunnel Syndrome     Simple (Specific) Phobia     Dermatitis     Patellofemoral Syndrome Of The Right Knee      Tension-type headache     Obsessive compulsive disorder     Costochondritis     Fatigue     Chronic pain     Vitamin D deficiency     Calcaneal spur, right     Numbness of right lower extremity     Tarsal tunnel syndrome of right side     Compression neuropathy of right lower extremity     Mononeuritis lower limb, right       Current Outpatient Prescriptions on File Prior to Encounter   Medication Sig Dispense Refill     amitriptyline (ELAVIL) 10 MG tablet 1 tab at bedtime x 5 days.  Then may increase to 2 tabs at bedtime x 5 days, then may increase to 3 caps at bedtime. 90 tablet 1     cholecalciferol, vitamin D3, (VITAMIN D3) 2,000 unit Tab Take 1 tablet by mouth every morning.       gabapentin (NEURONTIN) 300 MG capsule Take 3 capsules (900 mg total) by mouth at bedtime. 90 capsule 3     ibuprofen (ADVIL,MOTRIN) 800 MG tablet Take 1 tablet (800 mg total) by mouth every 6 (six) hours as needed for pain. 100 tablet 3     meclizine (ANTIVERT) 25 mg tablet Take 25 mg by mouth 3 (three) times a day as needed for dizziness or nausea.       omeprazole (PRILOSEC) 40 MG capsule Take 1 capsule (40 mg total) by mouth daily. 90 capsule 3     simvastatin (ZOCOR) 40 MG tablet Take 40 mg by mouth at bedtime.       No current facility-administered medications on file prior to encounter.        Allergies   Allergen Reactions     Amoxicillin Hives     Penicillins Swelling and Itching     Annotation: Swelling of face/eyes, itching       Shellfish Containing Products Anaphylaxis       Past Medical History:   Diagnosis Date     Anemia      Bilateral calcaneal spurs     surgery 2015     Bursitis of hip      Chronic back pain      DDD (degenerative disc disease)      Depression      Environmental allergies      Fibromyalgia      History of transfusion     With      HLD (hyperlipidemia)       Obesity         Review of Systems  ROS: Positive for numbness and tingling, perceived weakness.  Specifically negative for bowel/bladder dysfunction, balance changes, headache, dizziness, foot drop, fevers, chills, appetite changes, nausea/vomiting, unexplained weight loss. Otherwise 13 systems reviewed are negative. Please see the patient's intake questionnaire from today for details.    Reviewed Social, Family, Past Medical and Past Surgical history with patient, no significant changes noted since prior visit.     Objective:     /78 (Patient Site: Right Arm, Patient Position: Sitting)  Pulse 81  Wt (!) 254 lb (115.2 kg)  SpO2 95%  BMI 36.97 kg/m2    PHYSICAL EXAMINATION:    --CONSTITUTIONAL: Well developed, well nourished, healthy appearing individual.  --PSYCHIATRIC: Appropriate mood and affect. No difficulty interacting due to temper, social withdrawal, or memory issues.  --SKIN: Lumbar region is dry and intact.   --RESPIRATORY: Normal rhythm and effort. No abnormal accessory muscle breathing patterns noted.   --MUSCULOSKELETAL:  Normal lumbar lordosis noted, no lateral shift.  --GROSS MOTOR: Easily arises from a seated position. Gait is non-antalgic  --LUMBAR SPINE:  Inspection reveals no evidence of deformity. Range of motion is not limited in lumbar flexion, extension, lateral rotation.  Significant generalized tenderness to palpation entire lumbar spine and right greater than left sciatic notch.  --LOWER EXTREMITY MOTOR TESTING:  Plantar flexion left 5/5, right 5/5   Dorsiflexion left 5/5, right 5/5   Great toe MTP extension left 5/5, right 5/5  Knee flexion left 5/5, right 5/5  Knee extension left 5/5, right 5/5   Hip flexion left 5/5, right 5/5  --NEUROLOGIC: Bilateral patellar and achilles reflexes are physiologic and symmetric. Sensation to light touch is intact in the bilateral L4, L5, and S1 dermatomes.    RESULTS:   Imaging: Lumbar spine imaging was reviewed today. The images were shown  to the patient and the findings were explained using a spine model.      Mr Lumbar Spine Without Contrast  Result Date: 8/2/2018  [facility] MR LUMBAR SPINE WO CONTRAST 8/1/2018 4:11 PM INDICATION: Low back pain with right leg weakness in an L5-S1 distribution. TECHNIQUE: Without IV contrast. CONTRAST: None. COMPARISON: 12/27/2016. FINDINGS: Nomenclature is based on 5 lumbar type vertebral bodies. Vertebral body heights are maintained. No degenerative endplate edema. No pars defect. The conus tip is identified at T12-L1. Grossly normal paraspinal soft tissues. Sigmoid colon diverticulosis. T12-L1: Normal disc height and signal. No herniation. No facet arthropathy. No spinal canal stenosis. No right neural foraminal stenosis. No left neural foraminal stenosis. L1-L2: Normal disc height and signal. No herniation. No facet arthropathy. No spinal canal stenosis. No right neural foraminal stenosis. No left neural foraminal stenosis. L2-L3: Normal disc height and signal. No herniation. No facet arthropathy. No spinal canal stenosis. No right neural foraminal stenosis. No left neural foraminal stenosis. L3-L4: Mild diffuse disc bulge. Mild facet arthropathy. No spinal canal stenosis. No significant foraminal stenosis. L4-L5: Diffuse disc bulge with superimposed central protrusion and tiny annular fissure, similar in size compared to the prior exam. Mild facet arthropathy. Mild spinal canal stenosis and mild bilateral foraminal stenosis, left greater than right. L5-S1: Diffuse disc bulge. Mild facet arthropathy. No spinal canal stenosis. Mild bilateral foraminal stenosis.   CONCLUSION:   1.  Mild multilevel degenerative changes in the lumbar spine as described, including mild spinal canal stenosis at L4-L5 and mild bilateral foraminal stenosis at L4-L5 and L5-S1.

## 2021-06-19 NOTE — PROGRESS NOTES
Assessment/Plan:      Diagnoses and all orders for this visit:    Lumbar spine pain  -     MR Lumbar Spine Without Contrast; Future; Expected date: 8/1/18  -     amitriptyline (ELAVIL) 10 MG tablet; 1 tab at bedtime x 5 days.  Then may increase to 2 tabs at bedtime x 5 days, then may increase to 3 caps at bedtime.  Dispense: 90 tablet; Refill: 1    Lumbar radicular pain  -     MR Lumbar Spine Without Contrast; Future; Expected date: 8/1/18  -     amitriptyline (ELAVIL) 10 MG tablet; 1 tab at bedtime x 5 days.  Then may increase to 2 tabs at bedtime x 5 days, then may increase to 3 caps at bedtime.  Dispense: 90 tablet; Refill: 1    Right leg weakness  -     MR Lumbar Spine Without Contrast; Future; Expected date: 8/1/18  -     amitriptyline (ELAVIL) 10 MG tablet; 1 tab at bedtime x 5 days.  Then may increase to 2 tabs at bedtime x 5 days, then may increase to 3 caps at bedtime.  Dispense: 90 tablet; Refill: 1        Assessment: Pleasant 52-year-old female with a past history of anemia, depression, hypertension, hyperlipidemia with:    1.  Worsening low back pain and right gluteal lower extremity pain paresthesias and weakness.  This started following Achilles tendon repair done in November 2017.  EMG shows denervation/axonal loss L5-S1 distribution with some sensory findings as well likely placing this at the knee however, as she has had multiple surgeries in her lower extremity and the right, we need to evaluate for potential lumbar nerve root compression superimposed on peripheral injury.  2.  She does have multilevel degenerative disc disease in the lumbar spine.  Previous central disc annular tear at L4-5.    3.  Myofascial pain lumbar spine gluteal region right lower extremity.  4.  Neuropathic pain right lower extremity with allodynia      Discussion:    1.  I discussed the diagnoses and treatment options.  Unfortunately she has had significant issues following multiple right foot and ankle surgeries.   Difficult to interpret the results of the EMG and figure out where the injury occurred.  Likely occurs around the near mid calf affecting both the peroneal and tibial nerves however cannot exclude a component of lumbar radiculopathy and peripheral nerve injury.  This could also represent CRPS type picture.  2.  MRI lumbar spine evaluate.  3.  Trial amitriptyline for neuropathic pain.  4.  Follow-up by phone with results of MRI and further recommendations.  May include MRI of the right calf/knee      It was our pleasure caring for your patient today, if there any questions or concerns please do not hesitate to contact us.      Subjective:   Patient ID: Catherine Farrell is a 52 y.o. female.    History of Present Illness: Patient presents for evaluation of worsening low back pain right lower extremity pain paresthesias.  She also has weakness in the right foot and ankle.  She underwent Achilles tendon repair in November 2017.  Following that she began having significant weakness and pain in the right lower extremity from the mid calf distally top and bottom of the right foot.  Monitor symptoms for quite some time and then subsequently has had further decompressions on the foot and ankle for the tarsal tunnel and other potential nerve compression sites.  She has had an EMG done as well.  This was difficult to interpret and I did review the results today both the numbers and as well as interpretation.  This showed denervation of the L5 and S1 muscles both peroneal and tibial distribution distal to the knee with  absent SNAPs of the right lower extremity normal left.  Interpreted as axonal loss in L5 and S1 distribution/sciatic distribution likely occurring at the knee.    Her pain is in the low back also radiating down the right leg to the gluteal region she then has numbness tingling and pain in the mid calf distally medial lateral leg bottom and top of the right foot.  Fairly constant.  Her back pain is worse with any  sitting standing or walking.  Nothing makes it better but has tried heat and ice.  She takes gabapentin which does help her sleep somewhat helps with some of the pain and burning sensation in the right foot with paresthesias.  Tells me that she is not able to tolerate physical therapy in the right foot.  Wondering if there is a component of lumbar spine issue related to her leg as her back pain is worsened.    EMG data reviewed showing absent SNAPs right lower extremity normal left lower extremity.  Decreased CMAP amplitudes right versus left lower extremity.  Increased insertional activity/denervation spontaneous activity right medial gastroc, right lateral gastroc, right posterior tibialis, right abductor hallucis occasional right tibialis anterior peroneus longus.  Felt to be difficult to interpret likely representing sciatic or peripheral nerve injury at the knee.    Imaging: MRI lumbar spine from 2016 personally reviewed showing multilevel degenerative disc disease most significant posterior disc bulge L4-5 with central annular tear.  Mild to moderate foraminal stenosis.  Mild foraminal stenosis L5-S1.  Mild facet arthropathy    MRI report of the right ankle from March of this year shows postoperative changes Achilles repair.  Chronic appearing plantar calcaneal spur.  Chronic sprain of the ATF.  Sprain of the calcaneofibular ligament.  Mild edema in the deltoid ligament.    Review of Systems: Complains of swelling of her feet.  Poor sleep related to pain, back pain joint pain leg pain HEENT cold intolerance.  No rashes.  No signs of infection.  Remainder of 12 point review systems negative unless listed above.    Past Medical History:   Diagnosis Date     Anemia      Bilateral calcaneal spurs     surgery 2015     Bursitis of hip      Chronic back pain      DDD (degenerative disc disease)      Depression      Environmental allergies      Fibromyalgia      History of transfusion     With       HLD (hyperlipidemia)      Obesity      Social history: Works as a manager for [employer].    The following portions of the patient's history were reviewed and updated as appropriate: allergies, current medications, past family history, past medical history, past social history, past surgical history and problem list.      Objective:   Physical Exam:    Vitals:    08/01/18 1235   BP: 151/77   Pulse: 82       General: Alert and oriented with normal affect. Attention, knowledge, memory, and language are intact. No acute distress.   Eyes: Sclerae are clear.  Respirations: Unlabored. CV: No lower extremity edema.  Lymphatics: No cervical lymphadenopathy palpated.  Gait:  Nonantalgic, cautious.  Not able to heel  the right leg able to toe stand with both legs simultaneously.    Sensation is not symmetric with light touch even allodynic in nature right medial lateral malleolus and dorsal right foot.  Reflexes are 2+ and symmetric in the biceps triceps and brachioradialis with negative Hoffmans.  Trace right, 1+ left patellar and bilateral Achilles.  Difficult to assess right secondary to pain.    Manual muscle testing reveals: From seated right /Left out of 5     5/5 hip flexors  5/5 knee flexors  5/5 knee extensors  5/5 ankle plantar flexors  5/5 ankle dorsiflexors  5-/5  EHL

## 2021-06-19 NOTE — LETTER
Letter by Mario Ely DPM at      Author: Mario Ely DPM Service: -- Author Type: --    Filed:  Encounter Date: 9/16/2019 Status: (Other)         September 16, 2019     Patient: Catherine Farrell   YOB: 1965   Date of Visit: 9/16/2019       To Whom It May Concern:    It is my medical opinion that Catherine Farrell 09/16/2019 with the following restrictions, limited to 4 hours a day 4 shifts per week.      If you have any questions or concerns, please don't hesitate to call.    Sincerely,        Electronically signed by Mario Ely DPM

## 2021-06-19 NOTE — PROGRESS NOTES
Subjective findings: The patient return to the clinic today for postop visit #4, 9 weeks status post tarsal tunnel release with nerve decompression and external neurolysis of the medial and lateral plantar nerves and calcaneal nerves.  Patient also underwent nerve decompression with external neurolysis of the superficial and deep peroneal nerve right lower extremity.  The patient is in good spirits and she had no complaints.  She stated that she still has some mild numbness on the bottom of her right foot.        Objective findings: The wounds are well healed. There is no edema, erythema, cellulitis, drainage or bleeding noted.  Neurovascular status is intact and vital signs are stable.  Mild icreased protective sensation noted plantar aspect of the right foot.      Assessment: Tarsal tunnel, compression neuropathy right lower extremity       Plan: I informed the patient I expect her to continue to improve.  She is to return to the clinic in 3 months for follow-up visit.

## 2021-06-20 NOTE — LETTER
Letter by Annetta Hernandez MD at      Author: Annetta Hernandez MD Service: -- Author Type: --    Filed:  Encounter Date: 5/18/2020 Status: (Other)         05/18/20    RE: Catheirne Farrell  1965    To Whom It May Concern:    Catherine Farrell was seen in our office today.  Given her current symptoms, we are ordering COVID-19 testing.  While it is likely that she is not experiencing COVID-19 infection, she needs to remain out of work at least until she gets those results.      Thank you for your attention to this matter.     Sincerely,        Annetta Hernandez MD

## 2021-06-20 NOTE — PROGRESS NOTES
Surgery/Procedure: Decompression,proxiaml posterior tibial nerve      Special Equipment: sean determined     [place] [facility] [place]: [place]    Time: 10:30am     Surgeon: Dr. Ely    Assist: no    Length of Surgery: 95 minutes    OR Confirmed/ :  Yes with jared on 9/26/18    Orders In:  Yes    Provider Team Notified:  Yes    Entered on [employer] Calendar:  Yes    Post Op: 10/16 and 10/23/16    Pre-op Instructions Reviewed with Nurse:  Dalia DEVLIN

## 2021-06-20 NOTE — PROGRESS NOTES
Pain Clinic Consultation  ENCOUNTER DATE: 2018    Catherine Farrell    1965  MRN # [id number]  PCP: Annetta Hernandez MD    CC: Catherine Farrell 53 y.o. is here today, sent to me by  to discuss   Chief Complaint   Patient presents with     Consult     right leg pain, numbness and weakness, with back pain         HPI: Patient presented with 2 problems 1-chronic low back pain 2-right foot pain.    Pain started: No specific injury for the low back pain.  Pain in the right foot has been chronic she is status post multi foot surgeries.  Pain level: On a scale of 1-10, the patient rates their pain on average at a 8  Pain is described: Constant, sharp, burning, aching, tingling, numbness, shooting, stabbing, throbbing, swollen, tender  Pain interferes with: Daily activities, sleep, work, recreational activity  Aggravating factors: Standing, sitting, laying down, lifting, weather change, riding in the car  Alleviating factors: Heat, cold  Associated Symptoms: Swelling in the foot      Past Medical History:   Diagnosis Date     Anemia      Bilateral calcaneal spurs     surgery 2015     Bursitis of hip      Chronic back pain      DDD (degenerative disc disease)      Depression      Environmental allergies      Fibromyalgia      History of transfusion     With      HLD (hyperlipidemia)      Obesity        Past Surgical History:   Procedure Laterality Date     ACHILLES TENDON REPAIR Left 2015    Procedure: WITH SECONDARY ACHILLES TENDON REAPIR;  Surgeon: Rob Marion DPM;  Location: [facility] OR;  Service:      ACHILLES TENDON REPAIR Right 11/3/2017    Procedure: WITH SECONDARY ACHILLES TENDON REPAIR;  Surgeon: Rob Marion DPM;  Location: [facility] Main OR;  Service:      ENDOMETRIAL ABLATION       [place] APPENDECTOMY      Description: Appendectomy;  Recorded: 10/18/2008;     [place]  DELIVERY ONLY      Description:  Section;  Recorded: 10/18/2008;     [place]  REMOVAL OF HEEL SPUR Left 11/6/2015    Procedure: LEFT POSTERIOR CALCANEAL SPUR RESECTION;  Surgeon: Rob Marion DPM;  Location: [place] [facility] OR;  Service: Podiatry     NE REMOVAL OF TONSILS,<13 Y/O      Description: Tonsillectomy;  Recorded: 10/18/2008;       SOCIAL HISTORY:   reports that she has been smoking Cigarettes.  She has a 18.00 pack-year smoking history. She has never used smokeless tobacco. She reports that she does not drink alcohol or use illicit drugs.    FAMILY HISTORY  family history includes Alcohol abuse in her father and son; Breast cancer in her maternal aunt and maternal aunt; Hypertension in her mother; Lung cancer in her father; Stomach cancer in her maternal aunt.    Allergies   Allergen Reactions     Amoxicillin Hives     Penicillins Swelling and Itching     Annotation: Swelling of face/eyes, itching       Shellfish Containing Products Anaphylaxis       Current Outpatient Prescriptions   Medication Sig Dispense Refill     amitriptyline (ELAVIL) 10 MG tablet 1 tab at bedtime x 5 days.  Then may increase to 2 tabs at bedtime x 5 days, then may increase to 3 caps at bedtime. 90 tablet 1     cholecalciferol, vitamin D3, (VITAMIN D3) 2,000 unit Tab Take 1 tablet by mouth every morning.       gabapentin (NEURONTIN) 300 MG capsule Take 3 capsules (900 mg total) by mouth at bedtime. 90 capsule 3     ibuprofen (ADVIL,MOTRIN) 800 MG tablet Take 1 tablet (800 mg total) by mouth every 6 (six) hours as needed for pain. 100 tablet 3     meclizine (ANTIVERT) 25 mg tablet Take 25 mg by mouth 3 (three) times a day as needed for dizziness or nausea.       omeprazole (PRILOSEC) 40 MG capsule Take 1 capsule (40 mg total) by mouth daily. 90 capsule 3     simvastatin (ZOCOR) 40 MG tablet Take 40 mg by mouth at bedtime.       No current facility-administered medications for this encounter.        Chemical Dependency History: Denies      Mental Health History: OCD.  No history of suicide attempt.  No history  of psychiatric hospitalization      REVIEW OF SYSTEMS:  12 point systems were reviewed with pt as documented on pt health form of 9/6/2018. ROS was positive for fatigue, trouble sleeping, glasses, stuffiness in the nose, nose discharge, hayfever, sinus pain, dry mouth, heartburn, change in bowel habits, urinary frequency, leg cramping swelling, muscle pain, stiffness, back pain, allergies, frequent urination and thirst  The rest of systems were pertinent negative.       PHYSICAL EXAM:    Constitutional: 53 y.o. White or  female in NAD; alert and oriented x4/4; appears stated age.  Normal body habitus.  Patient is cooperative, polite, communicates well, makes eye contact, and expresses appropriate concern throughout history. Inspection of the neck demonstrates no skin abnormalities or deformities.  Posture is appropriate with no obvious listing, scoliosis, or rigidity.  HEENT:   Head: Normocephalic and atraumatic.   Right Ear: External ear normal.   Left Ear: External ear normal.   Nose: Nose normal.   Mouth/Throat: Oropharynx is clear and moist.   Eyes: Conjunctivae and EOM are normal. Right eye exhibits no discharge. Left eye exhibits no discharge.   Neck: Normal range of motion. Neck supple.   Cardiovascular: Normal rate, regular rhythm and normal heart sounds.    Pulmonary/Chest: Effort normal and breath sounds normal. No respiratory distress. No wheezes. Noo rales.  Integumentary: no rashes or breaks in the skin, no open wounds.   Psychiatry:  The patient described normal mood. Affect is normal. No abnormal speech. The patient denies any suicidal ideation. No hallucination. Judgement and insight are normal.     Musculoskeletal exam:      Spine:   Reduced range of motion of  spine with pain extension and flexion .Facet loading test is Positive bilateral L4-5 and L5-S1.    Gait: Patient has boot on the right foot after surgery walks with a somewhat well-balanced gait.  I could not examine the right foot .   Patient rises from a seated position without difficulty.        Neurological exam:     Motor Examination:  Muscles are symmetrical, no deformities or atrophy.  Motor examination in the lower extremities demonstrates grade 5/5 strength in all major motor groups.  Right foot was not examined  Sensory Examination:  Light touch sensation in the lower extremities is subjectively normal throughout all major dermatomes.  Right foot was not examined      CRPS exam: I could not examine her right foot.  Patient is status post surgery.  Patient has boots on.  Patient describes swelling, coldness, hyperesthesia in the right foot.           Images: EM[facility] of lower extremities [facility]/22/[facility] report is scanned in the chart.  I reviewed the EMG report.  EMG report shows axonal abnormality of the right foot.  Report is unclear whether patient has S1 radiculopathy    MRI of lumbar spine which was reviewed today done on 8/1/2018.    T12-L1: Normal disc height and signal. No herniation. No facet arthropathy. No spinal canal stenosis. No right neural foraminal stenosis. No left neural foraminal stenosis.     L1-L2: Normal disc height and signal. No herniation. No facet arthropathy. No spinal canal stenosis. No right neural foraminal stenosis. No left neural foraminal stenosis.     L2-L3: Normal disc height and signal. No herniation. No facet arthropathy. No spinal canal stenosis. No right neural foraminal stenosis. No left neural foraminal stenosis.     L3-L4: Mild diffuse disc bulge. Mild facet arthropathy. No spinal canal stenosis. No significant foraminal stenosis.     L4-L5: Diffuse disc bulge with superimposed central protrusion and tiny annular fissure, similar in size compared to the prior exam. Mild facet arthropathy. Mild spinal canal stenosis and mild bilateral foraminal stenosis, left greater than right.     L5-S1: Diffuse disc bulge. Mild facet arthropathy. No spinal canal stenosis. Mild bilateral  foraminal stenosis.    X-ray of right foot done 8/23/2018: he AP and lateral oblique views of the right foot were negative for any osseous or soft tissue abnormalities.  The lateral view shows some mild calcinosis of the Achilles tendon.  No other osseous or soft tissue abnormalities noted.       Bone scan 8/16/2008    Focus of delayed phase uptake right posterosuperior calcaneus, which may be postoperative. Pattern of uptake is nonspecific but can be seen in the setting of subacute complex regional pain syndrome.          Diagnosis  1. Chronic pain syndrome     2. Lumbar facet arthropathy  OPS Medial Branch Block Bilateral   3. Right foot pain           Assessment:    This is a 53-year-old female patient who presented today accompanied by her mother regarding her chronic low back and right foot pain.  Patient is status post several foot surgeries.  Patient may have developed complex regional pain syndrome in her right foot.  She has axial low back pain.  Exam and MRI are consistent with facet arthropathy originated pain.  We did discuss opioid management but patient is not interested.  Patient is on gabapentin and amitriptyline.  I believe patient is under dose for pain management.  I do recommend increasing her gabapentin and amitriptyline as far as tolerable.  I recommend to increase gabapentin to 300 mg 3 capsule 3 times daily gradually and amitriptyline to 50 mg at bedtime.  Is reasonable to proceed with bilateral facet medial branch blocks for facet joint pain.  If patient meets criteria, we will proceed with radiofrequency ablation for long-term pain relief.  Regarding her foot pain, if pain continues after adjustment of gabapentin and amitriptyline I would proceed with lumbar sympathetic block.  I did discuss with patient briefly spinal cord stimulator in the future if pain is out of control.  Consider physical therapy referral for her low back pain.  Patient was not recommended to do physical therapy for  her foot pain, per the patient          PLAN:    Available medical records including diagnostic studies were reviewed today with the patient. Plan of care was discussed with the patient. Education about patient pain condition, pathology, and strategies to manage the pain was provided.     Diagnotic Studies/Lab orders:  None       Interventions: 1- bilateral L3-4 and 5 medial branch blocks for facet joint pain.    2- Consider lumbar sympathetic block on the right side if continues to have foot pain in 3 months after adjustment of medication.    Physical Medicine and rehabilitations: Consider referral to physical therapy for low back pain    Non Opioid Management: I recommend     1- increasing gabapentin to 300 mg 3 capsule 3 times daily gradually.  2- increasing amitriptyline to 50 mg  at bedtime.    As far as medications are tolerable     if questions arise the patient knows to call [phone].       Please see your current provider for any continued prescription. They will continue to manage your general health and have requested you see the pain center for pain management. Please discuss any health concerns with your PCP     Follow up: 4 weeks.       Sherif Torres MD  [employer] Board Certified Interventional Pain [facility]  [address]  [place] [address]  Ph: [phone]  Fax: [phone]

## 2021-06-20 NOTE — PROGRESS NOTES
ASSESSMENT/PLAN:  1. Hypertension     2. Mononeuritis lower limb, right     3. Compression neuropathy of right lower extremity     4. Lumbar Disc Degeneration     5. Fibromyalgia  gabapentin (NEURONTIN) 300 MG capsule       This is a 52 yo female with:  1.  Hypertension - blood pressure is well controlled at 124/86 today.  She is not currently taking any anti-hypertensive medications.  Continue to monitor closely.  2.  Mononeuritis right lower extremity/compression neuropathy - patient with several surgeries (both feet); now with right ankle brace and work restrictions (off work) due to tendonitis in right leg.  Has been seeing a back/pain specialist due to pain in leg and back.  There was some concern that her back may be causing the right lower extremity pain. She has not had improvement in right foot/ankle pain.  3.  Lumbar disc degeneration - patient with pain in lower extremities, thought due to back pain.  Seeing back/pain specialist who has done some injections - if these work, then would plan a branch block.  She is hopeful this would give her more long lasting relief.  She does not have any improvement in lower extremity pain with these injections, however.  4.  Fibromyalgia - patient has been treating pain with Gabapentin - pain specialist recommended increase in Gabapentin dosing.  Patient wanted me to write for this.  Is currently taking 300 mg Gabapentin at bedtime.  I would increase to 300 mg po three times a day - staring at 300 mg po two times a day, then three times a day.  She should let me know if she is having further issues tolerating this.         Medications Discontinued During This Encounter   Medication Reason     gabapentin (NEURONTIN) 300 MG capsule Reorder     There are no Patient Instructions on file for this visit.    Chief Complaint:  Chief Complaint   Patient presents with     Medication Refill     Blood pressure check     Form     Disability parking       HPI:   Catherine Farrell is a 53  "y.o. female c/o  Still having difficulty with right foot- has been back to work x 6 weeks, then \"pop\"  Has tendonitis  They want to do surgery on peroneal nerve - she is not happy about that  Had bilateral L3-4 and 4-5 medial branch block - for facet joint pain - did get relief in back from the shots  Pain center can help her back, but not her foot    Blood pressure is stable - no meds      PMH:   Patient Active Problem List    Diagnosis Date Noted     Tarsal tunnel syndrome of right side 06/04/2018     Compression neuropathy of right lower extremity 06/04/2018     Mononeuritis lower limb, right 06/04/2018     Numbness of right lower extremity 05/30/2018     Calcaneal spur, right 09/11/2017     Vitamin D deficiency 07/03/2017     Chronic pain 03/10/2017     Costochondritis 07/10/2015     Fatigue 07/10/2015     Obsessive compulsive disorder 03/09/2015     Dermatitis 02/05/2015     Patellofemoral Syndrome Of The Right Knee  02/05/2015     Tension-type headache 02/05/2015     Simple (Specific) Phobia      Cough      Nicotine Dependence      Chronic Reflux Esophagitis      Fibromyalgia      Strain Of The Gastrocnemius Muscle Of The Right Leg      Adjustment Disorder With Depressed Mood      Joint Pain, Localized In The Knee      Pain During Urination (Dysuria)      Abdominal Pain      Diverticulitis Of Colon      Trochanteric Bursitis      Sinusitis      Midback Pain      Mixed hyperlipidemia      Abnormal Weight Loss      Abnormal Pap smear of cervix      Adverse Food Reaction (Not Anaphylactic)      Impingement Of The Right Shoulder      Hypertension      Head Injury      Oral Thrush      Lumbar Disc Degeneration      Lower Back Pain Chronic      Carpal Tunnel Syndrome      Past Medical History:   Diagnosis Date     Anemia      Bilateral calcaneal spurs     surgery Nov. 2015     Bursitis of hip      Chronic back pain      DDD (degenerative disc disease)      Depression      Environmental allergies      Fibromyalgia  "     History of transfusion     With      HLD (hyperlipidemia)      Obesity      Past Surgical History:   Procedure Laterality Date     ACHILLES TENDON REPAIR Left 2015    Procedure: WITH SECONDARY ACHILLES TENDON REAPIR;  Surgeon: Rob Marion DPM;  Location: [place] [facility] OR;  Service:      ACHILLES TENDON REPAIR Right 11/3/2017    Procedure: WITH SECONDARY ACHILLES TENDON REPAIR;  Surgeon: Rob Marion DPM;  Location: [facility] OR;  Service:      ENDOMETRIAL ABLATION       [place] APPENDECTOMY      Description: Appendectomy;  Recorded: 10/18/2008;     [place]  DELIVERY ONLY      Description:  Section;  Recorded: 10/18/2008;     [place] REMOVAL OF HEEL SPUR Left 2015    Procedure: LEFT POSTERIOR CALCANEAL SPUR RESECTION;  Surgeon: Rob Marion DPM;  Location: [facility] OR;  Service: Podiatry     HI REMOVAL OF TONSILS,<13 Y/O      Description: Tonsillectomy;  Recorded: 10/18/2008;     Social History     Social History     Marital status:      Spouse name: N/A     Number of children: N/A     Years of education: N/A     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[employer]     Social History Main Topics     Smoking status: Current Every Day Smoker     Packs/day: 0.50     Years: 36.00     Types: Cigarettes     Smokeless tobacco: Never Used     Alcohol use No      Comment: sober since      Drug use: No     Sexual activity: Yes     Partners: Male     Other Topics Concern     Not on file     Social History Narrative       Meds:    Current Outpatient Prescriptions:      amitriptyline (ELAVIL) 10 MG tablet, 1 tab at bedtime x 5 days.  Then may increase to 2 tabs at bedtime x 5 days, then may increase to 3 caps at bedtime., Disp: 90 tablet, Rfl: 1     cholecalciferol, vitamin D3, (VITAMIN D3) 2,000 unit Tab, Take 1 tablet by mouth every morning., Disp: , Rfl:      gabapentin (NEURONTIN) 300 MG capsule, Take 3 capsules (900 mg total) by mouth 3 (three) times a day., Disp: 270  capsule, Rfl: 3     ibuprofen (ADVIL,MOTRIN) 800 MG tablet, Take 1 tablet (800 mg total) by mouth every 6 (six) hours as needed for pain., Disp: 100 tablet, Rfl: 3     meclizine (ANTIVERT) 25 mg tablet, Take 25 mg by mouth 3 (three) times a day as needed for dizziness or nausea., Disp: , Rfl:      omeprazole (PRILOSEC) 40 MG capsule, Take 1 capsule (40 mg total) by mouth daily., Disp: 90 capsule, Rfl: 3     simvastatin (ZOCOR) 40 MG tablet, Take 40 mg by mouth at bedtime., Disp: , Rfl:     Allergies:  Allergies   Allergen Reactions     Amoxicillin Hives     Penicillins Swelling and Itching     Annotation: Swelling of face/eyes, itching       Shellfish Containing Products Anaphylaxis       ROS:  Pertinent positives as noted in HPI; otherwise 12 point ROS negative.      Physical Exam:  EXAM:  /86 (Patient Site: Left Arm, Patient Position: Sitting, Cuff Size: Adult Large)  Pulse (!) 101  Temp 98.6  F (37  C) (Oral)   Resp 20  Wt (!) 254 lb 8 oz (115.4 kg)  SpO2 97%  Breastfeeding? No  BMI 37.04 kg/m2   Gen:  NAD, appears well, well-hydrated  HEENT:  TMs nl, oropharynx benign, nasal mucosa nl, conjunctiva clear  Neck:  Supple, no adenopathy, no thyromegaly, no carotid bruits, no JVD  Lungs:  Clear to auscultation bilaterally  Cor:  RRR no murmur  Abd:  Soft, nontender, BS+, no masses, no guarding or rebound, no HSM  Back:  Decreased ROM lumbar spine - tender to palpation at lower paraspinal muscles  Extr:  Right foot / ankle in brace; moving slowly;   Neuro:  No asymmetry  Skin:  Warm/dry      Results:  Results for orders placed or performed in visit on 05/30/18   Basic Metabolic Panel   Result Value Ref Range    Sodium 142 136 - 145 mmol/L    Potassium 4.6 3.5 - 5.0 mmol/L    Chloride 108 (H) 98 - 107 mmol/L    CO2 24 22 - 31 mmol/L    Anion Gap, Calculation 10 5 - 18 mmol/L    Glucose 95 70 - 125 mg/dL    Calcium 9.9 8.5 - 10.5 mg/dL    BUN 8 8 - 22 mg/dL    Creatinine 0.81 0.60 - 1.10 mg/dL    GFR MDRD  Af Amer >60 >60 mL/min/1.73m2    GFR MDRD Non Af Amer >60 >60 mL/min/1.73m2   Hemoglobin   Result Value Ref Range    Hemoglobin 12.3 12.0 - 16.0 g/dL

## 2021-06-20 NOTE — PROGRESS NOTES
"Preoperative Exam    Scheduled Procedure: nerve decompression (tibial nerve) , right lower extremity  Surgery Date: 10/5/18  Surgery Location: [facility], fax [phone]    Surgeon:  Dr. Ely    Assessment/Plan:     1. Preop examination  Hemoglobin    Basic Metabolic Panel   2. Compression neuropathy of right lower extremity     3. Tarsal tunnel syndrome of right side     4. Lumbar Disc Degeneration     5. Nicotine Dependence     6. Hypertension     7. Acute sinusitis  azithromycin (ZITHROMAX) 250 MG tablet   8. Vitamin D deficiency  Vitamin D, Total (25-Hydroxy)   9. Mixed hyperlipidemia  Lipid Profile    Hepatic Profile         Surgical Procedure Risk: Low (reported cardiac risk generally < 1%)  Have you had prior anesthesia?: Yes  Have you or any family members had a previous anesthesia reaction:  No  Do you or any family members have a history of a clotting or bleeding disorder?: No  Cardiac Risk Assessment: no increased risk for major cardiac complications    Patient approved for surgery with general or local anesthesia.    Please Note:  no specific considerations    Functional Status: Independent  Patient plans to recover at home with family.     Subjective:      Catherine Farrell is a 53 y.o. female who presents for a preoperative consultation.      Has had multiple surgeries (first surgery 11/3/17) on right lower extremity/ankle/foot  -   Now, has anesthesia over right foot - due to nerve compression  Has no feeling in heel; walks on lateral aspect of foot - because she can feel that;  Now, has tendonitis in heel due to abnormal gait    C/o \"sinus\" infection now - has pain behind eyes,   Coughing green sputum  No fever  No chills      All other systems reviewed and are negative, other than those listed in the HPI.    Pertinent History  Do you have difficulty breathing or chest pain after walking up a flight of stairs: No  History of obstructive sleep apnea: No  Steroid use in the last 6 months: Yes: " 8/18  Frequent Aspirin/NSAID use: No  Prior Blood Transfusion: Yes: 30 plus years ago  Prior Blood Transfusion Reaction: No  If for some reason prior to, during or after the procedure, if it is medically indicated, would you be willing to have a blood transfusion?:  There is no transfusion refusal.    Current Outpatient Prescriptions   Medication Sig Dispense Refill     amitriptyline (ELAVIL) 10 MG tablet 1 tab at bedtime x 5 days.  Then may increase to 2 tabs at bedtime x 5 days, then may increase to 3 caps at bedtime. 90 tablet 1     cholecalciferol, vitamin D3, (VITAMIN D3) 2,000 unit Tab Take 1 tablet by mouth every morning.       gabapentin (NEURONTIN) 300 MG capsule Take 3 capsules (900 mg total) by mouth 3 (three) times a day. 270 capsule 3     ibuprofen (ADVIL,MOTRIN) 800 MG tablet Take 1 tablet (800 mg total) by mouth every 6 (six) hours as needed for pain. 100 tablet 3     meclizine (ANTIVERT) 25 mg tablet Take 25 mg by mouth 3 (three) times a day as needed for dizziness or nausea.       omeprazole (PRILOSEC) 40 MG capsule Take 1 capsule (40 mg total) by mouth daily. 90 capsule 3     simvastatin (ZOCOR) 40 MG tablet Take 40 mg by mouth at bedtime.       azithromycin (ZITHROMAX) 250 MG tablet Take 500 mg (2 x 250 mg tablets) on day 1 followed by 250 mg (1 tablet) on days 2-5. 6 tablet 0     No current facility-administered medications for this visit.         Allergies   Allergen Reactions     Amoxicillin Hives     Penicillins Swelling and Itching     Annotation: Swelling of face/eyes, itching       Shellfish Containing Products Anaphylaxis       Patient Active Problem List   Diagnosis     Cough     Nicotine Dependence     Chronic Reflux Esophagitis     Fibromyalgia     Strain Of The Gastrocnemius Muscle Of The Right Leg     Adjustment Disorder With Depressed Mood     Joint Pain, Localized In The Knee     Pain During Urination (Dysuria)     Abdominal Pain     Diverticulitis Of Colon     Trochanteric Bursitis      Sinusitis     Midback Pain     Mixed hyperlipidemia     Abnormal Weight Loss     Abnormal Pap smear of cervix     Adverse Food Reaction (Not Anaphylactic)     Impingement Of The Right Shoulder     Hypertension     Head Injury     Oral Thrush     Lumbar Disc Degeneration     Lower Back Pain Chronic     Carpal Tunnel Syndrome     Simple (Specific) Phobia     Dermatitis     Patellofemoral Syndrome Of The Right Knee      Tension-type headache     Obsessive compulsive disorder     Costochondritis     Fatigue     Chronic pain     Vitamin D deficiency     Calcaneal spur, right     Numbness of right lower extremity     Tarsal tunnel syndrome of right side     Compression neuropathy of right lower extremity     Mononeuritis lower limb, right     Right foot pain     Compression neuropathy       Past Medical History:   Diagnosis Date     Anemia      Bilateral calcaneal spurs     surgery 2015     Bursitis of hip      Chronic back pain      DDD (degenerative disc disease)      Depression      Environmental allergies      Fibromyalgia      History of transfusion     With      HLD (hyperlipidemia)      Obesity        Past Surgical History:   Procedure Laterality Date     ACHILLES TENDON REPAIR Left 2015    Procedure: WITH SECONDARY ACHILLES TENDON REAPIR;  Surgeon: Rob Marion DPM;  Location: [place] [facility];  Service:      ACHILLES TENDON REPAIR Right 11/3/2017    Procedure: WITH SECONDARY ACHILLES TENDON REPAIR;  Surgeon: Rob Marion DPM;  Location: [facility];  Service:      ENDOMETRIAL ABLATION       SD APPENDECTOMY      Description: Appendectomy;  Recorded: 10/18/2008;     SD  DELIVERY ONLY      Description:  Section;  Recorded: 10/18/2008;     SD REMOVAL OF HEEL SPUR Left 2015    Procedure: LEFT POSTERIOR CALCANEAL SPUR RESECTION;  Surgeon: Rob Marion DPM;  Location: [place] [facility];  Service: Podiatry     SD REMOVAL OF TONSILS,<13 Y/O      Description:  "Tonsillectomy;  Recorded: 10/18/2008;       Social History     Social History     Marital status:      Spouse name: N/A     Number of children: N/A     Years of education: N/A     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[employer]     Social History Main Topics     Smoking status: Current Every Day Smoker     Packs/day: 0.50     Years: 36.00     Types: Cigarettes     Smokeless tobacco: Never Used     Alcohol use 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     Drug use: No     Sexual activity: Yes     Partners: Male     Other Topics Concern     Not on file     Social History Narrative       Patient Care Team:  Annetta Hernandez MD as PCP - General          Objective:     Vitals:    10/01/18 0939   BP: 128/72   Pulse: 80   Resp: 12   Temp: 98.2  F (36.8  C)   TempSrc: Oral   Weight: (!) 257 lb 9 oz (116.8 kg)   Height: 5' 9.88\" (1.775 m)         Physical Exam:  EXAM:  /72  Pulse 80  Temp 98.2  F (36.8  C) (Oral)   Resp 12  Ht 5' 9.88\" (1.775 m)  Wt (!) 257 lb 9 oz (116.8 kg)  BMI 37.08 kg/m2   Gen:  NAD, appears well, well-hydrated, hoarse voice  HEENT:  TMs nl, oropharynx benign, nasal mucosa congested, tender to palpation overlying maxillary sinuses; conjunctiva clear  Neck:  Supple, no adenopathy, no thyromegaly, no carotid bruits, no JVD  Lungs:  Clear to auscultation bilaterally  Cor:  RRR no murmur  Abd:  Soft, nontender, BS+, no masses, no guarding or rebound, no HSM  Extr:  Multiple healed surgical wounds on right lower extremity  Neuro:  No asymmetry, Nl motor tone/strength, impaired sensation right lower extremity, reflexes =, gait nl, nl coordination, CN intact,   Skin:  Warm/dry        There are no Patient Instructions on file for this visit.    Labs:  Results for orders placed or performed in visit on 10/01/18   Hemoglobin   Result Value Ref Range    Hemoglobin 13.3 12.0 - 16.0 g/dL   Basic Metabolic Panel   Result Value Ref Range    Sodium 142 136 - 145 mmol/L    Potassium 4.3 3.5 - 5.0 " mmol/L    Chloride 107 98 - 107 mmol/L    CO2 25 22 - 31 mmol/L    Anion Gap, Calculation 10 5 - 18 mmol/L    Glucose 100 70 - 125 mg/dL    Calcium 9.8 8.5 - 10.5 mg/dL    BUN 7 (L) 8 - 22 mg/dL    Creatinine 0.76 0.60 - 1.10 mg/dL    GFR MDRD Af Amer >60 >60 mL/min/1.73m2    GFR MDRD Non Af Amer >60 >60 mL/min/1.73m2   Lipid Profile   Result Value Ref Range    Triglycerides 294 (H) <=149 mg/dL    Cholesterol 192 <=199 mg/dL    LDL Calculated 100 <=129 mg/dL    HDL Cholesterol 33 (L) >=50 mg/dL    Patient Fasting > 8hrs? Yes    Hepatic Profile   Result Value Ref Range    Bilirubin, Total 0.4 0.0 - 1.0 mg/dL    Bilirubin, Direct 0.1 <=0.5 mg/dL    Protein, Total 7.0 6.0 - 8.0 g/dL    Albumin 4.1 3.5 - 5.0 g/dL    Alkaline Phosphatase 62 45 - 120 U/L    AST 27 0 - 40 U/L    ALT 34 0 - 45 U/L   Vitamin D, Total (25-Hydroxy)   Result Value Ref Range    Vitamin D, Total (25-Hydroxy) 31.1 30.0 - 80.0 ng/mL         Immunization History   Administered Date(s) Administered     DT (pediatric) 04/01/1998     Td,adult,historic,unspecified 02/20/2008     Tdap 02/20/2008           Electronically signed by Annetta Hernandez MD 10/03/18 9:43 AM

## 2021-06-20 NOTE — PROGRESS NOTES
Subjective findings: The patient presented to the clinic today complaining of severe pain in the back of her right foot.  She stated that the pain radiates along the course of the Achilles tendon.  She stated that she had a very sudden onset of this pain.  She has undergone nerve decompression to treat peripheral neuropathy.  She stated she had been doing well.  The symptoms of her neuropathy had significantly improved.  She was back to work.  She finds it difficult to weight-bear and ambulate.  This appears to be different type of pain that she had prior to her peripheral nerve surgery.  The patient also had excision of the retrocalcaneal spur by Dr. Marion.  The patient also complained of tingling on the bottom of her right foot.  She has been able to decrease the amount of gabapentin she is taking from 900 mg to 600 mg.  She stated that the tingling is fairly pronounced on the bottom of her right foot.    Objective findings: Skin bilaterally warm and intact.  All surgical incisions well-healed.  DP PT pulses are palpable.  Capillary refill less than 2 seconds.  Negative clonus negative Babinski.  Range of motion within normal limits.  Muscle power within normal limits.  There is pain on palpation of the posterior aspect of the right heel.  There is also pain on palpation along the course of the right Achilles tendon.  Minimal edema noted posterior aspect right heel.  Normal protective sensation plantar aspect right foot.  There is significant Tinel sign when compressing the proximal tibial nerve at the level of the soleal sling.  No palpable masses noted.  Skin is intact.  No lesions noted.  Pain on range of motion of the right foot is noted.    Assessment: Achilles tendinitis pressure neuropathy proximal posterior tibial nerve at the level of the soleal sling right lower extremity.    Plan: An x-ray of the right foot was taken today.  I informed the patient the x-rays were negative.  I told the patient that she  does have some tendinitis.  I am recommending she ambulate in the cam walker for 1 month.  She was placed on methylprednisolone.  The patient is to return to the clinic in 1 month.  I also informed the patient that she may require a nerve decompression with external neural lysis of the proximal tibial nerve at the level of the soleal sling to alleviate the tingling of her right foot.  I informed the patient that if her Achilles tendinitis has not improved I would recommend an MRI at her next visit.

## 2021-06-20 NOTE — LETTER
Letter by Helen Em MD at      Author: Helen Em MD Service: -- Author Type: --    Filed:  Encounter Date: 4/21/2020 Status: (Other)         April 20, 2020      Patient: Brenda Farrell   YOB: 1945   Date of Visit: 4/20/2020         To Whom It May Concern:     It is my medical opinion that Brenda Farrell should be cared for at home. Please allow Catherine Farrell to request off work in order to care for Brenda Farrell.     If you have any questions or concerns, please don't hesitate to call us at [phone].     Sincerely,           Electronically signed by Helen Em MD

## 2021-06-20 NOTE — PROGRESS NOTES
Subjective findings: The patient return to the clinic today for postop visit #1, 1 week status post nerve decompression with external neural lysis of the proximal tibial nerve right lower extremity The patient is in good spirits he had no complaints.      Objective findings: The dressings were removed and the wound margins are well coaptated and maintained.  There is moderate edema noted left foot.  There is no erythema or cellulitis noted.  Neurovascular status is intact.  Vital signs are stable.  Surgical correction is maintained.      Assessment: Compression neuropathy         Plan: A sterile dressing was applied to the  right lower extremity today.  I have instructed the patient to keep bandages clean and dry.  She is to return to the clinic in 1 week for postop visit #2 at which time the sutures will be removed.

## 2021-06-20 NOTE — PROGRESS NOTES
Subjective findings: The patient return to the clinic today with continued complaints of pain and numbness on her right foot.  The patient stated that the immobilization did not give her any relief.  She has pain in the bottom of her foot she has numbness on the bottom of her foot and the pain radiates up the right calf muscle.      Objective findings: Skin bilaterally warm and intact.  All surgical incisions well-healed.  DP PT pulses are palpable.  Capillary refill less than 2 seconds.  Negative clonus negative Babinski.  Range of motion within normal limits.  Muscle power within normal limits.  There is pain on palpation of the posterior aspect of the right heel.  There is also pain on palpation along the course of the right Achilles tendon.  Minimal edema noted posterior aspect right heel.  Decreased protective sensation plantar aspect right foot.  There is significant Tinel sign when compressing the proximal tibial nerve at the level of the soleal sling.  No palpable masses noted.  Skin is intact.  No lesions noted.  Pain on range of motion of the right foot is noted.     Assessment: Depression neuropathy proximal posterior tibial nerve at the level of the soleal sling right lower extremity.     Plan: At this point I informed the patient I do believe she would benefit from a nerve decompression with external neural lysis of the proximal tibial nerve at the level of the soleal sling.  The patient was told the procedure would be done on an outpatient basis under general anesthesia.  She was told the procedure takes approximately 45 minutes to perform.  She would then be discharged and able to weight-bear.  She was told that this procedure should get rid of her pain and her numbness.  She was asked to go n.p.o. at midnight prior to the procedure  and she was to see her primary care physician for preoperative consultation.

## 2021-06-20 NOTE — PROGRESS NOTES
Chief Complaint   Patient presents with     Foot Pain     Right foot pain.  Has had two surgeries nov and april.  Stepped on it this morning and had a sudden shooting pain.  Felt like a nail went through her foot.  Still having a lot of pain and swelling up now.          HPI    Patient is here for sudden onset of severe, constant right foot pain started while she was at work walking routinely today. Pain is mostly at right heel and lateral ankle, radiating up to the distal right lower leg. She is s/p tarsal tunnel release with nerve decompression and external neurolysis of medial and lateral plantar nerves and calcaneal nerves. No other injuries. No fever, chills.     ROS: Pertinent ROS noted in HPI.     Allergies   Allergen Reactions     Amoxicillin Hives     Penicillins Swelling and Itching     Annotation: Swelling of face/eyes, itching       Shellfish Containing Products Anaphylaxis       Patient Active Problem List   Diagnosis     Cough     Nicotine Dependence     Chronic Reflux Esophagitis     Fibromyalgia     Strain Of The Gastrocnemius Muscle Of The Right Leg     Adjustment Disorder With Depressed Mood     Joint Pain, Localized In The Knee     Pain During Urination (Dysuria)     Abdominal Pain     Diverticulitis Of Colon     Trochanteric Bursitis     Sinusitis     Midback Pain     Mixed hyperlipidemia     Abnormal Weight Loss     Abnormal Pap smear of cervix     Adverse Food Reaction (Not Anaphylactic)     Impingement Of The Right Shoulder     Hypertension     Head Injury     Oral Thrush     Lumbar Disc Degeneration     Lower Back Pain Chronic     Carpal Tunnel Syndrome     Simple (Specific) Phobia     Dermatitis     Patellofemoral Syndrome Of The Right Knee      Tension-type headache     Obsessive compulsive disorder     Costochondritis     Fatigue     Chronic pain     Vitamin D deficiency     Calcaneal spur, right     Numbness of right lower extremity     Tarsal tunnel syndrome of right side     Compression  neuropathy of right lower extremity     Mononeuritis lower limb, right       Family History   Problem Relation Age of Onset     Lung cancer Father      Alcohol abuse Father      Alcohol abuse Son      Hypertension Mother      Stomach cancer Maternal Aunt      Breast cancer Maternal Aunt      Breast cancer Maternal Aunt        Social History     Social History     Marital status:      Spouse name: N/A     Number of children: N/A     Years of education: N/A     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[employer]     Social History Main Topics     Smoking status: Current Every Day Smoker     Packs/day: 0.50     Years: 36.00     Types: Cigarettes     Smokeless tobacco: Never Used     Alcohol use 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     Drug use: No     Sexual activity: Yes     Partners: Male     Other Topics Concern     Not on file     Social History Narrative             Objective:    Vitals:    08/22/18 1119   BP: 148/90   Pulse: 96   Resp: 16   Temp: 98.1  F (36.7  C)   SpO2: 96%       Gen:NAD  MSK: normal inspection of lower legs, ankles and feet except healing scars on right foot, ankle, and lower leg. Moderate pain to palpation at right lateral ankle, right heel and posterior ankle. No pain to palpation of right toes and foot. Reduced ROM of the right ankle in all planes due to pain. Normal resisted plantar flexion of right foot, but reduced resisted dorsi flexion of right foot. Limping on right foot.  Neuro: reduced gross sensation of right toes compared to right.    Assessment:    Right foot pain of unclear etiology.     Plan:    Consulted Dr. Ely from Podiatry who did not think her pain is likely related to her previous surgery. However, Dr. Eyl will see patient for follow up, and recommended patient wearing a CAM Walker which she has at home. Patient was given phone # to call for appointment (see instructions).

## 2021-06-20 NOTE — PROGRESS NOTES
Pt is being seen today in consultation with Dr. Torres for rt leg pain, weakness and numbness with increased back pain.

## 2021-06-20 NOTE — ANESTHESIA CARE TRANSFER NOTE
Last vitals:   Vitals:    10/05/18 1100   BP: 144/67   Pulse: (!) 110   Resp: 18   Temp: 37  C (98.6  F)   SpO2: 98%     Patient's level of consciousness is awake  Spontaneous respirations: yes  Maintains airway independently: yes  Dentition unchanged: yes  Oropharynx: oropharynx clear of all foreign objects    QCDR Measures:  ASA# 20 - Surgical Safety Checklist: WHO surgical safety checklist completed prior to induction  PQRS# 430 - Adult PONV Prevention: 4558F - Pt received => 2 anti-emetic agents (different classes) preop & intraop  ASA# 8 - Peds PONV Prevention: NA - Not pediatric patient, not GA or 2 or more risk factors NOT present  PQRS# 424 - Annie-op Temp Management: 4559F - At least one body temp DOCUMENTED => 35.5C or 95.9F within required timeframe  PQRS# 426 - PACU Transfer Protocol: - Transfer of care checklist used  ASA# 14 - Acute Post-op Pain: ASA14B - Patient did NOT experience pain >= 7 out of 10   Transferred to pacu w O2 via mask 8 L/min. VSS

## 2021-06-20 NOTE — LETTER
Letter by Annetta Hernandez MD at      Author: Annetta Hernandez MD Service: -- Author Type: --    Filed:  Encounter Date: 2/17/2020 Status: (Other)         February 17, 2020     Patient: Catherine Farrell   YOB: 1965   Date of Visit: 2/17/2020       To Whom it May Concern:    Catherine Farrell was seen in my clinic on 2/17/2020.  She has missed work due to influenza like illness, and infectious gastroenteritis and diverticulitis.  She will remain out of work this week.  Anticipate return to work on Monday February 24, 2020.      If you have any questions or concerns, please don't hesitate to call.    Sincerely,         Electronically signed by Annetta Hernandez MD

## 2021-06-20 NOTE — ANESTHESIA POSTPROCEDURE EVALUATION
Patient: Catherine Farrell  NERVE DECOMPRESSION WITH EXTERNAL NEUROLYSIS OF THE PROXIMAL TIBIAL NERVE AT THE LEVEL OF THE SOLEAL SLING RIGHT LOWER EXTREMITY  Anesthesia type: general    Patient location: PACU  Last vitals:   Vitals:    10/05/18 1149   BP: 136/65   Pulse: 88   Resp: 16   Temp:    SpO2: 98%     Post vital signs: stable  Level of consciousness: awake and responds to simple questions  Post-anesthesia pain: pain controlled  Post-anesthesia nausea and vomiting: no  Pulmonary: unassisted, return to baseline  Cardiovascular: stable and blood pressure at baseline  Hydration: adequate  Anesthetic events: no    QCDR Measures:  ASA# 11 - Annie-op Cardiac Arrest: ASA11B - Patient did NOT experience unanticipated cardiac arrest  ASA# 12 - Annie-op Mortality Rate: ASA12B - Patient did NOT die  ASA# 13 - PACU Re-Intubation Rate: ASA13B - Patient did NOT require a new airway mgmt  ASA# 10 - Composite Anes Safety: ASA10A - No serious adverse event    Additional Notes:

## 2021-06-20 NOTE — LETTER
Letter by Annetta Hernandez MD at      Author: Annetta Hernandez MD Service: -- Author Type: --    Filed:  Encounter Date: 5/20/2020 Status: (Other)       05/20/20    RE: Catherine Farrell  1965    To Whom It May Concern:    Catherine Farrell was seen May 18, 2020, will respiratory symptoms.  Results of her testing was:      COVID-19 PCR Results  5/18/20    2019-nCOV  Not Detected        Thank you for your attention to this matter.     Sincerely,        Annetta Hernandez MD

## 2021-06-20 NOTE — ANESTHESIA PREPROCEDURE EVALUATION
Anesthesia Evaluation      Patient summary reviewed   No history of anesthetic complications     Airway   Mallampati: III  Neck ROM: full  Comment: Thick neck.    Pulmonary - negative ROS    breath sounds clear to auscultation  (+) a smoker                         Cardiovascular   Exercise tolerance: > or = 4 METS  (+) hypertension, ,     Rhythm: regular  Rate: normal,         Neuro/Psych    (+) neuromuscular disease ( S/f tibial nerve decompression with neurolysis),  depression,     Endo/Other    (+) arthritis, obesity ( BMI 37),      GI/Hepatic/Renal - negative ROS   (+) GERD (on PPI) well controlled,             Dental    (+) poor dentition    Comment: Multiple missing teeth on top and bottom. R front incisor is sharpened and chipped.                        Anesthesia Plan  Planned anesthetic: general endotracheal  GETA, consider glidescope intubation given state of dentition   Ketamine 50mg post-induction for post op pain  Decadron 10/Zofran 4 for antiemesis   ASA 3   Induction: intravenous     Anesthesia plan special considerations: antiemetics,   Post-op plan: routine recovery

## 2021-06-21 NOTE — PROGRESS NOTES
Subjective findings: The patient return to the clinic today for postop visit #2, 2 weeks status post nerve decompression with external neural lysis of the proximal tibial nerve right lower extremity The patient is in good spirits he had no complaints.  The patient stated she has greater strength in her toes.  She is excited that she can now move her toes on her right foot.  She has greater sensation of the toes particularly her right great toe.  She has no numbness and tingling involving her right foot.  She does have some mild numbness remaining at the plantar medial aspect of the right heel.      Objective findings: The dressings were removed and the wound margins are well healed. There is moderate edema noted left foot.  There is no erythema or cellulitis noted.  Neurovascular status is intact.  Vital signs are stable.  Surgical correction is maintained.      Assessment: Compression neuropathy         Plan: I informed the patient that it appears the results of the decompression of the proximal tibial nerve is quite good.  Expected results have been quite good.  Greater strength and greater sensation are noted.  I told the patient she may begin wearing normal shoes.  She can return to work.  She is to return to the clinic in 3 months for general follow-up.

## 2021-06-21 NOTE — LETTER
Letter by Annetta Hernandez MD at      Author: Annetta Hernandez MD Service: -- Author Type: --    Filed:  Encounter Date: 11/17/2020 Status: (Other)         11/17/20    RE: Catherine Farrell  1965    To Whom It May Concern:    Catherine Farrell reports she was exposed to 2 individuals who have tested positive for COVID-19.  Her last reported exposure to either individual was Friday November 13, 2020.  According to CDC guidelines, she should remain in quarantine for 14 days post exposure before returning to work (regardless of positive or negative test).  She may return to work on Saturday November 28, 2020.      Thank you for your attention to this matter.     Sincerely,        Annetta Hernandez MD

## 2021-06-22 NOTE — PROGRESS NOTES
"ASSESSMENT/PLAN:  1. Hypertension     2. Need for tetanus booster  Td, Preservative Free (green label)   3. Colon cancer screening  Cologuard       This is a 52 yo female with:  1.  Hypertension - she believe her blood pressure remains elevated due to her \"stress\" - blood pressure is at goal today - although there I room for improvement - continue to take her medications regularly.  2.  Health Maintenance - due for tetanus booster - given today.  Due for colon cancer screening - referral made for colonoscopy  No Follow-up on file.      There are no discontinued medications.  There are no Patient Instructions on file for this visit.    Chief Complaint:  Chief Complaint   Patient presents with     Hypertension     f/u       HPI:   Catherine Farrell is a 53 y.o. female c/o  Still can't feel her right foot  Can feel right big toe - so, balance is back  Went back to work , 2 days a week, 4 hours/day  Has healed from \"last\" surgery - insurance wouldn't pay for the surgery (it was the 3rd surgery)  Walking is difficulty - foot gets tired;   Has found some socks that keep her warm, but not \"too warm\"  Not as upset about the foot anymore - \"stuff happens\"  Just wants to know is this a permanent disability?      Here for recheck BP - BP still elevated - \"I'm big\"    Does outreach      PMH:   Patient Active Problem List    Diagnosis Date Noted     Right foot pain 09/27/2018     Compression neuropathy 09/27/2018     Tarsal tunnel syndrome of right side 06/04/2018     Compression neuropathy of right lower extremity 06/04/2018     Mononeuritis lower limb, right 06/04/2018     Numbness of right lower extremity 05/30/2018     Calcaneal spur, right 09/11/2017     Vitamin D deficiency 07/03/2017     Chronic pain 03/10/2017     Costochondritis 07/10/2015     Fatigue 07/10/2015     Obsessive compulsive disorder 03/09/2015     Dermatitis 02/05/2015     Patellofemoral Syndrome Of The Right Knee  02/05/2015     Tension-type headache " 2015     Simple (Specific) Phobia      Cough      Nicotine Dependence      Chronic Reflux Esophagitis      Fibromyalgia      Strain Of The Gastrocnemius Muscle Of The Right Leg      Adjustment Disorder With Depressed Mood      Joint Pain, Localized In The Knee      Pain During Urination (Dysuria)      Abdominal Pain      Diverticulitis Of Colon      Trochanteric Bursitis      Sinusitis      Midback Pain      Mixed hyperlipidemia      Abnormal Weight Loss      Abnormal Pap smear of cervix      Adverse Food Reaction (Not Anaphylactic)      Impingement Of The Right Shoulder      Hypertension      Head Injury      Oral Thrush      Lumbar Disc Degeneration      Lower Back Pain Chronic      Carpal Tunnel Syndrome      Past Medical History:   Diagnosis Date     Anemia      Bilateral calcaneal spurs     surgery 2015     Bursitis of hip      Chronic back pain      DDD (degenerative disc disease)      Depression      Diverticulitis      Environmental allergies      Fibromyalgia      History of transfusion     With      HLD (hyperlipidemia)      Obesity      Past Surgical History:   Procedure Laterality Date     ACHILLES TENDON REPAIR Left 2015    Procedure: WITH SECONDARY ACHILLES TENDON REAPIR;  Surgeon: Rob Marion DPM;  Location: [facility] OR;  Service:      ACHILLES TENDON REPAIR Right 11/3/2017    Procedure: WITH SECONDARY ACHILLES TENDON REPAIR;  Surgeon: Rob Marion DPM;  Location: [facility] Main OR;  Service:      ENDOMETRIAL ABLATION       OR APPENDECTOMY      Description: Appendectomy;  Recorded: 10/18/2008;     OR  DELIVERY ONLY      Description:  Section;  Recorded: 10/18/2008;     OR REMOVAL OF HEEL SPUR Left 2015    Procedure: LEFT POSTERIOR CALCANEAL SPUR RESECTION;  Surgeon: Rob Marion DPM;  Location: [place] [facility] OR;  Service: Podiatry     OR REMOVAL OF TONSILS,<13 Y/O      Description: Tonsillectomy;  Recorded: 10/18/2008;     Social  History     Socioeconomic History     Marital status:      Spouse name: Not on file     Number of children: Not on file     Years of education: Not on file     Highest education level: Not on file   Social Needs     Financial resource strain: Not on file     Food insecurity - worry: Not on file     Food insecurity - inability: Not on file     Transportation needs - medical: Not on file     Transportation needs - non-medical: Not on file   Occupational History     Occupation: supervisor     Employer: [employer]   Tobacco Use     Smoking status: Current Every Day Smoker     Packs/day: 0.50     Years: 36.00     Pack years: 18.00     Types: Cigarettes     Smokeless tobacco: Never Used   Substance and Sexual Activity     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  Then may increase to 2 tabs at bedtime x 5 days, then may increase to 3 caps at bedtime. (Patient taking differently: Take 10 mg by mouth at bedtime. 1 tab at bedtime x 5 days.  Then may increase to 2 tabs at bedtime x 5 days, then may increase to 3 caps at bedtime.), Disp: 90 tablet, Rfl: 1     azithromycin (ZITHROMAX) 250 MG tablet, Take 500 mg (2 x 250 mg tablets) on day 1 followed by 250 mg (1 tablet) on days 2-5., Disp: 6 tablet, Rfl: 0     cholecalciferol, vitamin D3, (VITAMIN D3) 2,000 unit Tab, Take 2,000 Units by mouth at bedtime. , Disp: , Rfl:      gabapentin (NEURONTIN) 300 MG capsule, Take 3 capsules (900 mg total) by mouth 3 (three) times a  "day. (Patient taking differently: Take 900 mg by mouth at bedtime. ), Disp: 270 capsule, Rfl: 3     HYDROcodone-acetaminophen (NORCO )  mg per tablet, Take 1 tablet by mouth every 6 (six) hours as needed for pain., Disp: 20 tablet, Rfl: 0     ibuprofen (ADVIL,MOTRIN) 800 MG tablet, Take 1 tablet (800 mg total) by mouth every 6 (six) hours as needed for pain., Disp: 100 tablet, Rfl: 3     meclizine (ANTIVERT) 25 mg tablet, Take 25 mg by mouth 3 (three) times a day as needed for dizziness or nausea., Disp: , Rfl:      omeprazole (PRILOSEC) 40 MG capsule, Take 1 capsule (40 mg total) by mouth daily., Disp: 90 capsule, Rfl: 3     simvastatin (ZOCOR) 40 MG tablet, Take 40 mg by mouth at bedtime., Disp: , Rfl:     Allergies:  Allergies   Allergen Reactions     Amoxicillin Hives     Penicillins Swelling and Itching     Annotation: Swelling of face/eyes, itching       Shellfish Containing Products Anaphylaxis       ROS:  Pertinent positives as noted in HPI; otherwise 12 point ROS negative.      Physical Exam:  EXAM:  /78 (Patient Site: Right Arm, Patient Position: Sitting, Cuff Size: Adult Large)   Pulse 98   Ht 5' 10\" (1.778 m)   Wt (!) 263 lb (119.3 kg)   SpO2 98%   BMI 37.74 kg/m     Gen:  NAD, appears well, well-hydrated  HEENT:  TMs nl, oropharynx benign, nasal mucosa nl, conjunctiva clear  Neck:  Supple, no adenopathy, no thyromegaly, no carotid bruits, no JVD  Lungs:  Clear to auscultation bilaterally  Cor:  RRR no murmur  Abd:  Soft, nontender, BS+, no masses, no guarding or rebound, no HSM  Extr:  Right lower extremity - surgical wound right inner shin (healing);   Neuro:  No asymmetry, weakness right lower extremity  Skin:  Warm/dry        Results:  Results for orders placed or performed in visit on 10/01/18   Hemoglobin   Result Value Ref Range    Hemoglobin 13.3 12.0 - 16.0 g/dL   Basic Metabolic Panel   Result Value Ref Range    Sodium 142 136 - 145 mmol/L    Potassium 4.3 3.5 - 5.0 " mmol/L    Chloride 107 98 - 107 mmol/L    CO2 25 22 - 31 mmol/L    Anion Gap, Calculation 10 5 - 18 mmol/L    Glucose 100 70 - 125 mg/dL    Calcium 9.8 8.5 - 10.5 mg/dL    BUN 7 (L) 8 - 22 mg/dL    Creatinine 0.76 0.60 - 1.10 mg/dL    GFR MDRD Af Amer >60 >60 mL/min/1.73m2    GFR MDRD Non Af Amer >60 >60 mL/min/1.73m2   Lipid Profile   Result Value Ref Range    Triglycerides 294 (H) <=149 mg/dL    Cholesterol 192 <=199 mg/dL    LDL Calculated 100 <=129 mg/dL    HDL Cholesterol 33 (L) >=50 mg/dL    Patient Fasting > 8hrs? Yes    Hepatic Profile   Result Value Ref Range    Bilirubin, Total 0.4 0.0 - 1.0 mg/dL    Bilirubin, Direct 0.1 <=0.5 mg/dL    Protein, Total 7.0 6.0 - 8.0 g/dL    Albumin 4.1 3.5 - 5.0 g/dL    Alkaline Phosphatase 62 45 - 120 U/L    AST 27 0 - 40 U/L    ALT 34 0 - 45 U/L   Vitamin D, Total (25-Hydroxy)   Result Value Ref Range    Vitamin D, Total (25-Hydroxy) 31.1 30.0 - 80.0 ng/mL

## 2021-06-23 NOTE — PROGRESS NOTES
ASSESSMENT/PLAN:  1. Pneumonia due to infectious organism, unspecified laterality, unspecified part of lung  levoFLOXacin (LEVAQUIN) 750 MG tablet   2. Cough  Influenza A/B Rapid Test    levoFLOXacin (LEVAQUIN) 750 MG tablet       This is a 52 yo female with respiratory illness consistent with pneumonia . She appears ill - influenza testing is negative and I think, less likely.  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  I will start Levofloxacin; consider steroids if continues to have difficulty/cough.  She may use OTC symptom controllers.  If not improving will need more urgent evaluation.    Return in about 1 week (around 2/12/2019) for Recheck resp illness.      There are no discontinued medications.  There are no Patient Instructions on file for this visit.    Chief Complaint:  Chief Complaint   Patient presents with     Cough     Sinus drainage      Fever       HPI:   Catherine Farrell is a 53 y.o. female c/o  Feeling crummy  Working at [employer] through [employer]  Has been sick x 2 weeks  Fever, chills  Tired   Has been sleeping with 5 blankets    Sick x 2 weeks, but really bad today          PMH:   Patient Active Problem List    Diagnosis Date Noted     Right foot pain 09/27/2018     Compression neuropathy 09/27/2018     Tarsal tunnel syndrome of right side 06/04/2018     Compression neuropathy of right lower extremity 06/04/2018     Mononeuritis lower limb, right 06/04/2018     Numbness of right lower extremity 05/30/2018     Calcaneal spur, right 09/11/2017     Vitamin D deficiency 07/03/2017     Chronic pain 03/10/2017     Costochondritis 07/10/2015     Fatigue 07/10/2015     Obsessive compulsive disorder 03/09/2015     Dermatitis 02/05/2015     Patellofemoral Syndrome Of The Right Knee  02/05/2015     Tension-type headache 02/05/2015     Simple (Specific) Phobia      Cough      Nicotine Dependence      Chronic Reflux Esophagitis      Fibromyalgia      Strain Of The Gastrocnemius Muscle Of The  Right Leg      Adjustment Disorder With Depressed Mood      Joint Pain, Localized In The Knee      Pain During Urination (Dysuria)      Abdominal Pain      Diverticulitis Of Colon      Trochanteric Bursitis      Sinusitis      Midback Pain      Mixed hyperlipidemia      Abnormal Weight Loss      Abnormal Pap smear of cervix      Adverse Food Reaction (Not Anaphylactic)      Impingement Of The Right Shoulder      Hypertension      Head Injury      Oral Thrush      Lumbar Disc Degeneration      Lower Back Pain Chronic      Carpal Tunnel Syndrome      Past Medical History:   Diagnosis Date     Anemia      Bilateral calcaneal spurs     surgery 2015     Bursitis of hip      Chronic back pain      DDD (degenerative disc disease)      Depression      Diverticulitis      Environmental allergies      Fibromyalgia      History of transfusion     With      HLD (hyperlipidemia)      Obesity      Past Surgical History:   Procedure Laterality Date     ACHILLES TENDON REPAIR Left 2015    Procedure: WITH SECONDARY ACHILLES TENDON REAPIR;  Surgeon: Rob Marion DPM;  Location: [place] [facility] OR;  Service:      ACHILLES TENDON REPAIR Right 11/3/2017    Procedure: WITH SECONDARY ACHILLES TENDON REPAIR;  Surgeon: Rob Marion DPM;  Location: [facility] OR;  Service:      ENDOMETRIAL ABLATION       SC APPENDECTOMY      Description: Appendectomy;  Recorded: 10/18/2008;     SC  DELIVERY ONLY      Description:  Section;  Recorded: 10/18/2008;     SC REMOVAL OF HEEL SPUR Left 2015    Procedure: LEFT POSTERIOR CALCANEAL SPUR RESECTION;  Surgeon: Rob Marion DPM;  Location: [place] [facility] OR;  Service: Podiatry     SC REMOVAL OF TONSILS,<11 Y/O      Description: Tonsillectomy;  Recorded: 10/18/2008;     Social History     Socioeconomic History     Marital status:      Spouse name: Not on file     Number of children: Not on file     Years of education: Not on file     Highest  education level: Not on file   Social Needs     Financial resource strain: Not on file     Food insecurity - worry: Not on file     Food insecurity - inability: Not on file     Transportation needs - medical: Not on file     Transportation needs - non-medical: Not on file   Occupational History     Occupation: supervisor     Employer: [employer]   Tobacco Use     Smoking status: Current Every Day Smoker     Packs/day: 0.50     Years: 36.00     Pack years: 18.00     Types: Cigarettes     Smokeless tobacco: Never Used   Substance and Sexual Activity     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     Drug use: No     Sexual activity: Yes     Partners: Male   Other Topics Concern     Not on file   Social History Narrative     Not on file     Family History   Problem Relation Age of Onset     Lung cancer Father      Alcohol abuse Father      Alcohol abuse Son      Hypertension Mother      Stomach cancer Maternal Aunt      Breast cancer Maternal Aunt      Breast cancer Maternal Aunt        Meds:    Current Outpatient Medications:      azithromycin (ZITHROMAX) 250 MG tablet, Take 500 mg (2 x 250 mg tablets) on day 1 followed by 250 mg (1 tablet) on days 2-5., Disp: 6 tablet, Rfl: 0     cholecalciferol, vitamin D3, (VITAMIN D3) 2,000 unit Tab, Take 2,000 Units by mouth at bedtime. , Disp: , Rfl:      gabapentin (NEURONTIN) 300 MG capsule, Take 3 capsules (900 mg total) by mouth 3 (three) times a day. (Patient taking differently: Take 900 mg by mouth at bedtime. ), Disp: 270 capsule, Rfl: 3     HYDROcodone-acetaminophen (NORCO )  mg per tablet, Take 1 tablet by mouth every 6 (six) hours as needed for pain., Disp: 20 tablet, Rfl: 0     ibuprofen (ADVIL,MOTRIN) 800 MG tablet, Take 1 tablet (800 mg total) by mouth every 6 (six) hours as needed for pain., Disp: 100 tablet, Rfl: 3     meclizine (ANTIVERT) 25 mg tablet, Take 25 mg by mouth 3 (three) times a day as needed for dizziness or nausea., Disp: , Rfl:       "omeprazole (PRILOSEC) 40 MG capsule, Take 1 capsule (40 mg total) by mouth daily., Disp: 90 capsule, Rfl: 3     simvastatin (ZOCOR) 40 MG tablet, Take 40 mg by mouth at bedtime., Disp: , Rfl:      amitriptyline (ELAVIL) 10 MG tablet, 1 tab at bedtime x 5 days.  Then may increase to 2 tabs at bedtime x 5 days, then may increase to 3 caps at bedtime. (Patient taking differently: Take 10 mg by mouth at bedtime. 1 tab at bedtime x 5 days.  Then may increase to 2 tabs at bedtime x 5 days, then may increase to 3 caps at bedtime.), Disp: 90 tablet, Rfl: 1     levoFLOXacin (LEVAQUIN) 750 MG tablet, Take 1 tablet (750 mg total) by mouth daily for 10 days., Disp: 10 tablet, Rfl: 0    Allergies:  Allergies   Allergen Reactions     Amoxicillin Hives     Penicillins Swelling and Itching     Annotation: Swelling of face/eyes, itching       Shellfish Containing Products Anaphylaxis       ROS:  Pertinent positives as noted in HPI; otherwise 12 point ROS negative.      Physical Exam:  EXAM:  /80   Pulse 93   Temp 98.8  F (37.1  C) (Oral)   Resp 16   Ht 5' 10\" (1.778 m)   Wt (!) 264 lb (119.7 kg)   SpO2 96%   BMI 37.88 kg/m     Gen:  NAD, appears well, well-hydrated, covered in multiple layers to doc warm  HEENT:  TMs nl, oropharynx benign, nasal mucosa congested; conjunctiva clear  Neck:  Supple, no adenopathy, no thyromegaly, no carotid bruits, no JVD  Lungs: sl end exp wheeze  Cor:  RRR no murmur  Abd:  Soft, nontender, BS+, no masses, no guarding or rebound, no HSM  Extr:  Neg.  Neuro:  No asymmetry  Skin:  Warm/dry        Results:  Results for orders placed or performed in visit on 02/05/19   Influenza A/B Rapid Test   Result Value Ref Range    Influenza  A, Rapid Antigen No Influenza A antigen detected No Influenza A antigen detected    Influenza B, Rapid Antigen No Influenza B antigen detected No Influenza B antigen detected             "

## 2021-06-23 NOTE — PROGRESS NOTES
Subjective findings: The patient return to the clinic today for postop visit #3, 3 months status post nerve decompression with external neurolysis of the proximal tibial nerve right lower extremity The patient is in good spirits he had no complaints.  The patient stated she has greater strength in her toes.  She is excited that she can now move her toes on her right foot.  She has greater sensation of the toes particularly her right great toe.  She continues to have some swelling of her right leg after prolonged standing..  She also has some tenderness in the calf of her right leg.      Objective findings: The  wound margins are well healed. There is no edema noted left foot.  There is no erythema or cellulitis noted.  Neurovascular status is intact.  Muscle power and range of motion within normal limits right lower extremity vtal signs are stable.        Assessment: Lymphedema right lower extremity, compression neuropathy         Plan: I informed the patient that it appears the results of the decompression of the proximal tibial nerve is quite good. Greater strength and greater sensation are noted.  I recommended the patient wear a compression stocking in the right lower extremity to control her edema.  The patient is to return to the clinic in 6 months.

## 2021-06-24 NOTE — PROGRESS NOTES
ASSESSMENT/PLAN:  1. Acute bronchitis, unspecified organism  azithromycin (ZITHROMAX) 250 MG tablet    predniSONE (DELTASONE) 20 MG tablet       This is a 52 yo female who comes in with cough, congestion, feeling ill.  She has what appears to be an acute bronchitis. Will treat with Azithromycin as well as Prednisone.  Discussed risks/benefits.  Follow up if not improving.  Work slip printed and given to patient.    No Follow-up on file.      Medications Discontinued During This Encounter   Medication Reason     azithromycin (ZITHROMAX) 250 MG tablet Therapy completed     There are no Patient Instructions on file for this visit.    Chief Complaint:  Chief Complaint   Patient presents with     Follow-up     cough, congestion, fatigue       HPI:   Catherine Farrell is a 53 y.o. female c/o  Cough, congestion, fatigue  Cough is better in the cold air - worse inside (thinks it's croup)  No fever, no chills  Still can't feel the foot (right)  Did try to go to work this morning - the more she talks, the less voice she has    Exposed to ex- - he's been to Urgent Care for this - same symptoms        PMH:   Patient Active Problem List    Diagnosis Date Noted     Right foot pain 09/27/2018     Compression neuropathy 09/27/2018     Tarsal tunnel syndrome of right side 06/04/2018     Compression neuropathy of right lower extremity 06/04/2018     Mononeuritis lower limb, right 06/04/2018     Numbness of right lower extremity 05/30/2018     Calcaneal spur, right 09/11/2017     Vitamin D deficiency 07/03/2017     Chronic pain 03/10/2017     Costochondritis 07/10/2015     Fatigue 07/10/2015     Obsessive compulsive disorder 03/09/2015     Dermatitis 02/05/2015     Patellofemoral Syndrome Of The Right Knee  02/05/2015     Tension-type headache 02/05/2015     Simple (Specific) Phobia      Cough      Nicotine Dependence      Chronic Reflux Esophagitis      Fibromyalgia      Strain Of The Gastrocnemius Muscle Of The Right Leg       Adjustment Disorder With Depressed Mood      Joint Pain, Localized In The Knee      Pain During Urination (Dysuria)      Abdominal Pain      Diverticulitis Of Colon      Trochanteric Bursitis      Sinusitis      Midback Pain      Mixed hyperlipidemia      Abnormal Weight Loss      Abnormal Pap smear of cervix      Adverse Food Reaction (Not Anaphylactic)      Impingement Of The Right Shoulder      Hypertension      Head Injury      Oral Thrush      Lumbar Disc Degeneration      Lower Back Pain Chronic      Carpal Tunnel Syndrome      Past Medical History:   Diagnosis Date     Anemia      Bilateral calcaneal spurs     surgery 2015     Bursitis of hip      Chronic back pain      DDD (degenerative disc disease)      Depression      Diverticulitis      Environmental allergies      Fibromyalgia      History of transfusion     With      HLD (hyperlipidemia)      Obesity      Past Surgical History:   Procedure Laterality Date     ACHILLES TENDON REPAIR Left 2015    Procedure: WITH SECONDARY ACHILLES TENDON REAPIR;  Surgeon: Rob Marion DPM;  Location: [facility];  Service:      ACHILLES TENDON REPAIR Right 11/3/2017    Procedure: WITH SECONDARY ACHILLES TENDON REPAIR;  Surgeon: Rob Marion DPM;  Location: [facility];  Service:      ENDOMETRIAL ABLATION       NH APPENDECTOMY      Description: Appendectomy;  Recorded: 10/18/2008;     NH  DELIVERY ONLY      Description:  Section;  Recorded: 10/18/2008;     NH REMOVAL OF HEEL SPUR Left 2015    Procedure: LEFT POSTERIOR CALCANEAL SPUR RESECTION;  Surgeon: Rob Marion DPM;  Location: [facility];  Service: Podiatry     NH REMOVAL OF TONSILS,<11 Y/O      Description: Tonsillectomy;  Recorded: 10/18/2008;     Social History     Socioeconomic History     Marital status:      Spouse name: Not on file     Number of children: Not on file     Years of education: Not on file     Highest education level:  Not on file   Occupational History     Occupation: supervisor     Employer: [employer]   Social Needs     Financial resource strain: Not on file     Food insecurity:     Worry: Not on file     Inability: Not on file     Transportation needs:     Medical: Not on file     Non-medical: Not on file   Tobacco Use     Smoking status: Current Every Day Smoker     Packs/day: 0.50     Years: 36.00     Pack years: 18.00     Types: Cigarettes     Smokeless tobacco: Never Used   Substance and Sexual Activity     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     Drug use: No     Sexual activity: Yes     Partners: Male   Lifestyle     Physical activity:     Days per week: Not on file     Minutes per session: Not on file     Stress: Not on file   Relationships     Social connections:     Talks on phone: Not on file     Gets together: Not on file     Attends Gnosticist service: Not on file     Active member of club or organization: Not on file     Attends meetings of clubs or organizations: Not on file     Relationship status: Not on file     Intimate partner violence:     Fear of current or ex partner: Not on file     Emotionally abused: Not on file     Physically abused: Not on file     Forced sexual activity: Not on file   Other Topics Concern     Not on file   Social History Narrative     Not on file     Family History   Problem Relation Age of Onset     Lung cancer Father      Alcohol abuse Father      Alcohol abuse Son      Hypertension Mother      Stomach cancer Maternal Aunt      Breast cancer Maternal Aunt      Breast cancer Maternal Aunt        Meds:    Current Outpatient Medications:      amitriptyline (ELAVIL) 10 MG tablet, 1 tab at bedtime x 5 days.  Then may increase to 2 tabs at bedtime x 5 days, then may increase to 3 caps at bedtime. (Patient taking differently: Take 10 mg by mouth at bedtime. 1 tab at bedtime x 5 days.  Then may increase to 2 tabs at bedtime x 5 days, then may increase to 3 caps at bedtime.), Disp:  90 tablet, Rfl: 1     cholecalciferol, vitamin D3, (VITAMIN D3) 2,000 unit Tab, Take 2,000 Units by mouth at bedtime. , Disp: , Rfl:      gabapentin (NEURONTIN) 300 MG capsule, Take 3 capsules (900 mg total) by mouth 3 (three) times a day. (Patient taking differently: Take 900 mg by mouth at bedtime. ), Disp: 270 capsule, Rfl: 3     HYDROcodone-acetaminophen (NORCO )  mg per tablet, Take 1 tablet by mouth every 6 (six) hours as needed for pain., Disp: 20 tablet, Rfl: 0     ibuprofen (ADVIL,MOTRIN) 800 MG tablet, Take 1 tablet (800 mg total) by mouth every 6 (six) hours as needed for pain., Disp: 100 tablet, Rfl: 3     meclizine (ANTIVERT) 25 mg tablet, Take 25 mg by mouth 3 (three) times a day as needed for dizziness or nausea., Disp: , Rfl:      omeprazole (PRILOSEC) 40 MG capsule, Take 1 capsule (40 mg total) by mouth daily., Disp: 90 capsule, Rfl: 3     simvastatin (ZOCOR) 40 MG tablet, Take 40 mg by mouth at bedtime., Disp: , Rfl:      azithromycin (ZITHROMAX) 250 MG tablet, Take 500 mg (2 x 250 mg tablets) on day 1 followed by 250 mg (1 tablet) on days 2-5., Disp: 6 tablet, Rfl: 0     predniSONE (DELTASONE) 20 MG tablet, Take 20 mg by mouth 2 (two) times a day for 10 doses., Disp: 10 tablet, Rfl: 0    Allergies:  Allergies   Allergen Reactions     Amoxicillin Hives     Penicillins Swelling and Itching     Annotation: Swelling of face/eyes, itching       Shellfish Containing Products Anaphylaxis       ROS:  Pertinent positives as noted in HPI; otherwise 12 point ROS negative.      Physical Exam:  EXAM:  /88 (Patient Site: Right Arm, Patient Position: Sitting, Cuff Size: Adult Large)   Pulse 92   Temp 97.8  F (36.6  C) (Oral)   Wt (!) 263 lb (119.3 kg)   SpO2 97%   Breastfeeding? No   BMI 37.74 kg/m     Gen:  NAD, appears ill, hoarse voice,  HEENT:  TMs nl, oropharynx benign, nasal mucosa nl, conjunctiva clear  Neck:  Supple, no adenopathy, no thyromegaly, no carotid bruits, no JVD  Lungs:   Coarse breath sounds bilaterally;   Cor:  RRR no murmur  Abd:  Soft, nontender, BS+, no masses, no guarding or rebound, no HSM  Extr:  Neg.  Neuro:  No asymmetry  Skin:  Warm/dry        Results:  Results for orders placed or performed in visit on 02/05/19   Influenza A/B Rapid Test   Result Value Ref Range    Influenza  A, Rapid Antigen No Influenza A antigen detected No Influenza A antigen detected    Influenza B, Rapid Antigen No Influenza B antigen detected No Influenza B antigen detected

## 2021-06-26 ENCOUNTER — HEALTH MAINTENANCE LETTER (OUTPATIENT)
Age: 56
End: 2021-06-26

## 2021-07-03 NOTE — ADDENDUM NOTE
Addendum Note by Vince Mendez DPM at 5/21/2018  9:46 AM     Author: Vince Mendez DPM Service: -- Author Type: Physician    Filed: 5/21/2018  9:46 AM Encounter Date: 5/21/2018 Status: Signed    : Vince Mendez DPM (Physician)    Addended by: VINCE MENDEZ on: 5/21/2018 09:46 AM        Modules accepted: Orders

## 2021-07-08 ENCOUNTER — AMBULATORY - HEALTHEAST (OUTPATIENT)
Dept: FAMILY MEDICINE | Facility: CLINIC | Age: 56
End: 2021-07-08

## 2021-07-08 ENCOUNTER — COMMUNICATION - HEALTHEAST (OUTPATIENT)
Dept: FAMILY MEDICINE | Facility: CLINIC | Age: 56
End: 2021-07-08

## 2021-07-08 DIAGNOSIS — R42 VERTIGO: ICD-10-CM

## 2021-07-08 RX ORDER — MECLIZINE HYDROCHLORIDE 25 MG/1
25 TABLET ORAL 3 TIMES DAILY PRN
Qty: 45 TABLET | Refills: 1 | Status: SHIPPED | OUTPATIENT
Start: 2021-07-08 | End: 2022-04-19

## 2021-07-08 NOTE — TELEPHONE ENCOUNTER
Telephone Encounter by Emilia Dennis at 7/8/2021  6:21 PM     Author: Emilia Dennis Service: -- Author Type: --    Filed: 7/8/2021  6:21 PM Encounter Date: 7/7/2021 Status: Signed    : Emilia Dennis       Please advise

## 2021-07-15 DIAGNOSIS — M79.7 FIBROMYALGIA: ICD-10-CM

## 2021-07-16 RX ORDER — IBUPROFEN 800 MG/1
TABLET, FILM COATED ORAL
Qty: 100 TABLET | Refills: 0 | Status: SHIPPED | OUTPATIENT
Start: 2021-07-16 | End: 2021-10-13

## 2021-07-26 ENCOUNTER — TRANSFERRED RECORDS (OUTPATIENT)
Dept: HEALTH INFORMATION MANAGEMENT | Facility: CLINIC | Age: 56
End: 2021-07-26

## 2021-08-21 ENCOUNTER — HEALTH MAINTENANCE LETTER (OUTPATIENT)
Age: 56
End: 2021-08-21

## 2021-09-06 ENCOUNTER — NURSE TRIAGE (OUTPATIENT)
Dept: NURSING | Facility: CLINIC | Age: 56
End: 2021-09-06

## 2021-09-06 ENCOUNTER — HOSPITAL ENCOUNTER (EMERGENCY)
Facility: HOSPITAL | Age: 56
Discharge: HOME | End: 2021-09-06
Attending: EMERGENCY MEDICINE | Admitting: EMERGENCY MEDICINE
Payer: COMMERCIAL

## 2021-09-06 VITALS
RESPIRATION RATE: 16 BRPM | HEART RATE: 77 BPM | SYSTOLIC BLOOD PRESSURE: 134 MMHG | DIASTOLIC BLOOD PRESSURE: 63 MMHG | OXYGEN SATURATION: 95 % | TEMPERATURE: 98.2 F | WEIGHT: 250 LBS | BODY MASS INDEX: 35.39 KG/M2

## 2021-09-06 DIAGNOSIS — G50.0 TRIGEMINAL NEURALGIA: ICD-10-CM

## 2021-09-06 PROCEDURE — 99284 EMERGENCY DEPT VISIT MOD MDM: CPT | Mod: 25

## 2021-09-06 PROCEDURE — 96361 HYDRATE IV INFUSION ADD-ON: CPT

## 2021-09-06 PROCEDURE — 96375 TX/PRO/DX INJ NEW DRUG ADDON: CPT

## 2021-09-06 PROCEDURE — 258N000003 HC RX IP 258 OP 636: Performed by: EMERGENCY MEDICINE

## 2021-09-06 PROCEDURE — 250N000011 HC RX IP 250 OP 636: Performed by: EMERGENCY MEDICINE

## 2021-09-06 PROCEDURE — 96374 THER/PROPH/DIAG INJ IV PUSH: CPT

## 2021-09-06 RX ORDER — METOCLOPRAMIDE HYDROCHLORIDE 5 MG/ML
10 INJECTION INTRAMUSCULAR; INTRAVENOUS ONCE
Status: COMPLETED | OUTPATIENT
Start: 2021-09-06 | End: 2021-09-06

## 2021-09-06 RX ORDER — DEXAMETHASONE SODIUM PHOSPHATE 10 MG/ML
10 INJECTION, SOLUTION INTRAMUSCULAR; INTRAVENOUS ONCE
Status: COMPLETED | OUTPATIENT
Start: 2021-09-06 | End: 2021-09-06

## 2021-09-06 RX ORDER — DIPHENHYDRAMINE HYDROCHLORIDE 50 MG/ML
25 INJECTION INTRAMUSCULAR; INTRAVENOUS ONCE
Status: COMPLETED | OUTPATIENT
Start: 2021-09-06 | End: 2021-09-06

## 2021-09-06 RX ORDER — CARBAMAZEPINE 100 MG/1
100 CAPSULE, EXTENDED RELEASE ORAL DAILY
Qty: 14 CAPSULE | Refills: 0 | Status: SHIPPED | OUTPATIENT
Start: 2021-09-06 | End: 2021-09-22

## 2021-09-06 RX ORDER — KETOROLAC TROMETHAMINE 15 MG/ML
15 INJECTION, SOLUTION INTRAMUSCULAR; INTRAVENOUS ONCE
Status: COMPLETED | OUTPATIENT
Start: 2021-09-06 | End: 2021-09-06

## 2021-09-06 RX ADMIN — SODIUM CHLORIDE 1000 ML: 9 INJECTION, SOLUTION INTRAVENOUS at 20:35

## 2021-09-06 RX ADMIN — DEXAMETHASONE SODIUM PHOSPHATE 10 MG: 10 INJECTION, SOLUTION INTRAMUSCULAR; INTRAVENOUS at 20:29

## 2021-09-06 RX ADMIN — KETOROLAC TROMETHAMINE 15 MG: 15 INJECTION, SOLUTION INTRAMUSCULAR; INTRAVENOUS at 20:28

## 2021-09-06 RX ADMIN — METOCLOPRAMIDE HYDROCHLORIDE 10 MG: 5 INJECTION INTRAMUSCULAR; INTRAVENOUS at 20:30

## 2021-09-06 RX ADMIN — DIPHENHYDRAMINE HYDROCHLORIDE 25 MG: 50 INJECTION INTRAMUSCULAR; INTRAVENOUS at 20:29

## 2021-09-06 ASSESSMENT — ENCOUNTER SYMPTOMS
HEADACHES: 1
FEVER: 0
NAUSEA: 1
CHILLS: 0
SHORTNESS OF BREATH: 0
VOMITING: 0
WEAKNESS: 0
DIZZINESS: 0

## 2021-09-06 NOTE — TELEPHONE ENCOUNTER
"Pt is calling in about having a headache pain since about 9 am this morning. Pt reports it came on for 5-15 seconds behind her left ear, then went away, and kept increasing. Pt reports it has been increasing in frequency and pain level. Pt rates pain is now a \"10\" Pt denies any numbness, weakness, of the face, arm, or leg, on one side for the body. Pt denies pain in one eye, loss of vision, or double vision. Pt denies any recent head injury. Pt is able to walk, but reports it is the worst headache ever. Pt feels nauseated, and fuzzy.    Care advice given, and per protocol pt should be evaluated in the ER. Pt was advised to have someone drive her there. Pt verbalized understanding.     Otf Pruitt RN on 9/6/2021 at 6:21 PM      Reason for Disposition    [1] SEVERE headache (e.g., excruciating) AND [2] \"worst headache\" of life    Additional Information    Negative: Difficult to awaken or acting confused (e.g., disoriented, slurred speech)    Negative: [1] Weakness of the face, arm or leg on one side of the body AND [2] new onset    Negative: [1] Numbness of the face, arm or leg on one side of the body AND [2] new onset    Negative: [1] Loss of speech or garbled speech AND [2] new onset    Negative: Passed out (i.e., lost consciousness, collapsed and was not responding)    Negative: Sounds like a life-threatening emergency to the triager    Negative: Followed a head injury    Negative: Pregnant    Negative: Postpartum (from 0 to 6 weeks after delivery)    Negative: Traumatic Brain Injury (TBI) is suspected    Negative: Unable to walk, or can only walk with assistance (e.g., requires support)    Negative: Stiff neck (can't touch chin to chest)    Negative: Severe pain in one eye    Negative: [1] Other family members (or roommates) with headaches AND [2] possibility of carbon monoxide exposure    Protocols used: HEADACHE-A-AH      "

## 2021-09-06 NOTE — Clinical Note
Catherine Farrell was seen and treated in our emergency department on 9/6/2021.  She may return to work on 09/08/2021.       If you have any questions or concerns, please don't hesitate to call.      Brayan Garrido MD

## 2021-09-06 NOTE — ED TRIAGE NOTES
Pt developed a headache this morning at 0900 that has been intermittent all day. She states she has episodes of stabbing and pulsating pain. Pt also endorses nausea. Pt has tried gabapentin, ibuprofen, and tylenol without relief.

## 2021-09-07 NOTE — DISCHARGE INSTRUCTIONS
Follow-up with your primary care within 1 week    Return to the emergency department if you experience severe headache, fever, neck stiffness, or any new or concerning symptoms

## 2021-09-07 NOTE — ED PROVIDER NOTES
EMERGENCY DEPARTMENT ENCOUNTER      NAME: Catherine Farrell  AGE: 56 year old female  YOB: 1965  MRN: [id number]  EVALUATION DATE & TIME: 9/6/2021  7:49 PM    PCP: Annetta Hernandez    ED PROVIDER: Brayan Garrido M.D.      Chief Complaint   Patient presents with     Headache         FINAL IMPRESSION:  1. Trigeminal neuralgia          ED COURSE & MEDICAL DECISION MAKING:    Pertinent Labs & Imaging studies reviewed. (See chart for details)  56 year old female presents to the Emergency Department for evaluation of left-sided headache.  Headache was submaximal in onset, brief, episodic, and very severe.  Pain is described as sharp.  No associated neurologic deficits.  No rash in dermatomal distribution in area of pain.  Overall constellation of symptoms consistent with trigeminal neuralgia.  Patient improved treatment emergency department.  Will initiate carbamazepine.  Patient referred back to primary care for reevaluation after initiation of carbamazepine    8:06 PM I met with patient for initial interview and exam.     ED Course as of Sep 06 2126   Mon Sep 06, 2021   2008 56-year-old female with multiple medical problems including fibromyalgia, multiple chronic orthopedic problems, and chronic neuropathic pain to right lower extremity.  Patient presents today with severe headache.  She describes the headache is brief, intermittent.  It was submaximal in onset but is worse during exacerbations now.  She is unable to identify specific exacerbating or alleviating factors.  She has had no neurologic complaint.  She has no neurologic deficit on exam.  She does not have meningismus.  She has had a fever.  She is known about her mental status.      2008 Do not believe that imaging is clinically negated for a waxing and waning headache that started earlier today.  Presentation is not consistent with meningoencephalitis or subarachnoid hemorrhage or other intracranial hemorrhage.  Neurologic exam is  unremarkable.  Do not believe that imaging or CSF assays are clinically indicated.      2009 Will treat headache symptomatically.  The left-sided distribution of patient's headache and the brief but severe nature of her pain could be consistent with trigeminal neuralgia.  I counseled her to follow-up with neurology.      2121 Patient has improved.  Will initiate low-dose carbamazepine once a day and have patient follow-up with her primary care for reevaluation referral to neurology           At the conclusion of the encounter I discussed the results of all of the tests and the disposition. The questions were answered. The patient or family acknowledged understanding and was agreeable with the care plan.       0 minutes of critical care time     MEDICATIONS GIVEN IN THE EMERGENCY:  Medications   0.9% sodium chloride BOLUS (1,000 mLs Intravenous New Bag 9/6/21 2035)   ketorolac (TORADOL) injection 15 mg (15 mg Intravenous Given 9/6/21 2028)   metoclopramide (REGLAN) injection 10 mg (10 mg Intravenous Given 9/6/21 2030)   dexamethasone PF (DECADRON) injection 10 mg (10 mg Intravenous Given 9/6/21 2029)   diphenhydrAMINE (BENADRYL) injection 25 mg (25 mg Intravenous Given 9/6/21 2029)       NEW PRESCRIPTIONS STARTED AT TODAY'S ER VISIT  New Prescriptions    CARBAMAZEPINE (CARBATROL) 100 MG 12 HR CAPSULE    Take 1 capsule (100 mg) by mouth daily for 14 days          =================================================================    HPI    Patient information was obtained from: Patient     Use of Intrepreter: N/A        Ctaherine Farrell is a 56 year old female with a pertinent history of environmental allergies, obesity, chronic back pain, and DDD who presents to this ED as a walk in for evaluation of headache.     Patient reports a headache since 9-9:30 AM this morning. She describes it as someone driving an ice pick behind her left ear. The headache presents as 5-15 second episodes, with intermittent frequency. She also  notes that her head feels fuzzy, and she had some nausea but did not vomit. She denies any history of migraines or headaches. She denies any recent illness. She denies any fever, chills, chest pain, shortness of breath, abdominal pain, loss of sensation, weakness, change in vision, dizziness, or congestion, and has no other complaints at this time.      REVIEW OF SYSTEMS   Review of Systems   Constitutional: Negative for chills and fever.   HENT: Negative for congestion.    Eyes: Negative for visual disturbance.   Respiratory: Negative for shortness of breath.    Cardiovascular: Negative for chest pain.   Gastrointestinal: Positive for nausea. Negative for vomiting.   Neurological: Positive for headaches. Negative for dizziness and weakness.   All other systems reviewed and are negative.      PAST MEDICAL HISTORY:  Past Medical History:   Diagnosis Date     Anemia      Bilateral calcaneal spurs     surgery 2015     Bursitis of hip      Chronic back pain      DDD (degenerative disc disease)      Depression      Diverticulitis      Environmental allergies      Fibromyalgia      History of transfusion     With      HLD (hyperlipidemia)      Obesity        PAST SURGICAL HISTORY:  Past Surgical History:   Procedure Laterality Date     C APPENDECTOMY      Description: Appendectomy;  Recorded: 10/18/2008;     C  DELIVERY ONLY      Description:  Section;  Recorded: 10/18/2008;     ENDOMETRIAL ABLATION       HC REMOVAL HEEL SPUR, CALCANEUS Left 2015    Procedure: LEFT POSTERIOR CALCANEAL SPUR RESECTION;  Surgeon: Rob Marion DPM;  Location: [facility];  Service: Podiatry     HC REMOVAL OF TONSILS,<13 Y/O      Description: Tonsillectomy;  Recorded: 10/18/2008;     REPAIR TENDON ACHILLES Left 2015    Procedure: WITH SECONDARY ACHILLES TENDON REAPIR;  Surgeon: Rob Marion DPM;  Location: [facility];  Service:      REPAIR TENDON ACHILLES Right 11/3/2017     Procedure: WITH SECONDARY ACHILLES TENDON REPAIR;  Surgeon: Rob Marion DPM;  [place] [facility];  Service:            CURRENT MEDICATIONS:    carBAMazepine (CARBATROL) 100 MG 12 hr capsule  baclofen (LIORESAL) 10 MG tablet  cholecalciferol, vitamin D3, (VITAMIN D3) 2,000 unit Tab  gabapentin (NEURONTIN) 300 MG capsule  ibuprofen (ADVIL/MOTRIN) 800 MG tablet  meclizine (ANTIVERT) 25 mg tablet  omeprazole (PRILOSEC) 40 MG capsule  simvastatin (ZOCOR) 40 MG tablet        ALLERGIES:  Allergies   Allergen Reactions     Amoxicillin Hives     Penicillins Swelling and Itching     Annotation: Swelling of face/eyes, itching       Shellfish Containing Products [Shellfish-Derived Products] Anaphylaxis       FAMILY HISTORY:  Family History   Problem Relation Age of Onset     Lung Cancer Father      Alcoholism Father      Alcoholism Son      Hypertension Mother      Stomach Cancer Maternal Aunt      Breast Cancer Maternal Aunt      Breast Cancer Maternal Aunt        SOCIAL HISTORY:   Social History     Socioeconomic History     Marital status:      Spouse name: Not on file     Number of children: Not on file     Years of education: Not on file     Highest education level: Not on file   Occupational History     Not on file   Tobacco Use     Smoking status: Current Every Day Smoker     Packs/day: 0.50     Types: Cigarettes     Smokeless tobacco: Never Used     Tobacco comment: Started Zyban 4/23/19   Substance and Sexual Activity     Alcohol use: No     Comment: Alcoholic Drinks/day: sober since 2013     Drug use: No     Sexual activity: Yes     Partners: Male   Other Topics Concern     Not on file   Social History Narrative     Not on file     Social Determinants of Health     Financial Resource Strain:      Difficulty of Paying Living Expenses:    Food Insecurity:      Worried About Running Out of Food in the Last Year:      Ran Out of Food in the Last Year:    Transportation Needs:      Lack of  Transportation (Medical):      Lack of Transportation (Non-Medical):    Physical Activity:      Days of Exercise per Week:      Minutes of Exercise per Session:    Stress:      Feeling of Stress :    Social Connections:      Frequency of Communication with Friends and Family:      Frequency of Social Gatherings with Friends and Family:      Attends Denominational Services:      Active Member of Clubs or Organizations:      Attends Club or Organization Meetings:      Marital Status:    Intimate Partner Violence:      Fear of Current or Ex-Partner:      Emotionally Abused:      Physically Abused:      Sexually Abused:        VITALS:  Patient Vitals for the past 24 hrs:   BP Temp Temp src Pulse Resp SpO2 Weight   09/06/21 2100 134/63 -- -- 77 -- 95 % --   09/06/21 2045 (!) 140/72 -- -- 86 -- 94 % --   09/06/21 2030 134/61 -- -- 89 -- 96 % --   09/06/21 2015 (!) 147/67 -- -- 88 -- 95 % --   09/06/21 2005 (!) 177/83 -- -- 90 -- 97 % --   09/06/21 2000 -- -- -- 96 -- 97 % --   09/06/21 1900 -- (P) 99.2  F (37.3  C) (P) Oral -- -- -- --   09/06/21 1843 (!) 140/72 98.2  F (36.8  C) Temporal 101 16 95 % 113.4 kg (250 lb)       PHYSICAL EXAM    General: A&O x 3 no apparent distress  HEENT: PERRL, EOMI, moist mucous membranes.  No temporal tenderness to palpation.  Neck: Supple, no rigidity.  No meningismus  Cardiovascular: 2+ distal pulses, cap refill less than 2 seconds. RRR No m/r/g  Pulmonary: Chest nontender, symmetrical rise, normal effort, no respiratory distress. Clear to auscultation bilaterally.  Abdomen: Nontender, no distention. No rebound, guarding, or rigidity.  Extremities: No clubbing, cyanosis, or edema  Musculoskeletal: Gait normal; extremities atraumatic x4  Neuro: Cranial nerves II through XII intact, GCS 15; intact, symmetric strength and sensation x4 extremities  Skin: No rash, jaundice, pallor.  Warm dry and intact  Psych: Normal mood and affect         LAB:  All pertinent labs reviewed and interpreted.        RADIOLOGY:  Reviewed all pertinent imaging. Please see official radiology report.  No orders to display             I, Jose R Sanon, am serving as a scribe to document services personally performed by Dr. Brayan Garrido based on my observation and the provider's statements to me. I, Dr. Brayan Garrido MD attest that Jose R Sanon is acting in a scribe capacity, has observed my performance of the services and has documented them in accordance with my direction.  Any spelling or grammatic inconsistencies or inaccuracies are typographical or dictation errors.    Brayan Garrido M.D.  Emergency Medicine  [facility] EMERGENCY DEPARTMENT  [address] [place] [id number]  [phone]  Dept: [phone]      Brayan Garrido MD  09/06/21 2182

## 2021-09-14 ENCOUNTER — TRANSFERRED RECORDS (OUTPATIENT)
Dept: HEALTH INFORMATION MANAGEMENT | Facility: CLINIC | Age: 56
End: 2021-09-14

## 2021-09-15 ENCOUNTER — TRANSFERRED RECORDS (OUTPATIENT)
Dept: HEALTH INFORMATION MANAGEMENT | Facility: CLINIC | Age: 56
End: 2021-09-15

## 2021-09-22 ENCOUNTER — OFFICE VISIT (OUTPATIENT)
Dept: FAMILY MEDICINE | Facility: CLINIC | Age: 56
End: 2021-09-22
Payer: COMMERCIAL

## 2021-09-22 VITALS
SYSTOLIC BLOOD PRESSURE: 138 MMHG | BODY MASS INDEX: 35.68 KG/M2 | WEIGHT: 252 LBS | DIASTOLIC BLOOD PRESSURE: 84 MMHG | HEART RATE: 118 BPM

## 2021-09-22 DIAGNOSIS — R51.9 NONINTRACTABLE EPISODIC HEADACHE, UNSPECIFIED HEADACHE TYPE: Primary | ICD-10-CM

## 2021-09-22 PROCEDURE — 99214 OFFICE O/P EST MOD 30 MIN: CPT | Performed by: FAMILY MEDICINE

## 2021-09-22 RX ORDER — DICLOFENAC SODIUM 75 MG/1
TABLET, DELAYED RELEASE ORAL
COMMUNITY
Start: 2021-04-07 | End: 2021-09-22

## 2021-09-22 RX ORDER — CARBAMAZEPINE 100 MG/1
100 CAPSULE, EXTENDED RELEASE ORAL DAILY
Qty: 180 CAPSULE | Refills: 1 | Status: SHIPPED | OUTPATIENT
Start: 2021-09-22 | End: 2021-11-01

## 2021-09-22 RX ORDER — CEFDINIR 300 MG/1
CAPSULE ORAL
COMMUNITY
Start: 2020-11-23 | End: 2021-09-22

## 2021-09-22 RX ORDER — PREGABALIN 75 MG/1
75 CAPSULE ORAL 2 TIMES DAILY
COMMUNITY
Start: 2021-09-14 | End: 2022-03-02

## 2021-09-22 RX ORDER — METHOCARBAMOL 750 MG/1
TABLET, FILM COATED ORAL
COMMUNITY
Start: 2020-12-23 | End: 2022-03-02

## 2021-09-22 RX ORDER — METHYLPREDNISOLONE 4 MG
TABLET, DOSE PACK ORAL SEE ADMIN INSTRUCTIONS
COMMUNITY
Start: 2021-03-17 | End: 2021-09-22

## 2021-09-22 RX ORDER — COVID-19 MOLECULAR TEST ASSAY
KIT MISCELLANEOUS
COMMUNITY
Start: 2021-09-10 | End: 2021-09-22

## 2021-09-22 NOTE — PROGRESS NOTES
Assessment & Plan    1. Nonintractable episodic headache, unspecified headache type  This is a 55 yo female with headache - seen in ER     I have reviewed available ER records,  notes, as well as imaging and lab results.  In addition I have reviewed the medication list and made any adjustments as indicated in orders.    Patient was told she had trigeminal neuralgia - symptoms are certainly suggestive of trigeminal neuralgia; although location is a little suspect (mostly posterior to left ear).  Discussed - she has responded well to the carbamazepine and will refill this at this time.  We will make a referral to neurology to confirm diagnoses and help with future management.  She is agreeable.    - Adult Neurology Referral; Future  - carBAMazepine (CARBATROL) 100 MG 12 hr capsule; Take 1 capsule (100 mg) by mouth daily  Dispense: 180 capsule; Refill: 1       Return in about 4 weeks (around 10/20/2021) for Follow up headaches.    Chief Complaint   Patient presents with     Hospital F/U     headache      HPI  Started getting headaches  These were like someone drilling hole in her head  After the 4th time she got these, called nurse hot line, called son  Seen in ER - got medication -   Trigeminal neuralgia    Had MRI - has herniated discs - gets headaches from that    Has been on Carbamazepine -   It has helped her quantity and quality of headache -     [place] wants to do surgery to remove 2 discs that are bulging - in neck  thinking about fusion -   Still taking care of her mom - mom won't get the shot -              Patient Active Problem List   Diagnosis     Cough     Nicotine Dependence     Chronic Reflux Esophagitis     Fibromyalgia     Strain Of The Gastrocnemius Muscle Of The Right Leg     Adjustment Disorder With Depressed Mood     Joint Pain, Localized In The Knee     Pain During Urination (Dysuria)     Abdominal Pain     Diverticulitis Of Colon     Trochanteric Bursitis     Sinusitis     Midback Pain     Mixed  hyperlipidemia     Abnormal Weight Loss     Abnormal Pap smear of cervix     Adverse Food Reaction (Not Anaphylactic)     Impingement Of The Right Shoulder     Hypertension     Head Injury     Oral Thrush     Lumbar Disc Degeneration     Lower Back Pain Chronic     Carpal Tunnel Syndrome     Simple (Specific) Phobia     Dermatitis     Patellofemoral Syndrome Of The Right Knee      Tension-type headache     Obsessive compulsive disorder     Costochondritis     Fatigue     Chronic pain     Vitamin D deficiency     Calcaneal spur, right     Numbness of right lower extremity     Tarsal tunnel syndrome of right side     Compression neuropathy of right lower extremity     Mononeuritis lower limb, right     Right foot pain     Compression neuropathy     Obesity (BMI 35.0-39.9) with comorbidity (H)     Right shoulder injury, sequela        Past Medical History:   Diagnosis Date     Anemia      Bilateral calcaneal spurs     surgery 2015     Bursitis of hip      Chronic back pain      DDD (degenerative disc disease)      Depression      Diverticulitis      Environmental allergies      Fibromyalgia      History of transfusion     With      HLD (hyperlipidemia)      Obesity         Current Outpatient Medications   Medication     carBAMazepine (CARBATROL) 100 MG 12 hr capsule     cholecalciferol, vitamin D3, (VITAMIN D3) 2,000 unit Tab     ibuprofen (ADVIL/MOTRIN) 800 MG tablet     meclizine (ANTIVERT) 25 mg tablet     methocarbamol (ROBAXIN) 750 MG tablet     omeprazole (PRILOSEC) 40 MG capsule     pregabalin (LYRICA) 75 MG capsule     simvastatin (ZOCOR) 40 MG tablet     No current facility-administered medications for this visit.        Past Surgical History:   Procedure Laterality Date     C APPENDECTOMY      Description: Appendectomy;  Recorded: 10/18/2008;     C  DELIVERY ONLY      Description:  Section;  Recorded: 10/18/2008;     ENDOMETRIAL ABLATION       HC REMOVAL HEEL SPUR,  CALCANEUS Left 11/6/2015    Procedure: LEFT POSTERIOR CALCANEAL SPUR RESECTION;  Surgeon: Rob Marion DPM;  Location: [facility];  Service: Podiatry     HC REMOVAL OF TONSILS,<11 Y/O      Description: Tonsillectomy;  Recorded: 10/18/2008;     REPAIR TENDON ACHILLES Left 11/6/2015    Procedure: WITH SECONDARY ACHILLES TENDON REAPIR;  Surgeon: Rob Marion DPM;  Location: [facility];  Service:      REPAIR TENDON ACHILLES Right 11/3/2017    Procedure: WITH SECONDARY ACHILLES TENDON REPAIR;  Surgeon: Rob Marion DPM;  Location: [facility];  Service:         Social History     Socioeconomic History     Marital status:      Spouse name: Not on file     Number of children: Not on file     Years of education: Not on file     Highest education level: Not on file   Occupational History     Not on file   Tobacco Use     Smoking status: Current Every Day Smoker     Packs/day: 0.50     Types: Cigarettes     Smokeless tobacco: Never Used     Tobacco comment: Started Zyban 4/23/19   Substance and Sexual Activity     Alcohol use: No     Comment: Alcoholic Drinks/day: sober since 2013     Drug use: No     Sexual activity: Yes     Partners: Male   Other Topics Concern     Not on file   Social History Narrative     Not on file     Social Determinants of Health     Financial Resource Strain:      Difficulty of Paying Living Expenses:    Food Insecurity:      Worried About Running Out of Food in the Last Year:      Ran Out of Food in the Last Year:    Transportation Needs:      Lack of Transportation (Medical):      Lack of Transportation (Non-Medical):    Physical Activity:      Days of Exercise per Week:      Minutes of Exercise per Session:    Stress:      Feeling of Stress :    Social Connections:      Frequency of Communication with Friends and Family:      Frequency of Social Gatherings with Friends and Family:      Attends Judaism Services:      Active Member of Clubs or Organizations:       Attends Club or Organization Meetings:      Marital Status:    Intimate Partner Violence:      Fear of Current or Ex-Partner:      Emotionally Abused:      Physically Abused:      Sexually Abused:         Family History   Problem Relation Age of Onset     Lung Cancer Father      Alcoholism Father      Alcoholism Son      Hypertension Mother      Stomach Cancer Maternal Aunt      Breast Cancer Maternal Aunt      Breast Cancer Maternal Aunt         Review of Systems   Musculoskeletal: Positive for back pain (chronic).   Neurological: Positive for headaches (mostly behind left ear - pick-like, lasting few seconds but recurrent throughout day).   All other systems reviewed and are negative.       /84 (BP Location: Right arm, Patient Position: Sitting, Cuff Size: Adult Regular)   Pulse 118   Wt 114.3 kg (252 lb)   BMI 35.68 kg/m       Physical Exam  Constitutional:       General: She is not in acute distress.     Appearance: She is well-developed.   HENT:      Right Ear: Tympanic membrane and external ear normal.      Left Ear: Tympanic membrane and external ear normal.      Nose: Nose normal.      Mouth/Throat:      Pharynx: No oropharyngeal exudate.   Eyes:      General:         Right eye: No discharge.         Left eye: No discharge.      Conjunctiva/sclera: Conjunctivae normal.      Pupils: Pupils are equal, round, and reactive to light.   Neck:      Thyroid: No thyromegaly.      Trachea: No tracheal deviation.   Cardiovascular:      Rate and Rhythm: Normal rate and regular rhythm.      Pulses: Normal pulses.      Heart sounds: Normal heart sounds, S1 normal and S2 normal. No murmur heard.   No friction rub. No S3 or S4 sounds.    Pulmonary:      Effort: Pulmonary effort is normal. No respiratory distress.      Breath sounds: Normal breath sounds. No wheezing or rales.   Abdominal:      General: Bowel sounds are normal.      Palpations: Abdomen is soft. There is no mass.      Tenderness: There is no  abdominal tenderness.   Musculoskeletal:         General: Normal range of motion.      Cervical back: Neck supple.   Lymphadenopathy:      Cervical: No cervical adenopathy.   Skin:     General: Skin is warm and dry.      Findings: No rash.   Neurological:      General: No focal deficit present.      Mental Status: She is alert and oriented to person, place, and time.      Sensory: Sensory deficit (altered sensation at right lower extremity/foot) present.      Motor: No abnormal muscle tone.      Deep Tendon Reflexes: Reflexes are normal and symmetric.   Psychiatric:         Thought Content: Thought content normal.         Judgment: Judgment normal.          Results:  No results found for any visits on 09/22/21.    Medications at Conclusion of Visit:  Current Outpatient Medications   Medication Sig Dispense Refill     carBAMazepine (CARBATROL) 100 MG 12 hr capsule Take 1 capsule (100 mg) by mouth daily 180 capsule 1     cholecalciferol, vitamin D3, (VITAMIN D3) 2,000 unit Tab [CHOLECALCIFEROL, VITAMIN D3, (VITAMIN D3) 2,000 UNIT TAB] Take 2,000 Units by mouth at bedtime.        ibuprofen (ADVIL/MOTRIN) 800 MG tablet TAKE 1 TABLET BY MOUTH EVERY 6 HOURS AS NEEDED FOR PAIN 100 tablet 0     meclizine (ANTIVERT) 25 mg tablet [MECLIZINE (ANTIVERT) 25 MG TABLET] Take 1 tablet (25 mg total) by mouth 3 (three) times a day as needed for dizziness or nausea. 45 tablet 1     methocarbamol (ROBAXIN) 750 MG tablet TAKE 1 TABLET BY MOUTH AT BEDTIME AS NEEDED       omeprazole (PRILOSEC) 40 MG capsule [OMEPRAZOLE (PRILOSEC) 40 MG CAPSULE] Take 1 capsule by mouth once daily 90 capsule 2     pregabalin (LYRICA) 75 MG capsule Take 75 mg by mouth 2 times daily       simvastatin (ZOCOR) 40 MG tablet [SIMVASTATIN (ZOCOR) 40 MG TABLET] Take 1 tablet by mouth once daily 90 tablet 3         ZIGGY CARABALLO MD

## 2021-09-26 ASSESSMENT — ENCOUNTER SYMPTOMS
HEADACHES: 1
BACK PAIN: 1

## 2021-10-12 DIAGNOSIS — M79.7 FIBROMYALGIA: ICD-10-CM

## 2021-10-13 RX ORDER — IBUPROFEN 800 MG/1
TABLET, FILM COATED ORAL
Qty: 100 TABLET | Refills: 0 | Status: SHIPPED | OUTPATIENT
Start: 2021-10-13 | End: 2022-02-28

## 2021-10-13 NOTE — TELEPHONE ENCOUNTER
"Routing refill request to provider for review/approval because:  Labs out of range:  CBC    Last Written Prescription Date:  7/16/21  Last Fill Quantity: 100,  # refills: 0   Last office visit provider: 9/22/21     Requested Prescriptions   Pending Prescriptions Disp Refills     ibuprofen (ADVIL/MOTRIN) 800 MG tablet [Pharmacy Med Name: Ibuprofen 800 MG Oral Tablet] 100 tablet 0     Sig: TAKE 1 TABLET BY MOUTH EVERY 6 HOURS AS NEEDED FOR PAIN       NSAID Medications Failed - 10/12/2021 12:33 PM        Failed - Normal CBC on file in past 12 months     Recent Labs   Lab Test 02/24/21  0934   WBC 14.0*   RBC 4.50   HGB 13.1   HCT 40.5                    Passed - Blood pressure under 140/90 in past 12 months     BP Readings from Last 3 Encounters:   09/22/21 138/84   09/06/21 134/63   03/01/21 (!) 157/84                 Passed - Normal ALT on file in past 12 months     Recent Labs   Lab Test 02/24/21  0934   ALT 19             Passed - Normal AST on file in past 12 months     Recent Labs   Lab Test 02/24/21  0934   AST 17             Passed - Recent (12 mo) or future (30 days) visit within the authorizing provider's specialty     Patient has had an office visit with the authorizing provider or a provider within the authorizing providers department within the previous 12 mos or has a future within next 30 days. See \"Patient Info\" tab in inbasket, or \"Choose Columns\" in Meds & Orders section of the refill encounter.              Passed - Patient is age 6-64 years        Passed - Medication is active on med list        Passed - No active pregnancy on record        Passed - Normal serum creatinine on file in past 12 months     Recent Labs   Lab Test 02/24/21  0934   CR 0.78       Ok to refill medication if creatinine is low          Passed - No positive pregnancy test in past 12 months             Laura Hartman RN 10/13/21 11:53 AM  "

## 2021-10-16 ENCOUNTER — HEALTH MAINTENANCE LETTER (OUTPATIENT)
Age: 56
End: 2021-10-16

## 2021-10-26 ENCOUNTER — TRANSFERRED RECORDS (OUTPATIENT)
Dept: HEALTH INFORMATION MANAGEMENT | Facility: CLINIC | Age: 56
End: 2021-10-26
Payer: COMMERCIAL

## 2021-11-01 ENCOUNTER — OFFICE VISIT (OUTPATIENT)
Dept: NEUROLOGY | Facility: CLINIC | Age: 56
End: 2021-11-01
Attending: FAMILY MEDICINE
Payer: COMMERCIAL

## 2021-11-01 VITALS
BODY MASS INDEX: 36.51 KG/M2 | HEIGHT: 70 IN | SYSTOLIC BLOOD PRESSURE: 141 MMHG | WEIGHT: 255 LBS | DIASTOLIC BLOOD PRESSURE: 81 MMHG | HEART RATE: 81 BPM

## 2021-11-01 DIAGNOSIS — R51.9 NONINTRACTABLE HEADACHE, UNSPECIFIED CHRONICITY PATTERN, UNSPECIFIED HEADACHE TYPE: Primary | ICD-10-CM

## 2021-11-01 DIAGNOSIS — M54.2 NECK PAIN: ICD-10-CM

## 2021-11-01 PROCEDURE — 99205 OFFICE O/P NEW HI 60 MIN: CPT | Performed by: PSYCHIATRY & NEUROLOGY

## 2021-11-01 RX ORDER — INDOMETHACIN 25 MG/1
50 CAPSULE ORAL
Qty: 540 CAPSULE | Refills: 0 | Status: SHIPPED | OUTPATIENT
Start: 2021-11-01 | End: 2021-12-29

## 2021-11-01 RX ORDER — INDOMETHACIN 25 MG/1
50 CAPSULE ORAL
Qty: 270 CAPSULE | Refills: 1 | Status: SHIPPED | OUTPATIENT
Start: 2021-11-01 | End: 2021-11-01

## 2021-11-01 RX ORDER — PREGABALIN 150 MG/1
150 CAPSULE ORAL 2 TIMES DAILY
COMMUNITY
Start: 2021-10-26

## 2021-11-01 ASSESSMENT — MIFFLIN-ST. JEOR: SCORE: 1824.38

## 2021-11-01 NOTE — PROGRESS NOTES
NEUROLOGY OUTPATIENT CONSULT NOTE   2021     CHIEF COMPLAINT/REASON FOR VISIT/REASON FOR CONSULT  Patient presents with:  ER F/U: Trigeminal Neuralgia -     REASON FOR CONSULTATION- Facial pain- eval for trigeminal neuralgia    REFERRAL SOURCE  Dr. Annetta Harkins*  CC Dr. Annetta Harkins*    HISTORY OF PRESENT ILLNESS  Catherine Farrell is a 56 year old female seen today for headaches.  She reports that the headaches came on end of August 2 months ago.  Reports that there was no provoking factor.  Headaches have mainly remain behind the left ear.  They are more of a sharp stabbing ice pick like headache.  There is no other associated photophobia or aura phonophobia nausea.  Pain is intermittent and can last for 20 to 30 seconds.  She will have about 20 attacks a day.  She was seen in the emergency room and the pain got really severe.  She was thought to have trigeminal neuralgia and was put on carbamazepine.  Reports some benefit with the carbamazepine but the pain is still there.  It has gotten less severe compared to before.  Denies any other new symptoms.  Over-the-counter ibuprofen Tylenol has not helped.  Does take ibuprofen for generalized swelling and pain on the right side.  Denies any other triggers for the pain.    She did have some shoulder injury on the right side and has had a MRI of the cervical spine done through [facility].  Reports slight disc herniation at the C5 C7 level.  She wonders if the neck pain could be causing the ear pain.    Previous history is reviewed and this is unchanged.    PAST MEDICAL/SURGICAL HISTORY  Past Medical History:   Diagnosis Date     Anemia      Bilateral calcaneal spurs     surgery 2015     Bursitis of hip      Chronic back pain      DDD (degenerative disc disease)      Depression      Diverticulitis      Environmental allergies      Fibromyalgia      History of transfusion     With      HLD (hyperlipidemia)      Obesity       Patient Active Problem List   Diagnosis     Cough     Nicotine Dependence     Chronic Reflux Esophagitis     Fibromyalgia     Strain Of The Gastrocnemius Muscle Of The Right Leg     Adjustment Disorder With Depressed Mood     Joint Pain, Localized In The Knee     Pain During Urination (Dysuria)     Abdominal Pain     Diverticulitis Of Colon     Trochanteric Bursitis     Sinusitis     Midback Pain     Mixed hyperlipidemia     Abnormal Weight Loss     Abnormal Pap smear of cervix     Adverse Food Reaction (Not Anaphylactic)     Impingement Of The Right Shoulder     Hypertension     Head Injury     Oral Thrush     Lumbar Disc Degeneration     Lower Back Pain Chronic     Carpal Tunnel Syndrome     Simple (Specific) Phobia     Dermatitis     Patellofemoral Syndrome Of The Right Knee      Tension-type headache     Obsessive compulsive disorder     Costochondritis     Fatigue     Chronic pain     Vitamin D deficiency     Calcaneal spur, right     Numbness of right lower extremity     Tarsal tunnel syndrome of right side     Compression neuropathy of right lower extremity     Mononeuritis lower limb, right     Right foot pain     Compression neuropathy     Obesity (BMI 35.0-39.9) with comorbidity (H)     Right shoulder injury, sequela   Significant high cholesterol, migraines, arthritis    FAMILY HISTORY  Family History   Problem Relation Age of Onset     Lung Cancer Father      Alcoholism Father      Alcoholism Son      Hypertension Mother      Stomach Cancer Maternal Aunt      Breast Cancer Maternal Aunt      Breast Cancer Maternal Aunt    Family history positive for high cholesterol, high blood pressure, epilepsy, nervous breakdown, arthritis, cancer/leukemia.  No family history of headaches.    SOCIAL HISTORY  Social History     Tobacco Use     Smoking status: Current Every Day Smoker     Packs/day: 0.50     Types: Cigarettes     Smokeless tobacco: Never Used     Tobacco comment: Started Zyban 4/23/19   Substance  "Use Topics     Alcohol use: No     Comment: Alcoholic Drinks/day: sober since 2013     Drug use: No       SYSTEMS REVIEW  Twelve-system ROS was done and other than the HPI this was negative except for neck pain, back pain, arm and leg pain, joint pain, numbness and tingling, sleeping problems, headaches.  Sleeping problems are mainly related nerve issues in the legs.    MEDICATIONS  cholecalciferol, vitamin D3, (VITAMIN D3) 2,000 unit Tab, [CHOLECALCIFEROL, VITAMIN D3, (VITAMIN D3) 2,000 UNIT TAB] Take 2,000 Units by mouth at bedtime.   ibuprofen (ADVIL/MOTRIN) 800 MG tablet, TAKE 1 TABLET BY MOUTH EVERY 6 HOURS AS NEEDED FOR PAIN  meclizine (ANTIVERT) 25 mg tablet, [MECLIZINE (ANTIVERT) 25 MG TABLET] Take 1 tablet (25 mg total) by mouth 3 (three) times a day as needed for dizziness or nausea.  omeprazole (PRILOSEC) 40 MG capsule, [OMEPRAZOLE (PRILOSEC) 40 MG CAPSULE] Take 1 capsule by mouth once daily  pregabalin (LYRICA) 100 MG capsule,   simvastatin (ZOCOR) 40 MG tablet, [SIMVASTATIN (ZOCOR) 40 MG TABLET] Take 1 tablet by mouth once daily  methocarbamol (ROBAXIN) 750 MG tablet, TAKE 1 TABLET BY MOUTH AT BEDTIME AS NEEDED (Patient not taking: Reported on 11/1/2021)  pregabalin (LYRICA) 75 MG capsule, Take 75 mg by mouth 2 times daily (Patient not taking: Reported on 11/1/2021)    No current facility-administered medications on file prior to visit.       PHYSICAL EXAMINATION  VITALS: BP (!) 141/81 (BP Location: Right arm, Patient Position: Sitting)   Pulse 81   Ht 1.79 m (5' 10.47\")   Wt 115.7 kg (255 lb)   BMI 36.10 kg/m    GENERAL: Healthy appearing, alert, no acute distress, normal habitus.  CARDIOVASCULAR: Extremities warm and well perfused. Pulses present.   EYES: Funduscopic exam limited.  NEUROLOGICAL:  Patient is awake and oriented to self, place and time.  Attention span is normal.  Memory is grossly intact.  Language is fluent and follows commands appropriately.  Appropriate fund of knowledge. Cranial " nerves 2-12 are intact. There is no pronator drift.  Motor exam shows 5/5 strength in all extremities.  Tone is symmetric bilaterally in upper and lower extremities.  Reflexes are symmetric and absent in upper extremities and lower extremities. Sensory exam is grossly intact to light touch, pin prick and vibration.  Finger to nose and heel to shin is without dysmetria.  Romberg is negative.  Gait is normal and the patient is able to do tandem walk and walk on toes and heels.    DIAGNOSTICS  MRI  MRI C-spine report from [facility] reviewed.  Formal report is not available.    RELEVANT LABS  Component      Latest Ref Rng & Units 2/24/2021   WBC      4.0 - 11.0 thou/uL 14.0 (H)   RBC Count      3.80 - 5.40 mill/uL 4.50   Hemoglobin      12.0 - 16.0 g/dL 13.1   Hematocrit      35.0 - 47.0 % 40.5   MCV      80 - 100 fL 90   MCH      27.0 - 34.0 pg 29.1   MCHC      32.0 - 36.0 g/dL 32.3   RDW      11.0 - 14.5 % 14.1   Platelet Count      140 - 440 thou/uL 152   Mean Platelet Volume      7.0 - 10.0 fL 12.2 (H)   % Neutrophils      50 - 70 % 69   % Lymphocytes      20 - 40 % 24   % Monocytes      2 - 10 % 6   % Eosinophils      0 - 6 % 1   % Basophils      0 - 2 % 0   % Immature Granulocytes      <=0 % 0   Absolute Neutrophils      2.0 - 7.7 thou/uL 9.6 (H)   Absolute Lymphocytes      0.8 - 4.4 thou/uL 3.4   Absolute Monocytes      0.0 - 0.9 thou/uL 0.8   Eosinophils Absolute      0.0 - 0.4 thou/uL 0.2   Absolute Basophils      0.0 - 0.2 thou/uL 0.1   Absolute Immature Granulocytes      <=0.0 thou/uL 0.0   Sodium      136 - 145 mmol/L 141   Potassium      3.5 - 5.0 mmol/L 4.3   Chloride      98 - 107 mmol/L 105   Carbon Dioxide      22 - 31 mmol/L 22   Anion Gap      5 - 18 mmol/L 14   Glucose      70 - 125 mg/dL 103   Urea Nitrogen      8 - 22 mg/dL 9   Creatinine      0.60 - 1.10 mg/dL 0.78   GFR Estimate If Black      >60 mL/min/1.73m2 >60   GFR Estimate      >60 mL/min/1.73m2 >60   Bilirubin Total      0.0  - 1.0 mg/dL 0.4   Calcium      8.5 - 10.5 mg/dL 9.4   Protein Total      6.0 - 8.0 g/dL 7.3   Albumin      3.5 - 5.0 g/dL 4.2   Alkaline Phosphatase      45 - 120 U/L 60   AST      0 - 40 U/L 17   ALT      0 - 45 U/L 19       OUTSIDE RECORDS  Outside referral notes and chart notes were reviewed and pertinent information has been summarized (in addition to the HPI):-Patient was seen in the ER.  Thought to have trigeminal neuralgia.  Referred to neurology.  Carbamazepine was started.  Patient presented in September with a headache described as ice pick behind the left ear.  5 to 15 seconds increased frequency.  Head feeling fuzzy.  Did not vomit.  No imaging study was done.    Notes from orthopedics have also been reviewed.  Patient does have some C5 C7 degeneration seen on MRI.  No higher cervical degeneration.    Notes from primary care doctor also reviewed.  Patient was told that she has trigeminal neuralgia.  Was referred to neurology confirm the diagnosis.  Carbamazepine was refilled.  Has history of herniated disks and gets headaches from that.    IMPRESSION/REPORT/PLAN  Nonintractable headache, unspecified chronicity pattern, unspecified headache type-ice pick like headaches  Neck pain      This is a 56 year old female with new onset of left ear pain with intermittent pain.  Patient's headaches are suggestive of ice pick like headaches.  Symptoms are not suggestive of trigeminal neuralgia.  I will get a MRI of the brain to look for any structural lesions causing the symptoms.  Patient did have an MRI of the cervical spine through [facility] which I will request.  Based on the report through [facility] notes I do not believe the neck pain is causing these headaches.  Blood work was previously negative and with improving headaches will hold off on checking it again.    Discussed prognosis of ice pick like headaches.  Generally these are benign and go away on their own.  They are slowly getting  better for her.  I will put her on indomethacin and stop the carbamazepine.  She should not use ibuprofen with the indomethacin.  Indomethacin needs to be taken with food.    I can see her back in 2 months.    -     MR Brain w/o & w Contrast; Future  -     indomethacin (INDOCIN) 25 MG capsule; Take 2 capsules (50 mg) by mouth 3 times daily (with meals).  - Request MRI from [facility].    Return in about 2 months (around 1/1/2022) for In-Clinic Visit, After testing.    Over 61 minutes were spent coordinating the care for the patient on the day of the encounter.  This includes previsit, during visit and post visit activities as documented above.  Reviewing outside records from multiple sources.  Trying to get outside MRI cervical spine to review.  Reviewing emergency room notes.  Counseling patient regarding prognosis.  (Activities include but not inclusive of reviewing chart, reviewing outside records, reviewing labs and imaging study results as well as the images, patient visit time including getting history and exam,  use if applicable, review of test results with the patient and coming up with a plan in a shared model, counseling patient and family, education and answering patient questions, EMR , EMR diagnosis entry and problem list management, medication reconciliation and prescription management if applicable, paperwork if applicable, printing documents and documentation of the visit activities.)  Billing:-4 data points, 4 problems points        Thomas Newman MD  Neurologist  [facility]  Tel:- [phone]    This note was dictated using voice recognition software.  Any grammatical or context distortions are unintentional and inherent to the software.

## 2021-11-01 NOTE — LETTER
11/1/2021         RE: Catherine Farrell  [facility] [facility] [address] [facility] [address]        Dear Colleague,    Thank you for referring your patient, Catherine Farrell, to the [facility] [place]. Please see a copy of my visit note below.    NEUROLOGY OUTPATIENT CONSULT NOTE   Nov 1, 2021     CHIEF COMPLAINT/REASON FOR VISIT/REASON FOR CONSULT  Patient presents with:  ER F/U: Trigeminal Neuralgia -     REASON FOR CONSULTATION- Facial pain- eval for trigeminal neuralgia    REFERRAL SOURCE  Dr. Annetta Harkins*  CC Dr. Annetta Harkins*    HISTORY OF PRESENT ILLNESS  Catherine Farrell is a 56 year old female seen today for headaches.  She reports that the headaches came on end of August 2 months ago.  Reports that there was no provoking factor.  Headaches have mainly remain behind the left ear.  They are more of a sharp stabbing ice pick like headache.  There is no other associated photophobia or aura phonophobia nausea.  Pain is intermittent and can last for 20 to 30 seconds.  She will have about 20 attacks a day.  She was seen in the emergency room and the pain got really severe.  She was thought to have trigeminal neuralgia and was put on carbamazepine.  Reports some benefit with the carbamazepine but the pain is still there.  It has gotten less severe compared to before.  Denies any other new symptoms.  Over-the-counter ibuprofen Tylenol has not helped.  Does take ibuprofen for generalized swelling and pain on the right side.  Denies any other triggers for the pain.    She did have some shoulder injury on the right side and has had a MRI of the cervical spine done through [facility].  Reports slight disc herniation at the C5 C7 level.  She wonders if the neck pain could be causing the ear pain.    Previous history is reviewed and this is unchanged.    PAST MEDICAL/SURGICAL HISTORY  Past Medical History:   Diagnosis Date     Anemia      Bilateral calcaneal spurs      surgery 2015     Bursitis of hip      Chronic back pain      DDD (degenerative disc disease)      Depression      Diverticulitis      Environmental allergies      Fibromyalgia      History of transfusion     With      HLD (hyperlipidemia)      Obesity      Patient Active Problem List   Diagnosis     Cough     Nicotine Dependence     Chronic Reflux Esophagitis     Fibromyalgia     Strain Of The Gastrocnemius Muscle Of The Right Leg     Adjustment Disorder With Depressed Mood     Joint Pain, Localized In The Knee     Pain During Urination (Dysuria)     Abdominal Pain     Diverticulitis Of Colon     Trochanteric Bursitis     Sinusitis     Midback Pain     Mixed hyperlipidemia     Abnormal Weight Loss     Abnormal Pap smear of cervix     Adverse Food Reaction (Not Anaphylactic)     Impingement Of The Right Shoulder     Hypertension     Head Injury     Oral Thrush     Lumbar Disc Degeneration     Lower Back Pain Chronic     Carpal Tunnel Syndrome     Simple (Specific) Phobia     Dermatitis     Patellofemoral Syndrome Of The Right Knee      Tension-type headache     Obsessive compulsive disorder     Costochondritis     Fatigue     Chronic pain     Vitamin D deficiency     Calcaneal spur, right     Numbness of right lower extremity     Tarsal tunnel syndrome of right side     Compression neuropathy of right lower extremity     Mononeuritis lower limb, right     Right foot pain     Compression neuropathy     Obesity (BMI 35.0-39.9) with comorbidity (H)     Right shoulder injury, sequela   Significant high cholesterol, migraines, arthritis    FAMILY HISTORY  Family History   Problem Relation Age of Onset     Lung Cancer Father      Alcoholism Father      Alcoholism Son      Hypertension Mother      Stomach Cancer Maternal Aunt      Breast Cancer Maternal Aunt      Breast Cancer Maternal Aunt    Family history positive for high cholesterol, high blood pressure, epilepsy, nervous breakdown, arthritis,  "cancer/leukemia.  No family history of headaches.    SOCIAL HISTORY  Social History     Tobacco Use     Smoking status: Current Every Day Smoker     Packs/day: 0.50     Types: Cigarettes     Smokeless tobacco: Never Used     Tobacco comment: Started Zyban 4/23/19   Substance Use Topics     Alcohol use: No     Comment: Alcoholic Drinks/day: sober since 2013     Drug use: No       SYSTEMS REVIEW  Twelve-system ROS was done and other than the HPI this was negative except for neck pain, back pain, arm and leg pain, joint pain, numbness and tingling, sleeping problems, headaches.  Sleeping problems are mainly related nerve issues in the legs.    MEDICATIONS  cholecalciferol, vitamin D3, (VITAMIN D3) 2,000 unit Tab, [CHOLECALCIFEROL, VITAMIN D3, (VITAMIN D3) 2,000 UNIT TAB] Take 2,000 Units by mouth at bedtime.   ibuprofen (ADVIL/MOTRIN) 800 MG tablet, TAKE 1 TABLET BY MOUTH EVERY 6 HOURS AS NEEDED FOR PAIN  meclizine (ANTIVERT) 25 mg tablet, [MECLIZINE (ANTIVERT) 25 MG TABLET] Take 1 tablet (25 mg total) by mouth 3 (three) times a day as needed for dizziness or nausea.  omeprazole (PRILOSEC) 40 MG capsule, [OMEPRAZOLE (PRILOSEC) 40 MG CAPSULE] Take 1 capsule by mouth once daily  pregabalin (LYRICA) 100 MG capsule,   simvastatin (ZOCOR) 40 MG tablet, [SIMVASTATIN (ZOCOR) 40 MG TABLET] Take 1 tablet by mouth once daily  methocarbamol (ROBAXIN) 750 MG tablet, TAKE 1 TABLET BY MOUTH AT BEDTIME AS NEEDED (Patient not taking: Reported on 11/1/2021)  pregabalin (LYRICA) 75 MG capsule, Take 75 mg by mouth 2 times daily (Patient not taking: Reported on 11/1/2021)    No current facility-administered medications on file prior to visit.       PHYSICAL EXAMINATION  VITALS: BP (!) 141/81 (BP Location: Right arm, Patient Position: Sitting)   Pulse 81   Ht 1.79 m (5' 10.47\")   Wt 115.7 kg (255 lb)   BMI 36.10 kg/m    GENERAL: Healthy appearing, alert, no acute distress, normal habitus.  CARDIOVASCULAR: Extremities warm and well " perfused. Pulses present.   EYES: Funduscopic exam limited.  NEUROLOGICAL:  Patient is awake and oriented to self, place and time.  Attention span is normal.  Memory is grossly intact.  Language is fluent and follows commands appropriately.  Appropriate fund of knowledge. Cranial nerves 2-12 are intact. There is no pronator drift.  Motor exam shows 5/5 strength in all extremities.  Tone is symmetric bilaterally in upper and lower extremities.  Reflexes are symmetric and absent in upper extremities and lower extremities. Sensory exam is grossly intact to light touch, pin prick and vibration.  Finger to nose and heel to shin is without dysmetria.  Romberg is negative.  Gait is normal and the patient is able to do tandem walk and walk on toes and heels.    DIAGNOSTICS  MRI  MRI C-spine report from [facility] notes reviewed.  Formal report is not available.    RELEVANT LABS  Component      Latest Ref Rng & Units 2/24/2021   WBC      4.0 - 11.0 thou/uL 14.0 (H)   RBC Count      3.80 - 5.40 mill/uL 4.50   Hemoglobin      12.0 - 16.0 g/dL 13.1   Hematocrit      35.0 - 47.0 % 40.5   MCV      80 - 100 fL 90   MCH      27.0 - 34.0 pg 29.1   MCHC      32.0 - 36.0 g/dL 32.3   RDW      11.0 - 14.5 % 14.1   Platelet Count      140 - 440 thou/uL 152   Mean Platelet Volume      7.0 - 10.0 fL 12.2 (H)   % Neutrophils      50 - 70 % 69   % Lymphocytes      20 - 40 % 24   % Monocytes      2 - 10 % 6   % Eosinophils      0 - 6 % 1   % Basophils      0 - 2 % 0   % Immature Granulocytes      <=0 % 0   Absolute Neutrophils      2.0 - 7.7 thou/uL 9.6 (H)   Absolute Lymphocytes      0.8 - 4.4 thou/uL 3.4   Absolute Monocytes      0.0 - 0.9 thou/uL 0.8   Eosinophils Absolute      0.0 - 0.4 thou/uL 0.2   Absolute Basophils      0.0 - 0.2 thou/uL 0.1   Absolute Immature Granulocytes      <=0.0 thou/uL 0.0   Sodium      136 - 145 mmol/L 141   Potassium      3.5 - 5.0 mmol/L 4.3   Chloride      98 - 107 mmol/L 105   Carbon Dioxide      22  - 31 mmol/L 22   Anion Gap      5 - 18 mmol/L 14   Glucose      70 - 125 mg/dL 103   Urea Nitrogen      8 - 22 mg/dL 9   Creatinine      0.60 - 1.10 mg/dL 0.78   GFR Estimate If Black      >60 mL/min/1.73m2 >60   GFR Estimate      >60 mL/min/1.73m2 >60   Bilirubin Total      0.0 - 1.0 mg/dL 0.4   Calcium      8.5 - 10.5 mg/dL 9.4   Protein Total      6.0 - 8.0 g/dL 7.3   Albumin      3.5 - 5.0 g/dL 4.2   Alkaline Phosphatase      45 - 120 U/L 60   AST      0 - 40 U/L 17   ALT      0 - 45 U/L 19       OUTSIDE RECORDS  Outside referral notes and chart notes were reviewed and pertinent information has been summarized (in addition to the HPI):-Patient was seen in the ER.  Thought to have trigeminal neuralgia.  Referred to neurology.  Carbamazepine was started.  Patient presented in September with a headache described as ice pick behind the left ear.  5 to 15 seconds increased frequency.  Head feeling fuzzy.  Did not vomit.  No imaging study was done.    Notes from orthopedics have also been reviewed.  Patient does have some C5 C7 degeneration seen on MRI.  No higher cervical degeneration.    Notes from primary care doctor also reviewed.  Patient was told that she has trigeminal neuralgia.  Was referred to neurology confirm the diagnosis.  Carbamazepine was refilled.  Has history of herniated disks and gets headaches from that.    IMPRESSION/REPORT/PLAN  Nonintractable headache, unspecified chronicity pattern, unspecified headache type-ice pick like headaches  Neck pain      This is a 56 year old female with new onset of left ear pain with intermittent pain.  Patient's headaches are suggestive of ice pick like headaches.  Symptoms are not suggestive of trigeminal neuralgia.  I will get a MRI of the brain to look for any structural lesions causing the symptoms.  Patient did have an MRI of the cervical spine through [facility] which I will request.  Based on the report through [facility] notes I do not  believe the neck pain is causing these headaches.  Blood work was previously negative and with improving headaches will hold off on checking it again.    Discussed prognosis of ice pick like headaches.  Generally these are benign and go away on their own.  They are slowly getting better for her.  I will put her on indomethacin and stop the carbamazepine.  She should not use ibuprofen with the indomethacin.  Indomethacin needs to be taken with food.    I can see her back in 2 months.    -     MR Brain w/o & w Contrast; Future  -     indomethacin (INDOCIN) 25 MG capsule; Take 2 capsules (50 mg) by mouth 3 times daily (with meals).  - Request MRI from [facility].    Return in about 2 months (around 1/1/2022) for In-Clinic Visit, After testing.    Over 61 minutes were spent coordinating the care for the patient on the day of the encounter.  This includes previsit, during visit and post visit activities as documented above.  Reviewing outside records from multiple sources.  Trying to get outside MRI cervical spine to review.  Reviewing emergency room notes.  Counseling patient regarding prognosis.  (Activities include but not inclusive of reviewing chart, reviewing outside records, reviewing labs and imaging study results as well as the images, patient visit time including getting history and exam,  use if applicable, review of test results with the patient and coming up with a plan in a shared model, counseling patient and family, education and answering patient questions, EMR , EMR diagnosis entry and problem list management, medication reconciliation and prescription management if applicable, paperwork if applicable, printing documents and documentation of the visit activities.)  Billing:-4 data points, 4 problems points        Thomas Newman MD  Neurologist  [facility]  Tel:- [phone]    This note was dictated using voice recognition software.  Any  grammatical or context distortions are unintentional and inherent to the software.        Again, thank you for allowing me to participate in the care of your patient.        Sincerely,        Thomas Newman MD

## 2021-11-01 NOTE — NURSING NOTE
Chief Complaint   Patient presents with     ER F/U     Trigeminal Neuralgia -      Darleen Price MA on 11/1/2021 at 8:58 AM

## 2021-11-05 ENCOUNTER — TRANSFERRED RECORDS (OUTPATIENT)
Dept: HEALTH INFORMATION MANAGEMENT | Facility: CLINIC | Age: 56
End: 2021-11-05
Payer: COMMERCIAL

## 2021-11-15 ENCOUNTER — TRANSFERRED RECORDS (OUTPATIENT)
Dept: HEALTH INFORMATION MANAGEMENT | Facility: CLINIC | Age: 56
End: 2021-11-15
Payer: COMMERCIAL

## 2021-11-17 DIAGNOSIS — K21.00 REFLUX ESOPHAGITIS: ICD-10-CM

## 2021-11-19 RX ORDER — OMEPRAZOLE 40 MG/1
CAPSULE, DELAYED RELEASE ORAL
Qty: 90 CAPSULE | Refills: 3 | Status: SHIPPED | OUTPATIENT
Start: 2021-11-19 | End: 2022-11-11

## 2021-11-19 NOTE — TELEPHONE ENCOUNTER
"Last Written Prescription Date:  2/23/21  Last Fill Quantity: 90,  # refills: 2   Last office visit provider:  9/22/21     Requested Prescriptions   Pending Prescriptions Disp Refills     omeprazole (PRILOSEC) 40 MG DR capsule [Pharmacy Med Name: Omeprazole 40 MG Oral Capsule Delayed Release] 90 capsule 0     Sig: Take 1 capsule by mouth once daily       PPI Protocol Passed - 11/17/2021  9:17 PM        Passed - Not on Clopidogrel (unless Pantoprazole ordered)        Passed - No diagnosis of osteoporosis on record        Passed - Recent (12 mo) or future (30 days) visit within the authorizing provider's specialty     Patient has had an office visit with the authorizing provider or a provider within the authorizing providers department within the previous 12 mos or has a future within next 30 days. See \"Patient Info\" tab in inbasket, or \"Choose Columns\" in Meds & Orders section of the refill encounter.              Passed - Medication is active on med list        Passed - Patient is age 18 or older        Passed - No active pregnacy on record        Passed - No positive pregnancy test in past 12 months             Travis Hamilton RN 11/19/21 3:02 PM  "

## 2021-12-02 ENCOUNTER — HOSPITAL ENCOUNTER (OUTPATIENT)
Dept: MRI IMAGING | Facility: HOSPITAL | Age: 56
Discharge: HOME | End: 2021-12-02
Attending: PSYCHIATRY & NEUROLOGY | Admitting: PSYCHIATRY & NEUROLOGY
Payer: COMMERCIAL

## 2021-12-02 DIAGNOSIS — R51.9 NONINTRACTABLE HEADACHE, UNSPECIFIED CHRONICITY PATTERN, UNSPECIFIED HEADACHE TYPE: ICD-10-CM

## 2021-12-02 PROCEDURE — A9585 GADOBUTROL INJECTION: HCPCS | Performed by: PSYCHIATRY & NEUROLOGY

## 2021-12-02 PROCEDURE — 70553 MRI BRAIN STEM W/O & W/DYE: CPT

## 2021-12-02 PROCEDURE — 255N000002 HC RX 255 OP 636: Performed by: PSYCHIATRY & NEUROLOGY

## 2021-12-02 RX ORDER — GADOBUTROL 604.72 MG/ML
10 INJECTION INTRAVENOUS ONCE
Status: COMPLETED | OUTPATIENT
Start: 2021-12-02 | End: 2021-12-02

## 2021-12-02 RX ADMIN — GADOBUTROL 10 ML: 604.72 INJECTION INTRAVENOUS at 18:42

## 2021-12-28 ENCOUNTER — TRANSFERRED RECORDS (OUTPATIENT)
Dept: HEALTH INFORMATION MANAGEMENT | Facility: CLINIC | Age: 56
End: 2021-12-28
Payer: COMMERCIAL

## 2021-12-29 ENCOUNTER — OFFICE VISIT (OUTPATIENT)
Dept: NEUROLOGY | Facility: CLINIC | Age: 56
End: 2021-12-29
Payer: COMMERCIAL

## 2021-12-29 VITALS
WEIGHT: 255 LBS | DIASTOLIC BLOOD PRESSURE: 67 MMHG | SYSTOLIC BLOOD PRESSURE: 119 MMHG | HEART RATE: 90 BPM | BODY MASS INDEX: 36.51 KG/M2 | HEIGHT: 70 IN

## 2021-12-29 DIAGNOSIS — R51.9 NONINTRACTABLE HEADACHE, UNSPECIFIED CHRONICITY PATTERN, UNSPECIFIED HEADACHE TYPE: Primary | ICD-10-CM

## 2021-12-29 DIAGNOSIS — M54.2 NECK PAIN: ICD-10-CM

## 2021-12-29 PROCEDURE — 99214 OFFICE O/P EST MOD 30 MIN: CPT | Performed by: PSYCHIATRY & NEUROLOGY

## 2021-12-29 ASSESSMENT — MIFFLIN-ST. JEOR: SCORE: 1816.92

## 2021-12-29 NOTE — PROGRESS NOTES
NEUROLOGY OUTPATIENT PROGRESS NOTE   Dec 29, 2021     CHIEF COMPLAINT/REASON FOR VISIT/REASON FOR CONSULT  Patient presents with:  RECHECK: headaches     REASON FOR CONSULTATION- Facial pain- eval for trigeminal neuralgia    REFERRAL SOURCE  Dr. Annetta Harkins*  CC Dr. Annetta Harkins*    HISTORY OF PRESENT ILLNESS  Catherine Farrell is a 56 year old female seen today for headaches.  She reports that the headaches came on end of August 2 months ago.  Reports that there was no provoking factor.  Headaches have mainly remain behind the left ear.  They are more of a sharp stabbing ice pick like headache.  There is no other associated photophobia or aura phonophobia nausea.  Pain is intermittent and can last for 20 to 30 seconds.  She will have about 20 attacks a day.  She was seen in the emergency room and the pain got really severe.  She was thought to have trigeminal neuralgia and was put on carbamazepine.  Reports some benefit with the carbamazepine but the pain is still there.  It has gotten less severe compared to before.  Denies any other new symptoms.  Over-the-counter ibuprofen Tylenol has not helped.  Does take ibuprofen for generalized swelling and pain on the right side.  Denies any other triggers for the pain.    She did have some shoulder injury on the right side and has had a MRI of the cervical spine done through [facility].  Reports slight disc herniation at the C5 C7 level.  She wonders if the neck pain could be causing the ear pain.    12/29  Patient returns today.  He has been taking the indomethacin.  Her headaches have completely resolved at this point.  No side effects from the indomethacin.  Is interested in weaning it off.  Neck pain is better with nerve ablation.    Previous history is reviewed and this is unchanged.    PAST MEDICAL/SURGICAL HISTORY  Past Medical History:   Diagnosis Date     Anemia      Bilateral calcaneal spurs     surgery Nov. 2015     Bursitis of hip       Chronic back pain      DDD (degenerative disc disease)      Depression      Diverticulitis      Environmental allergies      Fibromyalgia      History of transfusion     With      HLD (hyperlipidemia)      Obesity      Patient Active Problem List   Diagnosis     Cough     Nicotine Dependence     Chronic Reflux Esophagitis     Fibromyalgia     Strain Of The Gastrocnemius Muscle Of The Right Leg     Adjustment Disorder With Depressed Mood     Joint Pain, Localized In The Knee     Pain During Urination (Dysuria)     Abdominal Pain     Diverticulitis Of Colon     Trochanteric Bursitis     Sinusitis     Midback Pain     Mixed hyperlipidemia     Abnormal Weight Loss     Abnormal Pap smear of cervix     Adverse Food Reaction (Not Anaphylactic)     Impingement Of The Right Shoulder     Hypertension     Head Injury     Oral Thrush     Lumbar Disc Degeneration     Lower Back Pain Chronic     Carpal Tunnel Syndrome     Simple (Specific) Phobia     Dermatitis     Patellofemoral Syndrome Of The Right Knee      Tension-type headache     Obsessive compulsive disorder     Costochondritis     Fatigue     Chronic pain     Vitamin D deficiency     Calcaneal spur, right     Numbness of right lower extremity     Tarsal tunnel syndrome of right side     Compression neuropathy of right lower extremity     Mononeuritis lower limb, right     Right foot pain     Compression neuropathy     Obesity (BMI 35.0-39.9) with comorbidity (H)     Right shoulder injury, sequela   Significant high cholesterol, migraines, arthritis    FAMILY HISTORY  Family History   Problem Relation Age of Onset     Lung Cancer Father      Alcoholism Father      Alcoholism Son      Hypertension Mother      Stomach Cancer Maternal Aunt      Breast Cancer Maternal Aunt      Breast Cancer Maternal Aunt    Family history positive for high cholesterol, high blood pressure, epilepsy, nervous breakdown, arthritis, cancer/leukemia.  No family history of  "headaches.    SOCIAL HISTORY  Social History     Tobacco Use     Smoking status: Current Every Day Smoker     Packs/day: 0.50     Types: Cigarettes     Smokeless tobacco: Never Used     Tobacco comment: Started Zyban 4/23/19   Substance Use Topics     Alcohol use: No     Comment: Alcoholic Drinks/day: sober since 2013     Drug use: No       SYSTEMS REVIEW  Twelve-system ROS was done and other than the HPI this was negative except for neck pain, back pain, arm and leg pain, joint pain, numbness and tingling, sleeping problems, headaches.  Sleeping problems are mainly related nerve issues in the legs.  No other new issues reported today.    MEDICATIONS  cholecalciferol, vitamin D3, (VITAMIN D3) 2,000 unit Tab, [CHOLECALCIFEROL, VITAMIN D3, (VITAMIN D3) 2,000 UNIT TAB] Take 2,000 Units by mouth at bedtime.   meclizine (ANTIVERT) 25 mg tablet, [MECLIZINE (ANTIVERT) 25 MG TABLET] Take 1 tablet (25 mg total) by mouth 3 (three) times a day as needed for dizziness or nausea.  omeprazole (PRILOSEC) 40 MG DR capsule, Take 1 capsule by mouth once daily  pregabalin (LYRICA) 100 MG capsule,   pregabalin (LYRICA) 75 MG capsule, Take 75 mg by mouth 2 times daily   simvastatin (ZOCOR) 40 MG tablet, [SIMVASTATIN (ZOCOR) 40 MG TABLET] Take 1 tablet by mouth once daily  ibuprofen (ADVIL/MOTRIN) 800 MG tablet, TAKE 1 TABLET BY MOUTH EVERY 6 HOURS AS NEEDED FOR PAIN (Patient not taking: Reported on 12/29/2021)  methocarbamol (ROBAXIN) 750 MG tablet, TAKE 1 TABLET BY MOUTH AT BEDTIME AS NEEDED (Patient not taking: Reported on 11/1/2021)    No current facility-administered medications on file prior to visit.       PHYSICAL EXAMINATION  VITALS: /67 (BP Location: Right arm, Patient Position: Sitting)   Pulse 90   Ht 1.778 m (5' 10\")   Wt 115.7 kg (255 lb)   BMI 36.59 kg/m    GENERAL: Healthy appearing, alert, no acute distress, normal habitus.  CARDIOVASCULAR: Extremities warm and well perfused. Pulses present.   NEUROLOGICAL:  " Patient is awake and oriented to self, place and time.  Attention span is normal.  Memory is grossly intact.  Language is fluent and follows commands appropriately.  Appropriate fund of knowledge. Cranial nerves 2-12 are intact. There is no pronator drift.  Motor exam shows 5/5 strength in all extremities.  Tone is symmetric bilaterally in upper and lower extremities. (Previously reflexes are symmetric and absent in upper extremities and lower extremities. Sensory exam is grossly intact to light touch, pin prick and vibration.)  Finger to nose and heel to shin is without dysmetria.  Romberg is negative.  Gait is normal and the patient is able to do tandem walk and walk on toes and heels.    DIAGNOSTICS  MRI  MRI C-spine report from [facility] notes reviewed.  Formal report is not available.    RELEVANT LABS  Component      Latest Ref Rng & Units 2/24/2021   WBC      4.0 - 11.0 thou/uL 14.0 (H)   RBC Count      3.80 - 5.40 mill/uL 4.50   Hemoglobin      12.0 - 16.0 g/dL 13.1   Hematocrit      35.0 - 47.0 % 40.5   MCV      80 - 100 fL 90   MCH      27.0 - 34.0 pg 29.1   MCHC      32.0 - 36.0 g/dL 32.3   RDW      11.0 - 14.5 % 14.1   Platelet Count      140 - 440 thou/uL 152   Mean Platelet Volume      7.0 - 10.0 fL 12.2 (H)   % Neutrophils      50 - 70 % 69   % Lymphocytes      20 - 40 % 24   % Monocytes      2 - 10 % 6   % Eosinophils      0 - 6 % 1   % Basophils      0 - 2 % 0   % Immature Granulocytes      <=0 % 0   Absolute Neutrophils      2.0 - 7.7 thou/uL 9.6 (H)   Absolute Lymphocytes      0.8 - 4.4 thou/uL 3.4   Absolute Monocytes      0.0 - 0.9 thou/uL 0.8   Eosinophils Absolute      0.0 - 0.4 thou/uL 0.2   Absolute Basophils      0.0 - 0.2 thou/uL 0.1   Absolute Immature Granulocytes      <=0.0 thou/uL 0.0   Sodium      136 - 145 mmol/L 141   Potassium      3.5 - 5.0 mmol/L 4.3   Chloride      98 - 107 mmol/L 105   Carbon Dioxide      22 - 31 mmol/L 22   Anion Gap      5 - 18 mmol/L 14   Glucose       70 - 125 mg/dL 103   Urea Nitrogen      8 - 22 mg/dL 9   Creatinine      0.60 - 1.10 mg/dL 0.78   GFR Estimate If Black      >60 mL/min/1.73m2 >60   GFR Estimate      >60 mL/min/1.73m2 >60   Bilirubin Total      0.0 - 1.0 mg/dL 0.4   Calcium      8.5 - 10.5 mg/dL 9.4   Protein Total      6.0 - 8.0 g/dL 7.3   Albumin      3.5 - 5.0 g/dL 4.2   Alkaline Phosphatase      45 - 120 U/L 60   AST      0 - 40 U/L 17   ALT      0 - 45 U/L 19       OUTSIDE RECORDS  Outside referral notes and chart notes were reviewed and pertinent information has been summarized (in addition to the HPI):-Patient was seen in the ER.  Thought to have trigeminal neuralgia.  Referred to neurology.  Carbamazepine was started.  Patient presented in September with a headache described as ice pick behind the left ear.  5 to 15 seconds increased frequency.  Head feeling fuzzy.  Did not vomit.  No imaging study was done.    Notes from orthopedics have also been reviewed.  Patient does have some C5 C7 degeneration seen on MRI.  No higher cervical degeneration.    Notes from primary care doctor also reviewed.  Patient was told that she has trigeminal neuralgia.  Was referred to neurology confirm the diagnosis.  Carbamazepine was refilled.  Has history of herniated disks and gets headaches from that.    MRI brain   -images reviewed with patient.  No major structural issues noted.         Has a few T2 hyperintensities.                                                       IMPRESSION:  1.  No acute/subacute infarction, intracranial hemorrhage, mass effect, hydrocephalus, or abnormal enhancement.  2.  Presumed sequelae of mild chronic small vessel ischemic disease.    MRI C spine per [facility]  MRI C-spine done through [facility] shows C5-C6 degenerative disease at C2-C5 bilateral mild to moderate facet arthropathy.    IMPRESSION/REPORT/PLAN  Nonintractable headache, unspecified chronicity pattern, unspecified headache type-ice pick like  headaches  Neck pain-cervical spine degeneration  Mild T2 hyperintensities on the MRI of the brain.      This is a 56 year old female with new onset of left ear pain with intermittent pain.  Patient's headaches are suggestive of ice pick like headaches.  Symptoms are not suggestive of trigeminal neuralgia.  MRI brain is negative for structural lesions.  Shows mild T2 hyperintensities unrelated to her symptoms.  I will get a MRI of the brain to look for any structural lesions causing the symptoms.  MRI C-spine does show some cervical spine degeneration.  Currently she has had neck ablation with pain being improved. I do not believe the neck pain is causing these headaches.  Blood work has been negative previously for causes of headaches.    Discussed prognosis of ice pick like headaches.  Currently these have resolved with indomethacin.  This should not come back.  She can stay off the carbamazepine growth.  We will slowly wean off the indomethacin and see how she does.  Instructions were given to the patient.     I can see her back on as-needed basis.    -     indomethacin (INDOCIN) 25 MG capsule; Take 2 capsules (50 mg) by mouth 3 times daily (with meals)-- STOP over 2-3 weeks    Return if symptoms worsen or fail to improve, for In-Clinic Visit.    Over 35 minutes were spent coordinating the care for the patient on the day of the encounter.  This includes previsit, during visit and post visit activities as documented above.  Reviewing multiple MRIs.  Prescription management.  Counseling patient.  (Activities include but not inclusive of reviewing chart, reviewing outside records, reviewing labs and imaging study results as well as the images, patient visit time including getting history and exam,  use if applicable, review of test results with the patient and coming up with a plan in a shared model, counseling patient and family, education and answering patient questions, EMR , EMR diagnosis  entry and problem list management, medication reconciliation and prescription management if applicable, paperwork if applicable, printing documents and documentation of the visit activities.)        Thomas Newman MD  Neurologist  [facility]  Tel:- [phone]    This note was dictated using voice recognition software.  Any grammatical or context distortions are unintentional and inherent to the software.

## 2021-12-29 NOTE — NURSING NOTE
Chief Complaint   Patient presents with     RECHECK     headaches      Sue Alvarez MA on 12/29/2021 at 1:13 PM

## 2021-12-29 NOTE — LETTER
12/29/2021         RE: Catherine Farrell  [facility] [address] [facility] [address]        Dear Colleague,    Thank you for referring your patient, Catherine Farrell, to the [facility] [place]. Please see a copy of my visit note below.    NEUROLOGY OUTPATIENT PROGRESS NOTE   Dec 29, 2021     CHIEF COMPLAINT/REASON FOR VISIT/REASON FOR CONSULT  Patient presents with:  RECHECK: headaches     REASON FOR CONSULTATION- Facial pain- eval for trigeminal neuralgia    REFERRAL SOURCE  Dr. Annetta Harkins*  CC Dr. Annetta Harkins*    HISTORY OF PRESENT ILLNESS  Catherine Farrell is a 56 year old female seen today for headaches.  She reports that the headaches came on end of August 2 months ago.  Reports that there was no provoking factor.  Headaches have mainly remain behind the left ear.  They are more of a sharp stabbing ice pick like headache.  There is no other associated photophobia or aura phonophobia nausea.  Pain is intermittent and can last for 20 to 30 seconds.  She will have about 20 attacks a day.  She was seen in the emergency room and the pain got really severe.  She was thought to have trigeminal neuralgia and was put on carbamazepine.  Reports some benefit with the carbamazepine but the pain is still there.  It has gotten less severe compared to before.  Denies any other new symptoms.  Over-the-counter ibuprofen Tylenol has not helped.  Does take ibuprofen for generalized swelling and pain on the right side.  Denies any other triggers for the pain.    She did have some shoulder injury on the right side and has had a MRI of the cervical spine done through [facility].  Reports slight disc herniation at the C5 C7 level.  She wonders if the neck pain could be causing the ear pain.    12/29  Patient returns today.  He has been taking the indomethacin.  Her headaches have completely resolved at this point.  No side effects from the indomethacin.  Is interested in  weaning it off.  Neck pain is better with nerve ablation.    Previous history is reviewed and this is unchanged.    PAST MEDICAL/SURGICAL HISTORY  Past Medical History:   Diagnosis Date     Anemia      Bilateral calcaneal spurs     surgery 2015     Bursitis of hip      Chronic back pain      DDD (degenerative disc disease)      Depression      Diverticulitis      Environmental allergies      Fibromyalgia      History of transfusion     With      HLD (hyperlipidemia)      Obesity      Patient Active Problem List   Diagnosis     Cough     Nicotine Dependence     Chronic Reflux Esophagitis     Fibromyalgia     Strain Of The Gastrocnemius Muscle Of The Right Leg     Adjustment Disorder With Depressed Mood     Joint Pain, Localized In The Knee     Pain During Urination (Dysuria)     Abdominal Pain     Diverticulitis Of Colon     Trochanteric Bursitis     Sinusitis     Midback Pain     Mixed hyperlipidemia     Abnormal Weight Loss     Abnormal Pap smear of cervix     Adverse Food Reaction (Not Anaphylactic)     Impingement Of The Right Shoulder     Hypertension     Head Injury     Oral Thrush     Lumbar Disc Degeneration     Lower Back Pain Chronic     Carpal Tunnel Syndrome     Simple (Specific) Phobia     Dermatitis     Patellofemoral Syndrome Of The Right Knee      Tension-type headache     Obsessive compulsive disorder     Costochondritis     Fatigue     Chronic pain     Vitamin D deficiency     Calcaneal spur, right     Numbness of right lower extremity     Tarsal tunnel syndrome of right side     Compression neuropathy of right lower extremity     Mononeuritis lower limb, right     Right foot pain     Compression neuropathy     Obesity (BMI 35.0-39.9) with comorbidity (H)     Right shoulder injury, sequela   Significant high cholesterol, migraines, arthritis    FAMILY HISTORY  Family History   Problem Relation Age of Onset     Lung Cancer Father      Alcoholism Father      Alcoholism Son       Hypertension Mother      Stomach Cancer Maternal Aunt      Breast Cancer Maternal Aunt      Breast Cancer Maternal Aunt    Family history positive for high cholesterol, high blood pressure, epilepsy, nervous breakdown, arthritis, cancer/leukemia.  No family history of headaches.    SOCIAL HISTORY  Social History     Tobacco Use     Smoking status: Current Every Day Smoker     Packs/day: 0.50     Types: Cigarettes     Smokeless tobacco: Never Used     Tobacco comment: Started Zyban 4/23/19   Substance Use Topics     Alcohol use: No     Comment: Alcoholic Drinks/day: sober since 2013     Drug use: No       SYSTEMS REVIEW  Twelve-system ROS was done and other than the HPI this was negative except for neck pain, back pain, arm and leg pain, joint pain, numbness and tingling, sleeping problems, headaches.  Sleeping problems are mainly related nerve issues in the legs.  No other new issues reported today.    MEDICATIONS  cholecalciferol, vitamin D3, (VITAMIN D3) 2,000 unit Tab, [CHOLECALCIFEROL, VITAMIN D3, (VITAMIN D3) 2,000 UNIT TAB] Take 2,000 Units by mouth at bedtime.   meclizine (ANTIVERT) 25 mg tablet, [MECLIZINE (ANTIVERT) 25 MG TABLET] Take 1 tablet (25 mg total) by mouth 3 (three) times a day as needed for dizziness or nausea.  omeprazole (PRILOSEC) 40 MG DR capsule, Take 1 capsule by mouth once daily  pregabalin (LYRICA) 100 MG capsule,   pregabalin (LYRICA) 75 MG capsule, Take 75 mg by mouth 2 times daily   simvastatin (ZOCOR) 40 MG tablet, [SIMVASTATIN (ZOCOR) 40 MG TABLET] Take 1 tablet by mouth once daily  ibuprofen (ADVIL/MOTRIN) 800 MG tablet, TAKE 1 TABLET BY MOUTH EVERY 6 HOURS AS NEEDED FOR PAIN (Patient not taking: Reported on 12/29/2021)  methocarbamol (ROBAXIN) 750 MG tablet, TAKE 1 TABLET BY MOUTH AT BEDTIME AS NEEDED (Patient not taking: Reported on 11/1/2021)    No current facility-administered medications on file prior to visit.       PHYSICAL EXAMINATION  VITALS: /67 (BP Location: Right  "arm, Patient Position: Sitting)   Pulse 90   Ht 1.778 m (5' 10\")   Wt 115.7 kg (255 lb)   BMI 36.59 kg/m    GENERAL: Healthy appearing, alert, no acute distress, normal habitus.  CARDIOVASCULAR: Extremities warm and well perfused. Pulses present.   NEUROLOGICAL:  Patient is awake and oriented to self, place and time.  Attention span is normal.  Memory is grossly intact.  Language is fluent and follows commands appropriately.  Appropriate fund of knowledge. Cranial nerves 2-12 are intact. There is no pronator drift.  Motor exam shows 5/5 strength in all extremities.  Tone is symmetric bilaterally in upper and lower extremities. (Previously reflexes are symmetric and absent in upper extremities and lower extremities. Sensory exam is grossly intact to light touch, pin prick and vibration.)  Finger to nose and heel to shin is without dysmetria.  Romberg is negative.  Gait is normal and the patient is able to do tandem walk and walk on toes and heels.    DIAGNOSTICS  MRI  MRI C-spine report from [facility] notes reviewed.  Formal report is not available.    RELEVANT LABS  Component      Latest Ref Rng & Units 2/24/2021   WBC      4.0 - 11.0 thou/uL 14.0 (H)   RBC Count      3.80 - 5.40 mill/uL 4.50   Hemoglobin      12.0 - 16.0 g/dL 13.1   Hematocrit      35.0 - 47.0 % 40.5   MCV      80 - 100 fL 90   MCH      27.0 - 34.0 pg 29.1   MCHC      32.0 - 36.0 g/dL 32.3   RDW      11.0 - 14.5 % 14.1   Platelet Count      140 - 440 thou/uL 152   Mean Platelet Volume      7.0 - 10.0 fL 12.2 (H)   % Neutrophils      50 - 70 % 69   % Lymphocytes      20 - 40 % 24   % Monocytes      2 - 10 % 6   % Eosinophils      0 - 6 % 1   % Basophils      0 - 2 % 0   % Immature Granulocytes      <=0 % 0   Absolute Neutrophils      2.0 - 7.7 thou/uL 9.6 (H)   Absolute Lymphocytes      0.8 - 4.4 thou/uL 3.4   Absolute Monocytes      0.0 - 0.9 thou/uL 0.8   Eosinophils Absolute      0.0 - 0.4 thou/uL 0.2   Absolute Basophils      0.0 - " 0.2 thou/uL 0.1   Absolute Immature Granulocytes      <=0.0 thou/uL 0.0   Sodium      136 - 145 mmol/L 141   Potassium      3.5 - 5.0 mmol/L 4.3   Chloride      98 - 107 mmol/L 105   Carbon Dioxide      22 - 31 mmol/L 22   Anion Gap      5 - 18 mmol/L 14   Glucose      70 - 125 mg/dL 103   Urea Nitrogen      8 - 22 mg/dL 9   Creatinine      0.60 - 1.10 mg/dL 0.78   GFR Estimate If Black      >60 mL/min/1.73m2 >60   GFR Estimate      >60 mL/min/1.73m2 >60   Bilirubin Total      0.0 - 1.0 mg/dL 0.4   Calcium      8.5 - 10.5 mg/dL 9.4   Protein Total      6.0 - 8.0 g/dL 7.3   Albumin      3.5 - 5.0 g/dL 4.2   Alkaline Phosphatase      45 - 120 U/L 60   AST      0 - 40 U/L 17   ALT      0 - 45 U/L 19       OUTSIDE RECORDS  Outside referral notes and chart notes were reviewed and pertinent information has been summarized (in addition to the HPI):-Patient was seen in the ER.  Thought to have trigeminal neuralgia.  Referred to neurology.  Carbamazepine was started.  Patient presented in September with a headache described as ice pick behind the left ear.  5 to 15 seconds increased frequency.  Head feeling fuzzy.  Did not vomit.  No imaging study was done.    Notes from orthopedics have also been reviewed.  Patient does have some C5 C7 degeneration seen on MRI.  No higher cervical degeneration.    Notes from primary care doctor also reviewed.  Patient was told that she has trigeminal neuralgia.  Was referred to neurology confirm the diagnosis.  Carbamazepine was refilled.  Has history of herniated disks and gets headaches from that.    MRI brain   -images reviewed with patient.  No major structural issues noted.         Has a few T2 hyperintensities.                                                       IMPRESSION:  1.  No acute/subacute infarction, intracranial hemorrhage, mass effect, hydrocephalus, or abnormal enhancement.  2.  Presumed sequelae of mild chronic small vessel ischemic disease.    MRI C spine per [facility] notes  MRI C-spine done through [facility] shows C5-C6 degenerative disease at C2-C5 bilateral mild to moderate facet arthropathy.    IMPRESSION/REPORT/PLAN  Nonintractable headache, unspecified chronicity pattern, unspecified headache type-ice pick like headaches  Neck pain-cervical spine degeneration  Mild T2 hyperintensities on the MRI of the brain.      This is a 56 year old female with new onset of left ear pain with intermittent pain.  Patient's headaches are suggestive of ice pick like headaches.  Symptoms are not suggestive of trigeminal neuralgia.  MRI brain is negative for structural lesions.  Shows mild T2 hyperintensities unrelated to her symptoms.  I will get a MRI of the brain to look for any structural lesions causing the symptoms.  MRI C-spine does show some cervical spine degeneration.  Currently she has had neck ablation with pain being improved. I do not believe the neck pain is causing these headaches.  Blood work has been negative previously for causes of headaches.    Discussed prognosis of ice pick like headaches.  Currently these have resolved with indomethacin.  This should not come back.  She can stay off the carbamazepine growth.  We will slowly wean off the indomethacin and see how she does.  Instructions were given to the patient.     I can see her back on as-needed basis.    -     indomethacin (INDOCIN) 25 MG capsule; Take 2 capsules (50 mg) by mouth 3 times daily (with meals)-- STOP over 2-3 weeks    Return if symptoms worsen or fail to improve, for In-Clinic Visit.    Over 35 minutes were spent coordinating the care for the patient on the day of the encounter.  This includes previsit, during visit and post visit activities as documented above.  Reviewing multiple MRIs.  Prescription management.  Counseling patient.  (Activities include but not inclusive of reviewing chart, reviewing outside records, reviewing labs and imaging study results as well as the images,  patient visit time including getting history and exam,  use if applicable, review of test results with the patient and coming up with a plan in a shared model, counseling patient and family, education and answering patient questions, EMR , EMR diagnosis entry and problem list management, medication reconciliation and prescription management if applicable, paperwork if applicable, printing documents and documentation of the visit activities.)        Thomas Newman MD  Neurologist  [facility]  Tel:- [phone]    This note was dictated using voice recognition software.  Any grammatical or context distortions are unintentional and inherent to the software.        Again, thank you for allowing me to participate in the care of your patient.        Sincerely,        Thomas Newman MD

## 2022-02-24 DIAGNOSIS — M79.7 FIBROMYALGIA: ICD-10-CM

## 2022-02-26 NOTE — TELEPHONE ENCOUNTER
"Routing refill request to provider for review/approval because:  Labs out of range:  CBC    Last Written Prescription Date:  10/13/21  Last Fill Quantity: 100,  # refills: 0   Last office visit provider:  9/22/21     Requested Prescriptions   Pending Prescriptions Disp Refills     ibuprofen (ADVIL/MOTRIN) 800 MG tablet [Pharmacy Med Name: Ibuprofen 800 MG Oral Tablet] 100 tablet 0     Sig: TAKE 1 TABLET BY MOUTH EVERY 6 HOURS AS NEEDED FOR PAIN       NSAID Medications Failed - 2/24/2022  4:57 PM        Failed - Normal CBC on file in past 12 months     Recent Labs   Lab Test 02/24/21  0934   WBC 14.0*   RBC 4.50   HGB 13.1   HCT 40.5                    Passed - Blood pressure under 140/90 in past 12 months     BP Readings from Last 3 Encounters:   12/29/21 119/67   11/01/21 (!) 141/81   09/22/21 138/84                 Passed - Normal ALT on file in past 12 months     Recent Labs   Lab Test 02/24/21  0934   ALT 19             Passed - Normal AST on file in past 12 months     Recent Labs   Lab Test 02/24/21  0934   AST 17             Passed - Recent (12 mo) or future (30 days) visit within the authorizing provider's specialty     Patient has had an office visit with the authorizing provider or a provider within the authorizing providers department within the previous 12 mos or has a future within next 30 days. See \"Patient Info\" tab in inbasket, or \"Choose Columns\" in Meds & Orders section of the refill encounter.              Passed - Patient is age 6-64 years        Passed - Medication is active on med list        Passed - No active pregnancy on record        Passed - Normal serum creatinine on file in past 12 months     Recent Labs   Lab Test 02/24/21  0934   CR 0.78       Ok to refill medication if creatinine is low          Passed - No positive pregnancy test in past 12 months             vik dawn RN 02/26/22 3:25 PM  "

## 2022-02-28 RX ORDER — IBUPROFEN 800 MG/1
TABLET, FILM COATED ORAL
Qty: 100 TABLET | Refills: 0 | Status: SHIPPED | OUTPATIENT
Start: 2022-02-28 | End: 2024-05-13

## 2022-03-02 ENCOUNTER — HOSPITAL ENCOUNTER (INPATIENT)
Facility: HOSPITAL | Age: 57
LOS: 2 days | Discharge: HOME | End: 2022-03-04
Attending: EMERGENCY MEDICINE | Admitting: STUDENT IN AN ORGANIZED HEALTH CARE EDUCATION/TRAINING PROGRAM
Payer: COMMERCIAL

## 2022-03-02 ENCOUNTER — APPOINTMENT (OUTPATIENT)
Dept: CT IMAGING | Facility: HOSPITAL | Age: 57
End: 2022-03-02
Attending: FAMILY MEDICINE
Payer: COMMERCIAL

## 2022-03-02 DIAGNOSIS — K57.32 DIVERTICULITIS OF COLON: ICD-10-CM

## 2022-03-02 LAB
ALBUMIN SERPL-MCNC: 4.1 G/DL (ref 3.5–5)
ALP SERPL-CCNC: 57 U/L (ref 45–120)
ALT SERPL W P-5'-P-CCNC: 14 U/L (ref 0–45)
ANION GAP SERPL CALCULATED.3IONS-SCNC: 12 MMOL/L (ref 5–18)
AST SERPL W P-5'-P-CCNC: 15 U/L (ref 0–40)
BASOPHILS # BLD AUTO: 0.1 10E3/UL (ref 0–0.2)
BASOPHILS NFR BLD AUTO: 1 %
BILIRUB DIRECT SERPL-MCNC: 0.1 MG/DL
BILIRUB SERPL-MCNC: 0.4 MG/DL (ref 0–1)
BUN SERPL-MCNC: 9 MG/DL (ref 8–22)
CALCIUM SERPL-MCNC: 9.6 MG/DL (ref 8.5–10.5)
CHLORIDE BLD-SCNC: 105 MMOL/L (ref 98–107)
CO2 SERPL-SCNC: 20 MMOL/L (ref 22–31)
CREAT SERPL-MCNC: 0.8 MG/DL (ref 0.6–1.1)
EOSINOPHIL # BLD AUTO: 0.1 10E3/UL (ref 0–0.7)
EOSINOPHIL NFR BLD AUTO: 0 %
ERYTHROCYTE [DISTWIDTH] IN BLOOD BY AUTOMATED COUNT: 13.6 % (ref 10–15)
GFR SERPL CREATININE-BSD FRML MDRD: 85 ML/MIN/1.73M2
GLUCOSE BLD-MCNC: 121 MG/DL (ref 70–125)
HCT VFR BLD AUTO: 40 % (ref 35–47)
HGB BLD-MCNC: 13 G/DL (ref 11.7–15.7)
HOLD SPECIMEN: NORMAL
IMM GRANULOCYTES # BLD: 0.1 10E3/UL
IMM GRANULOCYTES NFR BLD: 1 %
LACTATE SERPL-SCNC: 1.1 MMOL/L (ref 0.7–2)
LIPASE SERPL-CCNC: 32 U/L (ref 0–52)
LYMPHOCYTES # BLD AUTO: 2.9 10E3/UL (ref 0.8–5.3)
LYMPHOCYTES NFR BLD AUTO: 17 %
MCH RBC QN AUTO: 29 PG (ref 26.5–33)
MCHC RBC AUTO-ENTMCNC: 32.5 G/DL (ref 31.5–36.5)
MCV RBC AUTO: 89 FL (ref 78–100)
MONOCYTES # BLD AUTO: 1.2 10E3/UL (ref 0–1.3)
MONOCYTES NFR BLD AUTO: 7 %
NEUTROPHILS # BLD AUTO: 13.3 10E3/UL (ref 1.6–8.3)
NEUTROPHILS NFR BLD AUTO: 74 %
NRBC # BLD AUTO: 0 10E3/UL
NRBC BLD AUTO-RTO: 0 /100
PLATELET # BLD AUTO: 232 10E3/UL (ref 150–450)
POTASSIUM BLD-SCNC: 4.2 MMOL/L (ref 3.5–5)
PROT SERPL-MCNC: 7.8 G/DL (ref 6–8)
RBC # BLD AUTO: 4.49 10E6/UL (ref 3.8–5.2)
SARS-COV-2 RNA RESP QL NAA+PROBE: NEGATIVE
SODIUM SERPL-SCNC: 137 MMOL/L (ref 136–145)
WBC # BLD AUTO: 17.6 10E3/UL (ref 4–11)

## 2022-03-02 PROCEDURE — 96365 THER/PROPH/DIAG IV INF INIT: CPT

## 2022-03-02 PROCEDURE — 36415 COLL VENOUS BLD VENIPUNCTURE: CPT | Performed by: FAMILY MEDICINE

## 2022-03-02 PROCEDURE — 258N000003 HC RX IP 258 OP 636: Performed by: FAMILY MEDICINE

## 2022-03-02 PROCEDURE — 82310 ASSAY OF CALCIUM: CPT | Performed by: FAMILY MEDICINE

## 2022-03-02 PROCEDURE — 85025 COMPLETE CBC W/AUTO DIFF WBC: CPT | Performed by: FAMILY MEDICINE

## 2022-03-02 PROCEDURE — 120N000001 HC R&B MED SURG/OB

## 2022-03-02 PROCEDURE — 99222 1ST HOSP IP/OBS MODERATE 55: CPT | Performed by: STUDENT IN AN ORGANIZED HEALTH CARE EDUCATION/TRAINING PROGRAM

## 2022-03-02 PROCEDURE — 83605 ASSAY OF LACTIC ACID: CPT | Performed by: FAMILY MEDICINE

## 2022-03-02 PROCEDURE — 82248 BILIRUBIN DIRECT: CPT | Performed by: FAMILY MEDICINE

## 2022-03-02 PROCEDURE — 250N000013 HC RX MED GY IP 250 OP 250 PS 637: Performed by: STUDENT IN AN ORGANIZED HEALTH CARE EDUCATION/TRAINING PROGRAM

## 2022-03-02 PROCEDURE — 250N000011 HC RX IP 250 OP 636: Performed by: FAMILY MEDICINE

## 2022-03-02 PROCEDURE — 99207 PR CDG-CODE CATEGORY CHANGED: CPT | Performed by: STUDENT IN AN ORGANIZED HEALTH CARE EDUCATION/TRAINING PROGRAM

## 2022-03-02 PROCEDURE — 250N000011 HC RX IP 250 OP 636: Performed by: STUDENT IN AN ORGANIZED HEALTH CARE EDUCATION/TRAINING PROGRAM

## 2022-03-02 PROCEDURE — 74177 CT ABD & PELVIS W/CONTRAST: CPT

## 2022-03-02 PROCEDURE — 87635 SARS-COV-2 COVID-19 AMP PRB: CPT | Performed by: FAMILY MEDICINE

## 2022-03-02 PROCEDURE — 99285 EMERGENCY DEPT VISIT HI MDM: CPT | Mod: 25

## 2022-03-02 PROCEDURE — 258N000003 HC RX IP 258 OP 636: Performed by: STUDENT IN AN ORGANIZED HEALTH CARE EDUCATION/TRAINING PROGRAM

## 2022-03-02 PROCEDURE — 83690 ASSAY OF LIPASE: CPT | Performed by: FAMILY MEDICINE

## 2022-03-02 PROCEDURE — 258N000003 HC RX IP 258 OP 636: Performed by: EMERGENCY MEDICINE

## 2022-03-02 PROCEDURE — C9803 HOPD COVID-19 SPEC COLLECT: HCPCS

## 2022-03-02 PROCEDURE — 96361 HYDRATE IV INFUSION ADD-ON: CPT

## 2022-03-02 RX ORDER — SODIUM CHLORIDE 9 MG/ML
INJECTION, SOLUTION INTRAVENOUS ONCE
Status: COMPLETED | OUTPATIENT
Start: 2022-03-02 | End: 2022-03-02

## 2022-03-02 RX ORDER — PROCHLORPERAZINE 25 MG
25 SUPPOSITORY, RECTAL RECTAL EVERY 12 HOURS PRN
Status: DISCONTINUED | OUTPATIENT
Start: 2022-03-02 | End: 2022-03-04 | Stop reason: HOSPADM

## 2022-03-02 RX ORDER — NALOXONE HYDROCHLORIDE 0.4 MG/ML
0.2 INJECTION, SOLUTION INTRAMUSCULAR; INTRAVENOUS; SUBCUTANEOUS
Status: DISCONTINUED | OUTPATIENT
Start: 2022-03-02 | End: 2022-03-04 | Stop reason: HOSPADM

## 2022-03-02 RX ORDER — ACETAMINOPHEN 650 MG/1
650 SUPPOSITORY RECTAL EVERY 6 HOURS PRN
Status: DISCONTINUED | OUTPATIENT
Start: 2022-03-02 | End: 2022-03-04 | Stop reason: HOSPADM

## 2022-03-02 RX ORDER — ONDANSETRON 4 MG/1
4 TABLET, ORALLY DISINTEGRATING ORAL EVERY 6 HOURS PRN
Status: DISCONTINUED | OUTPATIENT
Start: 2022-03-02 | End: 2022-03-04 | Stop reason: HOSPADM

## 2022-03-02 RX ORDER — LEVOFLOXACIN 750 MG/1
750 TABLET, FILM COATED ORAL DAILY
Status: ON HOLD | COMMUNITY
Start: 2022-02-25 | End: 2022-03-04

## 2022-03-02 RX ORDER — NALOXONE HYDROCHLORIDE 0.4 MG/ML
0.4 INJECTION, SOLUTION INTRAMUSCULAR; INTRAVENOUS; SUBCUTANEOUS
Status: DISCONTINUED | OUTPATIENT
Start: 2022-03-02 | End: 2022-03-04 | Stop reason: HOSPADM

## 2022-03-02 RX ORDER — SODIUM CHLORIDE, SODIUM LACTATE, POTASSIUM CHLORIDE, CALCIUM CHLORIDE 600; 310; 30; 20 MG/100ML; MG/100ML; MG/100ML; MG/100ML
INJECTION, SOLUTION INTRAVENOUS CONTINUOUS
Status: DISCONTINUED | OUTPATIENT
Start: 2022-03-02 | End: 2022-03-03

## 2022-03-02 RX ORDER — OXYCODONE HYDROCHLORIDE 5 MG/1
5 TABLET ORAL EVERY 4 HOURS PRN
Status: DISCONTINUED | OUTPATIENT
Start: 2022-03-02 | End: 2022-03-04 | Stop reason: HOSPADM

## 2022-03-02 RX ORDER — METRONIDAZOLE 500 MG/1
500 TABLET ORAL 3 TIMES DAILY
Status: ON HOLD | COMMUNITY
Start: 2022-02-25 | End: 2022-03-04

## 2022-03-02 RX ORDER — LIDOCAINE 40 MG/G
CREAM TOPICAL
Status: DISCONTINUED | OUTPATIENT
Start: 2022-03-02 | End: 2022-03-04 | Stop reason: HOSPADM

## 2022-03-02 RX ORDER — ACETAMINOPHEN 325 MG/1
650 TABLET ORAL EVERY 6 HOURS PRN
Status: DISCONTINUED | OUTPATIENT
Start: 2022-03-02 | End: 2022-03-04 | Stop reason: HOSPADM

## 2022-03-02 RX ORDER — SIMVASTATIN 10 MG
40 TABLET ORAL AT BEDTIME
Status: DISCONTINUED | OUTPATIENT
Start: 2022-03-02 | End: 2022-03-04 | Stop reason: HOSPADM

## 2022-03-02 RX ORDER — MEROPENEM 1 G/1
1 INJECTION, POWDER, FOR SOLUTION INTRAVENOUS ONCE
Status: COMPLETED | OUTPATIENT
Start: 2022-03-02 | End: 2022-03-02

## 2022-03-02 RX ORDER — HYDRALAZINE HYDROCHLORIDE 20 MG/ML
10 INJECTION INTRAMUSCULAR; INTRAVENOUS EVERY 6 HOURS PRN
Status: DISCONTINUED | OUTPATIENT
Start: 2022-03-02 | End: 2022-03-04 | Stop reason: HOSPADM

## 2022-03-02 RX ORDER — MEROPENEM 1 G/1
1 INJECTION, POWDER, FOR SOLUTION INTRAVENOUS EVERY 8 HOURS
Status: DISCONTINUED | OUTPATIENT
Start: 2022-03-02 | End: 2022-03-04 | Stop reason: HOSPADM

## 2022-03-02 RX ORDER — PROCHLORPERAZINE MALEATE 10 MG
10 TABLET ORAL EVERY 6 HOURS PRN
Status: DISCONTINUED | OUTPATIENT
Start: 2022-03-02 | End: 2022-03-04 | Stop reason: HOSPADM

## 2022-03-02 RX ORDER — PREGABALIN 100 MG/1
100 CAPSULE ORAL 2 TIMES DAILY
Status: DISCONTINUED | OUTPATIENT
Start: 2022-03-02 | End: 2022-03-04 | Stop reason: HOSPADM

## 2022-03-02 RX ORDER — IOPAMIDOL 755 MG/ML
100 INJECTION, SOLUTION INTRAVASCULAR ONCE
Status: COMPLETED | OUTPATIENT
Start: 2022-03-02 | End: 2022-03-02

## 2022-03-02 RX ORDER — ONDANSETRON 2 MG/ML
4 INJECTION INTRAMUSCULAR; INTRAVENOUS EVERY 6 HOURS PRN
Status: DISCONTINUED | OUTPATIENT
Start: 2022-03-02 | End: 2022-03-04 | Stop reason: HOSPADM

## 2022-03-02 RX ADMIN — SODIUM CHLORIDE, POTASSIUM CHLORIDE, SODIUM LACTATE AND CALCIUM CHLORIDE: 600; 310; 30; 20 INJECTION, SOLUTION INTRAVENOUS at 18:45

## 2022-03-02 RX ADMIN — OMEPRAZOLE 20 MG: 20 CAPSULE, DELAYED RELEASE ORAL at 20:35

## 2022-03-02 RX ADMIN — PREGABALIN 100 MG: 100 CAPSULE ORAL at 20:35

## 2022-03-02 RX ADMIN — SODIUM CHLORIDE 1000 ML: 9 INJECTION, SOLUTION INTRAVENOUS at 13:29

## 2022-03-02 RX ADMIN — MEROPENEM 1 G: 1 INJECTION, POWDER, FOR SOLUTION INTRAVENOUS at 13:30

## 2022-03-02 RX ADMIN — SODIUM CHLORIDE: 9 INJECTION, SOLUTION INTRAVENOUS at 15:49

## 2022-03-02 RX ADMIN — MEROPENEM 1 G: 1 INJECTION, POWDER, FOR SOLUTION INTRAVENOUS at 21:54

## 2022-03-02 RX ADMIN — HYDRALAZINE HYDROCHLORIDE 10 MG: 20 INJECTION INTRAMUSCULAR; INTRAVENOUS at 20:35

## 2022-03-02 RX ADMIN — IOPAMIDOL 100 ML: 755 INJECTION, SOLUTION INTRAVENOUS at 14:22

## 2022-03-02 RX ADMIN — SIMVASTATIN 40 MG: 10 TABLET, FILM COATED ORAL at 20:35

## 2022-03-02 ASSESSMENT — ACTIVITIES OF DAILY LIVING (ADL)
ADLS_ACUITY_SCORE: 4
ADLS_ACUITY_SCORE: 4
FALL_HISTORY_WITHIN_LAST_SIX_MONTHS: NO
DRESSING/BATHING_DIFFICULTY: NO
ADLS_ACUITY_SCORE: 4
WALKING_OR_CLIMBING_STAIRS_DIFFICULTY: NO
ADLS_ACUITY_SCORE: 4
CHANGE_IN_FUNCTIONAL_STATUS_SINCE_ONSET_OF_CURRENT_ILLNESS/INJURY: NO
ADLS_ACUITY_SCORE: 8
ADLS_ACUITY_SCORE: 8
TOILETING_ISSUES: NO
ADLS_ACUITY_SCORE: 8
VISION_MANAGEMENT: GLASSES
DIFFICULTY_EATING/SWALLOWING: NO
DOING_ERRANDS_INDEPENDENTLY_DIFFICULTY: NO
WEAR_GLASSES_OR_BLIND: YES
ADLS_ACUITY_SCORE: 4
CONCENTRATING,_REMEMBERING_OR_MAKING_DECISIONS_DIFFICULTY: NO

## 2022-03-02 ASSESSMENT — ENCOUNTER SYMPTOMS
NAUSEA: 1
VOMITING: 1
FEVER: 1
ABDOMINAL PAIN: 1

## 2022-03-02 NOTE — ED NOTES
"[facility] ED Handoff Report    ED Chief Complaint: Abdominal Pain    ED Diagnosis:  (K57.32) Diverticulitis of colon  Comment:   Plan:        PMH:    Past Medical History:   Diagnosis Date     Anemia      Bilateral calcaneal spurs     surgery 2015     Bursitis of hip      Chronic back pain      DDD (degenerative disc disease)      Depression      Diverticulitis      Environmental allergies      Fibromyalgia      History of transfusion     With      HLD (hyperlipidemia)      Obesity         Code Status:  No Order     Falls Risk: No Band: Not applicable    Current Living Situation/Residence: with elderly mother      Elimination Status: Continent: Yes     Activity Level: Independent    Patients Preferred Language:  English     Needed: No    Vital Signs:  BP (!) 155/73   Pulse 92   Temp 99.2  F (37.3  C)   Resp 20   Ht 1.778 m (5' 10\")   Wt 113.4 kg (250 lb)   SpO2 97%   BMI 35.87 kg/m       Cardiac Rhythm: NSR    Pain Score: 10/10- refused pain medication    Is the Patient Confused:  No    Last Food or Drink: 22 at water at 1500- no food since before coming to the ER- wants to EAT    Focused Assessment:  Patient is having abdominal pain 10/10 this is her second round of antibiotic and admission was recommended    Tests Performed: Done: Labs and Imaging    Treatments Provided:  Saline and antibiotics     Family Dynamics/Concerns: No    Family Updated On Visitor Policy: Yes    Plan of Care Communicated to Family: No    Who Was Updated about Plan of Care: patient able to communicate with spouse    Belongings Checklist Done and Signed by Patient: No    Medications sent with patient: none    Covid: asymptomatic , negative    Additional Information: none    RN: Kia Bishop   3/2/2022 5:04 PM       "

## 2022-03-02 NOTE — ED TRIAGE NOTES
Pt diagnosed with diverticulitis, started on antibiotics on Friday, symptoms are worse.  Has had this in the past.

## 2022-03-02 NOTE — PROGRESS NOTES
Care Management Note    Length of Stay (days): 0    Expected Discharge Date: 03/05/2022       Concerns to be Addressed:   Treatment of diverticulitis (Meropenem).   Patient plan of care discussed at interdisciplinary rounds: Yes    Anticipated Discharge Disposition:  Home.     Anticipated Discharge Services:  Discharge goal is home pending response to treatment, medical needs and mobility closer to discharge.   Anticipated Discharge DME:  None    Patient/family educated on Medicare website which has current facility and service quality ratings:  NA  Education Provided on the Discharge Plan:  Per team  Patient/Family in Agreement with the Plan:  Yes     Referrals Placed by CM/SW:  none  Private pay costs discussed: Not applicable     Additional Information:  Per chart review, patient is  and independent with activities of daily living at baseline. CM will continue to monitor progression of care, review team recommendations and provide discharge planning assist as needed.      Erika Mariee RN       Patient will like to know if Dr Bernardo Kebede can send another antibiotic for her Urinary infection  Per patient was seen a month ago for this symptoms

## 2022-03-02 NOTE — ED PROVIDER NOTES
ED Triage Provider Note  [facility]  Encounter Date: Mar 2, 2022    History:  No chief complaint on file.    Catherine Farrell is a 56 year old female who presents to the ED with low abdominal pain, nausea. Was LLQ, now generalized.  Has had diverticulitis in the past.  Started on Levaquin and Flagyl.    Review of Systems:  No urinary symptoms    Exam:  There were no vitals taken for this visit.  General: No acute distress. Appears stated age.   Cardio: Regular rate, extremities well perfused  Resp: Normal work of breathing, grossly normal respiratory rate  Neuro: Alert. CN II-XII grossly intact. Grossly intact strength.   Abd: diffuse low abdominal pain    Medical Decision Making:  Patient arriving to the ED with problem as above. A medical screening exam was performed. Lab, CT orders initiated from Triage. The patient is appropriate to wait in triage.  Meropenem ordered for likely perforated diverticulitis.      Americo Chappell MD on 3/2/2022 at 12:42 PM      Lab/Imaging Results:       Interventions:  Medications - No data to display    Diagnosis:  No diagnosis found.     Americo Chappell MD  03/02/22 1248

## 2022-03-02 NOTE — ED PROVIDER NOTES
EMERGENCY DEPARTMENT ENCOUNTER      NAME: Catherine Farrell  AGE: 56 year old female  YOB: 1965  MRN: [id number]  EVALUATION DATE & TIME: No admission date for patient encounter.    PCP: Annetta Hernandez    ED PROVIDER: Heather Reyes    Chief Complaint   Patient presents with     Abdominal Pain     FINAL IMPRESSION:  1. Diverticulitis of colon      ED COURSE & MEDICAL DECISION MAKING:    Pertinent Labs & Imaging studies reviewed. (See chart for details)    2:42 PM Met with patient for initial interview and exam. Plan for care in the ED was discussed. PPE: Surgical mask, eye protection, gloves.     56 year old female presents to the Emergency Department for evaluation of worsening left lower abdominal pain in the setting of recent diagnosis of diverticulitis.  Episode in February that was treated with Cipro Flagyl resolved and then recurred with reinstitution antibiotics on Friday.  Despite the patient taking her Cipro and Flagyl and being able to keep it down over the last 6 days she has had steadily worsening symptoms.  Escalating abdominal pain now more focused in the left lower quadrant worsening nausea difficulty with oral intake.  On clinical examination she had focal pain on palpation of the left abdomen.  Vital signs notable for mild hypertension.  Has penicillin and amoxicillin allergies.  Laboratory testing demonstrating a white blood cell count of 17,000.  Lactic acid was negative.  CT scan demonstrating extensive pericolic inflammation and findings consistent with diverticulitis without perforation or abscess.  Patient on appropriate antibiotics with allergies limiting alternative therapies worsening symptoms despite these interventions.  I think in light of her CT scan findings her pain the symptomatology she is currently experiencing consistent with failure of outpatient antibiotics recommendations were admission to the hospital on IV antibiotics.  Received fluids meropenem in the  waiting room while awaiting room.  Due to capacity issues the emergency department evaluation management discussion of her plan of care was all conducted from the waiting room.  Currently we are working to admit the patient to the hospital and working on placement at this time.    4:00 PM  Discussed with [facility] admitting services.     At the conclusion of the encounter I discussed the results of all of the tests and the disposition. The questions were answered. The patient or family acknowledged understanding and was agreeable with the care plan.     MEDICATIONS GIVEN IN THE EMERGENCY:  Medications   sodium chloride 0.9% infusion (has no administration in time range)   meropenem (MERREM) 1 g vial to attach to  mL bag (0 g Intravenous Stopped 3/2/22 1442)   0.9% sodium chloride BOLUS (0 mLs Intravenous Stopped 3/2/22 1442)   iopamidol (ISOVUE-370) solution 100 mL (100 mLs Intravenous Given 3/2/22 1422)     NEW PRESCRIPTIONS STARTED AT TODAY'S ER VISIT  New Prescriptions    No medications on file       =================================================================    HPI    Patient information was obtained from: Patient  Use of : N/A       Catherine Farrell is a 56 year old female with a pertinent history of recurrent diverticulitis who presents to this ED walk in for evaluation of abdominal pain.     Patient with a history of recurrent diverticulitis. She had an episode of diverticulitis in February 2022 which presented with left upper quadrant abdominal pain. This was treated with a course of ciprofloxacin and flagyl which resolved the symptoms. Towards the end of February, she had recurrence of symptoms. Patient saw her doctor and was started again on ciprofloxacin and flagyl. Patient is 6 days into these antibiotics and feels as though she is getting worse, with more pain and nausea. Additionally, she has been having difficulty keeping liquids down, but has been able to take her oral  antibiotics. Patient typically responds to ciprofloxacin and flagyl. She has had fevers to 100 F. At present reports severe left sided abdominal pain, worse in the left lower quadrant.     Patient with an allergy to amoxicillin and penicillin.     REVIEW OF SYSTEMS   Review of Systems   Constitutional: Positive for fever (up to 100F).   Gastrointestinal: Positive for abdominal pain (left sided, worse in left lower quadrant), nausea and vomiting.   All other systems reviewed and are negative.    PAST MEDICAL HISTORY:  Past Medical History:   Diagnosis Date     Anemia      Bilateral calcaneal spurs     surgery 2015     Bursitis of hip      Chronic back pain      DDD (degenerative disc disease)      Depression      Diverticulitis      Environmental allergies      Fibromyalgia      History of transfusion     With      HLD (hyperlipidemia)      Obesity      PAST SURGICAL HISTORY:  Past Surgical History:   Procedure Laterality Date     ENDOMETRIAL ABLATION       HC REMOVAL HEEL SPUR, CALCANEUS Left 2015    Procedure: LEFT POSTERIOR CALCANEAL SPUR RESECTION;  Surgeon: Rob Marion DPM;  Location: [facility] OR;  Service: Podiatry     HC REMOVAL OF TONSILS,<13 Y/O      Description: Tonsillectomy;  Recorded: 10/18/2008;     REPAIR TENDON ACHILLES Left 2015    Procedure: WITH SECONDARY ACHILLES TENDON REAPIR;  Surgeon: Rob Marion DPM;  Location: [facility] OR;  Service:      REPAIR TENDON ACHILLES Right 11/3/2017    Procedure: WITH SECONDARY ACHILLES TENDON REPAIR;  Surgeon: Rob Marion DPM;  Location: [facility] [place];  Service:      [facility] APPENDECTOMY      Description: Appendectomy;  Recorded: 10/18/2008;     [facility]  DELIVERY ONLY      Description:  Section;  Recorded: 10/18/2008;     CURRENT MEDICATIONS:    cholecalciferol, vitamin D3, (VITAMIN D3) 2,000 unit Tab  ibuprofen (ADVIL/MOTRIN) 800 MG tablet  meclizine (ANTIVERT) 25 mg tablet  methocarbamol (ROBAXIN) 750  "MG tablet  omeprazole (PRILOSEC) 40 MG DR capsule  pregabalin (LYRICA) 100 MG capsule  pregabalin (LYRICA) 75 MG capsule  simvastatin (ZOCOR) 40 MG tablet      ALLERGIES:  Allergies   Allergen Reactions     Amoxicillin Hives     Penicillins Swelling and Itching     Annotation: Swelling of face/eyes, itching       Shellfish Containing Products [Shellfish-Derived Products] Anaphylaxis       FAMILY HISTORY:  Family History   Problem Relation Age of Onset     Lung Cancer Father      Alcoholism Father      Alcoholism Son      Hypertension Mother      Stomach Cancer Maternal Aunt      Breast Cancer Maternal Aunt      Breast Cancer Maternal Aunt        SOCIAL HISTORY:   Social History     Socioeconomic History     Marital status:      Spouse name: Not on file     Number of children: Not on file     Years of education: Not on file     Highest education level: Not on file   Occupational History     Not on file   Tobacco Use     Smoking status: Current Every Day Smoker     Packs/day: 0.50     Types: Cigarettes     Smokeless tobacco: Never Used     Tobacco comment: Started Zyban 4/23/19   Substance and Sexual Activity     Alcohol use: No     Comment: Alcoholic Drinks/day: sober since 2013     Drug use: No     Sexual activity: Yes     Partners: Male   Other Topics Concern     Not on file   Social History Narrative     Not on file     Social Determinants of Health     Financial Resource Strain: Not on file   Food Insecurity: Not on file   Transportation Needs: Not on file   Physical Activity: Not on file   Stress: Not on file   Social Connections: Not on file   Intimate Partner Violence: Not on file   Housing Stability: Not on file       VITALS:  BP (!) 140/76   Pulse 98   Temp 99.2  F (37.3  C)   Resp 20   Ht 1.778 m (5' 10\")   Wt 113.4 kg (250 lb)   SpO2 97%   BMI 35.87 kg/m      PHYSICAL EXAM    PHYSICAL EXAM    Constitutional: Well developed, Well nourished, appears to be uncomfortable  HENT: Normocephalic, " Atraumatic, Bilateral external ears normal, Oropharynx normal, mucous membranes moist, Nose normal. Neck-  Normal range of motion, No tenderness, Supple, No stridor.   Eyes: PERRL, EOMI, Conjunctiva normal, No discharge.   Respiratory: Normal breath sounds, No respiratory distress, No wheezing, Speaks full sentences easily. No cough.   Cardiovascular: Normal heart rate, Regular rhythm, No murmurs Chest wall nontender.    GI: Significant left-sided abdominal pain on palpation with some guarding in the left lower quadrant but no rebound tenderness no appreciable distention or palpable masses identified  : No cva tenderness    Musculoskeletal: 2+ DP pulses. No edema. No cyanosis. Good range of motion in all major joints. No tenderness to palpation. No tenderness of the CTLS spine.   Integument: Warm, Dry, No erythema, No rash. No petechiae.   Neurologic: Alert & oriented x 3, Normal motor function, Normal sensory function, No focal deficits noted.   Psychiatric: Affect normal, Judgment normal, Mood normal. Cooperative.     LAB:  All pertinent labs reviewed and interpreted.  Results for orders placed or performed during the hospital encounter of 03/02/22   CT Abdomen Pelvis w Contrast    Impression    IMPRESSION:   1.  Uncomplicated acute diverticulitis mid sigmoid colon with extensive pericolic inflammation but no abscess, obstruction or perforation.   2.  Moderate to marked diffuse hepatic steatosis and borderline hepatic enlargement.   Lactic acid whole blood   Result Value Ref Range    Lactic Acid 1.1 0.7 - 2.0 mmol/L   Basic metabolic panel   Result Value Ref Range    Sodium 137 136 - 145 mmol/L    Potassium 4.2 3.5 - 5.0 mmol/L    Chloride 105 98 - 107 mmol/L    Carbon Dioxide (CO2) 20 (L) 22 - 31 mmol/L    Anion Gap 12 5 - 18 mmol/L    Urea Nitrogen 9 8 - 22 mg/dL    Creatinine 0.80 0.60 - 1.10 mg/dL    Calcium 9.6 8.5 - 10.5 mg/dL    Glucose 121 70 - 125 mg/dL    GFR Estimate 85 >60 mL/min/1.73m2   Result  Value Ref Range    Lipase 32 0 - 52 U/L   Hepatic function panel   Result Value Ref Range    Bilirubin Total 0.4 0.0 - 1.0 mg/dL    Bilirubin Direct 0.1 <=0.5 mg/dL    Protein Total 7.8 6.0 - 8.0 g/dL    Albumin 4.1 3.5 - 5.0 g/dL    Alkaline Phosphatase 57 45 - 120 U/L    AST 15 0 - 40 U/L    ALT 14 0 - 45 U/L   CBC with platelets and differential   Result Value Ref Range    WBC Count 17.6 (H) 4.0 - 11.0 10e3/uL    RBC Count 4.49 3.80 - 5.20 10e6/uL    Hemoglobin 13.0 11.7 - 15.7 g/dL    Hematocrit 40.0 35.0 - 47.0 %    MCV 89 78 - 100 fL    MCH 29.0 26.5 - 33.0 pg    MCHC 32.5 31.5 - 36.5 g/dL    RDW 13.6 10.0 - 15.0 %    Platelet Count 232 150 - 450 10e3/uL    % Neutrophils 74 %    % Lymphocytes 17 %    % Monocytes 7 %    % Eosinophils 0 %    % Basophils 1 %    % Immature Granulocytes 1 %    NRBCs per 100 WBC 0 <1 /100    Absolute Neutrophils 13.3 (H) 1.6 - 8.3 10e3/uL    Absolute Lymphocytes 2.9 0.8 - 5.3 10e3/uL    Absolute Monocytes 1.2 0.0 - 1.3 10e3/uL    Absolute Eosinophils 0.1 0.0 - 0.7 10e3/uL    Absolute Basophils 0.1 0.0 - 0.2 10e3/uL    Absolute Immature Granulocytes 0.1 <=0.4 10e3/uL    Absolute NRBCs 0.0 10e3/uL   Extra Red Top Tube   Result Value Ref Range    Hold Specimen [facility]        RADIOLOGY:  Reviewed all pertinent imaging. Please see official radiology report.  CT Abdomen Pelvis w Contrast   Final Result   IMPRESSION:    1.  Uncomplicated acute diverticulitis mid sigmoid colon with extensive pericolic inflammation but no abscess, obstruction or perforation.    2.  Moderate to marked diffuse hepatic steatosis and borderline hepatic enlargement.        I, Heather Reyes, am serving as a scribe to document services personally performed by Dr. Byron MD based on my observation and the provider's statements to me. I, Dr. Byron MD, attest that Heather Reyes is acting in a scribe capacity, has observed my performance of the services and has documented them in accordance with my direction.    Emergency  [facility] EMERGENCY DEPARTMENT  [address] [place] [id number]  [phone]       Can Matthews MD  03/02/22 3208

## 2022-03-02 NOTE — PHARMACY-ADMISSION MEDICATION HISTORY
Pharmacy Note - Admission Medication History    Pertinent Provider Information:      ______________________________________________________________________    Prior To Admission (PTA) med list completed and updated in EMR.       PTA Med List   Medication Sig Note Last Dose     cholecalciferol, vitamin D3, (VITAMIN D3) 2,000 unit Tab [CHOLECALCIFEROL, VITAMIN D3, (VITAMIN D3) 2,000 UNIT TAB] Take 2,000 Units by mouth at bedtime.   3/1/2022 at Unknown time     ibuprofen (ADVIL/MOTRIN) 800 MG tablet TAKE 1 TABLET BY MOUTH EVERY 6 HOURS AS NEEDED FOR PAIN  prn     levofloxacin (LEVAQUIN) 750 MG tablet Take 750 mg by mouth daily X 10 days 3/2/2022: Started on 2/25/22 3/1/2022 at Unknown time     meclizine (ANTIVERT) 25 mg tablet [MECLIZINE (ANTIVERT) 25 MG TABLET] Take 1 tablet (25 mg total) by mouth 3 (three) times a day as needed for dizziness or nausea.  prn     metroNIDAZOLE (FLAGYL) 500 MG tablet Take 500 mg by mouth 3 times daily X 10 days 3/2/2022: Started on 2/25/22 3/1/2022 at Unknown time     omeprazole (PRILOSEC) 40 MG DR capsule Take 1 capsule by mouth once daily  3/1/2022 at pm     pregabalin (LYRICA) 100 MG capsule Take 100 mg by mouth 2 times daily   3/1/2022 at Unknown time     simvastatin (ZOCOR) 40 MG tablet [SIMVASTATIN (ZOCOR) 40 MG TABLET] Take 1 tablet by mouth once daily  3/1/2022 at pm       Information source(s): Patient, Prescription bottles and CareEverywhere/[facility]  Method of interview communication: in-person    Summary of Changes to PTA Med List  New: levofloxacin, metronidazole  Discontinued: methocarbamol, Lyrica 75mg  Changed: Lyrica 100 mg    Patient was asked about OTC/herbal products specifically.  PTA med list reflects this.    In the past week, patient estimated taking medication this percent of the time:  greater than 90%.    Allergies were reviewed, assessed, and updated with the patient.      Patient does not use any multi-dose medications prior to admission.    The  information provided in this note is only as accurate as the sources available at the time of the update(s).    Thank you for the opportunity to participate in the care of this patient.    Erin Hu [facility]  3/2/2022 5:16 PM

## 2022-03-03 LAB
ALBUMIN SERPL-MCNC: 3.5 G/DL (ref 3.5–5)
ALP SERPL-CCNC: 45 U/L (ref 45–120)
ALT SERPL W P-5'-P-CCNC: 11 U/L (ref 0–45)
ANION GAP SERPL CALCULATED.3IONS-SCNC: 10 MMOL/L (ref 5–18)
AST SERPL W P-5'-P-CCNC: 12 U/L (ref 0–40)
BASOPHILS # BLD AUTO: 0 10E3/UL (ref 0–0.2)
BASOPHILS NFR BLD AUTO: 0 %
BILIRUB SERPL-MCNC: 0.5 MG/DL (ref 0–1)
BUN SERPL-MCNC: 6 MG/DL (ref 8–22)
CALCIUM SERPL-MCNC: 8.7 MG/DL (ref 8.5–10.5)
CHLORIDE BLD-SCNC: 107 MMOL/L (ref 98–107)
CO2 SERPL-SCNC: 22 MMOL/L (ref 22–31)
CREAT SERPL-MCNC: 0.68 MG/DL (ref 0.6–1.1)
EOSINOPHIL # BLD AUTO: 0.1 10E3/UL (ref 0–0.7)
EOSINOPHIL NFR BLD AUTO: 1 %
ERYTHROCYTE [DISTWIDTH] IN BLOOD BY AUTOMATED COUNT: 13.5 % (ref 10–15)
GFR SERPL CREATININE-BSD FRML MDRD: >90 ML/MIN/1.73M2
GLUCOSE BLD-MCNC: 102 MG/DL (ref 70–125)
HCT VFR BLD AUTO: 33.9 % (ref 35–47)
HGB BLD-MCNC: 11 G/DL (ref 11.7–15.7)
IMM GRANULOCYTES # BLD: 0 10E3/UL
IMM GRANULOCYTES NFR BLD: 0 %
LYMPHOCYTES # BLD AUTO: 2.6 10E3/UL (ref 0.8–5.3)
LYMPHOCYTES NFR BLD AUTO: 29 %
MCH RBC QN AUTO: 28.9 PG (ref 26.5–33)
MCHC RBC AUTO-ENTMCNC: 32.4 G/DL (ref 31.5–36.5)
MCV RBC AUTO: 89 FL (ref 78–100)
MONOCYTES # BLD AUTO: 0.7 10E3/UL (ref 0–1.3)
MONOCYTES NFR BLD AUTO: 8 %
NEUTROPHILS # BLD AUTO: 5.7 10E3/UL (ref 1.6–8.3)
NEUTROPHILS NFR BLD AUTO: 62 %
NRBC # BLD AUTO: 0 10E3/UL
NRBC BLD AUTO-RTO: 0 /100
PLATELET # BLD AUTO: 202 10E3/UL (ref 150–450)
POTASSIUM BLD-SCNC: 3.6 MMOL/L (ref 3.5–5)
PROT SERPL-MCNC: 6.7 G/DL (ref 6–8)
RBC # BLD AUTO: 3.8 10E6/UL (ref 3.8–5.2)
SODIUM SERPL-SCNC: 139 MMOL/L (ref 136–145)
WBC # BLD AUTO: 9.1 10E3/UL (ref 4–11)

## 2022-03-03 PROCEDURE — 99407 BEHAV CHNG SMOKING > 10 MIN: CPT

## 2022-03-03 PROCEDURE — 120N000001 HC R&B MED SURG/OB

## 2022-03-03 PROCEDURE — 85014 HEMATOCRIT: CPT | Performed by: STUDENT IN AN ORGANIZED HEALTH CARE EDUCATION/TRAINING PROGRAM

## 2022-03-03 PROCEDURE — 36415 COLL VENOUS BLD VENIPUNCTURE: CPT | Performed by: STUDENT IN AN ORGANIZED HEALTH CARE EDUCATION/TRAINING PROGRAM

## 2022-03-03 PROCEDURE — 999N000032 HC STATISTIC CHRONIC DISEASE SPECIALIST RT CONSULT

## 2022-03-03 PROCEDURE — 99233 SBSQ HOSP IP/OBS HIGH 50: CPT | Performed by: INTERNAL MEDICINE

## 2022-03-03 PROCEDURE — 250N000011 HC RX IP 250 OP 636: Performed by: STUDENT IN AN ORGANIZED HEALTH CARE EDUCATION/TRAINING PROGRAM

## 2022-03-03 PROCEDURE — 80053 COMPREHEN METABOLIC PANEL: CPT | Performed by: STUDENT IN AN ORGANIZED HEALTH CARE EDUCATION/TRAINING PROGRAM

## 2022-03-03 PROCEDURE — 250N000013 HC RX MED GY IP 250 OP 250 PS 637: Performed by: STUDENT IN AN ORGANIZED HEALTH CARE EDUCATION/TRAINING PROGRAM

## 2022-03-03 PROCEDURE — 258N000003 HC RX IP 258 OP 636: Performed by: INTERNAL MEDICINE

## 2022-03-03 RX ORDER — SODIUM CHLORIDE 9 MG/ML
INJECTION, SOLUTION INTRAVENOUS CONTINUOUS
Status: DISCONTINUED | OUTPATIENT
Start: 2022-03-03 | End: 2022-03-04 | Stop reason: HOSPADM

## 2022-03-03 RX ADMIN — MEROPENEM 1 G: 1 INJECTION, POWDER, FOR SOLUTION INTRAVENOUS at 14:31

## 2022-03-03 RX ADMIN — SODIUM CHLORIDE: 9 INJECTION, SOLUTION INTRAVENOUS at 20:46

## 2022-03-03 RX ADMIN — MEROPENEM 1 G: 1 INJECTION, POWDER, FOR SOLUTION INTRAVENOUS at 05:50

## 2022-03-03 RX ADMIN — PREGABALIN 100 MG: 100 CAPSULE ORAL at 12:21

## 2022-03-03 RX ADMIN — OMEPRAZOLE 20 MG: 20 CAPSULE, DELAYED RELEASE ORAL at 20:47

## 2022-03-03 RX ADMIN — PREGABALIN 100 MG: 100 CAPSULE ORAL at 20:47

## 2022-03-03 RX ADMIN — SIMVASTATIN 40 MG: 10 TABLET, FILM COATED ORAL at 20:47

## 2022-03-03 RX ADMIN — MEROPENEM 1 G: 1 INJECTION, POWDER, FOR SOLUTION INTRAVENOUS at 20:47

## 2022-03-03 RX ADMIN — SODIUM CHLORIDE: 9 INJECTION, SOLUTION INTRAVENOUS at 10:05

## 2022-03-03 ASSESSMENT — ACTIVITIES OF DAILY LIVING (ADL)
ADLS_ACUITY_SCORE: 4

## 2022-03-03 NOTE — PLAN OF CARE
Problem: Pain Acute  Goal: Acceptable Pain Control and Functional Ability  Outcome: Ongoing, Progressing  Intervention: Develop Pain Management Plan    Pain to left lower abdomen was about 6/10 this morning, patient denied needing any pain medication. Did get up and did a lot of walking in the hallway independently throughout the shift and pain was improved after to 4/10. Diet was advanced to clears which she tolerated well-pain did not get any worse so did advance diet to fulls for dinner tonight. Will continue to monitor.       Problem: Plan of Care - These are the overarching goals to be used throughout the patient stay.    Goal: Readiness for Transition of Care  Outcome: Ongoing, Progressing   Patient continues to get IV fluids along with IV meropenem per orders. Pain seems to be improving as the day goes on. Denies any nausea or vomiting. Up independently, walking frequently in the hallways. Voiding without issues. Patient refused lovenox-stated that if she is still here tomorrow she will take it then. Will continue to monitor.     Viviana Hope RN 3/3/2022 2:46 PM

## 2022-03-03 NOTE — PLAN OF CARE
Problem: Plan of Care - These are the overarching goals to be used throughout the patient stay.    Goal: Optimal Comfort and Wellbeing  Outcome: Met  Intervention: Monitor Pain and Promote Comfort  Pain Management Interventions:   relaxation techniques promoted   rest   declines     Problem: Pain Acute  Goal: Acceptable Pain Control and Functional Ability  Outcome: Met  Intervention: Develop Pain Management Plan  Pain Management Interventions:   relaxation techniques promoted   rest   declines  Intervention: Prevent or Manage Pain  Recent Flowsheet Documentation  Medication Review/Management: medications reviewed   Goal Outcome Evaluation:    Overall night has been quiet. Pt has been sleeping all night comfortably. She denies pain, discomfort or nausea. IVF still infusing. On sched IV Abx as well. Tolerating NPO with ice chips + sips with meds okay with no issues

## 2022-03-03 NOTE — H&P
ADMISSION HISTORY & PHYSICAL        Patient YOB: 1965  Admit date: 3/2/2022  Date of Service: 3/2/2022    ASSESSMENT AND PLAN:  56 year old female presenting with severe left lower abdominal pain for the past 3 days.  Patient was treated for acute diverticulitis twice in February.  She was treated with oral antibiotics which gave partial relief.  Patient stated having nausea but denies having vomiting.  Denies having diarrhea or constipation.  Patient will be admitted for management of acute diverticulitis with IV antibiotics.    Recurrent diverticulitis  -Patient was treated with Levaquin and Flagyl twice in the month of February gave partial relief.  -Patient was diagnosed with diverticulitis for the first time 12 years ago.  -Still having severe left lower quadrant abdominal pain.  -CT abdomen reported as uncomplicated acute diverticulitis mid sigmoid colon with extensive pericolic inflammation but no abscess, obstruction or perforation.   -IV meropenem due to allergies to penicillin.  -Pain control  -N.p.o. for now  -GI consult     Hypertension, hyperlipidemia  -PTA simvastatin  -Hydralazine as needed    Chronic pain, fibromyalgia  -PTA Lyrica    GERD  -PTA omeprazole        CODE STATUS:  No Order    Disposition:  -Anticipated Length of Stay in midnights and medical necessity (including a midnight in the Emergency Department after triage if applicable): >2 days       CHIEF COMPLAINT:      HISTORY OF PRESENTING ILLNESS:  Patient is a 56 year old female with PMH significant for hypertension, hyperlipidemia, chronic pain, GERD presented with severe lower abdominal pain which has worsened over the past 3 days.  Patient stated that she was diagnosed with acute diverticulitis twice in the month of February.  She was given oral antibiotics which gave partial relief.  Patient also states that she was diagnosed with diverticulitis for the first time 12 years ago.  She indicated that she never had  colonoscopy done.  Patient states that she has nausea but denies having vomiting.  No history of diarrhea or constipation.  Patient gives history of low-grade fever.  CT scan of the abdomen revealed uncomplicated acute diverticulitis mid sigmoid colon with extensive pericolonic inflammation.  Patient will be admitted for management of acute diverticulitis with IV antibiotics.  PMH/PSH:  Patient Active Problem List   Diagnosis     Cough     Nicotine Dependence     Chronic Reflux Esophagitis     Fibromyalgia     Strain Of The Gastrocnemius Muscle Of The Right Leg     Adjustment Disorder With Depressed Mood     Joint Pain, Localized In The Knee     Pain During Urination (Dysuria)     Abdominal Pain     Diverticulitis of colon     Trochanteric Bursitis     Sinusitis     Midback Pain     Mixed hyperlipidemia     Abnormal Weight Loss     Abnormal Pap smear of cervix     Adverse Food Reaction (Not Anaphylactic)     Impingement Of The Right Shoulder     Hypertension     Head Injury     Oral Thrush     Lumbar Disc Degeneration     Lower Back Pain Chronic     Carpal Tunnel Syndrome     Simple (Specific) Phobia     Dermatitis     Patellofemoral Syndrome Of The Right Knee      Tension-type headache     Obsessive compulsive disorder     Costochondritis     Fatigue     Chronic pain     Vitamin D deficiency     Calcaneal spur, right     Numbness of right lower extremity     Tarsal tunnel syndrome of right side     Compression neuropathy of right lower extremity     Mononeuritis lower limb, right     Right foot pain     Compression neuropathy     Obesity (BMI 35.0-39.9) with comorbidity (H)     Right shoulder injury, sequela       Past Medical History:   Diagnosis Date     Anemia      Bilateral calcaneal spurs     surgery Nov. 2015     Bursitis of hip      Chronic back pain      DDD (degenerative disc disease)      Depression      Diverticulitis      Environmental allergies      Fibromyalgia      History of transfusion 1984    With       HLD (hyperlipidemia)      Obesity         Past Surgical History:   Procedure Laterality Date     ENDOMETRIAL ABLATION       HC REMOVAL HEEL SPUR, CALCANEUS Left 2015    Procedure: LEFT POSTERIOR CALCANEAL SPUR RESECTION;  Surgeon: Rob Marion DPM;  Location: [facility];  Service: Podiatry     HC REMOVAL OF TONSILS,<13 Y/O      Description: Tonsillectomy;  Recorded: 10/18/2008;     REPAIR TENDON ACHILLES Left 2015    Procedure: WITH SECONDARY ACHILLES TENDON REAPIR;  Surgeon: Rob Marion DPM;  Location: [facility];  Service:      REPAIR TENDON ACHILLES Right 11/3/2017    Procedure: WITH SECONDARY ACHILLES TENDON REPAIR;  Surgeon: Rob Marion DPM;  Location: [facility] [place], [place];  Service:      [facility] APPENDECTOMY      Description: Appendectomy;  Recorded: 10/18/2008;     [facility]  DELIVERY ONLY      Description:  Section;  Recorded: 10/18/2008;          ALLERGIES:  Allergies   Allergen Reactions     Amoxicillin Hives     Penicillins Swelling and Itching     Annotation: Swelling of face/eyes, itching       Shellfish Containing Products [Shellfish-Derived Products] Anaphylaxis       MEDICATIONS:  Reviewed.  Current Facility-Administered Medications   Medication     meropenem (MERREM) 1 g vial to attach to  mL bag       Current Facility-Administered Medications:      meropenem (MERREM) 1 g vial to attach to  mL bag, 1 g, Intravenous, Q8H, Claribel Lilly MD   Scheduled Meds:    meropenem  1 g Intravenous Q8H     Continuous Infusions:  PRN Meds:.    SOCIAL HISTORY:  Social History     Socioeconomic History     Marital status:      Spouse name: Not on file     Number of children: Not on file     Years of education: Not on file     Highest education level: Not on file   Occupational History     Not on file   Tobacco Use     Smoking status: Current Every Day Smoker     Packs/day: 0.50     Types: Cigarettes     Smokeless tobacco: Never Used      "Tobacco comment: Started Zyban 4/23/19   Substance and Sexual Activity     Alcohol use: No     Comment: Alcoholic Drinks/day: sober since 2013     Drug use: No     Sexual activity: Yes     Partners: Male   Other Topics Concern     Not on file   Social History Narrative     Not on file     Social Determinants of Health     Financial Resource Strain: Not on file   Food Insecurity: Not on file   Transportation Needs: Not on file   Physical Activity: Not on file   Stress: Not on file   Social Connections: Not on file   Intimate Partner Violence: Not on file   Housing Stability: Not on file       FAMILY HISTORY:  Family History   Problem Relation Age of Onset     Lung Cancer Father      Alcoholism Father      Alcoholism Son      Hypertension Mother      Stomach Cancer Maternal Aunt      Breast Cancer Maternal Aunt      Breast Cancer Maternal Aunt     reviewed     ROS:  12 point review of systems reviewed and is negative except for what has already been mentioned in HPI.       PHYSICAL EXAM:  GENRL: It is having severe pain 8 out of 10.  Sitting on the edge of the bed.  BP (!) 159/70 (BP Location: Left arm)   Pulse 96   Temp 99  F (37.2  C) (Oral)   Resp 18   Ht 1.778 m (5' 10\")   Wt 112.8 kg (248 lb 11.2 oz)   SpO2 95%   BMI 35.68 kg/m    I/O last 3 completed shifts:  In: 1100 [IV Piggyback:1100]  Out: -   No intake/output data recorded.  HEENT: NC/AT  CHEST: Clear to auscultation bilateral anteriorly, no ronchi or wheezing  HEART: S1S2 normal, regular.   ABDMN: Soft.  Diffuse tenderness non-distended.  No guarding or rigidity. Bowel sounds- active  EXTRM: No pedal edema   INTGM: No skin rash, no cyanosis or clubbing  MUSCULOSKELETAL: no joint tenderness or swelling on upper and lower extremities  NEURO: Alert and oriented. No focal neurological deficit.        DIAGNOSTIC DATA:    Recent Labs   Lab 03/02/22  1323   WBC 17.6*   HGB 13.0   HCT 40.0          Recent Labs   Lab 03/02/22  1323      CO2 20* "   BUN 9       No results for input(s): INR in the last 168 hours.    Recent Labs   Lab 03/02/22  1323      CO2 20*   BUN 9   ALBUMIN 4.1   ALKPHOS 57   ALT 14   AST 15       [unfilled]    CT Abdomen Pelvis w Contrast    Result Date: 3/2/2022  EXAM: CT ABDOMEN PELVIS W CONTRAST LOCATION: [facility] DATE/TIME: 3/2/2022 2:13 PM INDICATION: Lower abdominal pain. COMPARISON: CT AP 04/20/2016. TECHNIQUE: CT scan of the abdomen and pelvis was performed following injection of IV contrast. Multiplanar reformats were obtained. Dose reduction techniques were used. CONTRAST: Isovue 370 100ml FINDINGS: LOWER CHEST: A subpleural 4 mm right lower lobe nodule stable since 2016 is benign and requires no follow-up. Visualized lungs are otherwise clear. No pleural effusion. Heart size normal with no pericardial effusion. HEPATOBILIARY: Moderate to marked diffuse hepatic steatosis and borderline hepatic enlargement. Liver otherwise normal. No bile duct dilatation. No calcified gallstones. PANCREAS: Normal. SPLEEN: Spleen size normal. ADRENAL GLANDS: Normal. KIDNEY/BLADDER: Kidneys, ureters and bladder are normal. BOWEL: Uncomplicated acute diverticulitis mid sigmoid colon with extensive pericolic inflammation but no abscess, obstruction or perforation. No free fluid or free air. LYMPH NODES: No lymphadenopathy. VASCULATURE: Moderate calcified atheromatous plaque throughout the normal caliber abdominal aorta, iliofemoral arteries and at the origin of the renal and mesenteric arteries. PELVIC ORGANS: No pelvic mass or fluid. MUSCULOSKELETAL: Unremarkable.     IMPRESSION: 1.  Uncomplicated acute diverticulitis mid sigmoid colon with extensive pericolic inflammation but no abscess, obstruction or perforation. 2.  Moderate to marked diffuse hepatic steatosis and borderline hepatic enlargement.      Patient's new lab studies reviewed personally.  Patient's new radiology reports reviewed personally.  I  personally viewed and personally interpreted patient's EKG:     Note created using dragon voice recognition software.  Errors in spelling or words which seems out of context are unintentional.  Sounds alike errors may have escaped editing.     03/02/2022      Claribel Lilly  [facility]

## 2022-03-03 NOTE — CONSULTS
CONSULTING PHYSICIAN   Umesh Ngo MD     REASON FOR CONSULTATION   Acute diverticultiis     IMPRESSION   1) Acute diverticulitis- improved. Uncomplicated on CT. Prior episode in early February and 12 years ago. No prior colonoscopy, so cannot exclude underlying colonic lesion or chronic diverticular associated colitis  2) Chronic neck pain- on motrin  3) Chronic tobacco abuse       RECOMMENDATIONS   IV antibiotics today, then transition to oral antibiotics at discharge. She has a PCN allergy so will likely need to be cipro/flagyl again.  Clear liquids and ADAT to low fiber  Colonoscopy to be arranged 6 weeks from completion of antibiotics. Patient agreed  Avoid NSAIDS in the short term to enable better healing  Ideally would also stop smoking, for the same reason.  Will sign off- anticipate discharge tomorrow if tolerating diet. We will arrange outpatient colonoscopy.        HISTORY OF PRESENT ILLNESS   Catherine Farrell is a pleasant 56 year old female with history of tobacco abuse, chronic neck pain on NSAIDS who presents with recurrent diverticulitis. She had her first episode 12 years ago. She had another milder episode a couple years later by her report, and after that she did well for years. She was then diagnosed with diverticulitis in February, and completed antibiotic therapy with cipro/metronidazole. She felt much better, but then 2 weeks later, a day after having a neck ablation, she developed recurrent symptoms. She started cipro/metronidazole again (she has a PCN allergy) and had progressive symptoms so presented to the ED. She is feeling better on IV antibiotics.  Aside from her LLQ and lower pelvic pain, she denies other GI symptoms such as recent change in bowel habits or bleeding. She has never had a colonoscopy. She denies family history of colon polyps or cancer. She does take daily motrin for neck pain.      PAST HISTORY   Past Medical History:   Diagnosis  Date     Anemia      Bilateral calcaneal spurs     surgery 2015     Bursitis of hip      Chronic back pain      DDD (degenerative disc disease)      Depression      Diverticulitis      Environmental allergies      Fibromyalgia      History of transfusion     With      HLD (hyperlipidemia)      Obesity       Past Surgical History:   Procedure Laterality Date     ENDOMETRIAL ABLATION       HC REMOVAL HEEL SPUR, CALCANEUS Left 2015    Procedure: LEFT POSTERIOR CALCANEAL SPUR RESECTION;  Surgeon: Rob Marion DPM;  Location: [facility];  Service: Podiatry     HC REMOVAL OF TONSILS,<11 Y/O      Description: Tonsillectomy;  Recorded: 10/18/2008;     REPAIR TENDON ACHILLES Left 2015    Procedure: WITH SECONDARY ACHILLES TENDON REAPIR;  Surgeon: Rob Marion DPM;  Location: [facility];  Service:      REPAIR TENDON ACHILLES Right 11/3/2017    Procedure: WITH SECONDARY ACHILLES TENDON REPAIR;  Surgeon: Rob Marion DPM;  Location: [facility];  Service:      [facility] APPENDECTOMY      Description: Appendectomy;  Recorded: 10/18/2008;     [facility]  DELIVERY ONLY      Description:  Section;  Recorded: 10/18/2008;        Family History Social History   Family History   Problem Relation Age of Onset     Lung Cancer Father      Alcoholism Father      Alcoholism Son      Hypertension Mother      Stomach Cancer Maternal Aunt      Breast Cancer Maternal Aunt      Breast Cancer Maternal Aunt     Social History     Socioeconomic History     Marital status:      Spouse name: Not on file     Number of children: Not on file     Years of education: Not on file     Highest education level: Not on file   Occupational History     Not on file   Tobacco Use     Smoking status: Current Every Day Smoker     Packs/day: 0.50     Types: Cigarettes     Smokeless tobacco: Never Used     Tobacco comment: Started Zyban 17   Substance and Sexual Activity     Alcohol use: No      Comment: Alcoholic Drinks/day: sober since 2013     Drug use: No     Sexual activity: Yes     Partners: Male   Other Topics Concern     Not on file   Social History Narrative     Not on file     Social Determinants of Health     Financial Resource Strain: Not on file   Food Insecurity: Not on file   Transportation Needs: Not on file   Physical Activity: Not on file   Stress: Not on file   Social Connections: Not on file   Intimate Partner Violence: Not on file   Housing Stability: Not on file         MEDICATIONS & ALLERGIES   Medications Prior to Admission   Medication Sig Dispense Refill Last Dose     cholecalciferol, vitamin D3, (VITAMIN D3) 2,000 unit Tab [CHOLECALCIFEROL, VITAMIN D3, (VITAMIN D3) 2,000 UNIT TAB] Take 2,000 Units by mouth at bedtime.    3/1/2022 at Unknown time     ibuprofen (ADVIL/MOTRIN) 800 MG tablet TAKE 1 TABLET BY MOUTH EVERY 6 HOURS AS NEEDED FOR PAIN 100 tablet 0 prn     levofloxacin (LEVAQUIN) 750 MG tablet Take 750 mg by mouth daily X 10 days   3/1/2022 at Unknown time     meclizine (ANTIVERT) 25 mg tablet [MECLIZINE (ANTIVERT) 25 MG TABLET] Take 1 tablet (25 mg total) by mouth 3 (three) times a day as needed for dizziness or nausea. 45 tablet 1 prn     metroNIDAZOLE (FLAGYL) 500 MG tablet Take 500 mg by mouth 3 times daily X 10 days   3/1/2022 at Unknown time     omeprazole (PRILOSEC) 40 MG DR capsule Take 1 capsule by mouth once daily 90 capsule 3 3/1/2022 at pm     pregabalin (LYRICA) 100 MG capsule Take 100 mg by mouth 2 times daily    3/1/2022 at Unknown time     simvastatin (ZOCOR) 40 MG tablet [SIMVASTATIN (ZOCOR) 40 MG TABLET] Take 1 tablet by mouth once daily 90 tablet 3 3/1/2022 at pm        ALLERGIES   Allergies   Allergen Reactions     Amoxicillin Hives     Penicillins Swelling and Itching     Annotation: Swelling of face/eyes, itching       Shellfish Containing Products [Shellfish-Derived Products] Anaphylaxis         REVIEW OF SYSTEMS     See HPI for pertinent positives  "and negatives. A comprehensive review of systems was performed and was otherwise noncontributory.     OBJECTIVE   Vitals Blood pressure (!) 141/68, pulse 76, temperature 97.6  F (36.4  C), temperature source Oral, resp. rate 14, height 1.778 m (5' 10\"), weight 112.8 kg (248 lb 11.2 oz), SpO2 95 %.           Physical  Exam   GENERAL: alert and oriented, no acute distress.     HEENT: atraumatic, anicteric, moist mucous membranes, neck soft/supple     PULMONARY: normal resp effort, breath sounds clear to auscultation bilaterally    CARDIOVASCULAR: normal rate and rhythm, no murmurs    ABDOMEN: soft, minimal LLQ tenderness, no distention, bowel sounds normal. No hepatosplenomegaly.     MUSCULOSKELETAL: joints grossly normal    NEUROLOGICAL: appropriate mental status, grossly intact    PSYCHIATRIC: normal mood, affect and insight    SKIN: warm and dry, no rashes    EXT: no edema        LABORATORY    ELECTROLYTE PANEL   Recent Labs   Lab 03/03/22  0622 03/02/22  1323    137   POTASSIUM 3.6 4.2   CHLORIDE 107 105   CO2 22 20*    121   CR 0.68 0.80   BUN 6* 9      HEMATOLOGY PANEL   Recent Labs   Lab 03/03/22  0622 03/02/22  1323   HGB 11.0* 13.0   MCV 89 89   WBC 9.1 17.6*    232      LIVER AND PANCREAS PANEL   Recent Labs   Lab 03/03/22  0622 03/02/22  1323   AST 12 15   ALT 11 14   ALKPHOS 45 57   BILITOTAL 0.5 0.4   LIPASE  --  32     IMAGING STUDIES    EXAM: CT ABDOMEN PELVIS W CONTRAST  LOCATION: [facility]  DATE/TIME: 3/2/2022 2:13 PM     INDICATION: Lower abdominal pain.  COMPARISON: CT AP 04/20/2016.  TECHNIQUE: CT scan of the abdomen and pelvis was performed following injection of IV contrast. Multiplanar reformats were obtained. Dose reduction techniques were used.  CONTRAST: Isovue 370 100ml     FINDINGS:   LOWER CHEST: A subpleural 4 mm right lower lobe nodule stable since 2016 is benign and requires no follow-up. Visualized lungs are otherwise clear. No pleural " effusion. Heart size normal with no pericardial effusion.      HEPATOBILIARY: Moderate to marked diffuse hepatic steatosis and borderline hepatic enlargement. Liver otherwise normal. No bile duct dilatation. No calcified gallstones.     PANCREAS: Normal.     SPLEEN: Spleen size normal.     ADRENAL GLANDS: Normal.     KIDNEY/BLADDER: Kidneys, ureters and bladder are normal.     BOWEL: Uncomplicated acute diverticulitis mid sigmoid colon with extensive pericolic inflammation but no abscess, obstruction or perforation. No free fluid or free air.     LYMPH NODES: No lymphadenopathy.     VASCULATURE: Moderate calcified atheromatous plaque throughout the normal caliber abdominal aorta, iliofemoral arteries and at the origin of the renal and mesenteric arteries.     PELVIC ORGANS: No pelvic mass or fluid.     MUSCULOSKELETAL: Unremarkable.                                                                      IMPRESSION:   1.  Uncomplicated acute diverticulitis mid sigmoid colon with extensive pericolic inflammation but no abscess, obstruction or perforation.   2.  Moderate to marked diffuse hepatic steatosis and borderline hepatic enlargement.    I have reviewed the current diagnostic and laboratory tests.             Total time spent providing patient care: 43 min with at least 50% spent in reviewing documentation/test results, decision making, and coordination of care.           Grace Alejandro MD  Thank you for the opportunity to participate in the care of this patient.   Please feel free to call me with any questions or concerns.  Phone number [phone].

## 2022-03-03 NOTE — CONSULTS
Tobacco Treatment Consult  3/3/2022, 2:11 PM    Patient Admitted for: Diverticulitis of colon [K57.32] on 3/2/2022    History of Tobacco Use:   Tobacco Product(s):cigarette  Amount used: 1/2 ppd  Number of quit attempts in past: couple  Longest period of without use: 3-4 days  Pharmacotherapy tried in the past: Bupropion (Wellbutrin) and cold turkey  Pharmacotherapy tried that has been beneficial: none  Fagerstrom Score: 4 Level of dependence 4-7 moderate  Medical co-morbidities impacting tobacco use: depression, adjustment disorder and OCD    Assessment:   Importance of quitting to patient: 5  Current confidence in ability to quit: 3  Readiness to quit/planning to quit: ready  Emotional Triggers:nervous or stressed  Physical and behavioral triggers: driving  Nicotine withdrawal symptoms experiencing as an inpatient: irritability  -Catherine is open to talking, she does not have NRT here in the hospital but states she is feeling okay  Her biggest triggers are when she is driving, she has been working towards quitting for awhile, does not smoke in her house and slowly has been moving where she keeps her cigarettes so she has to go farther and farther to get them.  She states he son chews tobacco and is wanting to quit also as hm and his wife are expecting their first child.   Catherine already has nicotine gum at home to start.    Education done during visit:  -Discussed triggers and response to them  -Discussed barriers to quitting and how patient feels they may overcome them  -Educated on benefits of having a support system and remembering their reasons for quitting  -Issued tobacco cessation materials and resource information which she plans on sharing with her son    Recommendations:  -As an inpatient the patient have the following pharmacotherapy: Gum 2 mg as needed  -Upon discharge recommend the patient have the following pharmacotherapy: Gum 4 mg every 1-2 hours  -Taper NRT recommended after 4 weeks of successful  cessation. For Gum down to  Every 3-4 hours for 3 weeks then down to 2 mg gum every 3-4 hours for 3 weeks then every 4-6 hours for 4 weeks.  -Continued encouragement from health care providers to quit and stay tobacco free.    Catherine is declining to  participate in follow-up calls post-discharge at this time.  Encourage Catherine to call for any questions, concerns or issue she may have in her quitting process.  Will continue to be available to patient throughout hospital stay.    Total 29 minutes spent in smoking cessation, and 35 minutes spent in chart review,care coordination, and documentation.    Erin Smith RT, Chronic Pulmonary Disease Specialist, Tobacco Treatment Specialist  Phone [phone]

## 2022-03-03 NOTE — PROGRESS NOTES
[facility]    Medicine Progress Note - Hospitalist Service    Date of Admission:  3/2/2022    Assessment & Plan            56 year old female presenting with severe left lower abdominal pain for the past 3 days.  Patient was treated for acute diverticulitis twice in February.  She was treated with oral antibiotics which gave partial relief.  Patient stated having nausea but denies having vomiting.  Denies having diarrhea or constipation.  Patient will be admitted for management of acute diverticulitis with IV antibiotics.       3/3 :     Abdominal pain - 4/10 in intensity, improving  No nausea, vomiting  Continue iv meropenem  On liquid diet    Likely discharge in am if continues to improve to home      A/p       Recurrent diverticulitis  -Patient was treated with Levaquin and Flagyl twice in the month of February gave partial relief.  -Patient was diagnosed with diverticulitis for the first time 12 years ago.  -Still having severe left lower quadrant abdominal pain.  -CT abdomen reported as uncomplicated acute diverticulitis mid sigmoid colon with extensive pericolic inflammation but no abscess, obstruction or perforation.   -IV meropenem due to allergies to penicillin.  -Pain control  -N.p.o. for now  -GI consult        Hypertension, hyperlipidemia  -PTA simvastatin  -Hydralazine as needed       Chronic pain, fibromyalgia  -PTA Lyrica       GERD  -PTA omeprazole       Diet: Advance Diet as Tolerated: Clear Liquid Diet; Low Fiber    DVT Prophylaxis: Enoxaparin (Lovenox) SQ  Martinez Catheter: Not present  Central Lines: None  Cardiac Monitoring: None  Code Status: Full Code      Disposition Plan   Expected Discharge: in 1-2 days  Anticipated discharge location:  Home  Delays: improvement in abdominal symptoms, tolerate diet       The patient's care was discussed with the Patient and Patient's Family.    Umesh Ngo MD  Hospitalist Service  [facility]  Securely  "message with the Vocera Web Console (learn more here)  Text page via [employer] Mobile Paging/Directory         Clinically Significant Risk Factors Present on Admission                 # Obesity: Estimated body mass index is 35.68 kg/m  as calculated from the following:    Height as of this encounter: 1.778 m (5' 10\").    Weight as of this encounter: 112.8 kg (248 lb 11.2 oz).      ______________________________________________________________________        Data reviewed today: I reviewed all medications, new labs and imaging results over the last 24 hours. I personally reviewed no images or EKG's today.    Physical Exam   Vital Signs: Temp: 97.6  F (36.4  C) Temp src: Oral BP: (!) 141/68 Pulse: 76   Resp: 14 SpO2: 95 % O2 Device: None (Room air)    Weight: 248 lbs 11.2 oz     GENERAL: The patient is not in any acute distressed. Awake and alert.  HEENT: Nonicteric sclerae, PERRLA, EOMI. Oropharynx clear. Moist mucous membranes. Conjunctivae appear well perfused.  HEART: Regular rate and rhythm without murmurs.  LUNGS: Clear to auscultation bilaterally. No wheezing or crackles.  ABDOMEN: Soft, positive bowel sounds, tender lower abdomen  SKIN: No rash, no excessive bruising, petechiae, or purpura.  EXTREMITIES : no rashes, no swelling in legs.  NEUROLOGIC: conscious and oriented, follows commands, no obvious focal deficits.  ROS: All other systems negative       Data   Recent Labs   Lab 03/03/22  0622 03/02/22  1323   WBC 9.1 17.6*   HGB 11.0* 13.0   MCV 89 89    232    137   POTASSIUM 3.6 4.2   CHLORIDE 107 105   CO2 22 20*   BUN 6* 9   CR 0.68 0.80   ANIONGAP 10 12   EVELIA 8.7 9.6    121   ALBUMIN 3.5 4.1   PROTTOTAL 6.7 7.8   BILITOTAL 0.5 0.4   ALKPHOS 45 57   ALT 11 14   AST 12 15   LIPASE  --  32     "

## 2022-03-03 NOTE — PROGRESS NOTES
"CLINICAL NUTRITION SERVICES - ASSESSMENT NOTE     Nutrition Prescription    RECOMMENDATIONS FOR MDs/PROVIDERS TO ORDER:    Malnutrition Status:    Does not meet 2 criteria for malnutrition    Future/Additional Recommendations:  Follow for diet advance and need for nutritional supplements     REASON FOR ASSESSMENT  Catherine Farrell is a/an 56 year old female assessed by the dietitian for Admission Nutrition Risk Screen for positive weight loss and poor po intake PTA    Pt presenting with severe left lower abdominal pain for the past 3 days.  Pt was treated for acute diverticulitis twice in February.  Pt admitted for management of cute diverticulitis with IV antibiotics     NUTRITION HISTORY  Pt states poor po intake for the past month.  She was eating foods such as yogurt, occasional eggs, chicken, rice and applesauce.  She does not drink milk. She states she's had a 25 lb weight loss in the past month    CURRENT NUTRITION ORDERS  Diet: NPO    LABS  Labs reviewed: Na 139, K 3.6, urea nitrogen 6, alb 3.5, Glu 102    MEDICATIONS  Medications reviewed: merrem, prilosec    ANTHROPOMETRICS  Height: 177.8 cm (5' 10\")  Most Recent Weight: 112.8 kg (248 lb 11.2 oz)    IBW: 68.2 kg (150 lb)  BMI: Obesity Grade II BMI 35-39.9  Weight History:   Wt Readings from Last 10 Encounters:   03/02/22 112.8 kg (248 lb 11.2 oz)   12/29/21 115.7 kg (255 lb)   11/01/21 115.7 kg (255 lb)   09/22/21 114.3 kg (252 lb)   09/06/21 113.4 kg (250 lb)   03/01/21 121.6 kg (268 lb)   02/24/21 118.8 kg (262 lb)   07/24/20 117 kg (258 lb)   05/18/20 117.9 kg (260 lb)   02/17/20 113.9 kg (251 lb)   Weight loss of 7 lb in the past 2 months (2.7%) which is not clinically significant    Dosing Weight: 79 kg    ASSESSED NUTRITION NEEDS  Estimated Energy Needs: 1975 kcals/day 25 Kcal/Kg  Justification: Maintenance  Estimated Protein Needs: 79 grams protein/day 1 g/Kg  Justification: Maintenance  Estimated Fluid Needs: 0334-0517 mL/day (25 - 30 mL/kg) "   Justification: Maintenance    PHYSICAL FINDINGS  See malnutrition section below.  Skin WDL per nurse  Apolinar score=18, Nutrition noted as probably inadequate (pt NPO)  GI: Abdomen distended/ abdominal discomfort  Hypoactive BS all quadrants    MALNUTRITION:  % Weight Loss:  Weight loss does not meet criteria for malnutrition   % Intake:  <75% for >/= 1 month (moderate malnutrition)  Subcutaneous Fat Loss:  None observed  Muscle Loss:  None observed  Fluid Retention:  None noted    Malnutrition Diagnosis: Patient does not meet two of the above criteria necessary for diagnosing malnutrition    NUTRITION DIAGNOSIS  Inadequate oral intake related to acute diverticulitis as evidenced by stated poor po intake for the past month      INTERVENTIONS  Implementation  Follow for diet advance and possible need for nutritional supplements (pt does not want ensure)    Goals  Diet advancement vs nutrition support within 4-5 days.     Monitoring/Evaluation  Progress toward goals will be monitored and evaluated per protocol.

## 2022-03-04 VITALS
BODY MASS INDEX: 35.6 KG/M2 | OXYGEN SATURATION: 94 % | HEART RATE: 76 BPM | DIASTOLIC BLOOD PRESSURE: 76 MMHG | TEMPERATURE: 98.3 F | SYSTOLIC BLOOD PRESSURE: 129 MMHG | RESPIRATION RATE: 16 BRPM | HEIGHT: 70 IN | WEIGHT: 248.7 LBS

## 2022-03-04 PROCEDURE — 258N000003 HC RX IP 258 OP 636: Performed by: INTERNAL MEDICINE

## 2022-03-04 PROCEDURE — 99239 HOSP IP/OBS DSCHRG MGMT >30: CPT | Performed by: INTERNAL MEDICINE

## 2022-03-04 PROCEDURE — 250N000011 HC RX IP 250 OP 636: Performed by: STUDENT IN AN ORGANIZED HEALTH CARE EDUCATION/TRAINING PROGRAM

## 2022-03-04 RX ORDER — LEVOFLOXACIN 750 MG/1
750 TABLET, FILM COATED ORAL DAILY
Qty: 7 TABLET | Refills: 0 | Status: SHIPPED | OUTPATIENT
Start: 2022-03-04 | End: 2022-03-11

## 2022-03-04 RX ORDER — METRONIDAZOLE 500 MG/1
500 TABLET ORAL 3 TIMES DAILY
Qty: 21 TABLET | Refills: 0 | Status: SHIPPED | OUTPATIENT
Start: 2022-03-04 | End: 2022-03-11

## 2022-03-04 RX ADMIN — MEROPENEM 1 G: 1 INJECTION, POWDER, FOR SOLUTION INTRAVENOUS at 05:48

## 2022-03-04 RX ADMIN — SODIUM CHLORIDE: 9 INJECTION, SOLUTION INTRAVENOUS at 05:48

## 2022-03-04 ASSESSMENT — ACTIVITIES OF DAILY LIVING (ADL)
ADLS_ACUITY_SCORE: 4

## 2022-03-04 NOTE — PLAN OF CARE
Problem: Plan of Care - These are the overarching goals to be used throughout the patient stay.    Goal: Optimal Comfort and Wellbeing  Outcome: Ongoing, Progressing  Intervention: Monitor Pain and Promote Comfort  Recent Flowsheet Documentation  Taken 3/3/2022 1553 by Tanner Burk RN  Pain Management Interventions:   emotional support   medication offered but refused    Goal: Absence of Hospital-Acquired Illness or Injury  Intervention: Prevent and Manage VTE (Venous Thromboembolism) Risk  Recent Flowsheet Documentation  Taken 3/3/2022 1553 by Tanner Burk, RN  VTE Prevention/Management: patient refused intervention  Activity Management: ambulated in room     Problem: Plan of Care - These are the overarching goals to be used throughout the patient stay.    Goal: Readiness for Transition of Care  Outcome: Ongoing, Progressing     Patient vitally stable on this shift, initially reporting pain 4/10 in left lower abdomen but reports pain decreased to 2/10 after eating supper. Patient currently on full liquid diet and tolerating well and denies any nausea/vomiting, will continue to monitor. Patient is independent in room, ambulates to the bathroom independently and has ambulated in the hallway on this shift as well. Patient utilizes call light appropriately. Patient is currently resting in bed, IV meropenem infusing. From provider notes, plan is to switch patient over to PO abx and discharge tomorrow so long as she tolerates advances in diet.     Tanner Burk RN 9:27 PM 3/3/2022

## 2022-03-04 NOTE — DISCHARGE SUMMARY
[facility]  Hospitalist Discharge Summary      Date of Admission:  3/2/2022  Date of Discharge:  3/4/2022  Discharging Provider: Umesh Ngo MD  Discharge Service: Hospitalist Service    Discharge Diagnoses            56 year old female presenting with severe left lower abdominal pain for the past 3 days.  Patient was treated for acute diverticulitis twice in February.  She was treated with oral antibiotics - levaquin + flagyl - which gave partial relief.  Patient stated having nausea but denies having vomiting.  Denies having diarrhea or constipation.  Patient  admitted for management of acute diverticulitis and treated with IV antibiotics - meropenem. She was managed conservatively - kept NPO initially, on iv fluids and iv antibiotics. Patient has shown significant clinical improvement at this time. She will be discharged home on oral antibiotic - levaquin + flagyl - for 7 days. She will f/u with PCP and GI outpatient for colonoscopy.                  A/p        Recurrent diverticulitis  -Patient was treated with Levaquin and Flagyl twice in the month of February gave partial relief.  -Patient was diagnosed with diverticulitis for the first time 12 years ago.    Patient  admitted for management of acute diverticulitis and treated with IV antibiotics - meropenem. She was managed conservatively - kept NPO initially, on iv fluids and iv antibiotics. Patient has shown significant clinical improvement at this time. She will be discharged home on oral antibiotic - levaquin + flagyl - for 7 days. She will f/u with PCP and GI outpatient for colonoscopy.         Hypertension, hyperlipidemia  -PTA simvastatin  -Hydralazine as needed       Chronic pain, fibromyalgia  -PTA Lyrica       GERD  -PTA omeprazole            Follow-ups Needed After Discharge   Follow-up Appointments     Follow-up and recommended labs and tests       Follow up with primary care provider, ZIGGY CARABALLO    within 7 days for hospital follow- up.  No follow up labs or test are   needed.    Colonoscopy to be arranged 6 weeks from completion of antibiotics. Patient   agreed  Avoid NSAIDS in the short term to enable better healing  Ideally would also stop smoking, for the same reason.  GI will arrange outpatient colonoscopy.         {Additional follow-up instructions/to-do's for PCP    : none    Unresulted Labs Ordered in the Past 30 Days of this Admission     No orders found from 1/31/2022 to 3/3/2022.      These results will be followed up by pcp    Discharge Disposition   Discharged to home  Condition at discharge: Good      Hospital Course     See above    Consultations This Hospital Stay   GASTROENTEROLOGY IP CONSULT  SMOKING CESSATION PROGRAM IP CONSULT    Code Status   Full Code    Time Spent on this Encounter   I, Umesh Ngo MD, personally saw the patient today and spent greater than 30 minutes discharging this patient.       Umesh Ngo MD  [address] [address]  Phone: [phone]  Fax: [phone]  ______________________________________________________________________    Physical Exam   Vital Signs: Temp: 98.3  F (36.8  C) Temp src: Oral BP: 129/76 Pulse: 76   Resp: 16 SpO2: 94 % O2 Device: None (Room air)    Weight: 248 lbs 11.2 oz       GENERAL: The patient is not in any acute distressed. Awake and alert.  HEENT: Nonicteric sclerae, PERRLA, EOMI. Oropharynx clear. Moist mucous membranes. Conjunctivae appear well perfused.  HEART: Regular rate and rhythm without murmurs.  LUNGS: Clear to auscultation bilaterally. No wheezing or crackles.  ABDOMEN: Soft, positive bowel sounds, nontender.  SKIN: No rash, no excessive bruising, petechiae, or purpura.  EXTREMITIES : no rashes, no swelling in legs.  NEUROLOGIC: conscious and oriented, follows commands, no obvious focal deficits.  ROS: All other systems negative          Primary Care Physician    ZIGGY CARABALLO    Discharge Orders      Reason for your hospital stay    Abdominal pain     Follow-up and recommended labs and tests     Follow up with primary care provider, ZIGGY CARABALLO, within 7 days for hospital follow- up.  No follow up labs or test are needed.    Colonoscopy to be arranged 6 weeks from completion of antibiotics. Patient agreed  Avoid NSAIDS in the short term to enable better healing  Ideally would also stop smoking, for the same reason.  GI will arrange outpatient colonoscopy.     Activity    Your activity upon discharge: activity as tolerated     Diet    Follow this diet upon discharge: Orders Placed This Encounter      Advance Diet as Tolerated: Low Fiber; Low Fiber       Significant Results and Procedures   Most Recent 3 CBC's:Recent Labs   Lab Test 03/03/22  0622 03/02/22  1323 02/24/21  0934   WBC 9.1 17.6* 14.0*   HGB 11.0* 13.0 13.1   MCV 89 89 90    232 152     Most Recent 3 BMP's:Recent Labs   Lab Test 03/03/22  0622 03/02/22  1323 02/24/21  0934    137 141   POTASSIUM 3.6 4.2 4.3   CHLORIDE 107 105 105   CO2 22 20* 22   BUN 6* 9 9   CR 0.68 0.80 0.78   ANIONGAP 10 12 14   EVELIA 8.7 9.6 9.4    121 103       Discharge Medications   Current Discharge Medication List      CONTINUE these medications which have CHANGED    Details   levofloxacin (LEVAQUIN) 750 MG tablet Take 1 tablet (750 mg) by mouth daily for 7 days X 10 days  Qty: 7 tablet, Refills: 0    Associated Diagnoses: Diverticulitis of colon      metroNIDAZOLE (FLAGYL) 500 MG tablet Take 1 tablet (500 mg) by mouth 3 times daily for 7 days X 10 days  Qty: 21 tablet, Refills: 0    Associated Diagnoses: Diverticulitis of colon         CONTINUE these medications which have NOT CHANGED    Details   cholecalciferol, vitamin D3, (VITAMIN D3) 2,000 unit Tab [CHOLECALCIFEROL, VITAMIN D3, (VITAMIN D3) 2,000 UNIT TAB] Take 2,000 Units by mouth at bedtime.       ibuprofen (ADVIL/MOTRIN) 800  MG tablet TAKE 1 TABLET BY MOUTH EVERY 6 HOURS AS NEEDED FOR PAIN  Qty: 100 tablet, Refills: 0    Associated Diagnoses: Fibromyalgia      meclizine (ANTIVERT) 25 mg tablet [MECLIZINE (ANTIVERT) 25 MG TABLET] Take 1 tablet (25 mg total) by mouth 3 (three) times a day as needed for dizziness or nausea.  Qty: 45 tablet, Refills: 1    Associated Diagnoses: Vertigo      omeprazole (PRILOSEC) 40 MG DR capsule Take 1 capsule by mouth once daily  Qty: 90 capsule, Refills: 3    Associated Diagnoses: Reflux esophagitis      pregabalin (LYRICA) 100 MG capsule Take 100 mg by mouth 2 times daily       simvastatin (ZOCOR) 40 MG tablet [SIMVASTATIN (ZOCOR) 40 MG TABLET] Take 1 tablet by mouth once daily  Qty: 90 tablet, Refills: 3    Associated Diagnoses: Hyperlipidemia           Allergies   Allergies   Allergen Reactions     Amoxicillin Hives     Penicillins Swelling and Itching     Annotation: Swelling of face/eyes, itching       Shellfish Containing Products [Shellfish-Derived Products] Anaphylaxis

## 2022-03-04 NOTE — PLAN OF CARE
Goal Outcome Evaluation:        Problem: Pain Acute  Goal: Acceptable Pain Control and Functional Ability  Outcome: Ongoing, Progressing     Patient rated her LLQ pain a 2/10 this shift. She reports walking in the halls has improved her pain levels. Hyperactive bowel sounds x4. Full liquid diet tolerated well. Will advance to low fiber for breakfast.

## 2022-03-04 NOTE — PLAN OF CARE
RN discussed discharge instructions with patient.  Patient verbalized understanding.  AVS provided and low fiber diet handout.  E-scripts sent to home pharmacy and patient to pickup.  Patient to discharge home with family once available.  No significant changes in presentation.

## 2022-03-08 PROBLEM — F17.200 NICOTINE DEPENDENCE: Status: ACTIVE | Noted: 2022-03-08

## 2022-03-09 ENCOUNTER — HOSPITAL ENCOUNTER (OUTPATIENT)
Dept: CT IMAGING | Facility: CLINIC | Age: 57
Discharge: HOME | End: 2022-03-09
Attending: FAMILY MEDICINE | Admitting: FAMILY MEDICINE
Payer: COMMERCIAL

## 2022-03-09 ENCOUNTER — OFFICE VISIT (OUTPATIENT)
Dept: FAMILY MEDICINE | Facility: CLINIC | Age: 57
End: 2022-03-09
Payer: COMMERCIAL

## 2022-03-09 VITALS
OXYGEN SATURATION: 98 % | WEIGHT: 248 LBS | HEART RATE: 103 BPM | HEIGHT: 70 IN | DIASTOLIC BLOOD PRESSURE: 80 MMHG | SYSTOLIC BLOOD PRESSURE: 132 MMHG | BODY MASS INDEX: 35.5 KG/M2

## 2022-03-09 DIAGNOSIS — Z09 HOSPITAL DISCHARGE FOLLOW-UP: ICD-10-CM

## 2022-03-09 DIAGNOSIS — K58.9 SPASM OF BOWEL: ICD-10-CM

## 2022-03-09 DIAGNOSIS — Z12.11 SCREEN FOR COLON CANCER: ICD-10-CM

## 2022-03-09 DIAGNOSIS — K57.32 DIVERTICULITIS OF COLON: Primary | ICD-10-CM

## 2022-03-09 DIAGNOSIS — Z12.31 VISIT FOR SCREENING MAMMOGRAM: ICD-10-CM

## 2022-03-09 DIAGNOSIS — Z12.31 ENCOUNTER FOR SCREENING MAMMOGRAM FOR MALIGNANT NEOPLASM OF BREAST: ICD-10-CM

## 2022-03-09 DIAGNOSIS — B37.0 ORAL THRUSH: ICD-10-CM

## 2022-03-09 DIAGNOSIS — R30.0 DYSURIA: ICD-10-CM

## 2022-03-09 DIAGNOSIS — K57.32 DIVERTICULITIS OF COLON: ICD-10-CM

## 2022-03-09 LAB
ALBUMIN UR-MCNC: NEGATIVE MG/DL
ANION GAP SERPL CALCULATED.3IONS-SCNC: 14 MMOL/L (ref 5–18)
APPEARANCE UR: CLEAR
BACTERIA #/AREA URNS HPF: ABNORMAL /HPF
BASOPHILS # BLD AUTO: 0.1 10E3/UL (ref 0–0.2)
BASOPHILS NFR BLD AUTO: 1 %
BILIRUB UR QL STRIP: NEGATIVE
BUN SERPL-MCNC: 7 MG/DL (ref 8–22)
CALCIUM SERPL-MCNC: 10.7 MG/DL (ref 8.5–10.5)
CHLORIDE BLD-SCNC: 103 MMOL/L (ref 98–107)
CO2 SERPL-SCNC: 25 MMOL/L (ref 22–31)
COLOR UR AUTO: YELLOW
CREAT SERPL-MCNC: 0.84 MG/DL (ref 0.6–1.1)
EOSINOPHIL # BLD AUTO: 0.1 10E3/UL (ref 0–0.7)
EOSINOPHIL NFR BLD AUTO: 1 %
ERYTHROCYTE [DISTWIDTH] IN BLOOD BY AUTOMATED COUNT: 13.2 % (ref 10–15)
GFR SERPL CREATININE-BSD FRML MDRD: 80 ML/MIN/1.73M2
GLUCOSE BLD-MCNC: 97 MG/DL (ref 70–125)
GLUCOSE UR STRIP-MCNC: NEGATIVE MG/DL
HCT VFR BLD AUTO: 38.5 % (ref 35–47)
HGB BLD-MCNC: 13.3 G/DL (ref 11.7–15.7)
HGB UR QL STRIP: ABNORMAL
IMM GRANULOCYTES # BLD: 0 10E3/UL
IMM GRANULOCYTES NFR BLD: 0 %
KETONES UR STRIP-MCNC: NEGATIVE MG/DL
LEUKOCYTE ESTERASE UR QL STRIP: NEGATIVE
LYMPHOCYTES # BLD AUTO: 4 10E3/UL (ref 0.8–5.3)
LYMPHOCYTES NFR BLD AUTO: 28 %
MCH RBC QN AUTO: 28.8 PG (ref 26.5–33)
MCHC RBC AUTO-ENTMCNC: 34.5 G/DL (ref 31.5–36.5)
MCV RBC AUTO: 83 FL (ref 78–100)
MONOCYTES # BLD AUTO: 0.8 10E3/UL (ref 0–1.3)
MONOCYTES NFR BLD AUTO: 5 %
NEUTROPHILS # BLD AUTO: 9.3 10E3/UL (ref 1.6–8.3)
NEUTROPHILS NFR BLD AUTO: 65 %
NITRATE UR QL: NEGATIVE
PH UR STRIP: 6 [PH] (ref 5–8)
PLATELET # BLD AUTO: 292 10E3/UL (ref 150–450)
POTASSIUM BLD-SCNC: 4.1 MMOL/L (ref 3.5–5)
RBC # BLD AUTO: 4.62 10E6/UL (ref 3.8–5.2)
RBC #/AREA URNS AUTO: ABNORMAL /HPF
SODIUM SERPL-SCNC: 142 MMOL/L (ref 136–145)
SP GR UR STRIP: <=1.005 (ref 1–1.03)
SQUAMOUS #/AREA URNS AUTO: ABNORMAL /LPF
UROBILINOGEN UR STRIP-ACNC: 0.2 E.U./DL
WBC # BLD AUTO: 14.3 10E3/UL (ref 4–11)
WBC #/AREA URNS AUTO: ABNORMAL /HPF

## 2022-03-09 PROCEDURE — 81001 URINALYSIS AUTO W/SCOPE: CPT | Performed by: FAMILY MEDICINE

## 2022-03-09 PROCEDURE — 85025 COMPLETE CBC W/AUTO DIFF WBC: CPT | Performed by: FAMILY MEDICINE

## 2022-03-09 PROCEDURE — 99215 OFFICE O/P EST HI 40 MIN: CPT | Performed by: FAMILY MEDICINE

## 2022-03-09 PROCEDURE — 74177 CT ABD & PELVIS W/CONTRAST: CPT

## 2022-03-09 PROCEDURE — 36415 COLL VENOUS BLD VENIPUNCTURE: CPT | Performed by: FAMILY MEDICINE

## 2022-03-09 PROCEDURE — 250N000011 HC RX IP 250 OP 636: Performed by: FAMILY MEDICINE

## 2022-03-09 PROCEDURE — 80048 BASIC METABOLIC PNL TOTAL CA: CPT | Performed by: FAMILY MEDICINE

## 2022-03-09 PROCEDURE — 87086 URINE CULTURE/COLONY COUNT: CPT | Performed by: FAMILY MEDICINE

## 2022-03-09 RX ORDER — IOPAMIDOL 755 MG/ML
100 INJECTION, SOLUTION INTRAVASCULAR ONCE
Status: COMPLETED | OUTPATIENT
Start: 2022-03-09 | End: 2022-03-09

## 2022-03-09 RX ORDER — NYSTATIN 100000/ML
500000 SUSPENSION, ORAL (FINAL DOSE FORM) ORAL 4 TIMES DAILY
Qty: 200 ML | Refills: 1 | Status: SHIPPED | OUTPATIENT
Start: 2022-03-09 | End: 2022-03-19

## 2022-03-09 RX ORDER — DICYCLOMINE HYDROCHLORIDE 10 MG/1
10 CAPSULE ORAL
Qty: 120 CAPSULE | Refills: 1 | Status: SHIPPED | OUTPATIENT
Start: 2022-03-09 | End: 2022-04-19

## 2022-03-09 RX ORDER — DICYCLOMINE HYDROCHLORIDE 10 MG/1
10 CAPSULE ORAL
Qty: 120 CAPSULE | Refills: 1 | Status: SHIPPED | OUTPATIENT
Start: 2022-03-09 | End: 2022-03-09

## 2022-03-09 RX ADMIN — IOPAMIDOL 100 ML: 755 INJECTION, SOLUTION INTRAVENOUS at 16:16

## 2022-03-09 NOTE — PROGRESS NOTES
Assessment & Plan    1. Diverticulitis of colon  This is a 57 yo female who was recently treated - both outpatient (x2) and inpatient (x1) for acute diverticulitis.  She has been home x 4 days, finishing antibiotics.  She is on 3rd course of Levofloxacin and metronidazole.  She had Meropenem IV while in the hospital.  She was recovering nicely post-hospital, but now feels like her temp has increased a bit (to 99+, now taking Acetaminophen), and pain has increased.  Still passing stool.  Discussed with patient; ordered CT scan abd/pelvis to reassess severity of colon.  Will check hemogram - especially WBC/diff and BMP and urine.  Will contact patient later in day with results.  Also - reviewed case with Dr. Alejandro (GI), who consulted on patient last week in hospital.    - **CBC with platelets differential FUTURE 2mo; Future  - Basic metabolic panel  (Ca, Cl, CO2, Creat, Gluc, K, Na, BUN); Future  - CT Abdomen Pelvis w Contrast; Future  - **CBC with platelets differential FUTURE 2mo  - Basic metabolic panel  (Ca, Cl, CO2, Creat, Gluc, K, Na, BUN)  - CBC with platelets; Future    2. Oral thrush  Patient has small white patch on right buccal mucosa -  Likely thrush, related to significant antibiotic therapy.  Will treat with Nystatin suspension.    - nystatin (MYCOSTATIN) 236864 UNIT/ML suspension; Take 5 mLs (500,000 Units) by mouth 4 times daily for 10 days  Dispense: 200 mL; Refill: 1    3. Hospital discharge follow-up  I have reviewed available hospital records, consults, notes, discharge summary as well as imaging and lab results.  In addition I have reviewed her medication list and made any adjustments as indicated in orders.        4. Dysuria  Patient notes dysuria/discomfort - more likely related to diverticulitis and intra-abdominal inflammation , but will check UA/UC just to make sure (she has been on antibiotics for quite some time - doubt acute UTI).    - UA with Microscopic - lab collect; Future  - Urine  Culture Aerobic Bacterial - lab collect; Future  - UA with Microscopic - lab collect  - Urine Culture Aerobic Bacterial - lab collect  - Urine Microscopic Exam    5. Spasm of bowel  Reviewed CT scan results with radiologist, patient, GI.  Scan suggests improvement - but patient's WBC is increased again (to 14.6).  The area of inflammation of the bowel is apparently surrounding the uterus, this may be cause of patient's pelvic discomfort.  Will treat for bowel spasm related to inflammation.  Have reviewed with patient that if she is getting worse - increased fever, increased pain, significant change in stool - then, needs to be seen urgently.  Otherwise, will recheck labs on Friday 3/11.    - dicyclomine (BENTYL) 10 MG capsule; Take 1 capsule (10 mg) by mouth 4 times daily (before meals and nightly)  Dispense: 120 capsule; Refill: 1    6. Visit for screening mammogram  7. Encounter for screening mammogram for malignant neoplasm of breast  Patient is due for mammogram screening - unwilling to schedule/do this today as she feels ill and acute illness needs more urgent attention.      8. Screen for colon cancer  Patient has not had previous colonoscopy; did have Cologuard 1/2019 (negative).  Given patient's presentation with ongoing symptoms despite imaging improvement of diverticulitis, increased concern for other things in differential, including neoplasm.  Will need colonoscopy - hopefully we can wait adequate time post antibiotic treatment for current diverticulitis.  If patient continues to have recurring symptoms, may need to consider more urgent scope (?flex sig for biopsy)      Return in about 2 weeks (around 3/23/2022) for Follow up labs.    Chief Complaint   Patient presents with     Hospital F/U     diverticulitis [place] 3-2-22 to 3-4-22     Mouth/Lip Problem      HPI  Dehydrated in hospital -   Bruises every where - tried to get IV in     Was seen at [facility] - near 's work  Had gotten shots  there   Was seen there - treated for diverticulitis  Sx kicked up in February -   Using Ensure - yogurt, coffee,     Seen at [facility]   Mid to late February - had Flagyl and Levofloxacin -   Finished antibiotics - seemed better  Came back and came back hard - called and had phone consultation - had antibiotics again (Flagyl and Levofloxacin)  Started getting better  Then got a fever, increased pain -   So, went to ER  Then, admitted her -   Got IV Meropenem -   Now, on Flagyl and Levofloxacin - and getting worse again   Pains are really sharp - worse in last 24 hours    Makes her back sore  Going to bathroom, it hurts  Stools very loose - not a lot  Not eating much     Last night 99.8       Also has white patches in mouth          Patient Active Problem List   Diagnosis     Cough     Nicotine Dependence     Chronic Reflux Esophagitis     Fibromyalgia     Strain Of The Gastrocnemius Muscle Of The Right Leg     Adjustment Disorder With Depressed Mood     Joint Pain, Localized In The Knee     Pain During Urination (Dysuria)     Abdominal Pain     Diverticulitis of colon     Trochanteric Bursitis     Sinusitis     Midback Pain     Mixed hyperlipidemia     Abnormal Weight Loss     Abnormal Pap smear of cervix     Adverse Food Reaction (Not Anaphylactic)     Impingement Of The Right Shoulder     Hypertension     Head Injury     Oral Thrush     Lumbar Disc Degeneration     Lower Back Pain Chronic     Carpal Tunnel Syndrome     Simple (Specific) Phobia     Dermatitis     Patellofemoral Syndrome Of The Right Knee      Tension-type headache     Obsessive compulsive disorder     Costochondritis     Fatigue     Chronic pain     Vitamin D deficiency     Calcaneal spur, right     Numbness of right lower extremity     Tarsal tunnel syndrome of right side     Compression neuropathy of right lower extremity     Mononeuritis lower limb, right     Right foot pain     Compression neuropathy     Obesity (BMI 35.0-39.9) with  comorbidity (H)     Right shoulder injury, sequela     Nicotine dependence        Past Medical History:   Diagnosis Date     Anemia      Bilateral calcaneal spurs     surgery 2015     Bursitis of hip      Chronic back pain      DDD (degenerative disc disease)      Depression      Diverticulitis      Environmental allergies      Fibromyalgia      History of transfusion     With      HLD (hyperlipidemia)      Obesity         Current Outpatient Medications   Medication     cholecalciferol, vitamin D3, (VITAMIN D3) 2,000 unit Tab     dicyclomine (BENTYL) 10 MG capsule     ibuprofen (ADVIL/MOTRIN) 800 MG tablet     levofloxacin (LEVAQUIN) 750 MG tablet     meclizine (ANTIVERT) 25 mg tablet     metroNIDAZOLE (FLAGYL) 500 MG tablet     nystatin (MYCOSTATIN) 148190 UNIT/ML suspension     omeprazole (PRILOSEC) 40 MG DR capsule     pregabalin (LYRICA) 100 MG capsule     simvastatin (ZOCOR) 40 MG tablet     No current facility-administered medications for this visit.        Past Surgical History:   Procedure Laterality Date     ENDOMETRIAL ABLATION       HC REMOVAL HEEL SPUR, CALCANEUS Left 2015    Procedure: LEFT POSTERIOR CALCANEAL SPUR RESECTION;  Surgeon: Rob Marion DPM;  Location: [facility];  Service: Podiatry     HC REMOVAL OF TONSILS,<11 Y/O      Description: Tonsillectomy;  Recorded: 10/18/2008;     REPAIR TENDON ACHILLES Left 2015    Procedure: WITH SECONDARY ACHILLES TENDON REAPIR;  Surgeon: Rob Marion DPM;  Location: [facility];  Service:      REPAIR TENDON ACHILLES Right 11/3/2017    Procedure: WITH SECONDARY ACHILLES TENDON REPAIR;  Surgeon: Rob Marion DPM;  Location: [facility] OR;  Service:      [facility] APPENDECTOMY      Description: Appendectomy;  Recorded: 10/18/2008;     [facility]  DELIVERY ONLY      Description:  Section;  Recorded: 10/18/2008;        Social History     Socioeconomic History     Marital status:      Spouse name: Not on  file     Number of children: Not on file     Years of education: Not on file     Highest education level: Not on file   Occupational History     Not on file   Tobacco Use     Smoking status: Current Every Day Smoker     Packs/day: 0.50     Years: 43.00     Pack years: 21.50     Types: Cigarettes     Smokeless tobacco: Never Used     Tobacco comment: seen by [facility] 3/3/2022   Substance and Sexual Activity     Alcohol use: No     Comment: Alcoholic Drinks/day: sober since 2013     Drug use: No     Sexual activity: Yes     Partners: Male   Other Topics Concern     Not on file   Social History Narrative     Not on file     Social Determinants of Health     Financial Resource Strain: Not on file   Food Insecurity: Not on file   Transportation Needs: Not on file   Physical Activity: Not on file   Stress: Not on file   Social Connections: Not on file   Intimate Partner Violence: Not on file   Housing Stability: Not on file        Family History   Problem Relation Age of Onset     Lung Cancer Father      Alcoholism Father      Alcoholism Son      Hypertension Mother      Stomach Cancer Maternal Aunt      Breast Cancer Maternal Aunt      Breast Cancer Maternal Aunt         Review of Systems   Constitutional: Positive for activity change, appetite change, fatigue and fever.   HENT:        White patch on right buccal mucosa   Eyes: Negative.    Respiratory: Negative.  Negative for cough and shortness of breath.    Cardiovascular: Negative.  Negative for chest pain, palpitations and leg swelling.   Gastrointestinal: Positive for abdominal pain (LLQ). Negative for abdominal distention and blood in stool.   Endocrine: Negative.    Genitourinary: Positive for dysuria.   Musculoskeletal: Negative.    Allergic/Immunologic: Negative.    Neurological: Negative.    Hematological: Negative.    Psychiatric/Behavioral: Negative.         /80 (BP Location: Left arm, Patient Position: Sitting, Cuff Size: Adult Large)   Pulse 103   Ht  "1.778 m (5' 10\")   Wt 112.5 kg (248 lb)   SpO2 98%   BMI 35.58 kg/m       Physical Exam  Constitutional:       General: She is not in acute distress.     Appearance: She is well-developed.   HENT:      Right Ear: Tympanic membrane and external ear normal.      Left Ear: Tympanic membrane and external ear normal.      Nose: Nose normal.      Mouth/Throat:      Pharynx: No oropharyngeal exudate.   Eyes:      General:         Right eye: No discharge.         Left eye: No discharge.      Conjunctiva/sclera: Conjunctivae normal.      Pupils: Pupils are equal, round, and reactive to light.   Neck:      Thyroid: No thyromegaly.      Trachea: No tracheal deviation.   Cardiovascular:      Rate and Rhythm: Normal rate and regular rhythm.      Pulses: Normal pulses.      Heart sounds: Normal heart sounds, S1 normal and S2 normal. No murmur heard.    No friction rub. No S3 or S4 sounds.   Pulmonary:      Effort: Pulmonary effort is normal. No respiratory distress.      Breath sounds: Normal breath sounds. No wheezing or rales.   Abdominal:      General: Bowel sounds are normal.      Palpations: Abdomen is soft. There is no mass.      Tenderness: There is no abdominal tenderness (LLQ abdominal tenderness). There is no right CVA tenderness, left CVA tenderness, guarding or rebound.   Musculoskeletal:         General: Normal range of motion.      Cervical back: Neck supple.   Lymphadenopathy:      Cervical: No cervical adenopathy.   Skin:     General: Skin is warm and dry.      Findings: No rash.   Neurological:      Mental Status: She is alert and oriented to person, place, and time.      Motor: No abnormal muscle tone.      Deep Tendon Reflexes: Reflexes are normal and symmetric.   Psychiatric:         Thought Content: Thought content normal.         Judgment: Judgment normal.          Results:  Results for orders placed or performed during the hospital encounter of 03/09/22   CT Abdomen Pelvis w Contrast     Status: None    " Narrative    EXAM: CT ABDOMEN PELVIS W CONTRAST  LOCATION: [facility]  DATE/TIME: 3/9/2022 4:15 PM    INDICATION: Diverticulitis, complication suspected  COMPARISON: CT 3/2/2022 and 4/20/2016  TECHNIQUE: CT scan of the abdomen and pelvis was performed following injection of IV contrast. Multiplanar reformats were obtained. Dose reduction techniques were used.  CONTRAST: Isovue 370 100mL     FINDINGS:   LOWER CHEST: Normal.    HEPATOBILIARY: Mild hepatic steatosis with fatty sparing around the gallbladder. Normal gallbladder and bile ducts.    PANCREAS: Normal.    SPLEEN: Normal.    ADRENAL GLANDS: Stable 1.2 cm left adrenal gland nodule characterized as a lipid rich adenoma on CT performed 04/20/2016. No follow-up is indicated. Normal right adrenal gland.    KIDNEYS/BLADDER: Subcentimeter upper right renal cortical cyst. No follow-up is indicated. No hydronephrosis.    BOWEL: Normal caliber small bowel. Post appendectomy. Mid to distal sigmoid diverticulitis with interval mild to moderately improved pericolonic edema. No abscess or free air.    LYMPH NODES: Normal.    VASCULATURE: Mild to moderate atherosclerosis.    PELVIC ORGANS: Edema from the aforementioned diverticulitis surrounds the uterus. No free fluid.    MUSCULOSKELETAL: Spinal degenerative changes.      Impression    IMPRESSION:   1.  Acute uncomplicated mid to distal sigmoid colon diverticulitis, mild to moderately improved since the CT from 03/02/2022. No abscess or free air.    A phone call report regarding the critical findings on this exam was made to Dr. Annetta Medellin at 4:43 PM on 03/09/2022.   Results for orders placed or performed in visit on 03/09/22   UA with Microscopic - lab collect     Status: Abnormal   Result Value Ref Range    Color Urine Yellow Colorless, Straw, Light Yellow, Yellow    Appearance Urine Clear Clear    Glucose Urine Negative Negative mg/dL    Bilirubin Urine Negative Negative    Ketones Urine  Negative Negative mg/dL    Specific Gravity Urine <=1.005 1.005 - 1.030    Blood Urine Trace (A) Negative    pH Urine 6.0 5.0 - 8.0    Protein Albumin Urine Negative Negative mg/dL    Urobilinogen Urine 0.2 0.2, 1.0 E.U./dL    Nitrite Urine Negative Negative    Leukocyte Esterase Urine Negative Negative   Urine Microscopic Exam     Status: Abnormal   Result Value Ref Range    Bacteria Urine Few (A) None Seen /HPF    RBC Urine None Seen 0-2 /HPF /HPF    WBC Urine 0-5 0-5 /HPF /HPF    Squamous Epithelials Urine Few (A) None Seen /LPF   Basic metabolic panel  (Ca, Cl, CO2, Creat, Gluc, K, Na, BUN)     Status: Abnormal   Result Value Ref Range    Sodium 142 136 - 145 mmol/L    Potassium 4.1 3.5 - 5.0 mmol/L    Chloride 103 98 - 107 mmol/L    Carbon Dioxide (CO2) 25 22 - 31 mmol/L    Anion Gap 14 5 - 18 mmol/L    Urea Nitrogen 7 (L) 8 - 22 mg/dL    Creatinine 0.84 0.60 - 1.10 mg/dL    Calcium 10.7 (H) 8.5 - 10.5 mg/dL    Glucose 97 70 - 125 mg/dL    GFR Estimate 80 >60 mL/min/1.73m2   CBC with platelets and differential     Status: Abnormal   Result Value Ref Range    WBC Count 14.3 (H) 4.0 - 11.0 10e3/uL    RBC Count 4.62 3.80 - 5.20 10e6/uL    Hemoglobin 13.3 11.7 - 15.7 g/dL    Hematocrit 38.5 35.0 - 47.0 %    MCV 83 78 - 100 fL    MCH 28.8 26.5 - 33.0 pg    MCHC 34.5 31.5 - 36.5 g/dL    RDW 13.2 10.0 - 15.0 %    Platelet Count 292 150 - 450 10e3/uL    % Neutrophils 65 %    % Lymphocytes 28 %    % Monocytes 5 %    % Eosinophils 1 %    % Basophils 1 %    % Immature Granulocytes 0 %    Absolute Neutrophils 9.3 (H) 1.6 - 8.3 10e3/uL    Absolute Lymphocytes 4.0 0.8 - 5.3 10e3/uL    Absolute Monocytes 0.8 0.0 - 1.3 10e3/uL    Absolute Eosinophils 0.1 0.0 - 0.7 10e3/uL    Absolute Basophils 0.1 0.0 - 0.2 10e3/uL    Absolute Immature Granulocytes 0.0 <=0.4 10e3/uL   **CBC with platelets differential FUTURE 2mo     Status: Abnormal    Narrative    The following orders were created for panel order **CBC with platelets  differential FUTURE 2mo.  Procedure                               Abnormality         Status                     ---------                               -----------         ------                     CBC with platelets and d...[[id number]]  Abnormal            Final result                 Please view results for these tests on the individual orders.       Medications at Conclusion of Visit:  Current Outpatient Medications   Medication Sig Dispense Refill     cholecalciferol, vitamin D3, (VITAMIN D3) 2,000 unit Tab [CHOLECALCIFEROL, VITAMIN D3, (VITAMIN D3) 2,000 UNIT TAB] Take 2,000 Units by mouth at bedtime.        dicyclomine (BENTYL) 10 MG capsule Take 1 capsule (10 mg) by mouth 4 times daily (before meals and nightly) 120 capsule 1     ibuprofen (ADVIL/MOTRIN) 800 MG tablet TAKE 1 TABLET BY MOUTH EVERY 6 HOURS AS NEEDED FOR PAIN 100 tablet 0     levofloxacin (LEVAQUIN) 750 MG tablet Take 1 tablet (750 mg) by mouth daily for 7 days X 10 days 7 tablet 0     meclizine (ANTIVERT) 25 mg tablet [MECLIZINE (ANTIVERT) 25 MG TABLET] Take 1 tablet (25 mg total) by mouth 3 (three) times a day as needed for dizziness or nausea. 45 tablet 1     metroNIDAZOLE (FLAGYL) 500 MG tablet Take 1 tablet (500 mg) by mouth 3 times daily for 7 days X 10 days 21 tablet 0     nystatin (MYCOSTATIN) 474547 UNIT/ML suspension Take 5 mLs (500,000 Units) by mouth 4 times daily for 10 days 200 mL 1     omeprazole (PRILOSEC) 40 MG DR capsule Take 1 capsule by mouth once daily 90 capsule 3     pregabalin (LYRICA) 100 MG capsule Take 100 mg by mouth 2 times daily        simvastatin (ZOCOR) 40 MG tablet [SIMVASTATIN (ZOCOR) 40 MG TABLET] Take 1 tablet by mouth once daily 90 tablet 3         ZIGGY CARABALLO MD

## 2022-03-10 DIAGNOSIS — E83.52 SERUM CALCIUM ELEVATED: Primary | ICD-10-CM

## 2022-03-10 ASSESSMENT — ENCOUNTER SYMPTOMS
DYSURIA: 1
COUGH: 0
BLOOD IN STOOL: 0
CARDIOVASCULAR NEGATIVE: 1
PALPITATIONS: 0
ACTIVITY CHANGE: 1
MUSCULOSKELETAL NEGATIVE: 1
HEMATOLOGIC/LYMPHATIC NEGATIVE: 1
APPETITE CHANGE: 1
ENDOCRINE NEGATIVE: 1
ALLERGIC/IMMUNOLOGIC NEGATIVE: 1
FATIGUE: 1
NEUROLOGICAL NEGATIVE: 1
EYES NEGATIVE: 1
RESPIRATORY NEGATIVE: 1
ABDOMINAL DISTENTION: 0
FEVER: 1
PSYCHIATRIC NEGATIVE: 1
ABDOMINAL PAIN: 1
SHORTNESS OF BREATH: 0

## 2022-03-11 ENCOUNTER — LAB (OUTPATIENT)
Dept: LAB | Facility: CLINIC | Age: 57
End: 2022-03-11
Payer: COMMERCIAL

## 2022-03-11 DIAGNOSIS — K57.32 DIVERTICULITIS OF COLON: ICD-10-CM

## 2022-03-11 DIAGNOSIS — E83.52 SERUM CALCIUM ELEVATED: ICD-10-CM

## 2022-03-11 LAB
ANION GAP SERPL CALCULATED.3IONS-SCNC: 13 MMOL/L (ref 5–18)
BACTERIA UR CULT: NORMAL
BUN SERPL-MCNC: 6 MG/DL (ref 8–22)
CALCIUM SERPL-MCNC: 10.1 MG/DL (ref 8.5–10.5)
CHLORIDE BLD-SCNC: 101 MMOL/L (ref 98–107)
CO2 SERPL-SCNC: 26 MMOL/L (ref 22–31)
CREAT SERPL-MCNC: 0.83 MG/DL (ref 0.6–1.1)
ERYTHROCYTE [DISTWIDTH] IN BLOOD BY AUTOMATED COUNT: 13.1 % (ref 10–15)
GFR SERPL CREATININE-BSD FRML MDRD: 81 ML/MIN/1.73M2
GLUCOSE BLD-MCNC: 134 MG/DL (ref 70–125)
HCT VFR BLD AUTO: 38.5 % (ref 35–47)
HGB BLD-MCNC: 13.2 G/DL (ref 11.7–15.7)
MCH RBC QN AUTO: 28.9 PG (ref 26.5–33)
MCHC RBC AUTO-ENTMCNC: 34.3 G/DL (ref 31.5–36.5)
MCV RBC AUTO: 84 FL (ref 78–100)
PLATELET # BLD AUTO: 311 10E3/UL (ref 150–450)
POTASSIUM BLD-SCNC: 3.9 MMOL/L (ref 3.5–5)
RBC # BLD AUTO: 4.57 10E6/UL (ref 3.8–5.2)
SODIUM SERPL-SCNC: 140 MMOL/L (ref 136–145)
WBC # BLD AUTO: 13.7 10E3/UL (ref 4–11)

## 2022-03-11 PROCEDURE — 80048 BASIC METABOLIC PNL TOTAL CA: CPT

## 2022-03-11 PROCEDURE — 36415 COLL VENOUS BLD VENIPUNCTURE: CPT

## 2022-03-11 PROCEDURE — 85027 COMPLETE CBC AUTOMATED: CPT

## 2022-03-14 ENCOUNTER — OFFICE VISIT (OUTPATIENT)
Dept: FAMILY MEDICINE | Facility: CLINIC | Age: 57
End: 2022-03-14
Payer: COMMERCIAL

## 2022-03-14 VITALS
TEMPERATURE: 98.7 F | WEIGHT: 247.75 LBS | OXYGEN SATURATION: 98 % | SYSTOLIC BLOOD PRESSURE: 137 MMHG | DIASTOLIC BLOOD PRESSURE: 82 MMHG | HEART RATE: 92 BPM | BODY MASS INDEX: 35.55 KG/M2

## 2022-03-14 DIAGNOSIS — R73.9 ELEVATED BLOOD SUGAR: ICD-10-CM

## 2022-03-14 DIAGNOSIS — K57.32 DIVERTICULITIS OF COLON: Primary | ICD-10-CM

## 2022-03-14 DIAGNOSIS — Z12.11 SCREEN FOR COLON CANCER: ICD-10-CM

## 2022-03-14 DIAGNOSIS — Z12.31 VISIT FOR SCREENING MAMMOGRAM: ICD-10-CM

## 2022-03-14 PROBLEM — K52.9 ILEITIS: Status: ACTIVE | Noted: 2022-03-14

## 2022-03-14 LAB
BASOPHILS # BLD AUTO: 0.1 10E3/UL (ref 0–0.2)
BASOPHILS NFR BLD AUTO: 1 %
EOSINOPHIL # BLD AUTO: 0.1 10E3/UL (ref 0–0.7)
EOSINOPHIL NFR BLD AUTO: 1 %
ERYTHROCYTE [DISTWIDTH] IN BLOOD BY AUTOMATED COUNT: 13 % (ref 10–15)
HBA1C MFR BLD: 6.3 % (ref 0–5.6)
HCT VFR BLD AUTO: 37.4 % (ref 35–47)
HGB BLD-MCNC: 12.7 G/DL (ref 11.7–15.7)
IMM GRANULOCYTES # BLD: 0 10E3/UL
IMM GRANULOCYTES NFR BLD: 0 %
LYMPHOCYTES # BLD AUTO: 3.6 10E3/UL (ref 0.8–5.3)
LYMPHOCYTES NFR BLD AUTO: 31 %
MCH RBC QN AUTO: 28.5 PG (ref 26.5–33)
MCHC RBC AUTO-ENTMCNC: 34 G/DL (ref 31.5–36.5)
MCV RBC AUTO: 84 FL (ref 78–100)
MONOCYTES # BLD AUTO: 0.6 10E3/UL (ref 0–1.3)
MONOCYTES NFR BLD AUTO: 5 %
NEUTROPHILS # BLD AUTO: 7 10E3/UL (ref 1.6–8.3)
NEUTROPHILS NFR BLD AUTO: 62 %
PLATELET # BLD AUTO: 313 10E3/UL (ref 150–450)
RBC # BLD AUTO: 4.45 10E6/UL (ref 3.8–5.2)
WBC # BLD AUTO: 11.4 10E3/UL (ref 4–11)

## 2022-03-14 PROCEDURE — 36415 COLL VENOUS BLD VENIPUNCTURE: CPT | Performed by: FAMILY MEDICINE

## 2022-03-14 PROCEDURE — 99214 OFFICE O/P EST MOD 30 MIN: CPT | Performed by: FAMILY MEDICINE

## 2022-03-14 PROCEDURE — 83036 HEMOGLOBIN GLYCOSYLATED A1C: CPT | Performed by: FAMILY MEDICINE

## 2022-03-14 PROCEDURE — 85025 COMPLETE CBC W/AUTO DIFF WBC: CPT | Performed by: FAMILY MEDICINE

## 2022-03-14 ASSESSMENT — ENCOUNTER SYMPTOMS
ABDOMINAL PAIN: 1
DIARRHEA: 1

## 2022-03-14 NOTE — PROGRESS NOTES
"Assessment & Plan    1. Diverticulitis of colon  Patient continues to have pain related to presumed acute/chronic diverticulitis - last CT scan showed improvement although patient continued to have pain.  She is (slowly) improving.  Labs slowly improving.  Will order hemogram/diff - reviewed with patient   - CBC with Platelets & Differential; Future  - CBC with Platelets & Differential    2. Elevated blood sugar  Patient had elevated blood sugar on recent labs - will check A1c  - Hemoglobin A1c; Future  - Hemoglobin A1c    3. Screen for colon cancer  Due for colon cancer screening - needs colonoscopy as soon as safe (due to recent diverticulitis) - if symptoms do not improve may need to do more urgent colonoscopy with biopsies.     4. Visit for screening mammogram  Due for breast cancer screening - needs mammogram    No follow-ups on file.    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     Fever \"broke\" Friday night - woke up in mad sweat, now better         Patient Active Problem List   Diagnosis     Cough     Nicotine Dependence     Chronic Reflux Esophagitis     Fibromyalgia     Strain Of The Gastrocnemius Muscle Of The Right Leg     Adjustment Disorder With Depressed Mood     Joint Pain, Localized In The Knee     Pain During Urination (Dysuria)     Abdominal Pain     Diverticulitis of colon     Trochanteric Bursitis     Sinusitis     Midback Pain     Mixed hyperlipidemia     Abnormal Weight Loss     Abnormal Pap smear of " cervix     Adverse Food Reaction (Not Anaphylactic)     Impingement Of The Right Shoulder     Hypertension     Head Injury     Oral Thrush     Lumbar Disc Degeneration     Lower Back Pain Chronic     Carpal Tunnel Syndrome     Simple (Specific) Phobia     Dermatitis     Patellofemoral Syndrome Of The Right Knee      Tension-type headache     Obsessive compulsive disorder     Costochondritis     Fatigue     Chronic pain     Vitamin D deficiency     Calcaneal spur, right     Numbness of right lower extremity     Tarsal tunnel syndrome of right side     Compression neuropathy of right lower extremity     Mononeuritis lower limb, right     Right foot pain     Compression neuropathy     Obesity (BMI 35.0-39.9) with comorbidity (H)     Right shoulder injury, sequela     Nicotine dependence     Diverticulitis of gastrointestinal tract     Gastroesophageal reflux disease     Ileitis        Past Medical History:   Diagnosis Date     Anemia      Bilateral calcaneal spurs     surgery 2015     Bursitis of hip      Chronic back pain      DDD (degenerative disc disease)      Depression      Diverticulitis      Environmental allergies      Fibromyalgia      History of transfusion     With      HLD (hyperlipidemia)      Obesity         Current Outpatient Medications   Medication     cholecalciferol, vitamin D3, (VITAMIN D3) 2,000 unit Tab     dicyclomine (BENTYL) 10 MG capsule     ibuprofen (ADVIL/MOTRIN) 800 MG tablet     meclizine (ANTIVERT) 25 mg tablet     omeprazole (PRILOSEC) 40 MG DR capsule     pregabalin (LYRICA) 100 MG capsule     simvastatin (ZOCOR) 40 MG tablet     No current facility-administered medications for this visit.        Past Surgical History:   Procedure Laterality Date     ENDOMETRIAL ABLATION       HC REMOVAL HEEL SPUR, CALCANEUS Left 2015    Procedure: LEFT POSTERIOR CALCANEAL SPUR RESECTION;  Surgeon: Rob Marion DPM;  Location: [facility] OR;  Service: Podiatry     HC  REMOVAL OF TONSILS,<11 Y/O      Description: Tonsillectomy;  Recorded: 10/18/2008;     REPAIR TENDON ACHILLES Left 2015    Procedure: WITH SECONDARY ACHILLES TENDON REAPIR;  Surgeon: Rob Marion DPM;  Location: [facility] OR;  Service:      REPAIR TENDON ACHILLES Right 11/3/2017    Procedure: WITH SECONDARY ACHILLES TENDON REPAIR;  Surgeon: Rob Marion DPM;  Location: [facility] OR;  Service:      [facility] APPENDECTOMY      Description: Appendectomy;  Recorded: 10/18/2008;     [facility]  DELIVERY ONLY      Description:  Section;  Recorded: 10/18/2008;        Social History     Socioeconomic History     Marital status:      Spouse name: Not on file     Number of children: Not on file     Years of education: Not on file     Highest education level: Not on file   Occupational History     Not on file   Tobacco Use     Smoking status: Current Every Day Smoker     Packs/day: 0.50     Years: 43.00     Pack years: 21.50     Types: Cigarettes     Smokeless tobacco: Never Used     Tobacco comment: seen by TTS 3/3/2022   Substance and Sexual Activity     Alcohol use: No     Comment: Alcoholic Drinks/day: sober since      Drug use: No     Sexual activity: Yes     Partners: Male   Other Topics Concern     Not on file   Social History Narrative     Not on file     Social Determinants of Health     Financial Resource Strain: Not on file   Food Insecurity: Not on file   Transportation Needs: Not on file   Physical Activity: Not on file   Stress: Not on file   Social Connections: Not on file   Intimate Partner Violence: Not on file   Housing Stability: Not on file        Family History   Problem Relation Age of Onset     Lung Cancer Father      Alcoholism Father      Alcoholism Son      Hypertension Mother      Stomach Cancer Maternal Aunt      Breast Cancer Maternal Aunt      Breast Cancer Maternal Aunt         Review of Systems   HENT: Negative for mouth sores (had thrush - better on Nystatin).     Gastrointestinal: Positive for abdominal pain and diarrhea (stool is loose).   Genitourinary: Positive for pelvic pain.   All other systems reviewed and are negative.       /82   Pulse 92   Temp 98.7  F (37.1  C)   Wt 112.4 kg (247 lb 12 oz)   SpO2 98%   BMI 35.55 kg/m       Physical Exam  Constitutional:       General: She is not in acute distress.     Appearance: She is well-developed.   HENT:      Right Ear: Tympanic membrane and external ear normal.      Left Ear: Tympanic membrane and external ear normal.      Nose: Nose normal.      Mouth/Throat:      Pharynx: No oropharyngeal exudate.   Eyes:      General:         Right eye: No discharge.         Left eye: No discharge.      Conjunctiva/sclera: Conjunctivae normal.      Pupils: Pupils are equal, round, and reactive to light.   Neck:      Thyroid: No thyromegaly.      Trachea: No tracheal deviation.   Cardiovascular:      Rate and Rhythm: Normal rate and regular rhythm.      Pulses: Normal pulses.      Heart sounds: Normal heart sounds, S1 normal and S2 normal. No murmur heard.    No friction rub. No S3 or S4 sounds.   Pulmonary:      Effort: Pulmonary effort is normal. No respiratory distress.      Breath sounds: Normal breath sounds. No wheezing or rales.   Abdominal:      General: Bowel sounds are normal.      Palpations: Abdomen is soft. There is no mass.      Tenderness: There is abdominal tenderness (tender to palpation at LLQ/midline suprapubic). There is no right CVA tenderness, left CVA tenderness, guarding or rebound.   Musculoskeletal:         General: Normal range of motion.      Cervical back: Neck supple.   Lymphadenopathy:      Cervical: No cervical adenopathy.   Skin:     General: Skin is warm and dry.      Findings: No rash.   Neurological:      Mental Status: She is alert and oriented to person, place, and time.      Motor: No abnormal muscle tone.      Deep Tendon Reflexes: Reflexes are normal and symmetric.   Psychiatric:          Thought Content: Thought content normal.         Judgment: Judgment normal.          Results:  Results for orders placed or performed in visit on 03/14/22   Hemoglobin A1c     Status: Abnormal   Result Value Ref Range    Hemoglobin A1C 6.3 (H) 0.0 - 5.6 %   CBC with platelets and differential     Status: Abnormal   Result Value Ref Range    WBC Count 11.4 (H) 4.0 - 11.0 10e3/uL    RBC Count 4.45 3.80 - 5.20 10e6/uL    Hemoglobin 12.7 11.7 - 15.7 g/dL    Hematocrit 37.4 35.0 - 47.0 %    MCV 84 78 - 100 fL    MCH 28.5 26.5 - 33.0 pg    MCHC 34.0 31.5 - 36.5 g/dL    RDW 13.0 10.0 - 15.0 %    Platelet Count 313 150 - 450 10e3/uL    % Neutrophils 62 %    % Lymphocytes 31 %    % Monocytes 5 %    % Eosinophils 1 %    % Basophils 1 %    % Immature Granulocytes 0 %    Absolute Neutrophils 7.0 1.6 - 8.3 10e3/uL    Absolute Lymphocytes 3.6 0.8 - 5.3 10e3/uL    Absolute Monocytes 0.6 0.0 - 1.3 10e3/uL    Absolute Eosinophils 0.1 0.0 - 0.7 10e3/uL    Absolute Basophils 0.1 0.0 - 0.2 10e3/uL    Absolute Immature Granulocytes 0.0 <=0.4 10e3/uL   CBC with Platelets & Differential     Status: Abnormal    Narrative    The following orders were created for panel order CBC with Platelets & Differential.  Procedure                               Abnormality         Status                     ---------                               -----------         ------                     CBC with platelets and d...[[id number]]  Abnormal            Final result                 Please view results for these tests on the individual orders.       Medications at Conclusion of Visit:  Current Outpatient Medications   Medication Sig Dispense Refill     cholecalciferol, vitamin D3, (VITAMIN D3) 2,000 unit Tab [CHOLECALCIFEROL, VITAMIN D3, (VITAMIN D3) 2,000 UNIT TAB] Take 2,000 Units by mouth at bedtime.        dicyclomine (BENTYL) 10 MG capsule Take 1 capsule (10 mg) by mouth 4 times daily (before meals and nightly) 120 capsule 1     ibuprofen  (ADVIL/MOTRIN) 800 MG tablet TAKE 1 TABLET BY MOUTH EVERY 6 HOURS AS NEEDED FOR PAIN 100 tablet 0     meclizine (ANTIVERT) 25 mg tablet [MECLIZINE (ANTIVERT) 25 MG TABLET] Take 1 tablet (25 mg total) by mouth 3 (three) times a day as needed for dizziness or nausea. 45 tablet 1     omeprazole (PRILOSEC) 40 MG DR capsule Take 1 capsule by mouth once daily 90 capsule 3     pregabalin (LYRICA) 100 MG capsule Take 100 mg by mouth 2 times daily        simvastatin (ZOCOR) 40 MG tablet [SIMVASTATIN (ZOCOR) 40 MG TABLET] Take 1 tablet by mouth once daily 90 tablet 3         ZIGGY CARABALLO MD

## 2022-03-21 ENCOUNTER — LAB (OUTPATIENT)
Dept: LAB | Facility: CLINIC | Age: 57
End: 2022-03-21
Payer: COMMERCIAL

## 2022-03-21 DIAGNOSIS — K57.32 DIVERTICULITIS OF COLON: ICD-10-CM

## 2022-03-21 LAB
BASOPHILS # BLD AUTO: 0 10E3/UL (ref 0–0.2)
BASOPHILS NFR BLD AUTO: 0 %
EOSINOPHIL # BLD AUTO: 0.2 10E3/UL (ref 0–0.7)
EOSINOPHIL NFR BLD AUTO: 1 %
ERYTHROCYTE [DISTWIDTH] IN BLOOD BY AUTOMATED COUNT: 13.6 % (ref 10–15)
HCT VFR BLD AUTO: 40 % (ref 35–47)
HGB BLD-MCNC: 12.8 G/DL (ref 11.7–15.7)
IMM GRANULOCYTES # BLD: 0 10E3/UL
IMM GRANULOCYTES NFR BLD: 0 %
LYMPHOCYTES # BLD AUTO: 3.8 10E3/UL (ref 0.8–5.3)
LYMPHOCYTES NFR BLD AUTO: 35 %
MCH RBC QN AUTO: 28.6 PG (ref 26.5–33)
MCHC RBC AUTO-ENTMCNC: 32 G/DL (ref 31.5–36.5)
MCV RBC AUTO: 89 FL (ref 78–100)
MONOCYTES # BLD AUTO: 0.5 10E3/UL (ref 0–1.3)
MONOCYTES NFR BLD AUTO: 5 %
NEUTROPHILS # BLD AUTO: 6.3 10E3/UL (ref 1.6–8.3)
NEUTROPHILS NFR BLD AUTO: 58 %
PLATELET # BLD AUTO: 278 10E3/UL (ref 150–450)
RBC # BLD AUTO: 4.48 10E6/UL (ref 3.8–5.2)
WBC # BLD AUTO: 10.9 10E3/UL (ref 4–11)

## 2022-03-21 PROCEDURE — 85025 COMPLETE CBC W/AUTO DIFF WBC: CPT

## 2022-03-21 PROCEDURE — 36415 COLL VENOUS BLD VENIPUNCTURE: CPT

## 2022-03-22 ENCOUNTER — TRANSFERRED RECORDS (OUTPATIENT)
Dept: HEALTH INFORMATION MANAGEMENT | Facility: CLINIC | Age: 57
End: 2022-03-22
Payer: COMMERCIAL

## 2022-03-22 DIAGNOSIS — E78.5 HYPERLIPIDEMIA: ICD-10-CM

## 2022-03-24 RX ORDER — SIMVASTATIN 40 MG
TABLET ORAL
Qty: 90 TABLET | Refills: 0 | Status: SHIPPED | OUTPATIENT
Start: 2022-03-24 | End: 2022-06-22

## 2022-03-24 NOTE — TELEPHONE ENCOUNTER
"Routing refill request to provider for review/approval because:  Labs not current:  LDL    Last Written Prescription Date:  3/25/2021  Last Fill Quantity: 90,  # refills: 3   Last office visit provider:  3/14/2022     Requested Prescriptions   Pending Prescriptions Disp Refills     simvastatin (ZOCOR) 40 MG tablet [Pharmacy Med Name: Simvastatin 40 MG Oral Tablet] 90 tablet 0     Sig: Take 1 tablet by mouth once daily       Statins Protocol Failed - 3/22/2022  9:12 PM        Failed - LDL on file in past 12 months     Recent Labs   Lab Test 02/24/21  0934                Passed - No abnormal creatine kinase in past 12 months     No lab results found.             Passed - Recent (12 mo) or future (30 days) visit within the authorizing provider's specialty     Patient has had an office visit with the authorizing provider or a provider within the authorizing providers department within the previous 12 mos or has a future within next 30 days. See \"Patient Info\" tab in inbasket, or \"Choose Columns\" in Meds & Orders section of the refill encounter.              Passed - Medication is active on med list        Passed - Patient is age 18 or older        Passed - No active pregnancy on record        Passed - No positive pregnancy test in past 12 months             Sally See RN 03/23/22 11:55 PM  "

## 2022-03-27 ENCOUNTER — HEALTH MAINTENANCE LETTER (OUTPATIENT)
Age: 57
End: 2022-03-27

## 2022-03-30 ENCOUNTER — TRANSFERRED RECORDS (OUTPATIENT)
Dept: HEALTH INFORMATION MANAGEMENT | Facility: CLINIC | Age: 57
End: 2022-03-30
Payer: COMMERCIAL

## 2022-04-05 ENCOUNTER — OFFICE VISIT (OUTPATIENT)
Dept: FAMILY MEDICINE | Facility: CLINIC | Age: 57
End: 2022-04-05
Payer: COMMERCIAL

## 2022-04-05 VITALS
DIASTOLIC BLOOD PRESSURE: 92 MMHG | TEMPERATURE: 97.4 F | HEART RATE: 90 BPM | OXYGEN SATURATION: 99 % | SYSTOLIC BLOOD PRESSURE: 138 MMHG | WEIGHT: 246.3 LBS | BODY MASS INDEX: 35.34 KG/M2

## 2022-04-05 DIAGNOSIS — Z12.11 SCREEN FOR COLON CANCER: ICD-10-CM

## 2022-04-05 DIAGNOSIS — R10.84 ABDOMINAL PAIN, GENERALIZED: ICD-10-CM

## 2022-04-05 DIAGNOSIS — K59.00 CONSTIPATION, UNSPECIFIED CONSTIPATION TYPE: ICD-10-CM

## 2022-04-05 DIAGNOSIS — Z00.00 HEALTHCARE MAINTENANCE: ICD-10-CM

## 2022-04-05 DIAGNOSIS — Z12.31 VISIT FOR SCREENING MAMMOGRAM: ICD-10-CM

## 2022-04-05 DIAGNOSIS — K57.32 DIVERTICULITIS OF COLON: Primary | ICD-10-CM

## 2022-04-05 LAB
ALBUMIN SERPL-MCNC: 3.9 G/DL (ref 3.5–5)
ALP SERPL-CCNC: 60 U/L (ref 45–120)
ALT SERPL W P-5'-P-CCNC: 22 U/L (ref 0–45)
ANION GAP SERPL CALCULATED.3IONS-SCNC: 12 MMOL/L (ref 5–18)
AST SERPL W P-5'-P-CCNC: 16 U/L (ref 0–40)
BILIRUB SERPL-MCNC: 0.2 MG/DL (ref 0–1)
BUN SERPL-MCNC: 8 MG/DL (ref 8–22)
CALCIUM SERPL-MCNC: 10.3 MG/DL (ref 8.5–10.5)
CHLORIDE BLD-SCNC: 105 MMOL/L (ref 98–107)
CO2 SERPL-SCNC: 23 MMOL/L (ref 22–31)
CREAT SERPL-MCNC: 0.82 MG/DL (ref 0.6–1.1)
ERYTHROCYTE [DISTWIDTH] IN BLOOD BY AUTOMATED COUNT: 14.2 % (ref 10–15)
GFR SERPL CREATININE-BSD FRML MDRD: 82 ML/MIN/1.73M2
GLUCOSE BLD-MCNC: 179 MG/DL (ref 70–125)
HCT VFR BLD AUTO: 38.5 % (ref 35–47)
HGB BLD-MCNC: 12.5 G/DL (ref 11.7–15.7)
MCH RBC QN AUTO: 28.5 PG (ref 26.5–33)
MCHC RBC AUTO-ENTMCNC: 32.5 G/DL (ref 31.5–36.5)
MCV RBC AUTO: 88 FL (ref 78–100)
PLATELET # BLD AUTO: 213 10E3/UL (ref 150–450)
POTASSIUM BLD-SCNC: 4.1 MMOL/L (ref 3.5–5)
PROT SERPL-MCNC: 7.8 G/DL (ref 6–8)
RBC # BLD AUTO: 4.38 10E6/UL (ref 3.8–5.2)
SODIUM SERPL-SCNC: 140 MMOL/L (ref 136–145)
TSH SERPL DL<=0.005 MIU/L-ACNC: 1.44 UIU/ML (ref 0.3–5)
WBC # BLD AUTO: 9.6 10E3/UL (ref 4–11)

## 2022-04-05 PROCEDURE — 36415 COLL VENOUS BLD VENIPUNCTURE: CPT | Performed by: FAMILY MEDICINE

## 2022-04-05 PROCEDURE — 85027 COMPLETE CBC AUTOMATED: CPT | Performed by: FAMILY MEDICINE

## 2022-04-05 PROCEDURE — 84443 ASSAY THYROID STIM HORMONE: CPT | Performed by: FAMILY MEDICINE

## 2022-04-05 PROCEDURE — 99214 OFFICE O/P EST MOD 30 MIN: CPT | Performed by: FAMILY MEDICINE

## 2022-04-05 PROCEDURE — 80053 COMPREHEN METABOLIC PANEL: CPT | Performed by: FAMILY MEDICINE

## 2022-04-05 RX ORDER — POLYETHYLENE GLYCOL 3350 17 G/17G
1 POWDER, FOR SOLUTION ORAL DAILY PRN
Qty: 527 G | Refills: 1 | Status: SHIPPED | OUTPATIENT
Start: 2022-04-05 | End: 2024-05-20

## 2022-04-05 NOTE — PROGRESS NOTES
"Assessment & Plan    1. Diverticulitis of colon  2. Constipation, unspecified constipation type  3. Abdominal pain, generalized  Patient had multiple hospitalizations for diverticulitis - feels like it's not any better; frustrated by inability to get in for colonoscopy until June (but didn't want any day except Monday and particular locations).  Will use Miralax to make sure she is passing stool.  Check labs for metabolic issues that may lead to more difficulty with stool   - polyethylene glycol (MIRALAX) 17 GM/Dose powder; Take 17 g (1 capful) by mouth daily as needed for constipation  Dispense: 527 g; Refill: 1  - Comprehensive metabolic panel (BMP + Alb, Alk Phos, ALT, AST, Total. Bili, TP); Future  - TSH; Future  - CT Abdomen Pelvis w Contrast; Future  - Comprehensive metabolic panel (BMP + Alb, Alk Phos, ALT, AST, Total. Bili, TP)  - TSH  - CBC with platelets; Future    4. Screen for colon cancer  Due for colon cancer screening - had Cologuard 1/2019 = negative; due ; but had recurring diverticular? Disease - needs colonoscopy - scheduled in June!    5. Visit for screening mammogram  Due for mammogram - \"the least of my worries\"    6. Healthcare maintenance  Reviewed -    - REVIEW OF HEALTH MAINTENANCE PROTOCOL ORDERS    ue for 2nd shingles shot - got it at [facility] - doesn't want to go back right now ; got her COVID vaccines there as well as pneumococcal vaccine     No follow-ups on file.    Chief Complaint   Patient presents with     Abdominal Pain      HPI  Still not eating her regular diet -   Whatever she eats, doesn't come out   Now with consistent side ache - right lower side, not left -     Has a colonoscopy in June -   Needs 2 day prep - so needs 3 days off   Couldn't get a Monday scope until June 20 -     No antibiotics x a while -     Back to work -   No fever, no chills  Sitting still makes it worse     Prior to recent illnesses, could go a few days between stools  But now, it can take 5 days "     Cut out pop, cut out half her coffee -   No nausea  No emesis  ++constipation  Low grade temp 99 at max  Keeps an eye on her temp              Patient Active Problem List   Diagnosis     Cough     Nicotine Dependence     Chronic Reflux Esophagitis     Fibromyalgia     Strain Of The Gastrocnemius Muscle Of The Right Leg     Adjustment Disorder With Depressed Mood     Joint Pain, Localized In The Knee     Pain During Urination (Dysuria)     Abdominal Pain     Diverticulitis of colon     Trochanteric Bursitis     Sinusitis     Midback Pain     Mixed hyperlipidemia     Abnormal Weight Loss     Abnormal Pap smear of cervix     Adverse Food Reaction (Not Anaphylactic)     Impingement Of The Right Shoulder     Hypertension     Head Injury     Oral Thrush     Lumbar Disc Degeneration     Lower Back Pain Chronic     Carpal Tunnel Syndrome     Simple (Specific) Phobia     Dermatitis     Patellofemoral Syndrome Of The Right Knee      Tension-type headache     Obsessive compulsive disorder     Costochondritis     Fatigue     Chronic pain     Vitamin D deficiency     Calcaneal spur, right     Numbness of right lower extremity     Tarsal tunnel syndrome of right side     Compression neuropathy of right lower extremity     Mononeuritis lower limb, right     Right foot pain     Compression neuropathy     Obesity (BMI 35.0-39.9) with comorbidity (H)     Right shoulder injury, sequela     Nicotine dependence     Diverticulitis of gastrointestinal tract     Gastroesophageal reflux disease     Ileitis        Past Medical History:   Diagnosis Date     Anemia      Bilateral calcaneal spurs     surgery 2015     Bursitis of hip      Chronic back pain      DDD (degenerative disc disease)      Depression      Diverticulitis      Environmental allergies      Fibromyalgia      History of transfusion     With      HLD (hyperlipidemia)      Obesity         Current Outpatient Medications   Medication     cholecalciferol,  vitamin D3, (VITAMIN D3) 2,000 unit Tab     dicyclomine (BENTYL) 10 MG capsule     ibuprofen (ADVIL/MOTRIN) 800 MG tablet     meclizine (ANTIVERT) 25 mg tablet     omeprazole (PRILOSEC) 40 MG DR capsule     polyethylene glycol (MIRALAX) 17 GM/Dose powder     pregabalin (LYRICA) 100 MG capsule     simvastatin (ZOCOR) 40 MG tablet     No current facility-administered medications for this visit.        Past Surgical History:   Procedure Laterality Date     ENDOMETRIAL ABLATION       HC REMOVAL HEEL SPUR, CALCANEUS Left 2015    Procedure: LEFT POSTERIOR CALCANEAL SPUR RESECTION;  Surgeon: Rob Marion DPM;  Location: [facility];  Service: Podiatry     HC REMOVAL OF TONSILS,<11 Y/O      Description: Tonsillectomy;  Recorded: 10/18/2008;     REPAIR TENDON ACHILLES Left 2015    Procedure: WITH SECONDARY ACHILLES TENDON REAPIR;  Surgeon: Rob Marion DPM;  Location: [facility] OR;  Service:      REPAIR TENDON ACHILLES Right 11/3/2017    Procedure: WITH SECONDARY ACHILLES TENDON REPAIR;  Surgeon: Rob Marion DPM;  Location: [facility] OR;  Service:      [facility] APPENDECTOMY      Description: Appendectomy;  Recorded: 10/18/2008;     [facility]  DELIVERY ONLY      Description:  Section;  Recorded: 10/18/2008;        Social History     Socioeconomic History     Marital status:      Spouse name: Not on file     Number of children: Not on file     Years of education: Not on file     Highest education level: Not on file   Occupational History     Not on file   Tobacco Use     Smoking status: Current Every Day Smoker     Packs/day: 0.50     Years: 43.00     Pack years: 21.50     Types: Cigarettes     Smokeless tobacco: Never Used     Tobacco comment: seen by TTS 3/3/2022   Substance and Sexual Activity     Alcohol use: No     Comment: Alcoholic Drinks/day: sober since      Drug use: No     Sexual activity: Yes     Partners: Male   Other Topics Concern     Not on file   Social  History Narrative     Not on file     Social Determinants of Health     Financial Resource Strain: Not on file   Food Insecurity: Not on file   Transportation Needs: Not on file   Physical Activity: Not on file   Stress: Not on file   Social Connections: Not on file   Intimate Partner Violence: Not on file   Housing Stability: Not on file        Family History   Problem Relation Age of Onset     Lung Cancer Father      Alcoholism Father      Alcoholism Son      Hypertension Mother      Stomach Cancer Maternal Aunt      Breast Cancer Maternal Aunt      Breast Cancer Maternal Aunt         Review of Systems   Constitutional: Negative.    HENT: Negative.    Eyes: Negative.  Negative for visual disturbance.   Respiratory: Negative.  Negative for cough and shortness of breath.    Cardiovascular: Negative for chest pain.   Gastrointestinal: Positive for abdominal distention and abdominal pain.   Endocrine: Negative.    Genitourinary: Positive for dysuria and urgency.   Musculoskeletal: Positive for back pain (chronic).        Chronic lower extremity pain   Skin: Negative.    Allergic/Immunologic: Negative.    Neurological: Negative.  Negative for weakness.   Hematological: Negative.    Psychiatric/Behavioral: Negative.         Feeling a little more low mood due to ongoing symptoms        BP (!) 138/92   Pulse 90   Temp 97.4  F (36.3  C)   Wt 111.7 kg (246 lb 4.8 oz)   SpO2 99%   BMI 35.34 kg/m       Physical Exam  Constitutional:       General: She is not in acute distress.     Appearance: She is well-developed.   HENT:      Right Ear: Tympanic membrane and external ear normal.      Left Ear: Tympanic membrane and external ear normal.      Nose: Nose normal.      Mouth/Throat:      Pharynx: No oropharyngeal exudate.   Eyes:      General:         Right eye: No discharge.         Left eye: No discharge.      Conjunctiva/sclera: Conjunctivae normal.      Pupils: Pupils are equal, round, and reactive to light.   Neck:       Thyroid: No thyromegaly.      Trachea: No tracheal deviation.   Cardiovascular:      Rate and Rhythm: Normal rate and regular rhythm.      Pulses: Normal pulses.      Heart sounds: Normal heart sounds, S1 normal and S2 normal. No murmur heard.    No friction rub. No S3 or S4 sounds.   Pulmonary:      Effort: Pulmonary effort is normal. No respiratory distress.      Breath sounds: Normal breath sounds. No wheezing or rales.   Abdominal:      General: Bowel sounds are normal.      Palpations: Abdomen is soft. There is no mass.      Tenderness: There is abdominal tenderness (tender to palpation both RLQ and LLQ).   Musculoskeletal:         General: Normal range of motion.      Cervical back: Neck supple.   Lymphadenopathy:      Cervical: No cervical adenopathy.   Skin:     General: Skin is warm and dry.      Findings: No rash.   Neurological:      General: No focal deficit present.      Mental Status: She is alert and oriented to person, place, and time.      Motor: No abnormal muscle tone.      Deep Tendon Reflexes: Reflexes are normal and symmetric.   Psychiatric:         Thought Content: Thought content normal.         Judgment: Judgment normal.          Results:  Results for orders placed or performed in visit on 04/05/22   CBC with platelets     Status: Normal   Result Value Ref Range    WBC Count 9.6 4.0 - 11.0 10e3/uL    RBC Count 4.38 3.80 - 5.20 10e6/uL    Hemoglobin 12.5 11.7 - 15.7 g/dL    Hematocrit 38.5 35.0 - 47.0 %    MCV 88 78 - 100 fL    MCH 28.5 26.5 - 33.0 pg    MCHC 32.5 31.5 - 36.5 g/dL    RDW 14.2 10.0 - 15.0 %    Platelet Count 213 150 - 450 10e3/uL   Comprehensive metabolic panel (BMP + Alb, Alk Phos, ALT, AST, Total. Bili, TP)     Status: Abnormal   Result Value Ref Range    Sodium 140 136 - 145 mmol/L    Potassium 4.1 3.5 - 5.0 mmol/L    Chloride 105 98 - 107 mmol/L    Carbon Dioxide (CO2) 23 22 - 31 mmol/L    Anion Gap 12 5 - 18 mmol/L    Urea Nitrogen 8 8 - 22 mg/dL    Creatinine 0.82 0.60  - 1.10 mg/dL    Calcium 10.3 8.5 - 10.5 mg/dL    Glucose 179 (H) 70 - 125 mg/dL    Alkaline Phosphatase 60 45 - 120 U/L    AST 16 0 - 40 U/L    ALT 22 0 - 45 U/L    Protein Total 7.8 6.0 - 8.0 g/dL    Albumin 3.9 3.5 - 5.0 g/dL    Bilirubin Total 0.2 0.0 - 1.0 mg/dL    GFR Estimate 82 >60 mL/min/1.73m2   TSH     Status: Normal   Result Value Ref Range    TSH 1.44 0.30 - 5.00 uIU/mL       Medications at Conclusion of Visit:  Current Outpatient Medications   Medication Sig Dispense Refill     cholecalciferol, vitamin D3, (VITAMIN D3) 2,000 unit Tab [CHOLECALCIFEROL, VITAMIN D3, (VITAMIN D3) 2,000 UNIT TAB] Take 2,000 Units by mouth at bedtime.        dicyclomine (BENTYL) 10 MG capsule Take 1 capsule (10 mg) by mouth 4 times daily (before meals and nightly) 120 capsule 1     ibuprofen (ADVIL/MOTRIN) 800 MG tablet TAKE 1 TABLET BY MOUTH EVERY 6 HOURS AS NEEDED FOR PAIN 100 tablet 0     meclizine (ANTIVERT) 25 mg tablet [MECLIZINE (ANTIVERT) 25 MG TABLET] Take 1 tablet (25 mg total) by mouth 3 (three) times a day as needed for dizziness or nausea. 45 tablet 1     omeprazole (PRILOSEC) 40 MG DR capsule Take 1 capsule by mouth once daily 90 capsule 3     polyethylene glycol (MIRALAX) 17 GM/Dose powder Take 17 g (1 capful) by mouth daily as needed for constipation 527 g 1     pregabalin (LYRICA) 100 MG capsule Take 100 mg by mouth 2 times daily        simvastatin (ZOCOR) 40 MG tablet Take 1 tablet by mouth once daily 90 tablet 0         ZIGGY CARABALLO MD

## 2022-04-09 ENCOUNTER — HOSPITAL ENCOUNTER (OUTPATIENT)
Dept: CT IMAGING | Facility: HOSPITAL | Age: 57
Discharge: HOME | End: 2022-04-09
Attending: FAMILY MEDICINE | Admitting: FAMILY MEDICINE
Payer: COMMERCIAL

## 2022-04-09 DIAGNOSIS — K59.00 CONSTIPATION, UNSPECIFIED CONSTIPATION TYPE: ICD-10-CM

## 2022-04-09 DIAGNOSIS — K57.32 DIVERTICULITIS OF COLON: ICD-10-CM

## 2022-04-09 PROCEDURE — 74177 CT ABD & PELVIS W/CONTRAST: CPT

## 2022-04-09 PROCEDURE — 250N000011 HC RX IP 250 OP 636: Performed by: FAMILY MEDICINE

## 2022-04-09 RX ORDER — IOPAMIDOL 755 MG/ML
100 INJECTION, SOLUTION INTRAVASCULAR ONCE
Status: COMPLETED | OUTPATIENT
Start: 2022-04-09 | End: 2022-04-09

## 2022-04-09 RX ADMIN — IOPAMIDOL 100 ML: 755 INJECTION, SOLUTION INTRAVENOUS at 14:07

## 2022-04-10 ASSESSMENT — ENCOUNTER SYMPTOMS
ABDOMINAL DISTENTION: 1
ALLERGIC/IMMUNOLOGIC NEGATIVE: 1
RESPIRATORY NEGATIVE: 1
ABDOMINAL PAIN: 1
COUGH: 0
EYES NEGATIVE: 1
DYSURIA: 1
NEUROLOGICAL NEGATIVE: 1
HEMATOLOGIC/LYMPHATIC NEGATIVE: 1
SHORTNESS OF BREATH: 0
BACK PAIN: 1
CONSTITUTIONAL NEGATIVE: 1
WEAKNESS: 0
PSYCHIATRIC NEGATIVE: 1
ENDOCRINE NEGATIVE: 1

## 2022-04-14 DIAGNOSIS — K57.32 DIVERTICULITIS OF COLON: Primary | ICD-10-CM

## 2022-04-14 DIAGNOSIS — R10.84 ABDOMINAL PAIN, GENERALIZED: ICD-10-CM

## 2022-04-14 DIAGNOSIS — K59.00 CONSTIPATION, UNSPECIFIED CONSTIPATION TYPE: ICD-10-CM

## 2022-04-15 ENCOUNTER — TELEPHONE (OUTPATIENT)
Dept: GASTROENTEROLOGY | Facility: CLINIC | Age: 57
End: 2022-04-15
Payer: COMMERCIAL

## 2022-04-15 NOTE — TELEPHONE ENCOUNTER
Called to r/s 10/24 SERGO Brito appt to be seen sooner. LVM with K pod #. Will also send MyChart.    Offer visit with Carol Burr at [facility].

## 2022-04-18 ENCOUNTER — TELEPHONE (OUTPATIENT)
Dept: GASTROENTEROLOGY | Facility: CLINIC | Age: 57
End: 2022-04-18
Payer: COMMERCIAL

## 2022-04-18 NOTE — TELEPHONE ENCOUNTER
Patient called back. Soonest in person visit with Dr. Burr was June 21st. Patient stated she would be having a colonoscopy around that date and declined rescheduling Dr. Brito appointment at this time.

## 2022-04-18 NOTE — TELEPHONE ENCOUNTER
"[facility] Call Center    Phone Message    May a detailed message be left on voicemail: yes     Reason for Call: Other: Calling back Lisa Jefferyakin       \"Called to r/s 10/24 SERGO laket to be seen sooner. LVM with K pod #. Will also send MyChart.     Offer visit with Carol Burr at [facility].\"    We do not have any protocols on how to schedule Carol Burr under GI.     Action Taken: Message routed to:  [facility] ([facility]): Clinic Coordinator    Travel Screening: Not Applicable                                                                      "

## 2022-04-19 ENCOUNTER — OFFICE VISIT (OUTPATIENT)
Dept: PODIATRY | Facility: CLINIC | Age: 57
End: 2022-04-19
Payer: COMMERCIAL

## 2022-04-19 VITALS — HEART RATE: 85 BPM | HEIGHT: 70 IN | BODY MASS INDEX: 35.22 KG/M2 | WEIGHT: 246 LBS | OXYGEN SATURATION: 98 %

## 2022-04-19 DIAGNOSIS — M79.671 RIGHT FOOT PAIN: Primary | ICD-10-CM

## 2022-04-19 PROCEDURE — 99213 OFFICE O/P EST LOW 20 MIN: CPT | Performed by: PODIATRIST

## 2022-04-19 ASSESSMENT — PAIN SCALES - GENERAL: PAINLEVEL: SEVERE PAIN (7)

## 2022-04-19 NOTE — LETTER
2022         RE: Catherine Farrell  [facility] [facility] [id number]        Dear Colleague,    Thank you for referring your patient, Catherine Farrell, to the [facility]. Please see a copy of my visit note below.    FOOT AND ANKLE SURGERY/PODIATRY Progress Note        ASSESSMENT:   Right foot pain    HPI: Catherine Farrell was seen again today complaining of pain on the top of her right foot.  She describes it as a stabbing pain which is aggravated with weightbearing and ambulation.  She has mild pain while resting.  She stated that she feels a lump on the top of her right foot.  She has difficulty wearing her shoe at times because of the increased pressure placed on the top of her foot.  She denies any trauma to her foot.  The pain is nonradiating.  She has had this pain for approximately 2 to 3 months.    Past Medical History:   Diagnosis Date     Anemia      Bilateral calcaneal spurs     surgery 2015     Bursitis of hip      Chronic back pain      DDD (degenerative disc disease)      Depression      Diverticulitis      Environmental allergies      Fibromyalgia      History of transfusion     With      HLD (hyperlipidemia)      Obesity         Past Surgical History:   Procedure Laterality Date     ENDOMETRIAL ABLATION       HC REMOVAL HEEL SPUR, CALCANEUS Left 2015    Procedure: LEFT POSTERIOR CALCANEAL SPUR RESECTION;  Surgeon: Rob Marion DPM;  Location: [place] [facility];  Service: Podiatry     HC REMOVAL OF TONSILS,<11 Y/O      Description: Tonsillectomy;  Recorded: 10/18/2008;     REPAIR TENDON ACHILLES Left 2015    Procedure: WITH SECONDARY ACHILLES TENDON REAPIR;  Surgeon: Rob Marion DPM;  Location: [place] [facility];  Service:      REPAIR TENDON ACHILLES Right 11/3/2017    Procedure: WITH SECONDARY ACHILLES TENDON REPAIR;  Surgeon: Rob Marion DPM;  Location: [facility];  Service:      [facility] APPENDECTOMY      Description:  Appendectomy;  Recorded: 10/18/2008;     [facility]  DELIVERY ONLY      Description:  Section;  Recorded: 10/18/2008;       Allergies   Allergen Reactions     Amoxicillin Hives     Penicillins Swelling and Itching     Annotation: Swelling of face/eyes, itching       Shellfish Containing Products [Shellfish-Derived Products] Anaphylaxis         Current Outpatient Medications:      cholecalciferol, vitamin D3, (VITAMIN D3) 2,000 unit Tab, [CHOLECALCIFEROL, VITAMIN D3, (VITAMIN D3) 2,000 UNIT TAB] Take 2,000 Units by mouth at bedtime. , Disp: , Rfl:      ibuprofen (ADVIL/MOTRIN) 800 MG tablet, TAKE 1 TABLET BY MOUTH EVERY 6 HOURS AS NEEDED FOR PAIN, Disp: 100 tablet, Rfl: 0     omeprazole (PRILOSEC) 40 MG DR capsule, Take 1 capsule by mouth once daily, Disp: 90 capsule, Rfl: 3     polyethylene glycol (MIRALAX) 17 GM/Dose powder, Take 17 g (1 capful) by mouth daily as needed for constipation, Disp: 527 g, Rfl: 1     pregabalin (LYRICA) 100 MG capsule, Take 100 mg by mouth 2 times daily 150 2x day, Disp: , Rfl:      simvastatin (ZOCOR) 40 MG tablet, Take 1 tablet by mouth once daily, Disp: 90 tablet, Rfl: 0    Family History   Problem Relation Age of Onset     Lung Cancer Father      Alcoholism Father      Alcoholism Son      Hypertension Mother      Stomach Cancer Maternal Aunt      Breast Cancer Maternal Aunt      Breast Cancer Maternal Aunt        Social History     Socioeconomic History     Marital status:      Spouse name: Not on file     Number of children: Not on file     Years of education: Not on file     Highest education level: Not on file   Occupational History     Not on file   Tobacco Use     Smoking status: Current Every Day Smoker     Packs/day: 0.50     Years: 43.00     Pack years: 21.50     Types: Cigarettes     Smokeless tobacco: Never Used     Tobacco comment: seen by TTS 3/3/2022   Substance and Sexual Activity     Alcohol use: No     Comment: Alcoholic Drinks/day: sober since   "    Drug use: No     Sexual activity: Yes     Partners: Male   Other Topics Concern     Not on file   Social History Narrative     Not on file     Social Determinants of Health     Financial Resource Strain: Not on file   Food Insecurity: Not on file   Transportation Needs: Not on file   Physical Activity: Not on file   Stress: Not on file   Social Connections: Not on file   Intimate Partner Violence: Not on file   Housing Stability: Not on file       10 point Review of Systems is negative      Pulse 85   Ht 1.778 m (5' 10\")   Wt 111.6 kg (246 lb)   SpO2 98%   BMI 35.30 kg/m      BMI= Body mass index is 35.3 kg/m .    OBJECTIVE:  General appearance: Patient is alert and fully cooperative with history & exam.  No sign of distress is noted during the visit.  Vascular: Dorsalis pedis and posterior tibial pulses are palpable. There is no pedal hair growth bilaterally.  CFT < 3 sec from anterior tibial surface to distal digits bilaterally. There is no appreciable edema noted.  Dermatologic: Small firm raised palpable mass on the dorsal lateral aspect the right foot.  Turgor and texture are within normal limits. No coloration or temperature changes. No primary or secondary lesions noted.  Neurologic: All epicritic and proprioceptive sensations are grossly intact bilaterally.  Musculoskeletal: All active and passive ankle, subtalar, midtarsal, and 1st MPJ range of motion are grossly intact without pain or crepitus, with the exception of none. Manual muscle strength is within normal limits bilaterally. All dorsiflexors, plantarflexors, invertors, evertors are intact bilaterally. Tenderness present to the dorsal lateral aspect of the right foot on palpation. Tenderness to the dorsal lateral aspect of the right foot with range of motion. Calf is soft/non-tender without warmth/induration    Imaging:         CT Abdomen Pelvis w Contrast    Result Date: 4/9/2022  EXAM: CT ABDOMEN PELVIS W CONTRAST LOCATION: [facility] DATE/TIME: 4/9/2022 1:46 PM INDICATION:  Diverticulitis of colon, Constipation, unspecified constipation type. Abdominal pain. COMPARISON: 3/9/2022. TECHNIQUE: CT scan of the abdomen and pelvis was performed following injection of IV contrast. Multiplanar reformats were obtained. Dose reduction techniques were used. CONTRAST: 100ml isovue 370 FINDINGS: LOWER CHEST: Negative. HEPATOBILIARY: Hepatic steatosis without suspicious mass. No calcified gallstones or biliary dilatation. PANCREAS: Normal. SPLEEN: Normal. ADRENAL GLANDS: No significant nodules. KIDNEYS/BLADDER: Normal. BOWEL: No free air. Stomach is not well-distended. Small bowel is normal in caliber. Appendix not well visualized. No secondary signs of appendicitis. Average amount of stool in the colon. Diverticulosis again seen most pronounced in the distal descending and sigmoid colon. Previous diverticulitis has continued to improve. Minimal residual inflammation is noted. No perforation or abscess formation. No obstruction. No significant free fluid. LYMPH NODES: Normal. VASCULATURE: There is calcific atherosclerosis. No abdominal aortic aneurysm. PELVIC ORGANS: Uterus is present. No adnexal mass or pelvic free fluid. MUSCULOSKELETAL: Degenerative change in the spine and pelvic joints. Small fat-containing umbilical hernia.     IMPRESSION: 1.  Continued improvement in the sigmoid diverticulitis. Minimal residual inflammation. No perforation, obstruction or abscess formation.           TREATMENT:  The patient was referred to radiology for an MRI of the right foot.  She is to return to the clinic in 1 week to discuss the findings of the MRI.        Mario Simmons DPM  [facility] Foot & Ankle Surgery/Podiatry         Again, thank you for allowing me to participate in the care of your patient.        Sincerely,        Mario Ely DPM

## 2022-04-19 NOTE — PATIENT INSTRUCTIONS
Magnetic Resonance Imaging (MRI)     You will be asked to hold very still during the scan.     Magnetic resonance imaging (MRI) is a test that lets your doctor see detailed pictures of the inside of your body. MRI combines the use of strong magnets and radio waves to form an MRI image.  How do I get ready for an MRI?  Follow any directions you are given for not eating or drinking before the test.  Ask your provider if you should stop taking any medicine before the test.  Follow your normal daily routine unless your provider tells you otherwise.  You'll be asked to remove your watch, jewelry, hearing aids, credit cards, pens, pocket knives, eyeglasses, and other metal objects.  You may be asked to remove your makeup. Makeup may contain some metal.  Most MRI tests take 30 to 60 minutes. Depending on the type of MRI you are having, the test may take longer. Give yourself extra time to check in.      MRI uses strong magnets. Metal is affected by magnets and can distort the image. The magnet used in MRI can cause metal objects in your body to move. If you have a metal implant, you may not be able to have an MRI unless the implant is certified as MRI safe. People with these implants should not have an MRI:  Ear (cochlear) implants  Certain clips used for brain aneurysms  Certain metal coils put in blood vessels  Most defibrillators  Most pacemakers  Be sure to tell the radiologist or technologist if you:  Have had any previous surgeries  Have a pacemaker, surgical clips, metal plate or pins, an artificial joint, staples or screws, ear (cochlear) implants, or other implants  Wear a medicated adhesive patch  Have metal splinters in your body  Have implanted nerve stimulators or drug-infusion ports  Have tattoos or body piercings. Some tattoo inks contain metal.  Work with metal  Have braces. You must remove any dental work.  Have a bullet or other metal in your body  Also tell the radiologist or technologist if you:  Are  pregnant or think you may be  Are afraid of small, enclosed spaces (claustrophobic)  Are allergic to X-ray dye (contrast medium), iodine, shellfish, or any medicines  Have other allergies  Are breastfeeding  Have a history of cancer  Have any serious health problems. This includes kidney disease or a liver transplant. You may not be able to have the contrast material used for MRI.   What happens during an MRI?  You may be asked to wear a hospital gown.  You may be given earplugs to wear if you need them.  You may be injected with a special dye (contrast) that improves the MRI image.   You ll lie down on a platform that slides into the magnet.  What happens after an MRI?  You can get back to normal activities right away. If you were given contrast, it will pass naturally through your body within a day. You may be told to drink more water or other fluids during this time.   Your doctor will discuss the test results with you during a follow-up appointment or over the phone.  Your next appointment is: __________________  Date Last Reviewed: 6/1/2017 2000-2017 [employer]. [address] [place] [address], [place] [address]. All rights reserved. This information is not intended as a substitute for professional medical care. Always follow your healthcare professional's instructions.

## 2022-04-19 NOTE — PROGRESS NOTES
FOOT AND ANKLE SURGERY/PODIATRY Progress Note        ASSESSMENT:   Right foot pain    HPI: Catherine Farrell was seen again today complaining of pain on the top of her right foot.  She describes it as a stabbing pain which is aggravated with weightbearing and ambulation.  She has mild pain while resting.  She stated that she feels a lump on the top of her right foot.  She has difficulty wearing her shoe at times because of the increased pressure placed on the top of her foot.  She denies any trauma to her foot.  The pain is nonradiating.  She has had this pain for approximately 2 to 3 months.    Past Medical History:   Diagnosis Date     Anemia      Bilateral calcaneal spurs     surgery 2015     Bursitis of hip      Chronic back pain      DDD (degenerative disc disease)      Depression      Diverticulitis      Environmental allergies      Fibromyalgia      History of transfusion     With      HLD (hyperlipidemia)      Obesity         Past Surgical History:   Procedure Laterality Date     ENDOMETRIAL ABLATION       HC REMOVAL HEEL SPUR, CALCANEUS Left 2015    Procedure: LEFT POSTERIOR CALCANEAL SPUR RESECTION;  Surgeon: Rob Marion DPM;  Location: [place] [facility];  Service: Podiatry     HC REMOVAL OF TONSILS,<9 Y/O      Description: Tonsillectomy;  Recorded: 10/18/2008;     REPAIR TENDON ACHILLES Left 2015    Procedure: WITH SECONDARY ACHILLES TENDON REAPIR;  Surgeon: Rob Marion DPM;  Location: [place] [facility];  Service:      REPAIR TENDON ACHILLES Right 11/3/2017    Procedure: WITH SECONDARY ACHILLES TENDON REPAIR;  Surgeon: Rob Marion DPM;  Location: [facility] OR;  Service:      [facility] APPENDECTOMY      Description: Appendectomy;  Recorded: 10/18/2008;     [facility]  DELIVERY ONLY      Description:  Section;  Recorded: 10/18/2008;       Allergies   Allergen Reactions     Amoxicillin Hives     Penicillins Swelling and Itching     Annotation: Swelling of  face/eyes, itching       Shellfish Containing Products [Shellfish-Derived Products] Anaphylaxis         Current Outpatient Medications:      cholecalciferol, vitamin D3, (VITAMIN D3) 2,000 unit Tab, [CHOLECALCIFEROL, VITAMIN D3, (VITAMIN D3) 2,000 UNIT TAB] Take 2,000 Units by mouth at bedtime. , Disp: , Rfl:      ibuprofen (ADVIL/MOTRIN) 800 MG tablet, TAKE 1 TABLET BY MOUTH EVERY 6 HOURS AS NEEDED FOR PAIN, Disp: 100 tablet, Rfl: 0     omeprazole (PRILOSEC) 40 MG DR capsule, Take 1 capsule by mouth once daily, Disp: 90 capsule, Rfl: 3     polyethylene glycol (MIRALAX) 17 GM/Dose powder, Take 17 g (1 capful) by mouth daily as needed for constipation, Disp: 527 g, Rfl: 1     pregabalin (LYRICA) 100 MG capsule, Take 100 mg by mouth 2 times daily 150 2x day, Disp: , Rfl:      simvastatin (ZOCOR) 40 MG tablet, Take 1 tablet by mouth once daily, Disp: 90 tablet, Rfl: 0    Family History   Problem Relation Age of Onset     Lung Cancer Father      Alcoholism Father      Alcoholism Son      Hypertension Mother      Stomach Cancer Maternal Aunt      Breast Cancer Maternal Aunt      Breast Cancer Maternal Aunt        Social History     Socioeconomic History     Marital status:      Spouse name: Not on file     Number of children: Not on file     Years of education: Not on file     Highest education level: Not on file   Occupational History     Not on file   Tobacco Use     Smoking status: Current Every Day Smoker     Packs/day: 0.50     Years: 43.00     Pack years: 21.50     Types: Cigarettes     Smokeless tobacco: Never Used     Tobacco comment: seen by TTS 3/3/2022   Substance and Sexual Activity     Alcohol use: No     Comment: Alcoholic Drinks/day: sober since 2013     Drug use: No     Sexual activity: Yes     Partners: Male   Other Topics Concern     Not on file   Social History Narrative     Not on file     Social Determinants of Health     Financial Resource Strain: Not on file   Food Insecurity: Not on file  "  Transportation Needs: Not on file   Physical Activity: Not on file   Stress: Not on file   Social Connections: Not on file   Intimate Partner Violence: Not on file   Housing Stability: Not on file       10 point Review of Systems is negative      Pulse 85   Ht 1.778 m (5' 10\")   Wt 111.6 kg (246 lb)   SpO2 98%   BMI 35.30 kg/m      BMI= Body mass index is 35.3 kg/m .    OBJECTIVE:  General appearance: Patient is alert and fully cooperative with history & exam.  No sign of distress is noted during the visit.  Vascular: Dorsalis pedis and posterior tibial pulses are palpable. There is no pedal hair growth bilaterally.  CFT < 3 sec from anterior tibial surface to distal digits bilaterally. There is no appreciable edema noted.  Dermatologic: Small firm raised palpable mass on the dorsal lateral aspect the right foot.  Turgor and texture are within normal limits. No coloration or temperature changes. No primary or secondary lesions noted.  Neurologic: All epicritic and proprioceptive sensations are grossly intact bilaterally.  Musculoskeletal: All active and passive ankle, subtalar, midtarsal, and 1st MPJ range of motion are grossly intact without pain or crepitus, with the exception of none. Manual muscle strength is within normal limits bilaterally. All dorsiflexors, plantarflexors, invertors, evertors are intact bilaterally. Tenderness present to the dorsal lateral aspect of the right foot on palpation. Tenderness to the dorsal lateral aspect of the right foot with range of motion. Calf is soft/non-tender without warmth/induration    Imaging:         CT Abdomen Pelvis w Contrast    Result Date: 4/9/2022  EXAM: CT ABDOMEN PELVIS W CONTRAST LOCATION: [facility] DATE/TIME: 4/9/2022 1:46 PM INDICATION:  Diverticulitis of colon, Constipation, unspecified constipation type. Abdominal pain. COMPARISON: 3/9/2022. TECHNIQUE: CT scan of the abdomen and pelvis was performed following injection of " IV contrast. Multiplanar reformats were obtained. Dose reduction techniques were used. CONTRAST: 100ml isovue 370 FINDINGS: LOWER CHEST: Negative. HEPATOBILIARY: Hepatic steatosis without suspicious mass. No calcified gallstones or biliary dilatation. PANCREAS: Normal. SPLEEN: Normal. ADRENAL GLANDS: No significant nodules. KIDNEYS/BLADDER: Normal. BOWEL: No free air. Stomach is not well-distended. Small bowel is normal in caliber. Appendix not well visualized. No secondary signs of appendicitis. Average amount of stool in the colon. Diverticulosis again seen most pronounced in the distal descending and sigmoid colon. Previous diverticulitis has continued to improve. Minimal residual inflammation is noted. No perforation or abscess formation. No obstruction. No significant free fluid. LYMPH NODES: Normal. VASCULATURE: There is calcific atherosclerosis. No abdominal aortic aneurysm. PELVIC ORGANS: Uterus is present. No adnexal mass or pelvic free fluid. MUSCULOSKELETAL: Degenerative change in the spine and pelvic joints. Small fat-containing umbilical hernia.     IMPRESSION: 1.  Continued improvement in the sigmoid diverticulitis. Minimal residual inflammation. No perforation, obstruction or abscess formation.           TREATMENT:  The patient was referred to radiology for an MRI of the right foot.  She is to return to the clinic in 1 week to discuss the findings of the MRI.        Mario Ely; MARISSA  [facility] Foot & Ankle Surgery/Podiatry

## 2022-04-21 ENCOUNTER — MYC MEDICAL ADVICE (OUTPATIENT)
Dept: PODIATRY | Facility: CLINIC | Age: 57
End: 2022-04-21
Payer: COMMERCIAL

## 2022-04-29 ENCOUNTER — HOSPITAL ENCOUNTER (OUTPATIENT)
Dept: MRI IMAGING | Facility: HOSPITAL | Age: 57
Discharge: HOME | End: 2022-04-29
Attending: PODIATRIST | Admitting: PODIATRIST
Payer: COMMERCIAL

## 2022-04-29 DIAGNOSIS — M79.671 RIGHT FOOT PAIN: ICD-10-CM

## 2022-04-29 PROCEDURE — 73718 MRI LOWER EXTREMITY W/O DYE: CPT | Mod: RT

## 2022-05-02 ENCOUNTER — OFFICE VISIT (OUTPATIENT)
Dept: PODIATRY | Facility: CLINIC | Age: 57
End: 2022-05-02
Payer: COMMERCIAL

## 2022-05-02 VITALS — HEART RATE: 80 BPM | HEIGHT: 70 IN | BODY MASS INDEX: 35.22 KG/M2 | WEIGHT: 246 LBS | OXYGEN SATURATION: 99 %

## 2022-05-02 DIAGNOSIS — M67.471 GANGLION CYST OF RIGHT FOOT: Primary | ICD-10-CM

## 2022-05-02 PROCEDURE — 99214 OFFICE O/P EST MOD 30 MIN: CPT | Performed by: PODIATRIST

## 2022-05-02 ASSESSMENT — PAIN SCALES - GENERAL: PAINLEVEL: SEVERE PAIN (6)

## 2022-05-02 NOTE — PATIENT INSTRUCTIONS
Catherine,      Your surgery with [facility] has been scheduled. Please read thoroughly and follow instructions.     Procedure:   Excision Cyst Ganglion Right foot     Procedure Date :     *A surgery nurse will call you 1-2 days before surgery to inform you of the time of arrival for surgery.    Surgeon:   MD Mario Ely    Admission Type:   Outpatient    Procedure Location:   [facility]:  [address] [place] [address] [address] [place] [address] (Fax: [phone])    Covid Test:     Post Operative Appointment:       Preparation Instructions to complete:    []  You will need a Pre-op Physical within 30 days before surgery with your primary care provider. This exam is required by the anesthesiologist to ensure a safe surgical experience.    Failure  to obtain your pre-op physical will lead to cancellation of your surgery  Call them right away to schedule this. Please ensure your Preoperative Physical is faxed to the Hospital (numbers listed above)    [] Preoperative Medication Instructions  We would like you to stop your anticoagulation medications 3-5 days before surgery HOWEVER contact your prescribing provider for instructions before discontinuing any medications. (Examples: Coumadin, Plavix, Xarelto, Eliquis, Pradaxa, Effient, Brilinta) If you are on Coumadin, we would like the goal INR ? 1.2.  IT IS OK TO STAY ON ASPIRIN PRIOR TO SURGERY.   Hold Ibuprofen, Herbal Supplements and Vitamin E 7 days before  Stop Cialis, Levitra and Viagra 2-3 days before surgery    [] Fasting Requirements  Nothing to eat for 8 hours before surgery unless instructed differently by the surgery nurse.  You may have clear liquids up to two hours before your arrival time (coffee, tea, water, or Gatorade. No cream or milk)  If your primary care provider has instructed you to continue oral medications, you may take them on the morning of surgery with a small sip of water.    No alcohol or smoking after  "12:00am the day of surgery    [] PCR COVID-19 test is required within 4 days of surgery  If your test is positive or you fail to get tested, your surgery will be postponed.     [] Contact your insurance regarding coverage  If you would like a Good Diane Estimate for your upcoming procedure at [facility] Location, contact Cost of Care Estimates   Advocates are available Monday through Friday 8am - 5pm   [phone]  You may also submit a request online at [email or web address]  - Complete the secure online form found under \"Services and Procedure Pricing\"     For all self-pay, estimate, or anesthesia billing questions at [facility], the contact information is below:    Who to contact: Kamini POWERS  [facility], [place] by [facility] number: [phone]  Prepay number: [phone]    [] DO NOT BRING FMLA WITH TO SURGERY.  These should be sent to the provider's office by fax to [phone].    [] Day of Surgery  Medications - Take as indicated with sips of water.   Wear comfortable loose fitting clothes. Wear your glasses-Not contacts. Do not wear jewelry and remove body piercing's. Surgery may be cancelled if they are not removed.   You may have 1 family member wait in the lobby during your surgery. Visitor restrictions are subject to change. Please verify with the surgery nurse when they call.   If same day surgery-Have a someone come with you to surgery that can help you understand the surgeon's instructions, drive you home and stay with you overnight the first night.    [] If the community sees a new COVID-19 surge, your procedure may need to be postponed. We will contact you if this happens.    [] You will receive a call from a surgery nurse 1-3 days prior to surgery. They will go over more details with you.         Before Your Procedure or Hospital Admission  Testing for COVID-19 (Coronavirus)    Thank you for choosing [facility] for your health care needs. The COVID-19 pandemic " is a very challenging time for everyone. Our goal is to keep you and our team here at [facility] safe and healthy.     Before you come in, All patients must get tested for COVID-19. Your test needs to happen 2 to 4 days before you check in to the hospital or surgery site.  Note: If you go to a clinic or pharmacy like [facility] or [facility] for your test, make sure you get a test inside the nose. Tests inside the nose are:  - A naso-pharyngeal (NP) RT-PCR test  - An anterior nares RT-PCR test  Do NOT get a  rapid  test or a saliva (spit) test.  After the test, please stay at home and stay out of contact with other people. It will be harder for you to recover if you get COVID-19 before your treatment.    Please follow all current safety guidelines, including:   Limit trips outside your home.   Limit the number of people you see.   Always wear a mask outside your home.   Use social distancing. Stay 6 feet away from others whenever you can.   Wash your hands often.    If your test shows you have COVID-19  If your test is positive, we ll let you know. A positive test means that you have the virus.  We ll probably have to postpone your admission, surgery or procedure. Your doctor will discuss this with you. After that, we ll let you know what to do and when you can re-schedule.  We may need to cancel your treatment on short notice for other reasons, too.    If your test shows you DON T have COVID-19  Even if your test is negative, you can still get COVID-19. It s rare but, sometimes, the test result is wrong. You could also catch the virus after taking the test.  There s a very small chance that you could catch COVID-19 in the hospital or surgery center.    Day of your surgery or procedure   Please come wearing a face covering that covers both your nose and mouth.   When you arrive, we ll ask you some questions to find out if you have any signs or symptoms of COVID-19.   Ask your care team if you can have visitors. All  visitors must wear face coverings and will be screened for signs of COVID-19.   Even if no visitors are allowed, you can still have with you:  - Your legal guardian or legal decision maker  - A parent and one other visitor, if you are  younger than 18 years old  - A partner and a , if you are in labor   We might need to teach you about taking care of yourself after surgery. If so, a visitor can come into the hospital to learn about it, too.   The rules for visitors change often, depending on how much the virus is spreading. To learn more, see Visiting a Loved One in the Hospital during the COVID-19 Outbreak. Please call your care team, hospital or surgery center if you have any questions. We thank you for your understanding and for choosing [facility] for your care.    Questions and Answers  Does it matter where I get tested for COVID-19?  Yes. We urge you to get tested at one of our [facility] COVID-19 testing sites. We process these tests in our lab and can get the results quickly. Your [facility] care team needs to get your results before you check in.    What should I do if I can t get tested at [facility]?  You can get tested somewhere else, but you ll need to take these extra steps:  1. Contact your family doctor or clinic to arrange your test.  2. Take the test within 4 days of your surgery or procedure. We can t accept tests older than 4 days.  3. Make sure you re getting a test inside the nose.  Tests inside the nose are:  - A naso-pharyngeal (NP) RT-PCR test  - An anterior nares RT-PCR test  -Many pharmacies use  rapid  tests or saliva (spit) tests. We do NOT accept those tests before surgery or procedures. Tests from inside the nose are the most accurate tests.  4. Make sure your doctor or clinic faxes your results to [facility] at [phone].    If we don t get your results in time, we may have to delay or cancel your treatment.              Frequently Asked  Questions    WHAT DO I DO WHEN I COME HOME FROM SURGERY?    After surgery and/ or your hospital stay you may be tired and drowsy. Rest, but make sure to get up and walk around every couple of hours to circulate your blood. If you are non-weight bearing do not put any weight on your foot.  Be sure to take your medications as directed. Try to get a restful sleep and begin more normal activities as tolerated.    HOW MUCH PAIN SHOULD I EXPECT AND WILL I HAVE PAIN MEDICATION?    Everyone tolerates pain differently. Still, each type of surgery involves a certain level and type of pain. Generally most people feel better within a few days but keep in mind that everyone has a different tolerance to pain. All medications should be taken as directed. All medications can have side effects such as bleeding, upset stomach, headaches, dizziness, constipation, etc. If you have any major problems or allergic reaction, stop the medication and call the office. If you only have a little pain, you may simply take Tylenol or Ibuprofen as directed on the label.     WHAT ABOUT MY DRESSING AND WHEN DO I COME BACK TO THE OFFICE?    The outer dressing stays in place for 7 days and when you come in for your first post-op we will remove it.  Some patients may have staples, zipper or sutures; these stay in for 10-14 days and will be removed at your 2nd post-op visit. After 24 hours you may shower using shower precautions.    WHAT ABOUT SWELLING, REDNESS, BRUISING OR DRAINAGE?    It is normal to have some swelling, and bruising after surgery. It gradually subsides but may be present for several weeks. Elevation and icing will help to reduce the swelling more rapidly.  If your bandage is really tight after 24 hours you may cut the bandage an inch by the toes or under your foot and by your ankle.  Ice therapy often works better than oral pain medication. Using cold therapy is recommended every hour for 20 minutes.    WHEN CAN I TAKE A BATH?    You  can take a bath as long as the wound has no drainage and is fully healed without a scab. This generally takes about 2 weeks.  Unless you get the bandaged protector from above then you can take a shower when you feel up to it.    WHEN CAN I RETURN TO WORK?    You may return to work to an office-type job whenever you feel comfortable enough. The only restriction would be your own motivation and pain tolerance. If your job requires vigorous activities you may be off 1-4 weeks which is determined by what surgery you have and your operating physician.     WHAT ABOUT DRIVING OR FLYING?    As a general rule, you may resume driving as soon as you are comfortable and fully alert and off narcotic pain medications. If you are traveling by plane within 4 weeks of surgery, perform range of motion exercises of the legs and feet during the flight. Get up and walk around frequently to prevent blood clots.      WHEN CAN I EXERSICE?    You may return to normal activities such as exercising when your surgeon tells you usually 2 weeks. Walking, sitting, standing and going up the stairs are all fine after the 2-week mihai. You may have restrictions for 1-4 weeks of no lifting, pushing, pulling in excess of 20 lbs. This is determined by what surgery you have and your surgeon.    WILL I BECOME ADDICTED TO MY PAIN MEDICATION?    Pain medications are safe and effective when used as directed. However, misuse of these products can be extremely harmful and even deadly. Pain medications must be taken only as directed by your surgeon. Do not change the dose of your pain medication without talking to your operating physician first.    POSTOPERATIVE INFORMATION: MANAGING PAIN  Measuring Your Pain    A pain scale helps you rate pain intensity. In the scale, 0 means no pain, and 10 is the worst pain possible.  You should rate your pain every 4-6 hours. You may feel some pain even with medications. It is important to tell your health care provider if  medications don't reduce the pain.        Managing Post-Op Pain at Home: Medications    Pain after an operation (post-op pain) is common and expected. These guidelines can help you stay as comfortable as possible.    Taking Pain Medications    Take any prescribed pain medications as directed by your doctor.  Take pain medications with some food to avoid an upset stomach.  Be aware that narcotic pain medications cause constipation. We recommend taking Milk of Magnesia   Eating high-fiber foods and drink plenty of water.  Don t drink alcohol while using pain medications.  Possible side effects include stomach upset, nausea, and itching.    Alternating Ibuprofen with your pain medication    Ibuprofen has three actions: it reduces fever, relieves pain and fights inflammation. Alternate between your prescribed pain medication and Ibuprofen every three hours (i.e., take a dose of Ibuprofen then three hours later take your prescribed pain medication then three hours later Ibuprofen, ect.) These two medications are safe to use together like this.    POSTOPERATIVE INFORMATION: CONSTIPATION    Constipation after surgery is a common problem and is often related to taking post-operative narcotic pain medication. Signs that you may be constipated include bloating, abdominal distention and/or pain.    Constipation Prevention & Treatment    Drink plenty of fluids! This is the most important thing you can do to help prevent constipation.  Increase physical activity as recommended by your surgeon.  Consume a high fiber diet. We recommend introducing high fiber foods pre-operatively. Some foods high in fiber include:    Oatmeal Bran  Flaxseed Dried Fruit    Carrots   Bananas Strawberries Oranges Whole Grain Bread     Bananas Prunes  Pears  Raspberries     Over the counter medication/supplements - in order of recommended treatment:   (Be sure to follow instructions on product packaging)    Fiber supplement   Bulk forming laxative -  Can be used to prevent constipation  Great food sources of fiber include but are not limited to:  Colace (docusate sodium)  Osmotic stool softener - Can be used 2-3 times per day to prevent constipation  Milk of Magnesia  MIRALAX  Enema/Suppository    Patient can also  some probiotics such as Culturelle to help prevent Antibiotic associated diarrhea. They can take this 1 hour before or 2 hours after taking their antibiotic should not be taken at the same time as they can cancel out the effects.                          ** AFTER SURGERY INSTRUCTIONS **                          [] You are to remain WEIGHT BEARING on your right foot     [] After surgery you will be given a Post op shoe which you will need to Wear this anytime you are up and out of bed, it is okay to remove when you are sleeping. Please bring this with you on the day of surgery.      [] During surgery   will apply a gauze dressing to your foot. This will remain intact until you are seen in clinic for follow up    [] It is NOT OKAY to get your surgical site wet while bathing, you will need to purchase a cast cover to protect your foot from getting wet. You can purchase this at [facility] or your local pharmacy.    [] It is important that you elevate your affected foot after surgery. Proper elevation is raising your foot above your waist. The fluid in your lower extremities needs to get back to your heart so it can get pumped to your kidneys and expelled through urination. So if you notice you have to go to the bathroom more frequently when you are elevating your leg this is a good sign that it is working.     [] It is important that you increase your protein intake after surgery. Protein is essential for wound healing. We recommend you take a protein supplement twice per day. This is in addition to your normal diet. Examples of protein supplements are Ensure, Boost, Glucerna (if you are diabetic), or protein powder. You can  purchase these at your local retailer or grocery store.       Notify our office right away, if you have any changes in your health status, or if you develop a cold, flu, diarrhea, infection, fever or sore throat before your scheduled surgery date. We can be reached at [phone] Monday-Friday 8 am-4:30 pm if you have any questions.     Thank you for trusting us with your care!      For informational purposes only. Not to replace the advice of your health care provider. Copyright   2020 [facility]. All rights reserved. Clinically reviewed by Infection Prevention and the [facility] COVID-19 Clinical Team.  [facility] ([employer]) [id number] - Rev 09/09/21.

## 2022-05-02 NOTE — PROGRESS NOTES
FOOT AND ANKLE SURGERY/PODIATRY Progress Note        ASSESSMENT:   Ganglion cyst right foot    HPI: Catherine Farrell was seen again today with continued complaints of severe pain in the top of her right foot.  The patient stated that it is an achy type pain which is aggravated by her shoes.  She has pain while resting.  She was referred to radiology.  The MRI indicates she has a small ganglion cyst in the area of pain.    Past Medical History:   Diagnosis Date     Anemia      Bilateral calcaneal spurs     surgery 2015     Bursitis of hip      Chronic back pain      DDD (degenerative disc disease)      Depression      Diverticulitis      Environmental allergies      Fibromyalgia      History of transfusion     With      HLD (hyperlipidemia)      Obesity         Past Surgical History:   Procedure Laterality Date     ENDOMETRIAL ABLATION       HC REMOVAL HEEL SPUR, CALCANEUS Left 2015    Procedure: LEFT POSTERIOR CALCANEAL SPUR RESECTION;  Surgeon: Rob Marion DPM;  Location: [facility];  Service: Podiatry     HC REMOVAL OF TONSILS,<9 Y/O      Description: Tonsillectomy;  Recorded: 10/18/2008;     REPAIR TENDON ACHILLES Left 2015    Procedure: WITH SECONDARY ACHILLES TENDON REAPIR;  Surgeon: Rob Marion DPM;  Location: [facility];  Service:      REPAIR TENDON ACHILLES Right 11/3/2017    Procedure: WITH SECONDARY ACHILLES TENDON REPAIR;  Surgeon: Rob Marion DPM;  Location: [facility];  Service:      [facility] APPENDECTOMY      Description: Appendectomy;  Recorded: 10/18/2008;     [facility]  DELIVERY ONLY      Description:  Section;  Recorded: 10/18/2008;       Allergies   Allergen Reactions     Amoxicillin Hives     Penicillins Swelling and Itching     Annotation: Swelling of face/eyes, itching       Shellfish Containing Products [Shellfish-Derived Products] Anaphylaxis         Current Outpatient Medications:      cholecalciferol, vitamin D3, (VITAMIN D3)  2,000 unit Tab, [CHOLECALCIFEROL, VITAMIN D3, (VITAMIN D3) 2,000 UNIT TAB] Take 2,000 Units by mouth at bedtime. , Disp: , Rfl:      ibuprofen (ADVIL/MOTRIN) 800 MG tablet, TAKE 1 TABLET BY MOUTH EVERY 6 HOURS AS NEEDED FOR PAIN, Disp: 100 tablet, Rfl: 0     omeprazole (PRILOSEC) 40 MG DR capsule, Take 1 capsule by mouth once daily, Disp: 90 capsule, Rfl: 3     polyethylene glycol (MIRALAX) 17 GM/Dose powder, Take 17 g (1 capful) by mouth daily as needed for constipation, Disp: 527 g, Rfl: 1     pregabalin (LYRICA) 100 MG capsule, Take 100 mg by mouth 2 times daily 150 2x day, Disp: , Rfl:      simvastatin (ZOCOR) 40 MG tablet, Take 1 tablet by mouth once daily, Disp: 90 tablet, Rfl: 0    Family History   Problem Relation Age of Onset     Lung Cancer Father      Alcoholism Father      Alcoholism Son      Hypertension Mother      Stomach Cancer Maternal Aunt      Breast Cancer Maternal Aunt      Breast Cancer Maternal Aunt        Social History     Socioeconomic History     Marital status:      Spouse name: Not on file     Number of children: Not on file     Years of education: Not on file     Highest education level: Not on file   Occupational History     Not on file   Tobacco Use     Smoking status: Current Every Day Smoker     Packs/day: 0.50     Years: 43.00     Pack years: 21.50     Types: Cigarettes     Smokeless tobacco: Never Used     Tobacco comment: seen by TTS 3/3/2022   Substance and Sexual Activity     Alcohol use: No     Comment: Alcoholic Drinks/day: sober since 2013     Drug use: No     Sexual activity: Yes     Partners: Male   Other Topics Concern     Not on file   Social History Narrative     Not on file     Social Determinants of Health     Financial Resource Strain: Not on file   Food Insecurity: Not on file   Transportation Needs: Not on file   Physical Activity: Not on file   Stress: Not on file   Social Connections: Not on file   Intimate Partner Violence: Not on file   Housing  "Stability: Not on file       10 point Review of Systems is negative      Pulse 80   Ht 1.778 m (5' 10\")   Wt 111.6 kg (246 lb)   SpO2 99%   BMI 35.30 kg/m      BMI= Body mass index is 35.3 kg/m .    OBJECTIVE:  General appearance: Patient is alert and fully cooperative with history & exam.  No sign of distress is noted during the visit.  Vascular: Dorsalis pedis and posterior tibial pulses are palpable. There is no pedal hair growth bilaterally.  CFT < 3 sec from anterior tibial surface to distal digits bilaterally. There is no appreciable edema noted.  Dermatologic: Small firm raised palpable mass on the dorsal lateral aspect the right foot.  Turgor and texture are within normal limits. No coloration or temperature changes. No primary or secondary lesions noted.  Neurologic: All epicritic and proprioceptive sensations are grossly intact bilaterally.  Musculoskeletal: All active and passive ankle, subtalar, midtarsal, and 1st MPJ range of motion are grossly intact without pain or crepitus, with the exception of none. Manual muscle strength is within normal limits bilaterally. All dorsiflexors, plantarflexors, invertors, evertors are intact bilaterally. Tenderness present to the dorsal lateral aspect of the right foot on palpation. Tenderness to the dorsal lateral aspect of the right foot with range of motion. Calf is soft/non-tender without warmth/induration    Imaging:         MR Foot Right w/o Contrast    Result Date: 4/29/2022  EXAM: MR FOOT RIGHT W/O CONTRAST LOCATION: [facility] DATE/TIME: 4/29/2022 2:23 PM INDICATION: Foot pain, chronic, no prior imaging. COMPARISON: None. TECHNIQUE: Unenhanced. FINDINGS: JOINTS AND BONES: -No fracture or contusion. Moderate degenerative changes at the first MTP joint. Mild degenerative changes throughout the midfoot. No joint effusion. Normal alignment. Tibial and fibular sesamoids are intact. TENDONS: -No tendon tear, tendinopathy, or " tenosynovitis. LIGAMENTS: -Lisfranc ligament: Intact. No subluxation. MUSCLES AND SOFT TISSUES: -No muscle atrophy or edema. No soft tissue mass. Small ganglion cyst along the dorsolateral portion of the base of the fourth metatarsal as seen on series 7 image 10 and series 4 image 35.     IMPRESSION: 1.  No acute injury. 2.  Moderate degenerative changes at the first MTP joint. 3.  Small ganglion cyst along the dorsolateral margin of the base of the fourth metatarsal.    CT Abdomen Pelvis w Contrast    Result Date: 4/9/2022  EXAM: CT ABDOMEN PELVIS W CONTRAST LOCATION: [facility] DATE/TIME: 4/9/2022 1:46 PM INDICATION:  Diverticulitis of colon, Constipation, unspecified constipation type. Abdominal pain. COMPARISON: 3/9/2022. TECHNIQUE: CT scan of the abdomen and pelvis was performed following injection of IV contrast. Multiplanar reformats were obtained. Dose reduction techniques were used. CONTRAST: 100ml isovue 370 FINDINGS: LOWER CHEST: Negative. HEPATOBILIARY: Hepatic steatosis without suspicious mass. No calcified gallstones or biliary dilatation. PANCREAS: Normal. SPLEEN: Normal. ADRENAL GLANDS: No significant nodules. KIDNEYS/BLADDER: Normal. BOWEL: No free air. Stomach is not well-distended. Small bowel is normal in caliber. Appendix not well visualized. No secondary signs of appendicitis. Average amount of stool in the colon. Diverticulosis again seen most pronounced in the distal descending and sigmoid colon. Previous diverticulitis has continued to improve. Minimal residual inflammation is noted. No perforation or abscess formation. No obstruction. No significant free fluid. LYMPH NODES: Normal. VASCULATURE: There is calcific atherosclerosis. No abdominal aortic aneurysm. PELVIC ORGANS: Uterus is present. No adnexal mass or pelvic free fluid. MUSCULOSKELETAL: Degenerative change in the spine and pelvic joints. Small fat-containing umbilical hernia.     IMPRESSION: 1.  Continued  improvement in the sigmoid diverticulitis. Minimal residual inflammation. No perforation, obstruction or abscess formation.           TREATMENT:  I have recommended surgical excision of the ganglion cyst right foot.  The patient was told the procedure would be done on an outpatient basis under local anesthesia with IV sedation.  She was told the procedure will take approximately 10 to 15 minutes to perform.  She would then be discharged weightbearing in a postop shoe for 2 weeks.  The patient was asked to go n.p.o. at midnight prior to the procedure and she was asked to see her primary care physician for preoperative consultation.        Mario Ely; MARISSA  [facility] Foot & Ankle Surgery/Podiatry

## 2022-05-02 NOTE — LETTER
2022         RE: Catherine Farrell  [facility] [facility] [id number]        Dear Colleague,    Thank you for referring your patient, Catherine Farrell, to the [facility]. Please see a copy of my visit note below.    FOOT AND ANKLE SURGERY/PODIATRY Progress Note        ASSESSMENT:   Ganglion cyst right foot    HPI: Catherine Farrell was seen again today with continued complaints of severe pain in the top of her right foot.  The patient stated that it is an achy type pain which is aggravated by her shoes.  She has pain while resting.  She was referred to radiology.  The MRI indicates she has a small ganglion cyst in the area of pain.    Past Medical History:   Diagnosis Date     Anemia      Bilateral calcaneal spurs     surgery 2015     Bursitis of hip      Chronic back pain      DDD (degenerative disc disease)      Depression      Diverticulitis      Environmental allergies      Fibromyalgia      History of transfusion     With      HLD (hyperlipidemia)      Obesity         Past Surgical History:   Procedure Laterality Date     ENDOMETRIAL ABLATION       HC REMOVAL HEEL SPUR, CALCANEUS Left 2015    Procedure: LEFT POSTERIOR CALCANEAL SPUR RESECTION;  Surgeon: Rob Marion DPM;  Location: [facility] OR;  Service: Podiatry     HC REMOVAL OF TONSILS,<9 Y/O      Description: Tonsillectomy;  Recorded: 10/18/2008;     REPAIR TENDON ACHILLES Left 2015    Procedure: WITH SECONDARY ACHILLES TENDON REAPIR;  Surgeon: Rob Marion DPM;  Location: [facility] OR;  Service:      REPAIR TENDON ACHILLES Right 11/3/2017    Procedure: WITH SECONDARY ACHILLES TENDON REPAIR;  Surgeon: Rob Marion DPM;  Location: [facility] OR;  Service:      [facility] APPENDECTOMY      Description: Appendectomy;  Recorded: 10/18/2008;     [facility]  DELIVERY ONLY      Description:  Section;  Recorded: 10/18/2008;       Allergies   Allergen Reactions     Amoxicillin  Hives     Penicillins Swelling and Itching     Annotation: Swelling of face/eyes, itching       Shellfish Containing Products [Shellfish-Derived Products] Anaphylaxis         Current Outpatient Medications:      cholecalciferol, vitamin D3, (VITAMIN D3) 2,000 unit Tab, [CHOLECALCIFEROL, VITAMIN D3, (VITAMIN D3) 2,000 UNIT TAB] Take 2,000 Units by mouth at bedtime. , Disp: , Rfl:      ibuprofen (ADVIL/MOTRIN) 800 MG tablet, TAKE 1 TABLET BY MOUTH EVERY 6 HOURS AS NEEDED FOR PAIN, Disp: 100 tablet, Rfl: 0     omeprazole (PRILOSEC) 40 MG DR capsule, Take 1 capsule by mouth once daily, Disp: 90 capsule, Rfl: 3     polyethylene glycol (MIRALAX) 17 GM/Dose powder, Take 17 g (1 capful) by mouth daily as needed for constipation, Disp: 527 g, Rfl: 1     pregabalin (LYRICA) 100 MG capsule, Take 100 mg by mouth 2 times daily 150 2x day, Disp: , Rfl:      simvastatin (ZOCOR) 40 MG tablet, Take 1 tablet by mouth once daily, Disp: 90 tablet, Rfl: 0    Family History   Problem Relation Age of Onset     Lung Cancer Father      Alcoholism Father      Alcoholism Son      Hypertension Mother      Stomach Cancer Maternal Aunt      Breast Cancer Maternal Aunt      Breast Cancer Maternal Aunt        Social History     Socioeconomic History     Marital status:      Spouse name: Not on file     Number of children: Not on file     Years of education: Not on file     Highest education level: Not on file   Occupational History     Not on file   Tobacco Use     Smoking status: Current Every Day Smoker     Packs/day: 0.50     Years: 43.00     Pack years: 21.50     Types: Cigarettes     Smokeless tobacco: Never Used     Tobacco comment: seen by TTS 3/3/2022   Substance and Sexual Activity     Alcohol use: No     Comment: Alcoholic Drinks/day: sober since 2013     Drug use: No     Sexual activity: Yes     Partners: Male   Other Topics Concern     Not on file   Social History Narrative     Not on file     Social Determinants of Health  "    Financial Resource Strain: Not on file   Food Insecurity: Not on file   Transportation Needs: Not on file   Physical Activity: Not on file   Stress: Not on file   Social Connections: Not on file   Intimate Partner Violence: Not on file   Housing Stability: Not on file       10 point Review of Systems is negative      Pulse 80   Ht 1.778 m (5' 10\")   Wt 111.6 kg (246 lb)   SpO2 99%   BMI 35.30 kg/m      BMI= Body mass index is 35.3 kg/m .    OBJECTIVE:  General appearance: Patient is alert and fully cooperative with history & exam.  No sign of distress is noted during the visit.  Vascular: Dorsalis pedis and posterior tibial pulses are palpable. There is no pedal hair growth bilaterally.  CFT < 3 sec from anterior tibial surface to distal digits bilaterally. There is no appreciable edema noted.  Dermatologic: Small firm raised palpable mass on the dorsal lateral aspect the right foot.  Turgor and texture are within normal limits. No coloration or temperature changes. No primary or secondary lesions noted.  Neurologic: All epicritic and proprioceptive sensations are grossly intact bilaterally.  Musculoskeletal: All active and passive ankle, subtalar, midtarsal, and 1st MPJ range of motion are grossly intact without pain or crepitus, with the exception of none. Manual muscle strength is within normal limits bilaterally. All dorsiflexors, plantarflexors, invertors, evertors are intact bilaterally. Tenderness present to the dorsal lateral aspect of the right foot on palpation. Tenderness to the dorsal lateral aspect of the right foot with range of motion. Calf is soft/non-tender without warmth/induration    Imaging:         MR Foot Right w/o Contrast    Result Date: 4/29/2022  EXAM: MR FOOT RIGHT W/O CONTRAST LOCATION: [facility] DATE/TIME: 4/29/2022 2:23 PM INDICATION: Foot pain, chronic, no prior imaging. COMPARISON: None. TECHNIQUE: Unenhanced. FINDINGS: JOINTS AND BONES: -No fracture " or contusion. Moderate degenerative changes at the first MTP joint. Mild degenerative changes throughout the midfoot. No joint effusion. Normal alignment. Tibial and fibular sesamoids are intact. TENDONS: -No tendon tear, tendinopathy, or tenosynovitis. LIGAMENTS: -Lisfranc ligament: Intact. No subluxation. MUSCLES AND SOFT TISSUES: -No muscle atrophy or edema. No soft tissue mass. Small ganglion cyst along the dorsolateral portion of the base of the fourth metatarsal as seen on series 7 image 10 and series 4 image 35.     IMPRESSION: 1.  No acute injury. 2.  Moderate degenerative changes at the first MTP joint. 3.  Small ganglion cyst along the dorsolateral margin of the base of the fourth metatarsal.    CT Abdomen Pelvis w Contrast    Result Date: 4/9/2022  EXAM: CT ABDOMEN PELVIS W CONTRAST LOCATION: [facility] DATE/TIME: 4/9/2022 1:46 PM INDICATION:  Diverticulitis of colon, Constipation, unspecified constipation type. Abdominal pain. COMPARISON: 3/9/2022. TECHNIQUE: CT scan of the abdomen and pelvis was performed following injection of IV contrast. Multiplanar reformats were obtained. Dose reduction techniques were used. CONTRAST: 100ml isovue 370 FINDINGS: LOWER CHEST: Negative. HEPATOBILIARY: Hepatic steatosis without suspicious mass. No calcified gallstones or biliary dilatation. PANCREAS: Normal. SPLEEN: Normal. ADRENAL GLANDS: No significant nodules. KIDNEYS/BLADDER: Normal. BOWEL: No free air. Stomach is not well-distended. Small bowel is normal in caliber. Appendix not well visualized. No secondary signs of appendicitis. Average amount of stool in the colon. Diverticulosis again seen most pronounced in the distal descending and sigmoid colon. Previous diverticulitis has continued to improve. Minimal residual inflammation is noted. No perforation or abscess formation. No obstruction. No significant free fluid. LYMPH NODES: Normal. VASCULATURE: There is calcific atherosclerosis. No  abdominal aortic aneurysm. PELVIC ORGANS: Uterus is present. No adnexal mass or pelvic free fluid. MUSCULOSKELETAL: Degenerative change in the spine and pelvic joints. Small fat-containing umbilical hernia.     IMPRESSION: 1.  Continued improvement in the sigmoid diverticulitis. Minimal residual inflammation. No perforation, obstruction or abscess formation.           TREATMENT:  I have recommended surgical excision of the ganglion cyst right foot.  The patient was told the procedure would be done on an outpatient basis under local anesthesia with IV sedation.  She was told the procedure will take approximately 10 to 15 minutes to perform.  She would then be discharged weightbearing in a postop shoe for 2 weeks.  The patient was asked to go n.p.o. at midnight prior to the procedure and she was asked to see her primary care physician for preoperative consultation.        Mario Simmons DPM  [facility] Foot & Ankle Surgery/Podiatry         Again, thank you for allowing me to participate in the care of your patient.        Sincerely,        Mario Ely DPM

## 2022-05-03 ENCOUNTER — TRANSFERRED RECORDS (OUTPATIENT)
Dept: HEALTH INFORMATION MANAGEMENT | Facility: CLINIC | Age: 57
End: 2022-05-03
Payer: COMMERCIAL

## 2022-05-03 ENCOUNTER — TELEPHONE (OUTPATIENT)
Dept: PODIATRY | Facility: CLINIC | Age: 57
End: 2022-05-03
Payer: COMMERCIAL

## 2022-05-03 PROBLEM — M67.471 GANGLION CYST OF RIGHT FOOT: Status: ACTIVE | Noted: 2022-05-03

## 2022-05-03 NOTE — TELEPHONE ENCOUNTER
Surgery scheduled with Dr. Ely at [facility] on 06/03/2022    Email sent to Mary.    Added to [place].    Patient was directed to schedule a pre-op physical.    COVID test and post ops scheduled.

## 2022-05-04 NOTE — TELEPHONE ENCOUNTER
Due to low staffing she can not be scheduled on 06/03/22. Message left for a call back to choose another date. 06/06/22 and 06/10/22 are available.

## 2022-05-06 ENCOUNTER — TELEPHONE (OUTPATIENT)
Dept: PODIATRY | Facility: CLINIC | Age: 57
End: 2022-05-06
Payer: COMMERCIAL

## 2022-05-06 NOTE — TELEPHONE ENCOUNTER
I was notified pts surgery was being cancelled due to large outstanding balance at the surgery center. They were unable to move forward with the surgery without her balance being paid. I left her a message as she wanted to know next steps. I told her I would have Marleni reach out to her next week to see if there is any alternative options available.

## 2022-05-10 NOTE — TELEPHONE ENCOUNTER
Patient is working on getting the balance paid in the next few weeks so that she may have surgery in mid-to-late June. She will call back with an update.

## 2022-06-20 ENCOUNTER — TRANSFERRED RECORDS (OUTPATIENT)
Dept: HEALTH INFORMATION MANAGEMENT | Facility: CLINIC | Age: 57
End: 2022-06-20

## 2022-06-20 DIAGNOSIS — E78.5 HYPERLIPIDEMIA: ICD-10-CM

## 2022-06-21 NOTE — TELEPHONE ENCOUNTER
"Routing refill request to provider for review/approval because:  Labs not current:  LDL    Last Written Prescription Date:  03/24/2022  Last Fill Quantity: 90,  # refills: 0   Last office visit provider:  04/05/2022 with Dr Medellin.     Requested Prescriptions   Pending Prescriptions Disp Refills     simvastatin (ZOCOR) 40 MG tablet [Pharmacy Med Name: Simvastatin 40 MG Oral Tablet] 90 tablet 0     Sig: Take 1 tablet by mouth once daily       Statins Protocol Failed - 6/20/2022  2:12 PM        Failed - LDL on file in past 12 months     Recent Labs   Lab Test 02/24/21  0934                Passed - No abnormal creatine kinase in past 12 months     No lab results found.             Passed - Recent (12 mo) or future (30 days) visit within the authorizing provider's specialty     Patient has had an office visit with the authorizing provider or a provider within the authorizing providers department within the previous 12 mos or has a future within next 30 days. See \"Patient Info\" tab in inbasket, or \"Choose Columns\" in Meds & Orders section of the refill encounter.              Passed - Medication is active on med list        Passed - Patient is age 18 or older        Passed - No active pregnancy on record        Passed - No positive pregnancy test in past 12 months             Renea Correa 06/20/22 10:48 PM  "

## 2022-06-22 ENCOUNTER — TELEPHONE (OUTPATIENT)
Dept: PODIATRY | Facility: CLINIC | Age: 57
End: 2022-06-22

## 2022-06-22 ENCOUNTER — HOSPITAL ENCOUNTER (OUTPATIENT)
Facility: AMBULATORY SURGERY CENTER | Age: 57
End: 2022-06-22
Attending: PODIATRIST
Payer: COMMERCIAL

## 2022-06-22 RX ORDER — SIMVASTATIN 40 MG
TABLET ORAL
Qty: 90 TABLET | Refills: 0 | Status: SHIPPED | OUTPATIENT
Start: 2022-06-22 | End: 2022-09-23

## 2022-06-22 NOTE — TELEPHONE ENCOUNTER
Please enter order for surgery. Ganglion cyst right foot. She would like to schedule surgery on 07/11/22. She hadto  cancel earlier this month and is ready to reschedule.

## 2022-06-22 NOTE — TELEPHONE ENCOUNTER
Surgery scheduled with Dr. Ely at [facility] on 07/11/2022.    Email sent to Mary.    Added to [facility].    Patient was directed to schedule a pre-op physical and instructed on home COVID testing.    She will be going to external provider for pre-op px.    Post ops scheduled.

## 2022-06-23 NOTE — TELEPHONE ENCOUNTER
[facility] states the patient still has an outstanding balance from 2009. The have cancelled her surgery.    I left a message for the patient that I will schedule her at [place] but I am unable to book 07/11/22 until holds are released on the OR schedule. I will follow up next week.

## 2022-06-28 ENCOUNTER — PREP FOR PROCEDURE (OUTPATIENT)
Dept: PODIATRY | Facility: CLINIC | Age: 57
End: 2022-06-28

## 2022-06-28 DIAGNOSIS — M67.471 GANGLION CYST OF RIGHT FOOT: Primary | ICD-10-CM

## 2022-06-28 NOTE — TELEPHONE ENCOUNTER
OR became available at [facility] on 07/11/22 at 10:00am. We could possibly move ir earlier if has no more cases added at [facility].

## 2022-06-28 NOTE — TELEPHONE ENCOUNTER
Surgery scheduled with Dr. Ely at [facility] on 07/11/2022    Confirmed with [place]    Added to [place].    Message left informing patient that surgery is scheduled. And MyChart message sent directing patient to schedule a pre-op physical and home COVID testing instructions.    Post ops scheduled.

## 2022-07-06 NOTE — TELEPHONE ENCOUNTER
[facility] had to move her case to 3:30pm. Message left informing patient. I will check back tomorrow to see if any early times have become available.

## 2022-07-11 ENCOUNTER — HOSPITAL ENCOUNTER (OUTPATIENT)
Facility: HOSPITAL | Age: 57
Discharge: HOME | End: 2022-07-11
Attending: PODIATRIST | Admitting: PODIATRIST
Payer: COMMERCIAL

## 2022-07-11 ENCOUNTER — ANESTHESIA (OUTPATIENT)
Dept: SURGERY | Facility: HOSPITAL | Age: 57
End: 2022-07-11
Payer: COMMERCIAL

## 2022-07-11 ENCOUNTER — ANESTHESIA EVENT (OUTPATIENT)
Dept: SURGERY | Facility: HOSPITAL | Age: 57
End: 2022-07-11
Payer: COMMERCIAL

## 2022-07-11 VITALS
SYSTOLIC BLOOD PRESSURE: 153 MMHG | OXYGEN SATURATION: 97 % | WEIGHT: 242 LBS | HEIGHT: 69 IN | DIASTOLIC BLOOD PRESSURE: 71 MMHG | RESPIRATION RATE: 20 BRPM | TEMPERATURE: 98.2 F | BODY MASS INDEX: 35.84 KG/M2 | HEART RATE: 52 BPM

## 2022-07-11 DIAGNOSIS — M89.30 HYPERTROPHY OF BONE: Primary | ICD-10-CM

## 2022-07-11 PROCEDURE — 258N000003 HC RX IP 258 OP 636: Performed by: ANESTHESIOLOGY

## 2022-07-11 PROCEDURE — 999N000248 HC STATISTIC IV INSERT WITH US BY RN

## 2022-07-11 PROCEDURE — 710N000012 HC RECOVERY PHASE 2, PER MINUTE: Performed by: PODIATRIST

## 2022-07-11 PROCEDURE — 250N000011 HC RX IP 250 OP 636: Performed by: NURSE ANESTHETIST, CERTIFIED REGISTERED

## 2022-07-11 PROCEDURE — 28122 PARTIAL REMOVAL OF FOOT BONE: CPT | Mod: RT | Performed by: PODIATRIST

## 2022-07-11 PROCEDURE — 999N000141 HC STATISTIC PRE-PROCEDURE NURSING ASSESSMENT: Performed by: PODIATRIST

## 2022-07-11 PROCEDURE — 250N000011 HC RX IP 250 OP 636: Performed by: ANESTHESIOLOGY

## 2022-07-11 PROCEDURE — 360N000076 HC SURGERY LEVEL 3, PER MIN: Performed by: PODIATRIST

## 2022-07-11 PROCEDURE — 370N000017 HC ANESTHESIA TECHNICAL FEE, PER MIN: Performed by: PODIATRIST

## 2022-07-11 PROCEDURE — 250N000009 HC RX 250: Performed by: PODIATRIST

## 2022-07-11 PROCEDURE — 250N000009 HC RX 250: Performed by: NURSE ANESTHETIST, CERTIFIED REGISTERED

## 2022-07-11 PROCEDURE — 272N000001 HC OR GENERAL SUPPLY STERILE: Performed by: PODIATRIST

## 2022-07-11 RX ORDER — NALOXONE HYDROCHLORIDE 1 MG/ML
0.2 INJECTION INTRAMUSCULAR; INTRAVENOUS; SUBCUTANEOUS
Status: DISCONTINUED | OUTPATIENT
Start: 2022-07-11 | End: 2022-07-11 | Stop reason: HOSPADM

## 2022-07-11 RX ORDER — PROPOFOL 10 MG/ML
INJECTION, EMULSION INTRAVENOUS PRN
Status: DISCONTINUED | OUTPATIENT
Start: 2022-07-11 | End: 2022-07-11

## 2022-07-11 RX ORDER — MAGNESIUM SULFATE 4 G/50ML
4 INJECTION INTRAVENOUS ONCE
Status: COMPLETED | OUTPATIENT
Start: 2022-07-11 | End: 2022-07-11

## 2022-07-11 RX ORDER — NALOXONE HYDROCHLORIDE 1 MG/ML
0.4 INJECTION INTRAMUSCULAR; INTRAVENOUS; SUBCUTANEOUS
Status: DISCONTINUED | OUTPATIENT
Start: 2022-07-11 | End: 2022-07-11 | Stop reason: HOSPADM

## 2022-07-11 RX ORDER — OXYCODONE HYDROCHLORIDE 5 MG/1
5 TABLET ORAL EVERY 4 HOURS PRN
Status: DISCONTINUED | OUTPATIENT
Start: 2022-07-11 | End: 2022-07-11 | Stop reason: HOSPADM

## 2022-07-11 RX ORDER — LIDOCAINE 40 MG/G
CREAM TOPICAL
Status: DISCONTINUED | OUTPATIENT
Start: 2022-07-11 | End: 2022-07-11 | Stop reason: HOSPADM

## 2022-07-11 RX ORDER — FENTANYL CITRATE 50 UG/ML
25 INJECTION, SOLUTION INTRAMUSCULAR; INTRAVENOUS
Status: DISCONTINUED | OUTPATIENT
Start: 2022-07-11 | End: 2022-07-11 | Stop reason: HOSPADM

## 2022-07-11 RX ORDER — HYDROCODONE BITARTRATE AND ACETAMINOPHEN 5; 325 MG/1; MG/1
1-2 TABLET ORAL EVERY 6 HOURS PRN
Qty: 20 TABLET | Refills: 0 | Status: SHIPPED | OUTPATIENT
Start: 2022-07-11 | End: 2024-05-13

## 2022-07-11 RX ORDER — HYDROMORPHONE HYDROCHLORIDE 1 MG/ML
0.2 INJECTION, SOLUTION INTRAMUSCULAR; INTRAVENOUS; SUBCUTANEOUS EVERY 5 MIN PRN
Status: DISCONTINUED | OUTPATIENT
Start: 2022-07-11 | End: 2022-07-11 | Stop reason: HOSPADM

## 2022-07-11 RX ORDER — LIDOCAINE HYDROCHLORIDE 20 MG/ML
INJECTION, SOLUTION INFILTRATION; PERINEURAL PRN
Status: DISCONTINUED | OUTPATIENT
Start: 2022-07-11 | End: 2022-07-11

## 2022-07-11 RX ORDER — POVIDONE-IODINE 10 MG/G
OINTMENT TOPICAL PRN
Status: DISCONTINUED | OUTPATIENT
Start: 2022-07-11 | End: 2022-07-11 | Stop reason: HOSPADM

## 2022-07-11 RX ORDER — BUPIVACAINE HYDROCHLORIDE 5 MG/ML
INJECTION, SOLUTION PERINEURAL PRN
Status: DISCONTINUED | OUTPATIENT
Start: 2022-07-11 | End: 2022-07-11 | Stop reason: HOSPADM

## 2022-07-11 RX ORDER — CLINDAMYCIN HCL 300 MG
300 CAPSULE ORAL 3 TIMES DAILY
Qty: 30 CAPSULE | Refills: 0 | Status: SHIPPED | OUTPATIENT
Start: 2022-07-11 | End: 2022-07-21

## 2022-07-11 RX ORDER — LIDOCAINE HYDROCHLORIDE 20 MG/ML
INJECTION, SOLUTION EPIDURAL; INFILTRATION; INTRACAUDAL; PERINEURAL PRN
Status: DISCONTINUED | OUTPATIENT
Start: 2022-07-11 | End: 2022-07-11 | Stop reason: HOSPADM

## 2022-07-11 RX ORDER — DEXAMETHASONE SODIUM PHOSPHATE 4 MG/ML
INJECTION, SOLUTION INTRA-ARTICULAR; INTRALESIONAL; INTRAMUSCULAR; INTRAVENOUS; SOFT TISSUE PRN
Status: DISCONTINUED | OUTPATIENT
Start: 2022-07-11 | End: 2022-07-11

## 2022-07-11 RX ORDER — FENTANYL CITRATE 50 UG/ML
INJECTION, SOLUTION INTRAMUSCULAR; INTRAVENOUS PRN
Status: DISCONTINUED | OUTPATIENT
Start: 2022-07-11 | End: 2022-07-11

## 2022-07-11 RX ORDER — SODIUM CHLORIDE, SODIUM LACTATE, POTASSIUM CHLORIDE, CALCIUM CHLORIDE 600; 310; 30; 20 MG/100ML; MG/100ML; MG/100ML; MG/100ML
INJECTION, SOLUTION INTRAVENOUS CONTINUOUS
Status: DISCONTINUED | OUTPATIENT
Start: 2022-07-11 | End: 2022-07-11 | Stop reason: HOSPADM

## 2022-07-11 RX ORDER — ONDANSETRON 4 MG/1
4 TABLET, ORALLY DISINTEGRATING ORAL EVERY 30 MIN PRN
Status: DISCONTINUED | OUTPATIENT
Start: 2022-07-11 | End: 2022-07-11 | Stop reason: HOSPADM

## 2022-07-11 RX ORDER — GLYCOPYRROLATE 0.2 MG/ML
INJECTION, SOLUTION INTRAMUSCULAR; INTRAVENOUS PRN
Status: DISCONTINUED | OUTPATIENT
Start: 2022-07-11 | End: 2022-07-11

## 2022-07-11 RX ORDER — ONDANSETRON 2 MG/ML
4 INJECTION INTRAMUSCULAR; INTRAVENOUS EVERY 30 MIN PRN
Status: DISCONTINUED | OUTPATIENT
Start: 2022-07-11 | End: 2022-07-11 | Stop reason: HOSPADM

## 2022-07-11 RX ORDER — MEPERIDINE HYDROCHLORIDE 25 MG/ML
12.5 INJECTION INTRAMUSCULAR; INTRAVENOUS; SUBCUTANEOUS
Status: DISCONTINUED | OUTPATIENT
Start: 2022-07-11 | End: 2022-07-11 | Stop reason: HOSPADM

## 2022-07-11 RX ORDER — ONDANSETRON 2 MG/ML
INJECTION INTRAMUSCULAR; INTRAVENOUS PRN
Status: DISCONTINUED | OUTPATIENT
Start: 2022-07-11 | End: 2022-07-11

## 2022-07-11 RX ORDER — FENTANYL CITRATE 50 UG/ML
25 INJECTION, SOLUTION INTRAMUSCULAR; INTRAVENOUS EVERY 5 MIN PRN
Status: DISCONTINUED | OUTPATIENT
Start: 2022-07-11 | End: 2022-07-11 | Stop reason: HOSPADM

## 2022-07-11 RX ORDER — PROPOFOL 10 MG/ML
INJECTION, EMULSION INTRAVENOUS CONTINUOUS PRN
Status: DISCONTINUED | OUTPATIENT
Start: 2022-07-11 | End: 2022-07-11

## 2022-07-11 RX ADMIN — MAGNESIUM SULFATE HEPTAHYDRATE 4 G: 4 INJECTION, SOLUTION INTRAVENOUS at 15:08

## 2022-07-11 RX ADMIN — SODIUM CHLORIDE, POTASSIUM CHLORIDE, SODIUM LACTATE AND CALCIUM CHLORIDE: 600; 310; 30; 20 INJECTION, SOLUTION INTRAVENOUS at 15:09

## 2022-07-11 RX ADMIN — PROPOFOL 150 MCG/KG/MIN: 10 INJECTION, EMULSION INTRAVENOUS at 15:25

## 2022-07-11 RX ADMIN — FENTANYL CITRATE 50 MCG: 50 INJECTION, SOLUTION INTRAMUSCULAR; INTRAVENOUS at 15:25

## 2022-07-11 RX ADMIN — GLYCOPYRROLATE 0.1 MG: 0.2 INJECTION, SOLUTION INTRAMUSCULAR; INTRAVENOUS at 15:25

## 2022-07-11 RX ADMIN — PROPOFOL 20 MG: 10 INJECTION, EMULSION INTRAVENOUS at 15:27

## 2022-07-11 RX ADMIN — PROPOFOL 30 MG: 10 INJECTION, EMULSION INTRAVENOUS at 15:25

## 2022-07-11 RX ADMIN — ONDANSETRON 4 MG: 2 INJECTION INTRAMUSCULAR; INTRAVENOUS at 15:25

## 2022-07-11 RX ADMIN — DEXAMETHASONE SODIUM PHOSPHATE 4 MG: 4 INJECTION, SOLUTION INTRA-ARTICULAR; INTRALESIONAL; INTRAMUSCULAR; INTRAVENOUS; SOFT TISSUE at 15:25

## 2022-07-11 RX ADMIN — MIDAZOLAM 2 MG: 1 INJECTION INTRAMUSCULAR; INTRAVENOUS at 15:20

## 2022-07-11 RX ADMIN — LIDOCAINE HYDROCHLORIDE 40 MG: 20 INJECTION, SOLUTION INFILTRATION; PERINEURAL at 15:25

## 2022-07-11 ASSESSMENT — LIFESTYLE VARIABLES: TOBACCO_USE: 1

## 2022-07-11 NOTE — INTERVAL H&P NOTE
"I have reviewed the surgical (or preoperative) H&P that is linked to this encounter, and examined the patient. There are no significant changes    Clinical Conditions Present on Arrival:  Clinically Significant Risk Factors Present on Admission                   # Obesity: Estimated body mass index is 35.74 kg/m  as calculated from the following:    Height as of this encounter: 1.753 m (5' 9\").    Weight as of this encounter: 109.8 kg (242 lb).       "

## 2022-07-11 NOTE — ANESTHESIA POSTPROCEDURE EVALUATION
Patient: Catherine Farrell    Procedure: Procedure(s):  4th Metatarsal Ostectomy of Right Foot       Anesthesia Type:  MAC    Note:  Disposition: Outpatient   Postop Pain Control: Uneventful            Sign Out: Well controlled pain   PONV: No   Neuro/Psych: Uneventful            Sign Out: Acceptable/Baseline neuro status   Airway/Respiratory: Uneventful            Sign Out: Acceptable/Baseline resp. status   CV/Hemodynamics: Uneventful            Sign Out: Acceptable CV status; No obvious hypovolemia; No obvious fluid overload   Other NRE: NONE   DID A NON-ROUTINE EVENT OCCUR?            Last vitals:  Vitals Value Taken Time   /59 07/11/22 1616   Temp 36.5  C (97.7  F) 07/11/22 1559   Pulse 62 07/11/22 1616   Resp 18 07/11/22 1616   SpO2 96 % 07/11/22 1616       Electronically Signed By: Rubin Miller MD  July 11, 2022  4:28 PM

## 2022-07-11 NOTE — OP NOTE
Date of surgery: 7/11/2022    Surgeon: Mario Ely D.P.M.    Preoperative diagnosis: Ganglion cyst right foot    Postoperative diagnosis: Exostosis base of the fourth metatarsal right foot    Procedure: Ostectomy base of the fourth metatarsal right foot    Anesthesia: MAC     Hemostasis: Pneumatic ankle tourniquet 250 mmHg    Blood loss: None    Complication: None    Specimen: None    Description: The patient was taken to the operating room and placed in a table in the supine position.  Under local anesthesia of 0.5% Marcaine plain and 2% lidocaine plain in a one-to-one mixture along with IV sedation the right foot was prepped and draped in usual aseptic manner.  Following exsanguination of the right foot with a Jose's bandage the tourniquet was inflated and the following procedure was performed.  Attention was directed to the dorsal lateral aspect of the right foot where a linear longitudinal skin incision was made approximately 3.5 cm in length.  This incision was deepened with blunt dissection.  Care was taken to identify and retract all vital structures.  The area was then explored and there was no evidence of a fluid filled lesion in the in the area of the patient's preop pain.  The area was palpated and a large firm palpable mass was noted on the dorsal aspect of the base of fourth metatarsal.  Next a periosteal incision was made just above this hyperostosis and the periosteum was reflected dorsally and plantarly.  Once this had been accomplished a small exostosis was easily visible within the surgical site.  Next utilizing a small rasp the bone spur was rasped moved.  The wound was then flushed with copious amounts of sterile saline solution.  Deep closure was then accomplished utilizing 3-0 Monocryl suture material.  Skin closure was accomplished with half-inch Steri-Strips.  A sterile dressing was applied comprising of Betadine ointment, Adaptic, 4 x 4 gauze, 3 inch Angelika and Coban.  The tourniquet  was then deflated and normal color returned to all the digits of the right foot.  The patient appeared to tolerate the procedure and anesthesia well and was transported from the operating room to the recovery room with vital signs stable and neurovascular status intact.

## 2022-07-11 NOTE — ANESTHESIA CARE TRANSFER NOTE
Patient: Catherine Farrell    Procedure: Procedure(s):  4th Metatarsal Ostectomy       Diagnosis: Ganglion cyst of right foot [M67.471]  Diagnosis Additional Information: No value filed.    Anesthesia Type:   MAC     Note:    Oropharynx: oropharynx clear of all foreign objects  Level of Consciousness: awake  Oxygen Supplementation: room air    Independent Airway: airway patency satisfactory and stable  Dentition: dentition unchanged  Vital Signs Stable: post-procedure vital signs reviewed and stable  Report to RN Given: handoff report given  Patient transferred to: Phase II    Handoff Report: Identifed the Patient, Identified the Reponsible Provider, Reviewed the pertinent medical history, Discussed the surgical course, Reviewed Intra-OP anesthesia mangement and issues during anesthesia, Set expectations for post-procedure period and Allowed opportunity for questions and acknowledgement of understanding      Vitals:  Vitals Value Taken Time   /61 07/11/22 1559   Temp 36.5  C (97.7  F) 07/11/22 1559   Pulse 74 07/11/22 1559   Resp 14 07/11/22 1559   SpO2 96 % 07/11/22 1559       Electronically Signed By: MORGAN David CRNA  July 11, 2022  4:00 PM

## 2022-07-11 NOTE — ANESTHESIA PREPROCEDURE EVALUATION
Anesthesia Pre-Procedure Evaluation    Patient: Catherine Farrell   MRN: [id number] : 1965        Procedure : Procedure(s):  EXCISION, GANGLION CYST, FOOT          Past Medical History:   Diagnosis Date     Anemia      Bilateral calcaneal spurs     surgery 2015     Bursitis of hip      Chronic back pain      DDD (degenerative disc disease)      Depression      Diverticulitis      Environmental allergies      Fibromyalgia      History of transfusion 1984    With      HLD (hyperlipidemia)      Obesity      Other chronic pain      Vertigo       Past Surgical History:   Procedure Laterality Date     ENDOMETRIAL ABLATION       HC REMOVAL HEEL SPUR, CALCANEUS Left 2015    Procedure: LEFT POSTERIOR CALCANEAL SPUR RESECTION;  Surgeon: Rob Marion DPM;  Location: [facility];  Service: Podiatry     HC REMOVAL OF TONSILS,<11 Y/O      Description: Tonsillectomy;  Recorded: 10/18/2008;     REPAIR TENDON ACHILLES Left 2015    Procedure: WITH SECONDARY ACHILLES TENDON REAPIR;  Surgeon: Rob Marion DPM;  Location: [facility];  Service:      REPAIR TENDON ACHILLES Right 11/3/2017    Procedure: WITH SECONDARY ACHILLES TENDON REPAIR;  Surgeon: Rob Marion DPM;  Location: [facility];  Service:      [facility] APPENDECTOMY      Description: Appendectomy;  Recorded: 10/18/2008;     [facility]  DELIVERY ONLY      Description:  Section;  Recorded: 10/18/2008;      Allergies   Allergen Reactions     Amoxicillin Hives     Penicillins Swelling and Itching     Annotation: Swelling of face/eyes, itching       Shellfish Containing Products [Shellfish-Derived Products] Anaphylaxis      Social History     Tobacco Use     Smoking status: Current Every Day Smoker     Packs/day: 0.50     Years: 43.00     Pack years: 21.50     Types: Cigarettes     Smokeless tobacco: Never Used     Tobacco comment: seen by TTS 3/3/2022   Substance Use Topics     Alcohol use: No     Comment: Alcoholic  Drinks/day: sober since 2013      Wt Readings from Last 1 Encounters:   07/11/22 109.8 kg (242 lb)        Anesthesia Evaluation   Pt has had prior anesthetic.     No history of anesthetic complications       ROS/MED HX  ENT/Pulmonary:     (+) tobacco use, Current use,     Neurologic:  - neg neurologic ROS     Cardiovascular:     (+) hypertension-----    METS/Exercise Tolerance: >4 METS    Hematologic:  - neg hematologic  ROS     Musculoskeletal:   (+) arthritis,     GI/Hepatic:     (+) GERD, Asymptomatic on medication,     Renal/Genitourinary:  - neg Renal ROS     Endo:     (+) Obesity,     Psychiatric/Substance Use:     (+) psychiatric history depression     Infectious Disease:  - neg infectious disease ROS     Malignancy:  - neg malignancy ROS     Other:  - neg other ROS    (+) , H/O Chronic Pain,        Physical Exam    Airway  airway exam normal      Mallampati: I   TM distance: > 3 FB   Neck ROM: full   Mouth opening: > 3 cm    Respiratory Devices and Support         Dental       (+) chipped and missing      Cardiovascular   cardiovascular exam normal          Pulmonary   pulmonary exam normal                OUTSIDE LABS:  CBC:   Lab Results   Component Value Date    WBC 9.6 04/05/2022    WBC 10.9 03/21/2022    HGB 12.5 04/05/2022    HGB 12.8 03/21/2022    HCT 38.5 04/05/2022    HCT 40.0 03/21/2022     04/05/2022     03/21/2022     BMP:   Lab Results   Component Value Date     04/05/2022     03/11/2022    POTASSIUM 4.1 04/05/2022    POTASSIUM 3.9 03/11/2022    CHLORIDE 105 04/05/2022    CHLORIDE 101 03/11/2022    CO2 23 04/05/2022    CO2 26 03/11/2022    BUN 8 04/05/2022    BUN 6 (L) 03/11/2022    CR 0.82 04/05/2022    CR 0.83 03/11/2022     (H) 04/05/2022     (H) 03/11/2022     COAGS: No results found for: PTT, INR, FIBR  POC: No results found for: BGM, HCG, HCGS  HEPATIC:   Lab Results   Component Value Date    ALBUMIN 3.9 04/05/2022    PROTTOTAL 7.8 04/05/2022    ALT  22 04/05/2022    AST 16 04/05/2022    ALKPHOS 60 04/05/2022    BILITOTAL 0.2 04/05/2022     OTHER:   Lab Results   Component Value Date    LACT 1.1 03/02/2022    A1C 6.3 (H) 03/14/2022    EVELIA 10.3 04/05/2022    LIPASE 32 03/02/2022    TSH 1.44 04/05/2022    CRP 0.5 09/21/2020    SED 19 09/21/2020       Anesthesia Plan    ASA Status:  2   NPO Status:  NPO Appropriate    Anesthesia Type: MAC.     - Reason for MAC: straight local not clinically adequate   Induction: Propofol.   Maintenance: TIVA.        Consents    Anesthesia Plan(s) and associated risks, benefits, and realistic alternatives discussed. Questions answered and patient/representative(s) expressed understanding.    - Discussed:     - Discussed with:  Patient      - Extended Intubation/Ventilatory Support Discussed: No.      - Patient is DNR/DNI Status: No    Use of blood products discussed: No .     Postoperative Care    Pain management: IV analgesics, Multi-modal analgesia, Oral pain medications.   PONV prophylaxis: Ondansetron (or other 5HT-3)     Comments:                Rubin Miller MD

## 2022-07-18 ENCOUNTER — OFFICE VISIT (OUTPATIENT)
Dept: PODIATRY | Facility: CLINIC | Age: 57
End: 2022-07-18
Payer: COMMERCIAL

## 2022-07-18 VITALS — HEART RATE: 101 BPM | OXYGEN SATURATION: 98 % | HEIGHT: 69 IN | WEIGHT: 242 LBS | BODY MASS INDEX: 35.84 KG/M2

## 2022-07-18 DIAGNOSIS — M89.8X7 EXOSTOSIS OF RIGHT FOOT: Primary | ICD-10-CM

## 2022-07-18 PROBLEM — D12.3 BENIGN NEOPLASM OF TRANSVERSE COLON: Status: ACTIVE | Noted: 2022-06-22

## 2022-07-18 PROCEDURE — 99024 POSTOP FOLLOW-UP VISIT: CPT | Performed by: PODIATRIST

## 2022-07-18 RX ORDER — PREGABALIN 150 MG/1
1 CAPSULE ORAL EVERY 12 HOURS
COMMUNITY
Start: 2022-06-20 | End: 2024-05-13

## 2022-07-18 ASSESSMENT — PAIN SCALES - GENERAL: PAINLEVEL: MODERATE PAIN (5)

## 2022-07-18 NOTE — PROGRESS NOTES
Subjective findings: The patient return to the clinic today for postop visit #1, 1 week status post exostosis base of the fourth metatarsal right foot.  I informed the patient that there was no evidence of a ganglion cyst once the procedure was underway.  There was a hyperostosis which was removed.  The patient complained of mild to moderate pain and swelling.    Objective findings: The dressings were removed and wound margins are well coaptated and maintained.  There is minimal edema noted.  There is no erythema, cellulitis, drainage or bleeding noted.  Neurovascular status is intact.  Vital signs stable.    Assessment: Exostosis right foot    Plan: Applied a large Band-Aid over the incision site.  I have instructed the patient to keep the wound clean and dry.  She is to return to the clinic in 1 week for postop visit #2 at which time the sutures will be removed.

## 2022-07-18 NOTE — LETTER
7/18/2022         RE: Catherine Farrell  [facility] [facility] [address]        Dear Colleague,    Thank you for referring your patient, Catherine Farrell, to the [facility]. Please see a copy of my visit note below.    Subjective findings: The patient return to the clinic today for postop visit #1, 1 week status post exostosis base of the fourth metatarsal right foot.  I informed the patient that there was no evidence of a ganglion cyst once the procedure was underway.  There was a hyperostosis which was removed.  The patient complained of mild to moderate pain and swelling.    Objective findings: The dressings were removed and wound margins are well coaptated and maintained.  There is minimal edema noted.  There is no erythema, cellulitis, drainage or bleeding noted.  Neurovascular status is intact.  Vital signs stable.    Assessment: Exostosis right foot    Plan: Applied a large Band-Aid over the incision site.  I have instructed the patient to keep the wound clean and dry.  She is to return to the clinic in 1 week for postop visit #2 at which time the sutures will be removed.      Again, thank you for allowing me to participate in the care of your patient.        Sincerely,        Mario Ely DPM

## 2022-07-18 NOTE — PATIENT INSTRUCTIONS
Leave your dressings intact until your follow up appointment with Dr. Ely    Do not get your foot wet in the shower.

## 2022-07-18 NOTE — LETTER
July 18, 2022      Catherine Farrell  [address] [place] [id number]        To Whom It May Concern:    Catherine Farrell was seen in our clinic on 7/18/2022. She may return to work without restrictions beginning August 1, 2022.      Sincerely,        Mario Ely DPM

## 2022-07-25 ENCOUNTER — OFFICE VISIT (OUTPATIENT)
Dept: PODIATRY | Facility: CLINIC | Age: 57
End: 2022-07-25
Payer: COMMERCIAL

## 2022-07-25 VITALS — BODY MASS INDEX: 35.84 KG/M2 | HEIGHT: 69 IN | OXYGEN SATURATION: 99 % | WEIGHT: 242 LBS | HEART RATE: 66 BPM

## 2022-07-25 DIAGNOSIS — M89.8X7 EXOSTOSIS OF RIGHT FOOT: Primary | ICD-10-CM

## 2022-07-25 PROCEDURE — 99024 POSTOP FOLLOW-UP VISIT: CPT | Performed by: PODIATRIST

## 2022-07-25 ASSESSMENT — PAIN SCALES - GENERAL: PAINLEVEL: NO PAIN (0)

## 2022-07-25 NOTE — LETTER
7/25/2022         RE: Catherine Farrell  [facility] [facility] [address]        Dear Colleague,    Thank you for referring your patient, Catherine Farrell, to the [facility]. Please see a copy of my visit note below.    Subjective findings: The patient return to the clinic today for postop visit #2, 2 weeks status post exostosis base of the fourth metatarsal right foot.  I informed the patient that there was no evidence of a ganglion cyst once the procedure was underway.  There was a hyperostosis which was removed.  The patient complained of mild to moderate pain and swelling.     Objective findings: The dressings were removed and wound margins are well coaptated and maintained.  There is minimal edema noted.  There is no erythema, cellulitis, drainage or bleeding noted.  Neurovascular status is intact.  Vital signs stable.     Assessment: Exostosis right foot     Plan:All sutures removed today.  I informed the patient that she may now gradually return to normal activities and normal shoe gear.  She is to return to the clinic as needed.        Again, thank you for allowing me to participate in the care of your patient.        Sincerely,        Mario Ely DPM

## 2022-09-15 ENCOUNTER — TRANSFERRED RECORDS (OUTPATIENT)
Dept: HEALTH INFORMATION MANAGEMENT | Facility: CLINIC | Age: 57
End: 2022-09-15

## 2022-09-20 ENCOUNTER — TRANSFERRED RECORDS (OUTPATIENT)
Dept: HEALTH INFORMATION MANAGEMENT | Facility: CLINIC | Age: 57
End: 2022-09-20

## 2022-09-25 ENCOUNTER — HEALTH MAINTENANCE LETTER (OUTPATIENT)
Age: 57
End: 2022-09-25

## 2022-10-04 PROBLEM — K57.92 DIVERTICULITIS OF INTESTINE, PART UNSPECIFIED, WITHOUT PERFORATION OR ABSCESS WITHOUT BLEEDING: Status: ACTIVE | Noted: 2022-03-09

## 2022-11-07 ENCOUNTER — TRANSFERRED RECORDS (OUTPATIENT)
Dept: HEALTH INFORMATION MANAGEMENT | Facility: CLINIC | Age: 57
End: 2022-11-07

## 2022-11-09 DIAGNOSIS — K21.00 REFLUX ESOPHAGITIS: ICD-10-CM

## 2022-11-11 RX ORDER — OMEPRAZOLE 40 MG/1
CAPSULE, DELAYED RELEASE ORAL
Qty: 90 CAPSULE | Refills: 1 | Status: SHIPPED | OUTPATIENT
Start: 2022-11-11 | End: 2023-05-15

## 2022-11-11 NOTE — TELEPHONE ENCOUNTER
"Last Written Prescription Date:  11/19/21  Last Fill Quantity: 90,  # refills: 3  Last office visit provider:  4/5/22     Requested Prescriptions   Pending Prescriptions Disp Refills     omeprazole (PRILOSEC) 40 MG DR capsule [Pharmacy Med Name: Omeprazole 40 MG Oral Capsule Delayed Release] 90 capsule 0     Sig: Take 1 capsule by mouth once daily       PPI Protocol Passed - 11/9/2022  8:17 AM        Passed - Not on Clopidogrel (unless Pantoprazole ordered)        Passed - No diagnosis of osteoporosis on record        Passed - Recent (12 mo) or future (30 days) visit within the authorizing provider's specialty     Patient has had an office visit with the authorizing provider or a provider within the authorizing providers department within the previous 12 mos or has a future within next 30 days. See \"Patient Info\" tab in inbasket, or \"Choose Columns\" in Meds & Orders section of the refill encounter.              Passed - Medication is active on med list        Passed - Patient is age 18 or older        Passed - No active pregnacy on record        Passed - No positive pregnancy test in past 12 months             Alyse Clay RN 11/11/22 10:59 AM  "

## 2022-12-09 ENCOUNTER — TELEPHONE (OUTPATIENT)
Dept: FAMILY MEDICINE | Facility: CLINIC | Age: 57
End: 2022-12-09

## 2022-12-09 DIAGNOSIS — R42 VERTIGO: Primary | ICD-10-CM

## 2022-12-09 NOTE — TELEPHONE ENCOUNTER
RN attempted to contact patient, but no answer. Unable to leave a message. Will try patient later.    Patty Pretty RN  [facility]

## 2022-12-09 NOTE — TELEPHONE ENCOUNTER
General Call      Reason for Call:  Med request    What are your questions or concerns:  Pt called and stated that she would like a refill on her meclivine. I do not see that on pt med list but pt said it was marked as discontinued. I still don't see it on the discontinued list. Please send RX to pharmacy on file if appropriate. Thanks!    Date of last appointment with provider: 4/05/2022    Could we send this information to you in [employer] or would you prefer to receive a phone call?:   Patient would prefer a phone call   Okay to leave a detailed message?: Yes at Home number on file [phone] (home)

## 2022-12-12 RX ORDER — MECLIZINE HYDROCHLORIDE 25 MG/1
25 TABLET ORAL 3 TIMES DAILY PRN
Qty: 45 TABLET | Refills: 3 | Status: SHIPPED | OUTPATIENT
Start: 2022-12-12 | End: 2024-05-20

## 2022-12-12 NOTE — TELEPHONE ENCOUNTER
S-(situation):   Patient calling for meclizine refill, but medication discontinued.    B-(background):   Patient had neck steroid injection done on 12/2/22 by [facility]. Patient has dizziness after injections and relieved by meclizine.    A-(assessment):   Patient declined triage  Patient had a old refill left of meclizine which helps dizziness.  Patient unable to take more time off of work, but did schedule an appointment with Dr Medellin on 1/4/23    R-(recommendations):   Message routed to Dr Parks for meclizine refill. Medication and pharmacy pended.    Patty Pretty RN  [facility]

## 2022-12-13 NOTE — TELEPHONE ENCOUNTER
Contacted patient and she already picked up Rx.    No further action needed.    Patty Pretty RN  [facility]

## 2022-12-27 ENCOUNTER — TRANSFERRED RECORDS (OUTPATIENT)
Dept: HEALTH INFORMATION MANAGEMENT | Facility: CLINIC | Age: 57
End: 2022-12-27

## 2023-02-24 ENCOUNTER — TRANSFERRED RECORDS (OUTPATIENT)
Dept: HEALTH INFORMATION MANAGEMENT | Facility: CLINIC | Age: 58
End: 2023-02-24

## 2023-03-20 ENCOUNTER — TRANSFERRED RECORDS (OUTPATIENT)
Dept: HEALTH INFORMATION MANAGEMENT | Facility: CLINIC | Age: 58
End: 2023-03-20

## 2023-03-20 DIAGNOSIS — E78.5 HYPERLIPIDEMIA: ICD-10-CM

## 2023-03-21 RX ORDER — SIMVASTATIN 40 MG
TABLET ORAL
Qty: 90 TABLET | Refills: 0 | Status: SHIPPED | OUTPATIENT
Start: 2023-03-21 | End: 2023-06-12

## 2023-03-21 NOTE — TELEPHONE ENCOUNTER
"Routing refill request to provider for review/approval because:  Labs not current:  LDL    Last Written Prescription Date:  9/23/22  Last Fill Quantity: 90,  # refills: 1   Last office visit provider:  4/5/22     Requested Prescriptions   Pending Prescriptions Disp Refills     simvastatin (ZOCOR) 40 MG tablet [Pharmacy Med Name: Simvastatin 40 MG Oral Tablet] 90 tablet 0     Sig: Take 1 tablet by mouth once daily       Statins Protocol Failed - 3/20/2023  9:12 PM        Failed - LDL on file in past 12 months     Recent Labs   Lab Test 02/24/21  0934                Passed - No abnormal creatine kinase in past 12 months     No lab results found.             Passed - Recent (12 mo) or future (30 days) visit within the authorizing provider's specialty     Patient has had an office visit with the authorizing provider or a provider within the authorizing providers department within the previous 12 mos or has a future within next 30 days. See \"Patient Info\" tab in inbasket, or \"Choose Columns\" in Meds & Orders section of the refill encounter.              Passed - Medication is active on med list        Passed - Patient is age 18 or older        Passed - No active pregnancy on record        Passed - No positive pregnancy test in past 12 months             Dorothy Ferrer RN 03/21/23 12:54 AM  "

## 2023-04-27 ENCOUNTER — APPOINTMENT (OUTPATIENT)
Dept: CT IMAGING | Facility: HOSPITAL | Age: 58
End: 2023-04-27
Attending: PHYSICIAN ASSISTANT
Payer: COMMERCIAL

## 2023-04-27 ENCOUNTER — HOSPITAL ENCOUNTER (EMERGENCY)
Facility: HOSPITAL | Age: 58
Discharge: HOME | End: 2023-04-27
Admitting: PHYSICIAN ASSISTANT
Payer: COMMERCIAL

## 2023-04-27 VITALS
SYSTOLIC BLOOD PRESSURE: 138 MMHG | WEIGHT: 251.32 LBS | HEART RATE: 70 BPM | RESPIRATION RATE: 22 BRPM | OXYGEN SATURATION: 93 % | DIASTOLIC BLOOD PRESSURE: 69 MMHG | BODY MASS INDEX: 37.11 KG/M2 | TEMPERATURE: 97.5 F

## 2023-04-27 DIAGNOSIS — R10.9 RIGHT-SIDED ABDOMINAL PAIN OF UNKNOWN CAUSE: ICD-10-CM

## 2023-04-27 DIAGNOSIS — R10.9 FLANK PAIN: ICD-10-CM

## 2023-04-27 PROBLEM — J44.1 ACUTE EXACERBATION OF CHRONIC OBSTRUCTIVE AIRWAYS DISEASE (H): Status: ACTIVE | Noted: 2023-01-09

## 2023-04-27 PROBLEM — E78.5 DYSLIPIDEMIA: Status: ACTIVE | Noted: 2022-06-22

## 2023-04-27 PROBLEM — Z87.19 HISTORY OF DIVERTICULITIS: Status: ACTIVE | Noted: 2022-01-13

## 2023-04-27 PROBLEM — E11.9 TYPE 2 DIABETES MELLITUS (H): Status: ACTIVE | Noted: 2023-03-09

## 2023-04-27 LAB
ALBUMIN SERPL BCG-MCNC: 4.4 G/DL (ref 3.5–5.2)
ALBUMIN UR-MCNC: NEGATIVE MG/DL
ALP SERPL-CCNC: 57 U/L (ref 35–104)
ALT SERPL W P-5'-P-CCNC: 19 U/L (ref 10–35)
ANION GAP SERPL CALCULATED.3IONS-SCNC: 13 MMOL/L (ref 7–15)
APPEARANCE UR: CLEAR
AST SERPL W P-5'-P-CCNC: 24 U/L (ref 10–35)
BASOPHILS # BLD AUTO: 0.1 10E3/UL (ref 0–0.2)
BASOPHILS NFR BLD AUTO: 0 %
BILIRUB DIRECT SERPL-MCNC: <0.2 MG/DL (ref 0–0.3)
BILIRUB SERPL-MCNC: 0.4 MG/DL
BILIRUB UR QL STRIP: NEGATIVE
BUN SERPL-MCNC: 9.8 MG/DL (ref 8–23)
CALCIUM SERPL-MCNC: 9.9 MG/DL (ref 8.6–10)
CHLORIDE SERPL-SCNC: 104 MMOL/L (ref 98–107)
COLOR UR AUTO: COLORLESS
CREAT SERPL-MCNC: 0.88 MG/DL (ref 0.51–0.95)
DEPRECATED HCO3 PLAS-SCNC: 23 MMOL/L (ref 22–29)
EOSINOPHIL # BLD AUTO: 0.1 10E3/UL (ref 0–0.7)
EOSINOPHIL NFR BLD AUTO: 1 %
ERYTHROCYTE [DISTWIDTH] IN BLOOD BY AUTOMATED COUNT: 14.6 % (ref 10–15)
GFR SERPL CREATININE-BSD FRML MDRD: 75 ML/MIN/1.73M2
GLUCOSE SERPL-MCNC: 85 MG/DL (ref 70–99)
GLUCOSE UR STRIP-MCNC: NEGATIVE MG/DL
HCT VFR BLD AUTO: 39.9 % (ref 35–47)
HGB BLD-MCNC: 13 G/DL (ref 11.7–15.7)
HGB UR QL STRIP: NEGATIVE
IMM GRANULOCYTES # BLD: 0.1 10E3/UL
IMM GRANULOCYTES NFR BLD: 1 %
KETONES UR STRIP-MCNC: NEGATIVE MG/DL
LEUKOCYTE ESTERASE UR QL STRIP: NEGATIVE
LYMPHOCYTES # BLD AUTO: 3.4 10E3/UL (ref 0.8–5.3)
LYMPHOCYTES NFR BLD AUTO: 25 %
MCH RBC QN AUTO: 28.6 PG (ref 26.5–33)
MCHC RBC AUTO-ENTMCNC: 32.6 G/DL (ref 31.5–36.5)
MCV RBC AUTO: 88 FL (ref 78–100)
MONOCYTES # BLD AUTO: 0.6 10E3/UL (ref 0–1.3)
MONOCYTES NFR BLD AUTO: 4 %
NEUTROPHILS # BLD AUTO: 9.3 10E3/UL (ref 1.6–8.3)
NEUTROPHILS NFR BLD AUTO: 69 %
NITRATE UR QL: NEGATIVE
NRBC # BLD AUTO: 0 10E3/UL
NRBC BLD AUTO-RTO: 0 /100
PH UR STRIP: 5.5 [PH] (ref 5–7)
PLATELET # BLD AUTO: 213 10E3/UL (ref 150–450)
POTASSIUM SERPL-SCNC: 4 MMOL/L (ref 3.4–5.3)
PROT SERPL-MCNC: 7.6 G/DL (ref 6.4–8.3)
RBC # BLD AUTO: 4.54 10E6/UL (ref 3.8–5.2)
RBC URINE: <1 /HPF
SODIUM SERPL-SCNC: 140 MMOL/L (ref 136–145)
SP GR UR STRIP: 1 (ref 1–1.03)
UROBILINOGEN UR STRIP-MCNC: <2 MG/DL
WBC # BLD AUTO: 13.5 10E3/UL (ref 4–11)
WBC URINE: <1 /HPF

## 2023-04-27 PROCEDURE — 250N000011 HC RX IP 250 OP 636: Performed by: PHYSICIAN ASSISTANT

## 2023-04-27 PROCEDURE — 258N000003 HC RX IP 258 OP 636: Performed by: PHYSICIAN ASSISTANT

## 2023-04-27 PROCEDURE — 96375 TX/PRO/DX INJ NEW DRUG ADDON: CPT

## 2023-04-27 PROCEDURE — 81001 URINALYSIS AUTO W/SCOPE: CPT | Performed by: PHYSICIAN ASSISTANT

## 2023-04-27 PROCEDURE — 36415 COLL VENOUS BLD VENIPUNCTURE: CPT | Performed by: PHYSICIAN ASSISTANT

## 2023-04-27 PROCEDURE — 74177 CT ABD & PELVIS W/CONTRAST: CPT

## 2023-04-27 PROCEDURE — 85025 COMPLETE CBC W/AUTO DIFF WBC: CPT | Performed by: PHYSICIAN ASSISTANT

## 2023-04-27 PROCEDURE — 99285 EMERGENCY DEPT VISIT HI MDM: CPT | Mod: 25

## 2023-04-27 PROCEDURE — 80053 COMPREHEN METABOLIC PANEL: CPT | Performed by: PHYSICIAN ASSISTANT

## 2023-04-27 PROCEDURE — 82248 BILIRUBIN DIRECT: CPT | Performed by: PHYSICIAN ASSISTANT

## 2023-04-27 PROCEDURE — 96361 HYDRATE IV INFUSION ADD-ON: CPT

## 2023-04-27 PROCEDURE — 96374 THER/PROPH/DIAG INJ IV PUSH: CPT | Mod: 59

## 2023-04-27 RX ORDER — KETOROLAC TROMETHAMINE 15 MG/ML
15 INJECTION, SOLUTION INTRAMUSCULAR; INTRAVENOUS ONCE
Status: DISCONTINUED | OUTPATIENT
Start: 2023-04-27 | End: 2023-04-27

## 2023-04-27 RX ORDER — ONDANSETRON 2 MG/ML
4 INJECTION INTRAMUSCULAR; INTRAVENOUS ONCE
Status: COMPLETED | OUTPATIENT
Start: 2023-04-27 | End: 2023-04-27

## 2023-04-27 RX ORDER — IOPAMIDOL 755 MG/ML
90 INJECTION, SOLUTION INTRAVASCULAR ONCE
Status: COMPLETED | OUTPATIENT
Start: 2023-04-27 | End: 2023-04-27

## 2023-04-27 RX ORDER — CYCLOBENZAPRINE HCL 10 MG
10 TABLET ORAL 3 TIMES DAILY PRN
Qty: 18 TABLET | Refills: 0 | Status: SHIPPED | OUTPATIENT
Start: 2023-04-27 | End: 2023-05-03

## 2023-04-27 RX ORDER — KETOROLAC TROMETHAMINE 15 MG/ML
15 INJECTION, SOLUTION INTRAMUSCULAR; INTRAVENOUS ONCE
Status: COMPLETED | OUTPATIENT
Start: 2023-04-27 | End: 2023-04-27

## 2023-04-27 RX ORDER — ONDANSETRON 4 MG/1
4 TABLET, FILM COATED ORAL ONCE
Status: DISCONTINUED | OUTPATIENT
Start: 2023-04-27 | End: 2023-04-27

## 2023-04-27 RX ADMIN — SODIUM CHLORIDE 1000 ML: 9 INJECTION, SOLUTION INTRAVENOUS at 09:27

## 2023-04-27 RX ADMIN — IOPAMIDOL 90 ML: 755 INJECTION, SOLUTION INTRAVENOUS at 10:16

## 2023-04-27 RX ADMIN — ONDANSETRON 4 MG: 2 INJECTION INTRAMUSCULAR; INTRAVENOUS at 09:28

## 2023-04-27 RX ADMIN — KETOROLAC TROMETHAMINE 15 MG: 15 INJECTION, SOLUTION INTRAMUSCULAR; INTRAVENOUS at 09:30

## 2023-04-27 ASSESSMENT — ENCOUNTER SYMPTOMS
NAUSEA: 1
WEAKNESS: 0
DIZZINESS: 0
FEVER: 0
SHORTNESS OF BREATH: 0
FLANK PAIN: 1
FATIGUE: 0
HEADACHES: 0
CHILLS: 0
ABDOMINAL PAIN: 1
CHEST TIGHTNESS: 0
ABDOMINAL DISTENTION: 0

## 2023-04-27 ASSESSMENT — ACTIVITIES OF DAILY LIVING (ADL)
ADLS_ACUITY_SCORE: 33
ADLS_ACUITY_SCORE: 35

## 2023-04-27 NOTE — ED PROVIDER NOTES
EMERGENCY DEPARTMENT ENCOUNTER      NAME: Catherine Farrell  AGE: 57 year old female  YOB: 1965  MRN: [id number]  EVALUATION DATE & TIME: 4/27/2023  8:57 AM    PCP: Annetta Hernandez    ED PROVIDER: Titi Kumar PA-C      Chief Complaint   Patient presents with     Flank Pain     FINAL IMPRESSION:  1. Right-sided abdominal pain of unknown cause    2. Flank pain        ED COURSE & MEDICAL DECISION MAKING:    Pertinent Labs & Imaging studies reviewed. (See chart for details)  9:02 AM I met the patient and performed my initial interview and exam.   11:12 AM Rechecked and updated patient. Updated on the plan for discharge.     57 year old female presents to the Emergency Department for evaluation of right sided flank/RUQ pain.     ED Course as of 04/27/23 1133   Thu Apr 27, 2023   0905 Patient is a 57-year-old female, past medical history of abdominal pain, dysuria, diverticulitis, obesity, who presents emergency department for evaluation of right upper quadrant abdominal pain, flank pain.  Patient reports that the pain started approximately 530 this morning.  All on the right side.  She has not take any medications.  Feels nauseous, however has not had any vomiting.  No fever, cough, cold, chills.  No dysuria hematuria.  On examination here, she does not have any significant CVA tenderness bilaterally, has some tenderness to the right upper quadrant, without rebound or guarding.  No pain across the rest of the abdomen, the remainder the abdomen is soft and nontender.  No noted hernia.  Has not had any vaginal bleeding or discharge.  She is mildly tachycardic here, however afebrile, not hypoxic.  Has not take any medications prior to arrival.  Does note history of diverticulitis, however has not had a flare in quite some time.  Never had a perforation.  No longer has an appendix.  Otherwise denies any chest pain or shortness of breath, remainder of examination here is fairly unremarkable.   Differential at this point includes cholecystitis, choledocholithiasis, also could be nephrolithiasis, acute cystitis, less likely to be pyelonephritis.  Additional differential includes diverticulitis, bowel obstruction, perforation.   1004 With a leukocytosis here, hepatic function is negative, BMP negative, UA negative.   1108 CT Abdomen Pelvis w Contrast  Multiplanar reformats were obtained, no urinary calculi or hydronephrosis, no obstruction or inflammation.  Patent portal splenic and superior mesenteric veins. Aneurysmal abdominal aorta, with moderate atelectasis.  No acute abnormality, nothing specific cause of the pain.  Distal colonic diverticulosis.   1120 Patient seen and reevaluated, updated on imaging, and labs here.  Isolated leukocytosis, however patient did have a steroid injection approximately week and a half ago.  She has no fevers, or other focal infectious symptoms.  No shortness of breath, lung sounds are clear bilaterally.  Does not appear to be respiratory.  She is not tachycardic.  She otherwise has no focal symptoms other than the right-sided pain, could be musculoskeletal, does not appear to be abdominal at this time.  Patient declines any further testing here, does not want to proceed with a RUQ ultrasound, we will discharge her here, with some muscle relaxers, and she will continue to take Tylenol at home, will follow-up with her primary care doctor, return to the emergency department for any new or worsening symptoms.  She is agreeable with this plan.  Plan for discharge at this time, unclear etiology for patient's right-sided flank pain though does not appear consistent with cholecystitis, cortical phthisis, pancreatitis, acute cystitis, nephrolithiasis.     Medical Decision Making    History:    Supplemental history from: Documented in chart, if applicable    External Record(s) reviewed: Documented in chart, if applicable.    Work Up:    Chart documentation includes differential  considered and any EKGs or imaging independently interpreted by provider, where specified.    In additional to work up documented, I considered the following work up: Documented in chart, if applicable.    External consultation:    Discussion of management with another provider: Documented in chart, if applicable    Complicating factors:    Care impacted by chronic illness: Chronic Lung Disease, Chronic Pain, Diabetes, Hyperlipidemia, Hypertension, Smoking / Nicotine Use and Other: diverticulitis    Care affected by social determinants of health: N/A    Disposition considerations: Discharge. I prescribed additional prescription strength medication(s) as charted. N/A.     At the conclusion of the encounter I discussed the results of all of the tests and the disposition. The questions were answered. The patient or family acknowledged understanding and was agreeable with the care plan.     0 minutes of critical care time     MEDICATIONS GIVEN IN THE EMERGENCY:  Medications   0.9% sodium chloride BOLUS (0 mLs Intravenous Stopped 4/27/23 1018)   ketorolac (TORADOL) injection 15 mg (15 mg Intravenous $Given 4/27/23 0930)   ondansetron (ZOFRAN) injection 4 mg (4 mg Intravenous $Given 4/27/23 0928)   iopamidol (ISOVUE-370) solution 90 mL (90 mLs Intravenous $Given 4/27/23 1016)       NEW PRESCRIPTIONS STARTED AT TODAY'S ER VISIT  New Prescriptions    CYCLOBENZAPRINE (FLEXERIL) 10 MG TABLET    Take 1 tablet (10 mg) by mouth 3 times daily as needed for muscle spasms          =================================================================    HPI    Patient information was obtained from: patient    Use of : N/A        Catherine Farrell is a 57 year old female with a pertinent history of diverticulitis, obesity, fibromyalgia, HTN, HLD, DM2, chronic reflux esophagitis, costochondritis, tobacco use, benign neoplasm of transverse colon, s/p appendectomy, and s/p endometrial ablation who presents to this ED by walk in for  "evaluation of abdominal pain.    At 0530 this morning while at work, patient experienced a sudden onset of right lateral upper quadrant pain in which she felt like she \"got kicked by a horse.\" She called her primary clinic who directed her to be seen in the ED. Patient describes the location of her pain as by her right ribcage and states that her back hurts because of it. She endorses nausea without emesis. No urinary symptoms, diarrhea, or lower abdominal pain.     She states she has a known hernia, severe diverticulitis, and is s/p appendectomy. She does not take any medications for her diverticulitis. Patient also reports she is s/p colonoscopy in which they removed 5 polyps.    Patient has not yet eaten today. She arrived at work at 0430.    REVIEW OF SYSTEMS   Review of Systems   Constitutional: Negative for chills, fatigue and fever.   Respiratory: Negative for chest tightness and shortness of breath.    Gastrointestinal: Positive for abdominal pain and nausea. Negative for abdominal distention.   Genitourinary: Positive for flank pain.   Neurological: Negative for dizziness, weakness and headaches.   All other systems reviewed and are negative.      PAST MEDICAL HISTORY:  Past Medical History:   Diagnosis Date     Anemia      Bilateral calcaneal spurs     surgery 2015     Bursitis of hip      Chronic back pain      DDD (degenerative disc disease)      Depression      Diverticulitis      Environmental allergies      Fibromyalgia      History of transfusion 1984    With      HLD (hyperlipidemia)      Obesity      Other chronic pain      Vertigo        PAST SURGICAL HISTORY:  Past Surgical History:   Procedure Laterality Date     ENDOMETRIAL ABLATION       EXCISE GANGLION FOOT Right 2022    Procedure: 4th Metatarsal Ostectomy of Right Foot;  Surgeon: Mario Ely DPM;  Location: [facility] REMOVAL HEEL SPUR, CALCANEUS Left 2015    Procedure: LEFT POSTERIOR " CALCANEAL SPUR RESECTION;  Surgeon: Rob Marion DPM;  Location: [facility];  Service: Podiatry     HC REMOVAL OF TONSILS,<13 Y/O      Description: Tonsillectomy;  Recorded: 10/18/2008;     REPAIR TENDON ACHILLES Left 2015    Procedure: WITH SECONDARY ACHILLES TENDON REAPIR;  Surgeon: Rob Marion DPM;  Location: [facility];  Service:      REPAIR TENDON ACHILLES Right 11/3/2017    Procedure: WITH SECONDARY ACHILLES TENDON REPAIR;  Surgeon: Rob Marion DPM;  Location: [facility];  Service:      [facility] APPENDECTOMY      Description: Appendectomy;  Recorded: 10/18/2008;     [facility]  DELIVERY ONLY      Description:  Section;  Recorded: 10/18/2008;           CURRENT MEDICATIONS:    cyclobenzaprine (FLEXERIL) 10 MG tablet  cholecalciferol, vitamin D3, (VITAMIN D3) 2,000 unit Tab  HYDROcodone-acetaminophen (NORCO) 5-325 MG tablet  ibuprofen (ADVIL/MOTRIN) 800 MG tablet  meclizine (ANTIVERT) 25 MG tablet  omeprazole (PRILOSEC) 40 MG DR capsule  polyethylene glycol (MIRALAX) 17 GM/Dose powder  pregabalin (LYRICA) 100 MG capsule  pregabalin (LYRICA) 150 MG capsule  simvastatin (ZOCOR) 40 MG tablet         ALLERGIES:  Allergies   Allergen Reactions     Amoxicillin Hives     Penicillins Swelling and Itching     Annotation: Swelling of face/eyes, itching       Shellfish Containing Products [Shellfish-Derived Products] Anaphylaxis       FAMILY HISTORY:  Family History   Problem Relation Age of Onset     Lung Cancer Father      Alcoholism Father      Alcoholism Son      Hypertension Mother      Stomach Cancer Maternal Aunt      Breast Cancer Maternal Aunt      Breast Cancer Maternal Aunt        SOCIAL HISTORY:   Social History     Socioeconomic History     Marital status:    Tobacco Use     Smoking status: Every Day     Packs/day: 0.50     Years: 43.00     Pack years: 21.50     Types: Cigarettes     Smokeless tobacco: Never     Tobacco comments:     seen by TTS 3/3/2022   Substance  and Sexual Activity     Alcohol use: No     Comment: Alcoholic Drinks/day: sober since 2013     Drug use: No     Sexual activity: Yes     Partners: Male       VITALS:  /66   Pulse 77   Temp 97.5  F (36.4  C) (Temporal)   Resp 22   Wt 114 kg (251 lb 5.2 oz)   SpO2 94%   BMI 37.11 kg/m      PHYSICAL EXAM    Physical Exam  Vitals and nursing note reviewed.   Constitutional:       General: She is not in acute distress.     Appearance: Normal appearance. She is normal weight. She is not toxic-appearing or diaphoretic.   HENT:      Right Ear: External ear normal.      Left Ear: External ear normal.   Eyes:      Conjunctiva/sclera: Conjunctivae normal.   Cardiovascular:      Rate and Rhythm: Regular rhythm. Tachycardia present.   Pulmonary:      Effort: Pulmonary effort is normal.   Abdominal:      General: Abdomen is flat.      Palpations: Abdomen is soft.      Tenderness: There is abdominal tenderness. There is right CVA tenderness. There is no left CVA tenderness, guarding or rebound.      Comments: Exceptionally mild right-sided flank tenderness, some right upper quadrant abdominal tenderness without rebound or guarding.   Musculoskeletal:         General: Normal range of motion.   Skin:     Findings: No erythema or rash.   Neurological:      Mental Status: She is alert. Mental status is at baseline.      Sensory: No sensory deficit.      Motor: No weakness.         LAB:  All pertinent labs reviewed and interpreted.  Labs Ordered and Resulted from Time of ED Arrival to Time of ED Departure   CBC WITH PLATELETS AND DIFFERENTIAL - Abnormal       Result Value    WBC Count 13.5 (*)     RBC Count 4.54      Hemoglobin 13.0      Hematocrit 39.9      MCV 88      MCH 28.6      MCHC 32.6      RDW 14.6      Platelet Count 213      % Neutrophils 69      % Lymphocytes 25      % Monocytes 4      % Eosinophils 1      % Basophils 0      % Immature Granulocytes 1      NRBCs per 100 WBC 0      Absolute Neutrophils 9.3 (*)      Absolute Lymphocytes 3.4      Absolute Monocytes 0.6      Absolute Eosinophils 0.1      Absolute Basophils 0.1      Absolute Immature Granulocytes 0.1      Absolute NRBCs 0.0     BASIC METABOLIC PANEL - Normal    Sodium 140      Potassium 4.0      Chloride 104      Carbon Dioxide (CO2) 23      Anion Gap 13      Urea Nitrogen 9.8      Creatinine 0.88      Calcium 9.9      Glucose 85      GFR Estimate 75     ROUTINE UA WITH MICROSCOPIC REFLEX TO CULTURE - Normal    Color Urine Colorless      Appearance Urine Clear      Glucose Urine Negative      Bilirubin Urine Negative      Ketones Urine Negative      Specific Gravity Urine 1.002      Blood Urine Negative      pH Urine 5.5      Protein Albumin Urine Negative      Urobilinogen Urine <2.0      Nitrite Urine Negative      Leukocyte Esterase Urine Negative      RBC Urine <1      WBC Urine <1     HEPATIC FUNCTION PANEL - Normal    Protein Total 7.6      Albumin 4.4      Bilirubin Total 0.4      Alkaline Phosphatase 57      AST 24      ALT 19      Bilirubin Direct <0.20       RADIOLOGY:  Reviewed all pertinent imaging. Please see official radiology report.  CT Abdomen Pelvis w Contrast   Final Result   IMPRESSION:       1.  No acute abnormality. No specific cause of abdominal pain is identified.      2.  Distal colonic diverticulosis.        I, Jerrica Guido, am serving as a scribe to document services personally performed by Titi Kumar PA-C, based on my observation and the provider's statements to me. I, Titi Kumar PA-C, attest that Jerrica Guido is acting in a scribe capacity, has observed my performance of the services and has documented them in accordance with my direction.    Titi Kumar PA-C  Emergency Medicine  [facility] EMERGENCY DEPARTMENT  [facility] [facility] [id number] [phone]  Dept: [phone]     Titi Kumar PA-C  04/27/23 6613

## 2023-04-27 NOTE — ED TRIAGE NOTES
She started to have right flank pain about five hours ago. She has nausea but no vomiting. Denies diarrhea.

## 2023-04-27 NOTE — DISCHARGE INSTRUCTIONS
Seen here in the emergency department for evaluation of right-sided abdominal pain.  No clear etiology based on your labs, and imaging.  We discussed further testing here including an ultrasound of your abdomen, however we will defer this.  Recommend ibuprofen or Tylenol at home, dosage recommendations are included below.  Additionally will send home with some muscle relaxers see if this helps with the right-sided pain, as this may be musculoskeletal.    For pain or fever you may use:  -Tylenol 650 mg every 6 hours.  Max 4000 mg in 24 hours  Do not use thismedication with alcohol as it can cause liver problems.  -Ibuprofen 600 mg every 6 hours.  Max 3500 mg in 24 hours  Do not take this medication if you have a history of a GI bleed or have kidney problems.  You may use both of these medications at the same time or you can alternate them every 3 hours.  For example, Tylenol at 6 AM, ibuprofen at 9 AM, Tylenol at noon, etc.

## 2023-04-27 NOTE — ED NOTES
Discharge paperwork, follow up care and prescriptions discussed with pt. Pt verbalized understanding and has no questions at this time. VSS. IV access removed. Pt is a/ox4 abc's intact and ambulatory with steady gait when leaving the ED today. Pt reports improvement in pain today

## 2023-05-04 ENCOUNTER — TRANSFERRED RECORDS (OUTPATIENT)
Dept: HEALTH INFORMATION MANAGEMENT | Facility: CLINIC | Age: 58
End: 2023-05-04
Payer: COMMERCIAL

## 2023-05-13 ENCOUNTER — HEALTH MAINTENANCE LETTER (OUTPATIENT)
Age: 58
End: 2023-05-13

## 2023-05-13 DIAGNOSIS — K21.00 REFLUX ESOPHAGITIS: ICD-10-CM

## 2023-05-14 NOTE — TELEPHONE ENCOUNTER
"Routing refill request to provider for review/approval because:  Patient needs to be seen because it has been more than 1 year since last office visit.    Last Written Prescription Date:  11/11/22  Last Fill Quantity: 90,  # refills: 1   Last office visit provider:  4/5/22     Requested Prescriptions   Pending Prescriptions Disp Refills     omeprazole (PRILOSEC) 40 MG DR capsule [Pharmacy Med Name: Omeprazole 40 MG Oral Capsule Delayed Release] 90 capsule 0     Sig: Take 1 capsule by mouth once daily       PPI Protocol Failed - 5/13/2023  3:02 PM        Failed - Recent (12 mo) or future (30 days) visit within the authorizing provider's specialty     Patient has had an office visit with the authorizing provider or a provider within the authorizing providers department within the previous 12 mos or has a future within next 30 days. See \"Patient Info\" tab in inbasket, or \"Choose Columns\" in Meds & Orders section of the refill encounter.              Passed - Not on Clopidogrel (unless Pantoprazole ordered)        Passed - No diagnosis of osteoporosis on record        Passed - Medication is active on med list        Passed - Patient is age 18 or older        Passed - No active pregnacy on record        Passed - No positive pregnancy test in past 12 months             Karla Wadsworth RN 05/14/23 11:52 AM  "

## 2023-05-15 RX ORDER — OMEPRAZOLE 40 MG/1
CAPSULE, DELAYED RELEASE ORAL
Qty: 90 CAPSULE | Refills: 0 | Status: SHIPPED | OUTPATIENT
Start: 2023-05-15 | End: 2023-08-13

## 2023-05-25 ENCOUNTER — TRANSFERRED RECORDS (OUTPATIENT)
Dept: HEALTH INFORMATION MANAGEMENT | Facility: CLINIC | Age: 58
End: 2023-05-25
Payer: COMMERCIAL

## 2023-05-31 ENCOUNTER — TRANSFERRED RECORDS (OUTPATIENT)
Dept: MULTI SPECIALTY CLINIC | Facility: CLINIC | Age: 58
End: 2023-05-31

## 2023-05-31 LAB — RETINOPATHY: NORMAL

## 2023-06-11 DIAGNOSIS — E78.5 HYPERLIPIDEMIA: ICD-10-CM

## 2023-06-12 RX ORDER — SIMVASTATIN 40 MG
TABLET ORAL
Qty: 90 TABLET | Refills: 0 | Status: SHIPPED | OUTPATIENT
Start: 2023-06-12 | End: 2024-04-11

## 2023-06-12 NOTE — TELEPHONE ENCOUNTER
"Routing refill request to provider for review/approval because:  Labs not current:  LDL  Patient needs to be seen because it has been more than 1 year since last office visit.    Last Written Prescription Date:  3/21/2023  Last Fill Quantity: 90,  # refills: 0   Last office visit provider:  4/5/2022     Requested Prescriptions   Pending Prescriptions Disp Refills     simvastatin (ZOCOR) 40 MG tablet [Pharmacy Med Name: Simvastatin 40 MG Oral Tablet] 90 tablet 0     Sig: Take 1 tablet by mouth once daily       Statins Protocol Failed - 6/11/2023 11:30 AM        Failed - LDL on file in past 12 months     Recent Labs   Lab Test 02/24/21  0934                Failed - Recent (12 mo) or future (30 days) visit within the authorizing provider's specialty     Patient has had an office visit with the authorizing provider or a provider within the authorizing providers department within the previous 12 mos or has a future within next 30 days. See \"Patient Info\" tab in inbasket, or \"Choose Columns\" in Meds & Orders section of the refill encounter.              Passed - No abnormal creatine kinase in past 12 months     No lab results found.             Passed - Medication is active on med list        Passed - Patient is age 18 or older        Passed - No active pregnancy on record        Passed - No positive pregnancy test in past 12 months             Sally See RN 06/11/23 8:45 PM  "

## 2023-08-11 ENCOUNTER — TRANSFERRED RECORDS (OUTPATIENT)
Dept: HEALTH INFORMATION MANAGEMENT | Facility: CLINIC | Age: 58
End: 2023-08-11
Payer: COMMERCIAL

## 2023-08-11 DIAGNOSIS — K21.00 REFLUX ESOPHAGITIS: ICD-10-CM

## 2023-08-12 NOTE — TELEPHONE ENCOUNTER
"Routing refill request to provider for review/approval because:  Patient needs to be seen because it has been more than 1 year since last office visit.    Last Written Prescription Date:  5/15/2023  Last Fill Quantity: 90,  # refills: 0   Last office visit provider:  4/5/2022     Requested Prescriptions   Pending Prescriptions Disp Refills    omeprazole (PRILOSEC) 40 MG DR capsule [Pharmacy Med Name: Omeprazole 40 MG Oral Capsule Delayed Release] 90 capsule 0     Sig: Take 1 capsule by mouth once daily       PPI Protocol Failed - 8/11/2023  9:36 PM        Failed - Recent (12 mo) or future (30 days) visit within the authorizing provider's specialty     Patient has had an office visit with the authorizing provider or a provider within the authorizing providers department within the previous 12 mos or has a future within next 30 days. See \"Patient Info\" tab in inbasket, or \"Choose Columns\" in Meds & Orders section of the refill encounter.              Passed - Not on Clopidogrel (unless Pantoprazole ordered)        Passed - No diagnosis of osteoporosis on record        Passed - Medication is active on med list        Passed - Patient is age 18 or older        Passed - No active pregnacy on record        Passed - No positive pregnancy test in past 12 months             Marce Granados RN 08/11/23 9:59 PM  "
5

## 2023-08-13 RX ORDER — OMEPRAZOLE 40 MG/1
CAPSULE, DELAYED RELEASE ORAL
Qty: 90 CAPSULE | Refills: 0 | Status: SHIPPED | OUTPATIENT
Start: 2023-08-13 | End: 2023-11-08

## 2023-10-14 ENCOUNTER — HEALTH MAINTENANCE LETTER (OUTPATIENT)
Age: 58
End: 2023-10-14

## 2023-10-17 ENCOUNTER — TRANSFERRED RECORDS (OUTPATIENT)
Dept: HEALTH INFORMATION MANAGEMENT | Facility: CLINIC | Age: 58
End: 2023-10-17
Payer: COMMERCIAL

## 2023-11-08 DIAGNOSIS — K21.00 REFLUX ESOPHAGITIS: ICD-10-CM

## 2023-11-08 RX ORDER — OMEPRAZOLE 40 MG/1
CAPSULE, DELAYED RELEASE ORAL
Qty: 90 CAPSULE | Refills: 0 | Status: SHIPPED | OUTPATIENT
Start: 2023-11-08 | End: 2024-02-05

## 2024-01-08 ENCOUNTER — TRANSFERRED RECORDS (OUTPATIENT)
Dept: HEALTH INFORMATION MANAGEMENT | Facility: CLINIC | Age: 59
End: 2024-01-08
Payer: COMMERCIAL

## 2024-02-03 DIAGNOSIS — K21.00 REFLUX ESOPHAGITIS: ICD-10-CM

## 2024-02-05 RX ORDER — OMEPRAZOLE 40 MG/1
CAPSULE, DELAYED RELEASE ORAL
Qty: 90 CAPSULE | Refills: 0 | Status: SHIPPED | OUTPATIENT
Start: 2024-02-05 | End: 2024-05-06

## 2024-03-02 ENCOUNTER — HEALTH MAINTENANCE LETTER (OUTPATIENT)
Age: 59
End: 2024-03-02

## 2024-03-15 ENCOUNTER — MEDICAL CORRESPONDENCE (OUTPATIENT)
Dept: SCHEDULING | Facility: CLINIC | Age: 59
End: 2024-03-15
Payer: COMMERCIAL

## 2024-03-19 ENCOUNTER — ANCILLARY PROCEDURE (OUTPATIENT)
Dept: MAMMOGRAPHY | Facility: HOSPITAL | Age: 59
End: 2024-03-19
Attending: PHYSICIAN ASSISTANT
Payer: COMMERCIAL

## 2024-03-19 DIAGNOSIS — Z12.31 ENCOUNTER FOR SCREENING MAMMOGRAM FOR MALIGNANT NEOPLASM OF BREAST: ICD-10-CM

## 2024-03-19 PROCEDURE — 77063 BREAST TOMOSYNTHESIS BI: CPT

## 2024-04-01 ENCOUNTER — TRANSFERRED RECORDS (OUTPATIENT)
Dept: HEALTH INFORMATION MANAGEMENT | Facility: CLINIC | Age: 59
End: 2024-04-01
Payer: COMMERCIAL

## 2024-04-11 ENCOUNTER — HOSPITAL ENCOUNTER (OUTPATIENT)
Dept: CT IMAGING | Facility: HOSPITAL | Age: 59
Discharge: HOME | End: 2024-04-11
Attending: STUDENT IN AN ORGANIZED HEALTH CARE EDUCATION/TRAINING PROGRAM | Admitting: STUDENT IN AN ORGANIZED HEALTH CARE EDUCATION/TRAINING PROGRAM
Payer: COMMERCIAL

## 2024-04-11 ENCOUNTER — OFFICE VISIT (OUTPATIENT)
Dept: PEDIATRICS | Facility: CLINIC | Age: 59
End: 2024-04-11
Payer: COMMERCIAL

## 2024-04-11 VITALS
DIASTOLIC BLOOD PRESSURE: 82 MMHG | HEIGHT: 69 IN | WEIGHT: 243.17 LBS | SYSTOLIC BLOOD PRESSURE: 133 MMHG | OXYGEN SATURATION: 97 % | TEMPERATURE: 97.9 F | RESPIRATION RATE: 16 BRPM | HEART RATE: 77 BPM | BODY MASS INDEX: 36.02 KG/M2

## 2024-04-11 DIAGNOSIS — K57.32 DIVERTICULITIS OF COLON: ICD-10-CM

## 2024-04-11 DIAGNOSIS — R10.32 LLQ ABDOMINAL PAIN: ICD-10-CM

## 2024-04-11 DIAGNOSIS — R10.32 LLQ ABDOMINAL PAIN: Primary | ICD-10-CM

## 2024-04-11 LAB
ALBUMIN SERPL-MCNC: 4.4 G/DL (ref 3.4–5)
ALBUMIN UR-MCNC: NEGATIVE MG/DL
ALP SERPL-CCNC: 58 U/L (ref 40–150)
ALT SERPL W P-5'-P-CCNC: 22 U/L (ref 0–50)
ANION GAP SERPL CALCULATED.3IONS-SCNC: 7 MMOL/L (ref 3–14)
APPEARANCE UR: CLEAR
AST SERPL W P-5'-P-CCNC: 21 U/L (ref 0–45)
BACTERIA #/AREA URNS HPF: ABNORMAL /HPF
BILIRUB SERPL-MCNC: 0.6 MG/DL (ref 0.2–1.3)
BILIRUB UR QL STRIP: NEGATIVE
BUN SERPL-MCNC: 10 MG/DL (ref 7–30)
CALCIUM SERPL-MCNC: 9.6 MG/DL (ref 8.5–10.1)
CHLORIDE BLD-SCNC: 107 MMOL/L (ref 94–109)
CO2 SERPL-SCNC: 26 MMOL/L (ref 20–32)
COLOR UR AUTO: YELLOW
CREAT SERPL-MCNC: 0.7 MG/DL (ref 0.52–1.04)
EGFRCR SERPLBLD CKD-EPI 2021: >90 ML/MIN/1.73M2
ERYTHROCYTE [DISTWIDTH] IN BLOOD BY AUTOMATED COUNT: 14.5 % (ref 10–15)
GLUCOSE BLD-MCNC: 96 MG/DL (ref 70–99)
GLUCOSE UR STRIP-MCNC: NEGATIVE MG/DL
HCT VFR BLD AUTO: 36.1 % (ref 35–47)
HGB BLD-MCNC: 12 G/DL (ref 11.7–15.7)
HGB UR QL STRIP: ABNORMAL
KETONES UR STRIP-MCNC: NEGATIVE MG/DL
LEUKOCYTE ESTERASE UR QL STRIP: NEGATIVE
LIPASE SERPL-CCNC: 41 U/L (ref 13–60)
MCH RBC QN AUTO: 29.1 PG (ref 26.5–33)
MCHC RBC AUTO-ENTMCNC: 33.2 G/DL (ref 31.5–36.5)
MCV RBC AUTO: 87 FL (ref 78–100)
NITRATE UR QL: NEGATIVE
PH UR STRIP: 6 [PH] (ref 5–8)
PLATELET # BLD AUTO: 227 10E3/UL (ref 150–450)
POTASSIUM BLD-SCNC: 4.3 MMOL/L (ref 3.4–5.3)
PROT SERPL-MCNC: 7.7 G/DL (ref 6.8–8.8)
RBC # BLD AUTO: 4.13 10E6/UL (ref 3.8–5.2)
RBC #/AREA URNS AUTO: ABNORMAL /HPF
SODIUM SERPL-SCNC: 140 MMOL/L (ref 135–145)
SP GR UR STRIP: <=1.005 (ref 1–1.03)
SQUAMOUS #/AREA URNS AUTO: ABNORMAL /LPF
UROBILINOGEN UR STRIP-ACNC: 0.2 E.U./DL
WBC # BLD AUTO: 10.4 10E3/UL (ref 4–11)
WBC #/AREA URNS AUTO: ABNORMAL /HPF

## 2024-04-11 PROCEDURE — 250N000009 HC RX 250: Performed by: STUDENT IN AN ORGANIZED HEALTH CARE EDUCATION/TRAINING PROGRAM

## 2024-04-11 PROCEDURE — 85027 COMPLETE CBC AUTOMATED: CPT | Performed by: STUDENT IN AN ORGANIZED HEALTH CARE EDUCATION/TRAINING PROGRAM

## 2024-04-11 PROCEDURE — 83690 ASSAY OF LIPASE: CPT | Performed by: STUDENT IN AN ORGANIZED HEALTH CARE EDUCATION/TRAINING PROGRAM

## 2024-04-11 PROCEDURE — 36415 COLL VENOUS BLD VENIPUNCTURE: CPT | Performed by: STUDENT IN AN ORGANIZED HEALTH CARE EDUCATION/TRAINING PROGRAM

## 2024-04-11 PROCEDURE — 99214 OFFICE O/P EST MOD 30 MIN: CPT | Performed by: STUDENT IN AN ORGANIZED HEALTH CARE EDUCATION/TRAINING PROGRAM

## 2024-04-11 PROCEDURE — 250N000011 HC RX IP 250 OP 636: Performed by: STUDENT IN AN ORGANIZED HEALTH CARE EDUCATION/TRAINING PROGRAM

## 2024-04-11 PROCEDURE — 81001 URINALYSIS AUTO W/SCOPE: CPT | Performed by: STUDENT IN AN ORGANIZED HEALTH CARE EDUCATION/TRAINING PROGRAM

## 2024-04-11 PROCEDURE — 80053 COMPREHEN METABOLIC PANEL: CPT | Performed by: STUDENT IN AN ORGANIZED HEALTH CARE EDUCATION/TRAINING PROGRAM

## 2024-04-11 PROCEDURE — 74177 CT ABD & PELVIS W/CONTRAST: CPT

## 2024-04-11 RX ORDER — METRONIDAZOLE 500 MG/1
500 TABLET ORAL 3 TIMES DAILY
Qty: 15 TABLET | Refills: 0 | Status: SHIPPED | OUTPATIENT
Start: 2024-04-11 | End: 2024-04-16

## 2024-04-11 RX ORDER — CIPROFLOXACIN 500 MG/1
500 TABLET, FILM COATED ORAL 2 TIMES DAILY
Qty: 10 TABLET | Refills: 0 | Status: SHIPPED | OUTPATIENT
Start: 2024-04-11 | End: 2024-04-16

## 2024-04-11 RX ORDER — IOPAMIDOL 755 MG/ML
90 INJECTION, SOLUTION INTRAVASCULAR ONCE
Status: COMPLETED | OUTPATIENT
Start: 2024-04-11 | End: 2024-04-11

## 2024-04-11 RX ADMIN — SODIUM CHLORIDE 90 ML: 9 INJECTION, SOLUTION INTRAVENOUS at 12:59

## 2024-04-11 RX ADMIN — IOPAMIDOL 90 ML: 755 INJECTION, SOLUTION INTRAVENOUS at 12:58

## 2024-04-11 ASSESSMENT — PAIN SCALES - GENERAL: PAINLEVEL: MODERATE PAIN (5)

## 2024-04-11 NOTE — PROGRESS NOTES
Acute and Diagnostic Services Clinic Visit      Assessment & Plan        Diagnosis Comments   1. LLQ abdominal pain  CBC with platelets, Comprehensive metabolic panel, Lipase, UA with Microscopic reflex to Culture, CT Abdomen Pelvis w Contrast, UA Microscopic with Reflex to Culture       2. Diverticulitis of colon  ciprofloxacin (CIPRO) 500 MG tablet, metroNIDAZOLE (FLAGYL) 500 MG tablet           On exam, patient appears comfortable but does have left lower quadrant pain  Start with a baseline blood work, imaging of her abdomen she lower quadrant pain  Saturday, abdominal pain and repeat UA as I do not have records from the outside facility    2:30 PM:  CT abdomen came back consistent with mild diverticulitis  No evidence of cystitis  UA is clean and blood work is unremarkable  Given persistence and some progression of her symptoms over the last 24 to 48 hours, will opt to treat with antibiotics.  Patient has a penicillin allergy.  Will do ciprofloxacin and metronidazole  Advised patient to discontinue the Macrobid while on the ciprofloxacin as that should cover for both UTI and the diverticulitis.  Advised to continue liquid diet and soft foods.  She verbalized understanding.          Alexander Alexander is a 58 year old, presenting for the following health issues:  Abdominal Pain (Possible Diverticulitis flare up)        4/5/2022    10:27 AM   Additional Questions   Roomed by Akila SAMUEL     On Saturday, patient started to experience abdominal and suprapubic pressure.  Tried to switch up her diet.  Has been doing a liquid diet.  However, her abdominal pain has not improved.  It is now focused in the left lower quadrant.  Has a history of diverticulitis and this feels very similar.  Her last bout of diverticulitis was complicated.  She is having thin bowel movements without any blood in them.  Subjective chills but no fevers.  She did go see an outside clinic and they diagnosed her with a UTI.  She is on Macrobid  "for that.  Notes that that has relieved her suprapubic pressure but it has not done anything for her left lower quadrant pain.  She also feels like her abdominal pain is progressing and can be 10/10 at its worst.  She started to feel more swollen and bloated in her abdomen.  No vomiting but does have some nausea.  Has had an appendectomy and  in the past.      Abdominal/Flank Pain  Onset/Duration: 24  Description:   Character: Sharp, Dull ache, and Cramping  Location: right upper quadrant left lower quadrant  Radiation: None  Intensity: 10/10 At worst  Progression of Symptoms:  worsening  Accompanying Signs & Symptoms:  Fever/chills: YES- chills  Gas/Bloating: YES  Nausea: YES  Vomitting: no   Diarrhea: no, but loose stools   Constipation:no   Dysuria: no            Hematuria: no            Frequency: no            Incontinence of urine: no   History:            Last bowel movement: today 9:30 am  Trauma: no, but has Diverticulitis   Previous similar pain: YES- has Diverticulitis   Previous tests done: CT and Colonoscopy           Previous Abdominal surgery: YES-  and Appendix   Precipitating factors:   Does the pain change with:     Food: YES     Bowel Movement: YES- it gets really sharp and then dissipate     Urination: no              Other factors: YES- sitting  Therapies tried and outcome:  None    When food last eaten: 9:00 am today. Yogurt and Jello        Review of Systems  Constitutional, neuro, ENT, endocrine, pulmonary, cardiac, gastrointestinal, genitourinary, musculoskeletal, integument and psychiatric systems are negative, except as otherwise noted.      Objective    /82 (BP Location: Right arm, Patient Position: Sitting, Cuff Size: Adult Large)   Pulse 77   Temp 97.9  F (36.6  C) (Oral)   Resp 16   Ht 1.753 m (5' 9\")   Wt 110.3 kg (243 lb 2.7 oz)   SpO2 97%   BMI 35.91 kg/m    Body mass index is 35.91 kg/m .  Physical Exam   GENERAL: alert and no distress  NECK: no " adenopathy, no asymmetry, masses, or scars  RESP: lungs clear to auscultation - no rales, rhonchi or wheezes  CV: regular rate and rhythm, no murmur, click or rub, no peripheral edema  ABDOMEN: soft, (+) notable tenderness in the left lower quadrant with palpation, no hepatosplenomegaly, no masses and bowel sounds normal  MS: no gross musculoskeletal defects noted, no edema  PSYCH: mentation appears normal, affect anxious      Signed Electronically by: Bria Fournier DO    Results for orders placed or performed during the hospital encounter of 04/11/24   CT Abdomen Pelvis w Contrast     Status: None    Narrative    EXAM: CT ABDOMEN PELVIS W CONTRAST  LOCATION: [facility]  DATE: 4/11/2024    INDICATION:  LLQ abdominal pain  COMPARISON: None.  TECHNIQUE: CT scan of the abdomen and pelvis was performed following injection of IV contrast. Multiplanar reformats were obtained. Dose reduction techniques were used.  CONTRAST: 90ml Isovue 370    FINDINGS:   LOWER CHEST: Normal.    HEPATOBILIARY: Normal.    PANCREAS: Normal.    SPLEEN: Normal.    ADRENAL GLANDS: Normal.    KIDNEYS/BLADDER: Normal.    BOWEL: Very minimal changes of acute diverticulitis mid sigmoid colon (image 143). Bowel loops otherwise normal.    LYMPH NODES: Normal.    VASCULATURE: Normal.    PELVIC ORGANS: Normal.    MUSCULOSKELETAL: Normal.      Impression    IMPRESSION:   1.  Very minimal changes of acute diverticulitis mid sigmoid colon.    2.  No other findings.    3.  No abscess.   Results for orders placed or performed in visit on 04/11/24   UA with Microscopic reflex to Culture     Status: Abnormal    Specimen: Urine, Clean Catch   Result Value Ref Range    Color Urine Yellow Colorless, Straw, Light Yellow, Yellow    Appearance Urine Clear Clear    Glucose Urine Negative Negative mg/dL    Bilirubin Urine Negative Negative    Ketones Urine Negative Negative mg/dL    Specific Gravity Urine <=1.005 1.005 - 1.030    Blood  Urine Trace (A) Negative    pH Urine 6.0 5.0 - 8.0    Protein Albumin Urine Negative Negative mg/dL    Urobilinogen Urine 0.2 0.2, 1.0 E.U./dL    Nitrite Urine Negative Negative    Leukocyte Esterase Urine Negative Negative   Lipase     Status: Normal   Result Value Ref Range    Lipase 41 13 - 60 U/L   Comprehensive metabolic panel     Status: Normal   Result Value Ref Range    Sodium 140 135 - 145 mmol/L    Potassium 4.3 3.4 - 5.3 mmol/L    Chloride 107 94 - 109 mmol/L    Carbon Dioxide (CO2) 26 20 - 32 mmol/L    Anion Gap 7 3 - 14 mmol/L    Urea Nitrogen 10 7 - 30 mg/dL    Creatinine 0.70 0.52 - 1.04 mg/dL    GFR Estimate >90 >60 mL/min/1.73m2    Calcium 9.6 8.5 - 10.1 mg/dL    Glucose 96 70 - 99 mg/dL    Alkaline Phosphatase 58 40 - 150 U/L    AST 21 0 - 45 U/L    ALT 22 0 - 50 U/L    Protein Total 7.7 6.8 - 8.8 g/dL    Albumin 4.4 3.4 - 5.0 g/dL    Bilirubin Total 0.6 0.2 - 1.3 mg/dL   CBC with platelets     Status: Normal   Result Value Ref Range    WBC Count 10.4 4.0 - 11.0 10e3/uL    RBC Count 4.13 3.80 - 5.20 10e6/uL    Hemoglobin 12.0 11.7 - 15.7 g/dL    Hematocrit 36.1 35.0 - 47.0 %    MCV 87 78 - 100 fL    MCH 29.1 26.5 - 33.0 pg    MCHC 33.2 31.5 - 36.5 g/dL    RDW 14.5 10.0 - 15.0 %    Platelet Count 227 150 - 450 10e3/uL   UA Microscopic with Reflex to Culture     Status: Abnormal   Result Value Ref Range    Bacteria Urine Few (A) None Seen /HPF    RBC Urine 0-2 0-2 /HPF /HPF    WBC Urine 0-5 0-5 /HPF /HPF    Squamous Epithelials Urine Few (A) None Seen /LPF    Narrative    Urine Culture not indicated

## 2024-05-05 DIAGNOSIS — K21.00 REFLUX ESOPHAGITIS: ICD-10-CM

## 2024-05-06 RX ORDER — OMEPRAZOLE 40 MG/1
CAPSULE, DELAYED RELEASE ORAL
Qty: 90 CAPSULE | Refills: 1 | Status: SHIPPED | OUTPATIENT
Start: 2024-05-06

## 2024-05-07 ENCOUNTER — TRANSFERRED RECORDS (OUTPATIENT)
Dept: HEALTH INFORMATION MANAGEMENT | Facility: CLINIC | Age: 59
End: 2024-05-07
Payer: COMMERCIAL

## 2024-05-11 ENCOUNTER — HEALTH MAINTENANCE LETTER (OUTPATIENT)
Age: 59
End: 2024-05-11

## 2024-05-13 ENCOUNTER — OFFICE VISIT (OUTPATIENT)
Dept: PODIATRY | Facility: CLINIC | Age: 59
End: 2024-05-13
Payer: COMMERCIAL

## 2024-05-13 VITALS
HEIGHT: 69 IN | RESPIRATION RATE: 16 BRPM | DIASTOLIC BLOOD PRESSURE: 83 MMHG | WEIGHT: 243 LBS | HEART RATE: 89 BPM | SYSTOLIC BLOOD PRESSURE: 152 MMHG | BODY MASS INDEX: 35.99 KG/M2 | OXYGEN SATURATION: 98 %

## 2024-05-13 DIAGNOSIS — M72.2 PLANTAR FASCIITIS, BILATERAL: Primary | ICD-10-CM

## 2024-05-13 DIAGNOSIS — E11.9 TYPE 2 DIABETES MELLITUS WITHOUT COMPLICATION, WITHOUT LONG-TERM CURRENT USE OF INSULIN (H): ICD-10-CM

## 2024-05-13 PROCEDURE — 99213 OFFICE O/P EST LOW 20 MIN: CPT | Performed by: PODIATRIST

## 2024-05-13 ASSESSMENT — PAIN SCALES - GENERAL: PAINLEVEL: SEVERE PAIN (6)

## 2024-05-13 NOTE — PATIENT INSTRUCTIONS
[place] [facility] LOCATIONS  [facility]  [address]  [place] [address]  Phone: [phone]  Fax: [phone] [facility]  [address] [place] [address]  Home Medical Equipment, [address] Phone: [phone]  Orthotics and Prosthetics, [address]  Phone [phone] [facility]  [address]  [place] [address]  Phone: [phone]   Fax: [phone]   [facility]  [address]  [place] [address]  Phone: [phone]  Fax: [phone] [facility]   Home Medical Equipment   [address], [place] [address]  Phone :[phone]  Orthotics and Prosthetics  [address], [facility] [address]  Phone:[phone]   [facility]  [address]  [place] [address]  Phone: [phone]   Fax: [phone]   [facility]   [address]  [place] [address]  Phone: [phone]  Fax: [phone] [facility]   [address]  [address]  [place] [address]  Phone: [phone] [place]  [address] [place] [place] [address]  Phone :[phone]             WEARING YOUR CUSTOM FOOT ORTHOTICS   Most insurance plans cover one pair of orthotics per year. You must check with your   insurance plan to see what your payment responsibility will be. Please call your   insurance company by calling the number on the back of your insurance card.   Orthotic's are non-refundable and non-returnable.   Orthotics are made of various designs. Some orthotics are covered with material that extends beyond your toes. If your orthotic is of this design, you will likely need to trim the toe end to get a proper fit. The insole from your shoe can be used as a template. Simply overlay the shoe insert on top of the custom orthotic.  Align the heel end while tracing the length of the insert onto the custom orthotic. Use a large scissor to trim the toe end until you get a proper fit in the shoe.   The orthotic needs to be pushed as far back in the shoe as possible. The heel portion should not ride forward so as not to irritate your heel.   Orthotics are designed to work with socks. Excessive perspiration will shorten the life span of the orthotics. Remove the orthotic from the shoe frequently for proper drying.   The break-in period lasts for weeks. People new to orthotics will likely experience new aches and pains. The orthotic is forcing your foot into a new position. Arch, foot and leg muscle aches and fatigue are common during these weeks. Minor discomfort can be considered normal break in phenomenon. Start wearing your orthotic around your home your first day. Limited activity for one to two hours is recommended. You can increase one or two additional hours each day provided the aches and pains are subsiding. The degree of discomfort, fatigue and problems will dictate the speed of break in. You may require multiple weeks to work up to full time use.   Do not continue wearing your orthotics if they are creating problems such as blisters or sores. Do not hesitate to call the clinic to speak with a nurse regarding orthotic   break in, fit, trimming, etc. You may also need to see the doctor if the orthotics are   simply not working out. Adjustments are sometimes made to improve orthotic   function.     Orthotics will only work in certain styles and types of shoes. Orthotics rarely work in dress shoes. Slip-ons, clogs, sandals and heels are particularly troublesome. Specially designed orthotics may be necessary for these types of shoes. Your custom orthotic was designed for activities that require appropriate walking or running shoes. Lace up athletic shoes, walking shoes or work boots should work appropriately. You may need a wider or longer  shoe. Shoes with a removable  or insert work best. In general, you want to remove an insert from the shoe before placing the orthotic into the shoe. Shoes without a removable liner may not work as well.     When purchasing new shoes, bring your orthotics along to get a proper fit. Shop at stores that are familiar with orthotics.   Frequent washing of the orthotic may shorten the life span of the top cover. The top cover can be replaced but will generally last one to five years depending on use and foot perspiration.

## 2024-05-13 NOTE — PROGRESS NOTES
FOOT AND ANKLE SURGERY/PODIATRY Progress Note        ASSESSMENT:   Plantar fasciitis bilateral feet  DM 2    HPI: Catherine Farrell was seen again today complaining of moderate pain in the bottom of both heels.  The patient indicated that she has had this heel pain for several months.  She describes it as a aching type pain which is aggravated with weightbearing and ambulation.  She stated when she first gets out of bed in the mornings the pain can be quite severe.  After standing for short period of time the pain does not diminish.  She denies any trauma to her feet.  She has not had any associated redness or swelling.  The pain is easily relieved with nonweightbearing.    Past Medical History:   Diagnosis Date    Anemia     Bilateral calcaneal spurs     surgery 2015    Bursitis of hip     Chronic back pain     DDD (degenerative disc disease)     Depression     Diverticulitis     Environmental allergies     Fibromyalgia     History of transfusion 1984    With     HLD (hyperlipidemia)     Obesity     Other chronic pain     Vertigo         Past Surgical History:   Procedure Laterality Date    ENDOMETRIAL ABLATION  2010    EXCISE GANGLION FOOT Right 2022    Procedure: 4th Metatarsal Ostectomy of Right Foot;  Surgeon: Mario Ely DPM;  Location: [facility] REMOVAL HEEL SPUR, CALCANEUS Left 2015    Procedure: LEFT POSTERIOR CALCANEAL SPUR RESECTION;  Surgeon: Rob Marion DPM;  Location: [facility] OR;  Service: Podiatry    HC REMOVAL OF TONSILS,<11 Y/O      Description: Tonsillectomy;  Recorded: 10/18/2008;    REPAIR TENDON ACHILLES Left 2015    Procedure: WITH SECONDARY ACHILLES TENDON REAPIR;  Surgeon: Rob Marion DPM;  Location: [facility] OR;  Service:     REPAIR TENDON ACHILLES Right 11/3/2017    Procedure: WITH SECONDARY ACHILLES TENDON REPAIR;  Surgeon: Rob Marion DPM;  Location: [facility] OR;  Service:     [facility] APPENDECTOMY      Description:  Appendectomy;  Recorded: 10/18/2008;    [facility]  DELIVERY ONLY      Description:  Section;  Recorded: 10/18/2008;       Allergies   Allergen Reactions    Amoxicillin Hives    Penicillins Swelling and Itching     Annotation: Swelling of face/eyes, itching      Shellfish Containing Products [Shellfish-Derived Products] Anaphylaxis         Current Outpatient Medications:     albuterol (PROAIR HFA/PROVENTIL HFA/VENTOLIN HFA) 108 (90 Base) MCG/ACT inhaler, every 4 hours, Disp: , Rfl:     atorvastatin (LIPITOR) 40 MG tablet, Take 0.5 tablets by mouth daily, Disp: , Rfl:     cholecalciferol 25 MCG (1000 UT) CHEW, Vitamin D3  2 daily, Disp: , Rfl:     cholecalciferol, vitamin D3, (VITAMIN D3) 2,000 unit Tab, [CHOLECALCIFEROL, VITAMIN D3, (VITAMIN D3) 2,000 UNIT TAB] Take 2,000 Units by mouth at bedtime. , Disp: , Rfl:     HYDROcodone-acetaminophen (NORCO) 5-325 MG tablet, Take 1-2 tablets by mouth every 6 hours as needed for moderate to severe pain (Patient not taking: Reported on 2024), Disp: 20 tablet, Rfl: 0    ibuprofen (ADVIL/MOTRIN) 800 MG tablet, TAKE 1 TABLET BY MOUTH EVERY 6 HOURS AS NEEDED FOR PAIN (Patient not taking: Reported on 2024), Disp: 100 tablet, Rfl: 0    meclizine (ANTIVERT) 25 MG tablet, Take 1 tablet (25 mg) by mouth 3 times daily as needed for dizziness, Disp: 45 tablet, Rfl: 3    metFORMIN (GLUCOPHAGE XR) 500 MG 24 hr tablet, TAKE 2 TABLETS BY MOUTH TWICE DAILY FOR DIABETES, Disp: , Rfl:     omeprazole (PRILOSEC) 40 MG DR capsule, Take 1 capsule by mouth once daily, Disp: 90 capsule, Rfl: 1    polyethylene glycol (MIRALAX) 17 GM/Dose powder, Take 17 g (1 capful) by mouth daily as needed for constipation (Patient not taking: Reported on 2024), Disp: 527 g, Rfl: 1    pregabalin (LYRICA) 100 MG capsule, Take 100 mg by mouth 2 times daily 150 2x day (Patient not taking: Reported on 2024), Disp: , Rfl:     pregabalin (LYRICA) 150 MG capsule, Take 1 capsule by mouth every  12 hours, Disp: , Rfl:     Family History   Problem Relation Age of Onset    Lung Cancer Father     Alcoholism Father     Alcoholism Son     Hypertension Mother     Stomach Cancer Maternal Aunt     Breast Cancer Maternal Aunt     Breast Cancer Maternal Aunt        Social History     Socioeconomic History    Marital status:      Spouse name: Not on file    Number of children: Not on file    Years of education: Not on file    Highest education level: Not on file   Occupational History    Not on file   Tobacco Use    Smoking status: Every Day     Current packs/day: 0.50     Average packs/day: 0.5 packs/day for 43.0 years (21.5 ttl pk-yrs)     Types: Cigarettes    Smokeless tobacco: Never    Tobacco comments:     seen by TTS 3/3/2022   Substance and Sexual Activity    Alcohol use: No     Comment: Alcoholic Drinks/day: sober since 2013    Drug use: No    Sexual activity: Yes     Partners: Male   Other Topics Concern    Not on file   Social History Narrative    Not on file     Social Determinants of Health     Financial Resource Strain: Not on file   Food Insecurity: Not on file   Transportation Needs: Not on file   Physical Activity: Not on file   Stress: Not on file   Social Connections: Not on file   Interpersonal Safety: Not on file   Housing Stability: Not on file       10 point Review of Systems is negative.     There were no vitals taken for this visit.    BMI= There is no height or weight on file to calculate BMI.    OBJECTIVE:  General appearance: Patient is alert and fully cooperative with history & exam.  No sign of distress is noted during the visit.  Vascular: Dorsalis pedis and posterior tibial pulses are palpable. There is no pedal hair growth bilaterally.  CFT < 3 sec from anterior tibial surface to distal digits bilaterally. There is no appreciable edema noted.  Dermatologic: Turgor and texture are within normal limits. No coloration or temperature changes. No primary or secondary lesions  noted.  Neurologic: All epicritic and proprioceptive sensations are grossly intact bilaterally.  Musculoskeletal: All active and passive ankle, subtalar, midtarsal, and 1st MPJ range of motion are grossly intact without pain or crepitus.Manual muscle strength is within normal limits bilaterally. All dorsiflexors, plantarflexors, invertors, evertors are intact bilaterally. Tenderness present to the plantar medial aspect of both heels on palpation.  No tenderness to bilateral feet or ankles with range of motion. Calf is soft/non-tender without warmth/induration    Imaging:         No results found.         TREATMENT:  I have recommended a new pair of orthotics.  After wearing her orthotics consistently for 2 to 3 months if her pain persist I recommended the patient return to the clinic at which time I may recommend a cortisone injection in both heels.        Mario Ely; MARISSA  [facility] Foot [facility] Ankle Surgery/Podiatry

## 2024-05-13 NOTE — Clinical Note
2024         RE: Catherine Farrell  [facility] [address]        Dear Colleague,    Thank you for referring your patient, Catherine Farrell, to the [facility]. Please see a copy of my visit note below.    FOOT AND ANKLE SURGERY/PODIATRY Progress Note        ASSESSMENT:   ***    HPI: Catherine Farrell was seen again today  ***.      Past Medical History:   Diagnosis Date     Anemia      Bilateral calcaneal spurs     surgery 2015     Bursitis of hip      Chronic back pain      DDD (degenerative disc disease)      Depression      Diverticulitis      Environmental allergies      Fibromyalgia      History of transfusion 1984    With      HLD (hyperlipidemia)      Obesity      Other chronic pain      Vertigo         Past Surgical History:   Procedure Laterality Date     ENDOMETRIAL ABLATION       EXCISE GANGLION FOOT Right 2022    Procedure: 4th Metatarsal Ostectomy of Right Foot;  Surgeon: Mario Ely DPM;  Location: [facility]     HC REMOVAL HEEL SPUR, CALCANEUS Left 2015    Procedure: LEFT POSTERIOR CALCANEAL SPUR RESECTION;  Surgeon: Rob Marion DPM;  Location: [facility];  Service: Podiatry     HC REMOVAL OF TONSILS,<9 Y/O      Description: Tonsillectomy;  Recorded: 10/18/2008;     REPAIR TENDON ACHILLES Left 2015    Procedure: WITH SECONDARY ACHILLES TENDON REAPIR;  Surgeon: Rob Marion DPM;  Location: [facility];  Service:      REPAIR TENDON ACHILLES Right 11/3/2017    Procedure: WITH SECONDARY ACHILLES TENDON REPAIR;  Surgeon: Rob Marion DPM;  Location: [facility];  Service:      [facility] APPENDECTOMY      Description: Appendectomy;  Recorded: 10/18/2008;     [facility]  DELIVERY ONLY      Description:  Section;  Recorded: 10/18/2008;       Allergies   Allergen Reactions     Amoxicillin Hives     Penicillins Swelling and Itching     Annotation: Swelling of face/eyes, itching        Shellfish Containing Products [Shellfish-Derived Products] Anaphylaxis         Current Outpatient Medications:      albuterol (PROAIR HFA/PROVENTIL HFA/VENTOLIN HFA) 108 (90 Base) MCG/ACT inhaler, every 4 hours, Disp: , Rfl:      atorvastatin (LIPITOR) 40 MG tablet, Take 0.5 tablets by mouth daily, Disp: , Rfl:      cholecalciferol 25 MCG (1000 UT) CHEW, Vitamin D3  2 daily, Disp: , Rfl:      cholecalciferol, vitamin D3, (VITAMIN D3) 2,000 unit Tab, [CHOLECALCIFEROL, VITAMIN D3, (VITAMIN D3) 2,000 UNIT TAB] Take 2,000 Units by mouth at bedtime. , Disp: , Rfl:      HYDROcodone-acetaminophen (NORCO) 5-325 MG tablet, Take 1-2 tablets by mouth every 6 hours as needed for moderate to severe pain (Patient not taking: Reported on 4/11/2024), Disp: 20 tablet, Rfl: 0     ibuprofen (ADVIL/MOTRIN) 800 MG tablet, TAKE 1 TABLET BY MOUTH EVERY 6 HOURS AS NEEDED FOR PAIN (Patient not taking: Reported on 4/11/2024), Disp: 100 tablet, Rfl: 0     meclizine (ANTIVERT) 25 MG tablet, Take 1 tablet (25 mg) by mouth 3 times daily as needed for dizziness, Disp: 45 tablet, Rfl: 3     metFORMIN (GLUCOPHAGE XR) 500 MG 24 hr tablet, TAKE 2 TABLETS BY MOUTH TWICE DAILY FOR DIABETES, Disp: , Rfl:      omeprazole (PRILOSEC) 40 MG DR capsule, Take 1 capsule by mouth once daily, Disp: 90 capsule, Rfl: 1     polyethylene glycol (MIRALAX) 17 GM/Dose powder, Take 17 g (1 capful) by mouth daily as needed for constipation (Patient not taking: Reported on 4/11/2024), Disp: 527 g, Rfl: 1     pregabalin (LYRICA) 100 MG capsule, Take 100 mg by mouth 2 times daily 150 2x day (Patient not taking: Reported on 4/11/2024), Disp: , Rfl:      pregabalin (LYRICA) 150 MG capsule, Take 1 capsule by mouth every 12 hours, Disp: , Rfl:     Family History   Problem Relation Age of Onset     Lung Cancer Father      Alcoholism Father      Alcoholism Son      Hypertension Mother      Stomach Cancer Maternal Aunt      Breast Cancer Maternal Aunt      Breast Cancer Maternal  Aunt        Social History     Socioeconomic History     Marital status:      Spouse name: Not on file     Number of children: Not on file     Years of education: Not on file     Highest education level: Not on file   Occupational History     Not on file   Tobacco Use     Smoking status: Every Day     Current packs/day: 0.50     Average packs/day: 0.5 packs/day for 43.0 years (21.5 ttl pk-yrs)     Types: Cigarettes     Smokeless tobacco: Never     Tobacco comments:     seen by [facility] 3/3/2022   Substance and Sexual Activity     Alcohol use: No     Comment: Alcoholic Drinks/day: sober since 2013     Drug use: No     Sexual activity: Yes     Partners: Male   Other Topics Concern     Not on file   Social History Narrative     Not on file     Social Determinants of Health     Financial Resource Strain: Not on file   Food Insecurity: Not on file   Transportation Needs: Not on file   Physical Activity: Not on file   Stress: Not on file   Social Connections: Not on file   Interpersonal Safety: Not on file   Housing Stability: Not on file       10 point Review of Systems is negative except for ***.     There were no vitals taken for this visit.    BMI= There is no height or weight on file to calculate BMI.    OBJECTIVE:  General appearance: Patient is alert and fully cooperative with history & exam.  No sign of distress is noted during the visit.  Vascular: Dorsalis pedis and posterior tibial pulses are palpable. There is pedal hair growth ***.  CFT < 3 sec from anterior tibial surface to distal digits ***. There is no appreciable edema noted.  Dermatologic: Turgor and texture are within normal limits. No coloration or temperature changes. No primary or secondary lesions noted.  Neurologic: All epicritic and proprioceptive sensations are grossly intact ***.  Musculoskeletal: All active and passive ankle, subtalar, midtarsal, and 1st MPJ range of motion are grossly intact without pain or crepitus, with the exception of  ***. Manual muscle strength is ***. All dorsiflexors, plantarflexors, invertors, evertors are intact ***. Tenderness present to *** on palpation. Tenderness to *** with range of motion. Calf is soft/non-tender with/without warmth/induration    Imaging:     {Imaging order/result:03135}    No results found.         TREATMENT:  ***        Mario Ely; MARISSA  [facility] Foot & Ankle Surgery/Podiatry           Again, thank you for allowing me to participate in the care of your patient.        Sincerely,        Mario Ely DPM

## 2024-05-20 ENCOUNTER — OFFICE VISIT (OUTPATIENT)
Dept: FAMILY MEDICINE | Facility: CLINIC | Age: 59
End: 2024-05-20
Payer: COMMERCIAL

## 2024-05-20 VITALS
DIASTOLIC BLOOD PRESSURE: 83 MMHG | OXYGEN SATURATION: 98 % | BODY MASS INDEX: 36.29 KG/M2 | WEIGHT: 245 LBS | SYSTOLIC BLOOD PRESSURE: 137 MMHG | RESPIRATION RATE: 18 BRPM | HEIGHT: 69 IN | HEART RATE: 79 BPM | TEMPERATURE: 98 F

## 2024-05-20 DIAGNOSIS — E11.65 TYPE 2 DIABETES MELLITUS WITH HYPERGLYCEMIA, WITHOUT LONG-TERM CURRENT USE OF INSULIN (H): ICD-10-CM

## 2024-05-20 DIAGNOSIS — J44.1 ACUTE EXACERBATION OF CHRONIC OBSTRUCTIVE AIRWAYS DISEASE (H): ICD-10-CM

## 2024-05-20 DIAGNOSIS — M51.379 DEGENERATION OF LUMBAR OR LUMBOSACRAL INTERVERTEBRAL DISC: ICD-10-CM

## 2024-05-20 DIAGNOSIS — F17.210 CIGARETTE NICOTINE DEPENDENCE WITHOUT COMPLICATION: ICD-10-CM

## 2024-05-20 DIAGNOSIS — F17.200 TOBACCO USE DISORDER: ICD-10-CM

## 2024-05-20 DIAGNOSIS — K21.00 GASTROESOPHAGEAL REFLUX DISEASE WITH ESOPHAGITIS WITHOUT HEMORRHAGE: ICD-10-CM

## 2024-05-20 DIAGNOSIS — E78.2 MIXED HYPERLIPIDEMIA: ICD-10-CM

## 2024-05-20 DIAGNOSIS — Z00.00 ADULT GENERAL MEDICAL EXAM: Primary | ICD-10-CM

## 2024-05-20 LAB — HBA1C MFR BLD: 6.2 % (ref 0–5.6)

## 2024-05-20 PROCEDURE — 80053 COMPREHEN METABOLIC PANEL: CPT | Performed by: FAMILY MEDICINE

## 2024-05-20 PROCEDURE — 83036 HEMOGLOBIN GLYCOSYLATED A1C: CPT | Performed by: FAMILY MEDICINE

## 2024-05-20 PROCEDURE — 99214 OFFICE O/P EST MOD 30 MIN: CPT | Mod: 25 | Performed by: FAMILY MEDICINE

## 2024-05-20 PROCEDURE — 99396 PREV VISIT EST AGE 40-64: CPT | Performed by: FAMILY MEDICINE

## 2024-05-20 PROCEDURE — 82043 UR ALBUMIN QUANTITATIVE: CPT | Performed by: FAMILY MEDICINE

## 2024-05-20 PROCEDURE — 82570 ASSAY OF URINE CREATININE: CPT | Performed by: FAMILY MEDICINE

## 2024-05-20 PROCEDURE — 36415 COLL VENOUS BLD VENIPUNCTURE: CPT | Performed by: FAMILY MEDICINE

## 2024-05-20 PROCEDURE — 99207 PR FOOT EXAM NO CHARGE: CPT | Performed by: FAMILY MEDICINE

## 2024-05-20 PROCEDURE — 80061 LIPID PANEL: CPT | Performed by: FAMILY MEDICINE

## 2024-05-20 SDOH — HEALTH STABILITY: PHYSICAL HEALTH: ON AVERAGE, HOW MANY MINUTES DO YOU ENGAGE IN EXERCISE AT THIS LEVEL?: 30 MIN

## 2024-05-20 SDOH — HEALTH STABILITY: PHYSICAL HEALTH: ON AVERAGE, HOW MANY DAYS PER WEEK DO YOU ENGAGE IN MODERATE TO STRENUOUS EXERCISE (LIKE A BRISK WALK)?: 2 DAYS

## 2024-05-20 ASSESSMENT — SOCIAL DETERMINANTS OF HEALTH (SDOH): HOW OFTEN DO YOU GET TOGETHER WITH FRIENDS OR RELATIVES?: TWICE A WEEK

## 2024-05-20 NOTE — PROGRESS NOTES
Preventive Care Visit  [facility]  ZIGGY CARABALLO MD, Family Medicine  May 20, 2024      1. Adult general medical exam  Thi sis a 57 yo female here for physical exam     2. Type 2 diabetes mellitus with hyperglycemia, without long-term current use of insulin (H)  Type II DM - will need to follow labs - foot exam may represent neuropathy associated with chronic back/radicular symptoms   - FOOT EXAM  - Albumin Random Urine Quantitative with Creat Ratio; Future  - Lipid panel reflex to direct LDL Non-fasting; Future  - HEMOGLOBIN A1C; Future  - Comprehensive metabolic panel (BMP + Alb, Alk Phos, ALT, AST, Total. Bili, TP); Future  - Lipid panel reflex to direct LDL Non-fasting  - HEMOGLOBIN A1C  - Comprehensive metabolic panel (BMP + Alb, Alk Phos, ALT, AST, Total. Bili, TP)  - Albumin Random Urine Quantitative with Creat Ratio    3. Acute exacerbation of chronic obstructive airways disease (H)  Patient has known COPD - generally stable    4. Nicotine Dependence  Patient has ongoing nicotine dependence - discussed lung cancer screening - ordered   - CT Chest Lung Cancer Screen Low Dose Without; Future    5. Lumbar Disc Degeneration  Lumbar disc disease - longstanding with intermittent worsening     6. Mixed hyperlipidemia  Elevated lipids - on Atorvastatin therapy - check labs   - Lipid panel reflex to direct LDL Non-fasting; Future  - Comprehensive metabolic panel (BMP + Alb, Alk Phos, ALT, AST, Total. Bili, TP); Future  - Lipid panel reflex to direct LDL Non-fasting  - Comprehensive metabolic panel (BMP + Alb, Alk Phos, ALT, AST, Total. Bili, TP)    7. Gastroesophageal reflux disease with esophagitis without hemorrhage  GERD - currently stable     8. Cigarette nicotine dependence without complication  Discussed lung cancer screening - she agrees:      Lung Cancer Screening Shared Decision Making Visit     Catherine NISA Farrell is eligible for lung cancer screening on the basis  of:   has not experienced symptoms suggestive of lung cancer.   born on 1965, 58 year old years old.   smoked 1/2 packs of cigarettes for 45 years for a total of 25 pack-years   has not quit smoking is currently smoking.      I have discussed with patient the risks and benefits of screening for lung cancer with low-dose CT.     The risks include:   radiation exposure    false positives     over-diagnosis    The benefit of early detection of lung cancer is contingent upon adherence to annual screening or more frequent follow up if indicated.     Furthermore, reaping the benefits of screening requires Catherine Farrell to be willing and able to undergo diagnostic procedures, if indicated.     We did discuss that the only way to prevent lung cancer is to not smoke. Smoking cessation assistance was offered.      Subjective   Catherine is a 58 year old, presenting for the following:  Physical (Spot on right side of face by her eye, wants looked at. )        5/20/2024     9:19 AM   Additional Questions   Roomed by She         5/20/2024     9:19 AM   Patient Reported Additional Medications   Patient reports taking the following new medications Allergy OTC, Delta 9 THC Gummy        Via the Health Maintenance questionnaire, the patient has reported the following services have been completed , this information has been sent to the abstraction team.  Health Care Directive  Patient does not have a Health Care Directive or Living Will: no written documents     Still working   Helped niece through breast cancer treatment  Helps with grandkids -     Takes delta 9 THC -   Burned the nerve in spine - in February - helped the pain   Feet have been bothering her - seeing Dr. Ely - getting orthotics    Still doing pow wows    Started smoking at age 12 -   At least 1/2 ppd   45 years     Eats chicken, turkey, occ pork   Doesn't tolerate beef any more  More balanced diet     Still working at [employer] -           5/20/2024   General  Health   How would you rate your overall physical health? Good   Feel stress (tense, anxious, or unable to sleep) To some extent   (!) STRESS CONCERN      5/20/2024   Nutrition   Three or more servings of calcium each day? Yes   Diet: Diabetic   How many servings of fruit and vegetables per day? (!) 2-3   How many sweetened beverages each day? 0-1         5/20/2024   Exercise   Days per week of moderate/strenous exercise 2 days   Average minutes spent exercising at this level 30 min   (!) EXERCISE CONCERN      5/20/2024   Social Factors   Frequency of gathering with friends or relatives Twice a week   Worry food won't last until get money to buy more No   Food not last or not have enough money for food? No   Do you have housing?  Yes   Are you worried about losing your housing? No   Lack of transportation? No   Unable to get utilities (heat,electricity)? No         5/20/2024   Fall Risk   Fallen 2 or more times in the past year? No   Trouble with walking or balance? Yes          5/20/2024   Dental   Dentist two times every year? Yes            Today's PHQ-2 Score:       5/20/2024     9:05 AM   PHQ-2 ( 1999 [employer])   Q1: Little interest or pleasure in doing things 0   Q2: Feeling down, depressed or hopeless 0   PHQ-2 Score 0   Q1: Little interest or pleasure in doing things Not at all   Q2: Feeling down, depressed or hopeless Not at all   PHQ-2 Score 0           5/20/2024   Substance Use   If I could quit smoking, I would Neutral   I want to quit somking, worry about health affects Somewhat disagree   Willing to make a plan to quit smoking Somewhat disagree   Willing to cut down before quitting Somewhat disagree   Alcohol more than 3/day or more than 7/wk Not Applicable   Do you use any other substances recreationally? (!) CANNABIS PRODUCTS     Social History     Tobacco Use    Smoking status: Every Day     Current packs/day: 0.50     Average packs/day: 0.5 packs/day for 43.0 years (21.5 ttl pk-yrs)     Types:  Cigarettes    Smokeless tobacco: Never    Tobacco comments:     seen by TTS 3/3/2022   Substance Use Topics    Alcohol use: No     Comment: Alcoholic Drinks/day: sober since 2013    Drug use: No           3/19/2024   LAST FHS-7 RESULTS   1st degree relative breast or ovarian cancer Yes   Any relative bilateral breast cancer No   Any male have breast cancer No   Any ONE woman have BOTH breast AND ovarian cancer No   Any woman with breast cancer before 50yrs No   2 or more relatives with breast AND/OR ovarian cancer No   2 or more relatives with breast AND/OR bowel cancer No        Mammogram Screening - Mammogram every 1-2 years updated in Health Maintenance based on mutual decision making        5/20/2024   STI Screening   New sexual partner(s) since last STI/HIV test? No     History of abnormal Pap smear: YES - reflected in Problem List and Health Maintenance accordingly        Latest Ref Rng & Units 2/24/2021     9:38 AM 7/31/2015     8:45 AM   PAP / HPV   PAP Negative for squamous intraepithelial lesion or malignancy. Negative for squamous intraepithelial lesion or malignancy  Electronically signed by Imelda Loyola CT (ASCP) on 3/2/2021 at  2:27 PM    Negative for squamous intraepithelial lesion or malignancy  Electronically signed by Imelda Loyola CT (ASCP) on 8/10/2015 at  2:41 PM      HPV 16 DNA NEG Negative     HPV 18 DNA NEG Negative     Other HR HPV NEG Negative       ASCVD Risk   The 10-year ASCVD risk score (Jenny CROCKER, et al., 2019) is: 16.5%    Values used to calculate the score:      Age: 58 years      Sex: Female      Is Non- : No      Diabetic: Yes      Tobacco smoker: Yes      Systolic Blood Pressure: 137 mmHg      Is BP treated: No      HDL Cholesterol: 35 mg/dL      Total Cholesterol: 160 mg/dL           Reviewed and updated as needed this visit by Provider   Tobacco  Allergies  Meds  Problems  Med Hx  Surg Hx  Fam Hx            Past Medical History:    Diagnosis Date    Anemia     Bilateral calcaneal spurs     surgery 2015    Bursitis of hip     Chronic back pain     DDD (degenerative disc disease)     Depression     Diverticulitis     Environmental allergies     Fibromyalgia     History of transfusion 1984    With     HLD (hyperlipidemia)     Obesity     Other chronic pain     Vertigo      Past Surgical History:   Procedure Laterality Date    ENDOMETRIAL ABLATION  2010    EXCISE GANGLION FOOT Right 2022    Procedure: 4th Metatarsal Ostectomy of Right Foot;  Surgeon: Mario Ely DPM;  [place] [facility] [place]    HC REMOVAL HEEL SPUR, CALCANEUS Left 2015    Procedure: LEFT POSTERIOR CALCANEAL SPUR RESECTION;  Surgeon: Rob Marion DPM;  Location: [place] [facility];  Service: Podiatry    HC REMOVAL OF TONSILS,<13 Y/O      Description: Tonsillectomy;  Recorded: 10/18/2008;    REPAIR TENDON ACHILLES Left 2015    Procedure: WITH SECONDARY ACHILLES TENDON REAPIR;  Surgeon: Rob Marion DPM;  Location: [place] [facility];  Service:     REPAIR TENDON ACHILLES Right 11/3/2017    Procedure: WITH SECONDARY ACHILLES TENDON REPAIR;  Surgeon: Rob Marion DPM;  Location: [facility] Main OR;  Service:     [facility] APPENDECTOMY      Description: Appendectomy;  Recorded: 10/18/2008;    [facility]  DELIVERY ONLY      Description:  Section;  Recorded: 10/18/2008;     OB History    Para Term  AB Living   3 2 2 0 1 0   SAB IAB Ectopic Multiple Live Births   1 0 0 0 0      # Outcome Date GA Lbr Davidson/2nd Weight Sex Type Anes PTL Lv   3 SAB            2 Term            1 Term              BP Readings from Last 3 Encounters:   24 137/83   24 (!) 152/83   24 131/48    Wt Readings from Last 3 Encounters:   24 111.1 kg (245 lb)   24 110.2 kg (243 lb)   24 110.3 kg (243 lb 4 oz)                  Patient Active Problem List   Diagnosis    Cough    Nicotine Dependence    Chronic Reflux Esophagitis     Fibromyalgia    Strain Of The Gastrocnemius Muscle Of The Right Leg    Adjustment Disorder With Depressed Mood    Joint Pain, Localized In The Knee    Pain During Urination (Dysuria)    Abdominal Pain    Diverticulitis of colon    Trochanteric Bursitis    Sinusitis    Midback Pain    Mixed hyperlipidemia    Abnormal Weight Loss    Abnormal Pap smear of cervix    Adverse Food Reaction (Not Anaphylactic)    Impingement Of The Right Shoulder    Hypertension    Head Injury    Oral Thrush    Lumbar Disc Degeneration    Lower Back Pain Chronic    Carpal Tunnel Syndrome    Simple (Specific) Phobia    Dermatitis    Patellofemoral Syndrome Of The Right Knee     Tension-type headache    Obsessive compulsive disorder    Costochondritis    Fatigue    Chronic pain    Vitamin D deficiency    Calcaneal spur, right    Numbness of right lower extremity    Tarsal tunnel syndrome of right side    Compression neuropathy of right lower extremity    Mononeuritis lower limb, right    Right foot pain    Compression neuropathy    Obesity (BMI 35.0-39.9) with comorbidity (H)    Right shoulder injury, sequela    Nicotine dependence    Diverticulitis of gastrointestinal tract    Gastroesophageal reflux disease    Ileitis    Ganglion cyst of right foot    Benign neoplasm of transverse colon    Acute exacerbation of chronic obstructive airways disease (H)    Dyslipidemia    History of diverticulitis    Type 2 diabetes mellitus (H)     Past Surgical History:   Procedure Laterality Date    ENDOMETRIAL ABLATION  2010    EXCISE GANGLION FOOT Right 7/11/2022    Procedure: 4th Metatarsal Ostectomy of Right Foot;  Surgeon: Mario Ely DPM;  [facility]    HC REMOVAL HEEL SPUR, CALCANEUS Left 11/6/2015    Procedure: LEFT POSTERIOR CALCANEAL SPUR RESECTION;  Surgeon: Rob Marion DPM;  Location: [facility];  Service: Podiatry    HC REMOVAL OF TONSILS,<13 Y/O      Description: Tonsillectomy;  Recorded: 10/18/2008;    REPAIR  TENDON ACHILLES Left 2015    Procedure: WITH SECONDARY ACHILLES TENDON REAPIR;  Surgeon: Rob Marion DPM;  Location: [place] [facility];  Service:     REPAIR TENDON ACHILLES Right 11/3/2017    Procedure: WITH SECONDARY ACHILLES TENDON REPAIR;  Surgeon: Rob Marion DPM;  Location: [facility] OR;  Service:     [facility] APPENDECTOMY      Description: Appendectomy;  Recorded: 10/18/2008;    [facility]  DELIVERY ONLY      Description:  Section;  Recorded: 10/18/2008;       Social History     Tobacco Use    Smoking status: Every Day     Current packs/day: 0.50     Average packs/day: 0.5 packs/day for 43.0 years (21.5 ttl pk-yrs)     Types: Cigarettes    Smokeless tobacco: Never    Tobacco comments:     seen by TTS 3/3/2022   Substance Use Topics    Alcohol use: No     Comment: Alcoholic Drinks/day: sober since      Family History   Problem Relation Age of Onset    Lung Cancer Father     Alcoholism Father     Alcoholism Son     Hypertension Mother     Stomach Cancer Maternal Aunt     Breast Cancer Maternal Aunt     Breast Cancer Maternal Aunt          Current Outpatient Medications   Medication Sig Dispense Refill    atorvastatin (LIPITOR) 40 MG tablet Take 0.5 tablets by mouth daily      cholecalciferol, vitamin D3, (VITAMIN D3) 2,000 unit Tab [CHOLECALCIFEROL, VITAMIN D3, (VITAMIN D3) 2,000 UNIT TAB] Take 2,000 Units by mouth at bedtime.       metFORMIN (GLUCOPHAGE XR) 500 MG 24 hr tablet TAKE 2 TABLETS BY MOUTH TWICE DAILY FOR DIABETES      omeprazole (PRILOSEC) 40 MG DR capsule Take 1 capsule by mouth once daily 90 capsule 1    pregabalin (LYRICA) 150 MG capsule Take 150 mg by mouth 2 times daily 150 2x day       Allergies   Allergen Reactions    Amoxicillin Hives    Penicillins Swelling and Itching     Annotation: Swelling of face/eyes, itching      Shellfish Containing Products [Shellfish-Derived Products] Anaphylaxis     Review of Systems   Constitutional:  Positive for fatigue. Negative for  "chills and fever.   HENT:  Negative for ear pain and sore throat.    Eyes:  Negative for pain and visual disturbance.   Respiratory:  Negative for cough and shortness of breath.    Cardiovascular:  Negative for chest pain and palpitations.   Gastrointestinal:  Negative for abdominal pain and vomiting.   Endocrine: Negative for polyuria.   Genitourinary:  Negative for dysuria and hematuria.   Musculoskeletal:  Positive for back pain (chronic back pain). Negative for arthralgias.   Skin:  Negative for color change and rash.        Dark spot on face - consistent with seborrheic keratosis   Allergic/Immunologic: Negative.    Neurological:  Negative for seizures and syncope.   Hematological:  Does not bruise/bleed easily.   Psychiatric/Behavioral:  Positive for dysphoric mood (feels stressed by family challenges).    All other systems reviewed and are negative.           Objective   Exam  /83 (BP Location: Right arm, Patient Position: Sitting, Cuff Size: Adult Large)   Pulse 79   Temp 98  F (36.7  C) (Temporal)   Resp 18   Ht 1.765 m (5' 9.49\")   Wt 111.1 kg (245 lb)   LMP  (LMP Unknown)   SpO2 98%   BMI 35.67 kg/m     Estimated body mass index is 35.67 kg/m  as calculated from the following:    Height as of this encounter: 1.765 m (5' 9.49\").    Weight as of this encounter: 111.1 kg (245 lb).    Physical Exam  Vitals reviewed.   Constitutional:       General: She is not in acute distress.     Appearance: Normal appearance.   HENT:      Head: Normocephalic.      Right Ear: Tympanic membrane, ear canal and external ear normal.      Left Ear: Tympanic membrane, ear canal and external ear normal.      Nose: Nose normal.      Mouth/Throat:      Mouth: Mucous membranes are moist.      Pharynx: No posterior oropharyngeal erythema.   Eyes:      Extraocular Movements: Extraocular movements intact.      Conjunctiva/sclera: Conjunctivae normal.      Pupils: Pupils are equal, round, and reactive to light. "   Cardiovascular:      Rate and Rhythm: Normal rate and regular rhythm.      Pulses: Normal pulses.      Heart sounds: Normal heart sounds. No murmur heard.  Pulmonary:      Effort: Pulmonary effort is normal.      Breath sounds: Normal breath sounds.   Abdominal:      Palpations: Abdomen is soft. There is no mass.      Tenderness: There is no abdominal tenderness. There is no guarding or rebound.   Musculoskeletal:         General: No deformity.      Cervical back: Normal range of motion and neck supple.      Comments: Decreased ROM lower back/hips     Lymphadenopathy:      Cervical: No cervical adenopathy.   Skin:     General: Skin is warm and dry.   Neurological:      General: No focal deficit present.      Mental Status: She is alert.   Psychiatric:         Mood and Affect: Mood normal.         Behavior: Behavior normal.       Results for orders placed or performed in visit on 05/20/24   Lipid panel reflex to direct LDL Non-fasting     Status: Abnormal   Result Value Ref Range    Cholesterol 160 <200 mg/dL    Triglycerides 194 (H) <150 mg/dL    Direct Measure HDL 35 (L) >=50 mg/dL    LDL Cholesterol Calculated 86 <=100 mg/dL    Non HDL Cholesterol 125 <130 mg/dL    Patient Fasting > 8hrs? No     Narrative    Cholesterol  Desirable:  <200 mg/dL    Triglycerides  Normal:  Less than 150 mg/dL  Borderline High:  150-199 mg/dL  High:  200-499 mg/dL  Very High:  Greater than or equal to 500 mg/dL    Direct Measure HDL  Female:  Greater than or equal to 50 mg/dL   Male:  Greater than or equal to 40 mg/dL    LDL Cholesterol  Desirable:  <100mg/dL  Above Desirable:  100-129 mg/dL   Borderline High:  130-159 mg/dL   High:  160-189 mg/dL   Very High:  >= 190 mg/dL    Non HDL Cholesterol  Desirable:  130 mg/dL  Above Desirable:  130-159 mg/dL  Borderline High:  160-189 mg/dL  High:  190-219 mg/dL  Very High:  Greater than or equal to 220 mg/dL   HEMOGLOBIN A1C     Status: Abnormal   Result Value Ref Range    Hemoglobin A1C  6.2 (H) 0.0 - 5.6 %   Comprehensive metabolic panel (BMP + Alb, Alk Phos, ALT, AST, Total. Bili, TP)     Status: Abnormal   Result Value Ref Range    Sodium 140 135 - 145 mmol/L    Potassium 4.0 3.4 - 5.3 mmol/L    Carbon Dioxide (CO2) 24 22 - 29 mmol/L    Anion Gap 13 7 - 15 mmol/L    Urea Nitrogen 7.3 (L) 8.0 - 23.0 mg/dL    Creatinine 0.73 0.51 - 0.95 mg/dL    GFR Estimate >90 >60 mL/min/1.73m2    Calcium 10.0 8.8 - 10.2 mg/dL    Chloride 103 98 - 107 mmol/L    Glucose 99 70 - 99 mg/dL    Alkaline Phosphatase 58 40 - 150 U/L    AST 23 0 - 45 U/L    ALT 23 0 - 50 U/L    Protein Total 7.7 6.4 - 8.3 g/dL    Albumin 4.6 3.5 - 5.2 g/dL    Bilirubin Total 0.2 <=1.2 mg/dL    Patient Fasting > 8hrs? No    Albumin Random Urine Quantitative with Creat Ratio     Status: None   Result Value Ref Range    Creatinine Urine mg/dL 16.4 mg/dL    Albumin Urine mg/L <12.0 mg/L    Albumin Urine mg/g Cr               Signed Electronically by: ZIGGY CARABALLO MD      Prior to immunization administration, verified patients identity using patient s name and date of birth. Please see Immunization Activity for additional information.     Screening Questionnaire for Adult Immunization    Are you sick today?   No   Do you have allergies to medications, food, a vaccine component or latex?   Yes   Have you ever had a serious reaction after receiving a vaccination?   No   Do you have a long-term health problem with heart, lung, kidney, or metabolic disease (e.g., diabetes), asthma, a blood disorder, no spleen, complement component deficiency, a cochlear implant, or a spinal fluid leak?  Are you on long-term aspirin therapy?   Yes   Do you have cancer, leukemia, HIV/AIDS, or any other immune system problem?   No   Do you have a parent, brother, or sister with an immune system problem?   No   In the past 3 months, have you taken medications that affect  your immune system, such as prednisone, other steroids, or anticancer drugs;  drugs for the treatment of rheumatoid arthritis, Crohn s disease, or psoriasis; or have you had radiation treatments?   No   Have you had a seizure, or a brain or other nervous system problem?   No   During the past year, have you received a transfusion of blood or blood    products, or been given immune (gamma) globulin or antiviral drug?   No   For women: Are you pregnant or is there a chance you could become       pregnant during the next month?   No   Have you received any vaccinations in the past 4 weeks?   No     Immunization questionnaire was positive for at least one answer.  Notified Dr. Medellin.      Patient instructed to remain in clinic for 15 minutes afterwards, and to report any adverse reactions.     Screening performed by She Joel CMA on 5/20/2024 at 9:21 AM.

## 2024-05-21 LAB
ALBUMIN SERPL BCG-MCNC: 4.6 G/DL (ref 3.5–5.2)
ALP SERPL-CCNC: 58 U/L (ref 40–150)
ALT SERPL W P-5'-P-CCNC: 23 U/L (ref 0–50)
ANION GAP SERPL CALCULATED.3IONS-SCNC: 13 MMOL/L (ref 7–15)
AST SERPL W P-5'-P-CCNC: 23 U/L (ref 0–45)
BILIRUB SERPL-MCNC: 0.2 MG/DL
BUN SERPL-MCNC: 7.3 MG/DL (ref 8–23)
CALCIUM SERPL-MCNC: 10 MG/DL (ref 8.8–10.2)
CHLORIDE SERPL-SCNC: 103 MMOL/L (ref 98–107)
CHOLEST SERPL-MCNC: 160 MG/DL
CREAT SERPL-MCNC: 0.73 MG/DL (ref 0.51–0.95)
CREAT UR-MCNC: 16.4 MG/DL
DEPRECATED HCO3 PLAS-SCNC: 24 MMOL/L (ref 22–29)
EGFRCR SERPLBLD CKD-EPI 2021: >90 ML/MIN/1.73M2
FASTING STATUS PATIENT QL REPORTED: NO
FASTING STATUS PATIENT QL REPORTED: NO
GLUCOSE SERPL-MCNC: 99 MG/DL (ref 70–99)
HDLC SERPL-MCNC: 35 MG/DL
LDLC SERPL CALC-MCNC: 86 MG/DL
MICROALBUMIN UR-MCNC: <12 MG/L
MICROALBUMIN/CREAT UR: NORMAL MG/G{CREAT}
NONHDLC SERPL-MCNC: 125 MG/DL
POTASSIUM SERPL-SCNC: 4 MMOL/L (ref 3.4–5.3)
PROT SERPL-MCNC: 7.7 G/DL (ref 6.4–8.3)
SODIUM SERPL-SCNC: 140 MMOL/L (ref 135–145)
TRIGL SERPL-MCNC: 194 MG/DL

## 2024-05-28 ENCOUNTER — HOSPITAL ENCOUNTER (OUTPATIENT)
Dept: CT IMAGING | Facility: HOSPITAL | Age: 59
Discharge: HOME | End: 2024-05-28
Attending: FAMILY MEDICINE | Admitting: FAMILY MEDICINE
Payer: COMMERCIAL

## 2024-05-28 DIAGNOSIS — F17.200 TOBACCO USE DISORDER: ICD-10-CM

## 2024-05-28 PROCEDURE — 71271 CT THORAX LUNG CANCER SCR C-: CPT

## 2024-06-02 ASSESSMENT — ENCOUNTER SYMPTOMS
COLOR CHANGE: 0
ARTHRALGIAS: 0
SEIZURES: 0
ABDOMINAL PAIN: 0
VOMITING: 0
SHORTNESS OF BREATH: 0
HEMATURIA: 0
EYE PAIN: 0
COUGH: 0
CHILLS: 0
SORE THROAT: 0
DYSPHORIC MOOD: 1
BACK PAIN: 1
DYSURIA: 0
BRUISES/BLEEDS EASILY: 0
FEVER: 0
PALPITATIONS: 0
ALLERGIC/IMMUNOLOGIC NEGATIVE: 1
FATIGUE: 1

## 2024-08-12 ENCOUNTER — TRANSFERRED RECORDS (OUTPATIENT)
Dept: HEALTH INFORMATION MANAGEMENT | Facility: CLINIC | Age: 59
End: 2024-08-12
Payer: COMMERCIAL

## 2024-09-15 ENCOUNTER — TRANSFERRED RECORDS (OUTPATIENT)
Dept: MULTI SPECIALTY CLINIC | Facility: CLINIC | Age: 59
End: 2024-09-15

## 2024-09-15 LAB — RETINOPATHY: NORMAL

## 2024-09-17 ENCOUNTER — TRANSFERRED RECORDS (OUTPATIENT)
Dept: HEALTH INFORMATION MANAGEMENT | Facility: CLINIC | Age: 59
End: 2024-09-17
Payer: COMMERCIAL

## 2024-09-28 ENCOUNTER — HEALTH MAINTENANCE LETTER (OUTPATIENT)
Age: 59
End: 2024-09-28

## 2024-10-13 ENCOUNTER — HOSPITAL ENCOUNTER (OUTPATIENT)
Dept: GENERAL RADIOLOGY | Facility: HOSPITAL | Age: 59
Discharge: HOME | End: 2024-10-13
Attending: PHYSICIAN ASSISTANT | Admitting: PHYSICIAN ASSISTANT
Payer: COMMERCIAL

## 2024-10-13 ENCOUNTER — OFFICE VISIT (OUTPATIENT)
Dept: FAMILY MEDICINE | Facility: CLINIC | Age: 59
End: 2024-10-13
Payer: COMMERCIAL

## 2024-10-13 VITALS
TEMPERATURE: 98.3 F | HEART RATE: 86 BPM | SYSTOLIC BLOOD PRESSURE: 142 MMHG | RESPIRATION RATE: 18 BRPM | OXYGEN SATURATION: 96 % | WEIGHT: 242.2 LBS | DIASTOLIC BLOOD PRESSURE: 89 MMHG | BODY MASS INDEX: 35.27 KG/M2

## 2024-10-13 DIAGNOSIS — R10.9 RIGHT SIDED ABDOMINAL PAIN: ICD-10-CM

## 2024-10-13 DIAGNOSIS — R10.9 RIGHT SIDED ABDOMINAL PAIN: Primary | ICD-10-CM

## 2024-10-13 LAB
ALBUMIN SERPL BCG-MCNC: 4.7 G/DL (ref 3.5–5.2)
ALBUMIN UR-MCNC: NEGATIVE MG/DL
ALP SERPL-CCNC: 64 U/L (ref 40–150)
ALT SERPL W P-5'-P-CCNC: 27 U/L (ref 0–50)
AMYLASE SERPL-CCNC: 76 U/L (ref 28–100)
ANION GAP SERPL CALCULATED.3IONS-SCNC: 10 MMOL/L (ref 7–15)
APPEARANCE UR: CLEAR
AST SERPL W P-5'-P-CCNC: 23 U/L (ref 0–45)
BACTERIA #/AREA URNS HPF: ABNORMAL /HPF
BILIRUB SERPL-MCNC: 0.3 MG/DL
BILIRUB UR QL STRIP: NEGATIVE
BUN SERPL-MCNC: 8.6 MG/DL (ref 8–23)
CALCIUM SERPL-MCNC: 9.5 MG/DL (ref 8.8–10.4)
CHLORIDE SERPL-SCNC: 103 MMOL/L (ref 98–107)
COLOR UR AUTO: YELLOW
CREAT SERPL-MCNC: 0.8 MG/DL (ref 0.51–0.95)
EGFRCR SERPLBLD CKD-EPI 2021: 83 ML/MIN/1.73M2
ERYTHROCYTE [DISTWIDTH] IN BLOOD BY AUTOMATED COUNT: 15.8 % (ref 10–15)
GLUCOSE SERPL-MCNC: 105 MG/DL (ref 70–99)
GLUCOSE UR STRIP-MCNC: NEGATIVE MG/DL
HCO3 SERPL-SCNC: 26 MMOL/L (ref 22–29)
HCT VFR BLD AUTO: 40.5 % (ref 35–47)
HGB BLD-MCNC: 13.1 G/DL (ref 11.7–15.7)
HGB UR QL STRIP: ABNORMAL
KETONES UR STRIP-MCNC: NEGATIVE MG/DL
LEUKOCYTE ESTERASE UR QL STRIP: NEGATIVE
LIPASE SERPL-CCNC: 53 U/L (ref 13–60)
MCH RBC QN AUTO: 28.6 PG (ref 26.5–33)
MCHC RBC AUTO-ENTMCNC: 32.3 G/DL (ref 31.5–36.5)
MCV RBC AUTO: 88 FL (ref 78–100)
NITRATE UR QL: NEGATIVE
PH UR STRIP: 5.5 [PH] (ref 5–8)
PLATELET # BLD AUTO: 226 10E3/UL (ref 150–450)
POTASSIUM SERPL-SCNC: 4.2 MMOL/L (ref 3.4–5.3)
PROT SERPL-MCNC: 7.9 G/DL (ref 6.4–8.3)
RBC # BLD AUTO: 4.58 10E6/UL (ref 3.8–5.2)
RBC #/AREA URNS AUTO: ABNORMAL /HPF
SODIUM SERPL-SCNC: 139 MMOL/L (ref 135–145)
SP GR UR STRIP: <=1.005 (ref 1–1.03)
SQUAMOUS #/AREA URNS AUTO: ABNORMAL /LPF
UROBILINOGEN UR STRIP-ACNC: 0.2 E.U./DL
WBC # BLD AUTO: 10.6 10E3/UL (ref 4–11)
WBC #/AREA URNS AUTO: ABNORMAL /HPF

## 2024-10-13 PROCEDURE — 85027 COMPLETE CBC AUTOMATED: CPT | Performed by: PHYSICIAN ASSISTANT

## 2024-10-13 PROCEDURE — 36415 COLL VENOUS BLD VENIPUNCTURE: CPT | Performed by: PHYSICIAN ASSISTANT

## 2024-10-13 PROCEDURE — 83690 ASSAY OF LIPASE: CPT | Performed by: PHYSICIAN ASSISTANT

## 2024-10-13 PROCEDURE — 99214 OFFICE O/P EST MOD 30 MIN: CPT | Performed by: PHYSICIAN ASSISTANT

## 2024-10-13 PROCEDURE — 71101 X-RAY EXAM UNILAT RIBS/CHEST: CPT | Mod: RT

## 2024-10-13 PROCEDURE — 81001 URINALYSIS AUTO W/SCOPE: CPT | Performed by: PHYSICIAN ASSISTANT

## 2024-10-13 PROCEDURE — 82150 ASSAY OF AMYLASE: CPT | Performed by: PHYSICIAN ASSISTANT

## 2024-10-13 PROCEDURE — 80053 COMPREHEN METABOLIC PANEL: CPT | Performed by: PHYSICIAN ASSISTANT

## 2024-10-13 RX ORDER — PREDNISONE 10 MG/1
TABLET ORAL
COMMUNITY
Start: 2024-08-12

## 2024-10-13 RX ORDER — METHOCARBAMOL 500 MG/1
500 TABLET, FILM COATED ORAL 3 TIMES DAILY PRN
COMMUNITY
Start: 2024-08-27

## 2024-10-13 NOTE — PATIENT INSTRUCTIONS
All of your labs are looking good so far  Your XR is negative.  I have put an order in for you to be seen tomorrow. You will hear from the clinic, if you don't get a call by 10A, call [facility]:  [phone]      Use ice on the area  Take Tylenol or diclofenac for pain    If you are having fever, chills, severe pain, vomiting etc please go to the ER

## 2024-10-13 NOTE — PROGRESS NOTES
Assessment & Plan     1. Right sided abdominal pain  No laboratory evidence, anemia, electrolyte abnormality or renal or hepatic dysfunction.  No signs of peritonitis at this time.  Given the length of symptoms and that they have been worsening, I did put in a referral for ADS for tomorrow for further evaluation.   Discussed if having worsening abdominal pain, fever, chills, vomiting etc.  To follow-up in the emergency department.  - CBC with platelets; Future  - Comprehensive metabolic panel; Future  - UA Macroscopic with reflex to Microscopic and Culture - Clinic Collect  - XR Ribs & Chest Right G/E 3 Views; Future  - Amylase; Future  - Lipase; Future  - CBC with platelets  - Comprehensive metabolic panel  - Amylase  - Lipase  - UA Microscopic with Reflex to Culture  - Referral to Acute and Diagnostic Services (Day of diagnostic / First order acute); Future      Diagnosis and treatment plan was reviewed with patient and/or family.   We went over any labs or imaging. Discussed worsening symptoms to go to ER  Patient verbalizes understanding. All questions were addressed and answered.     Lilian Granda PA-C  [facility]    CHIEF COMPLAINT:   Chief Complaint   Patient presents with    Flank Pain     R side x3wks, pain and swelling from under ribs to pelvic bone      Subjective     Catherine is a 59 year old female who presents to clinic today for evaluation of right sided abdominal pain.  Symptoms initially started 3 weeks ago, and she thought this was a pulled muscle.  As she had been stretching prior to the pain.  She did not have an injury or incident where she felt pain though.  In the past 5 days, pain has increased, she feels like the area is swollen.  She has not noticed any bruising.  Pain is made worse with movement.  She has not had any chest pain or shortness of breath.  No nausea or vomiting.  No dysuria or hematuria.  No history of kidney stones.  Her appetite has been  okay.      She had steroid injections in cervical spine.     Her appendix is not present.       Past Medical History:   Diagnosis Date    Anemia     Bilateral calcaneal spurs     surgery 2015    Bursitis of hip     Chronic back pain     DDD (degenerative disc disease)     Depression     Diverticulitis     Environmental allergies     Fibromyalgia     History of transfusion 1984    With     HLD (hyperlipidemia)     Obesity     Other chronic pain     Vertigo      Past Surgical History:   Procedure Laterality Date    ENDOMETRIAL ABLATION  2010    EXCISE GANGLION FOOT Right 2022    Procedure: 4th Metatarsal Ostectomy of Right Foot;  Surgeon: Mario Ely DPM;  Location: [facility] REMOVAL HEEL SPUR, CALCANEUS Left 2015    Procedure: LEFT POSTERIOR CALCANEAL SPUR RESECTION;  Surgeon: Rob Marion DPM;  Location: [facility] OR;  Service: Podiatry    HC REMOVAL OF TONSILS,<11 Y/O      Description: Tonsillectomy;  Recorded: 10/18/2008;    REPAIR TENDON ACHILLES Left 2015    Procedure: WITH SECONDARY ACHILLES TENDON REAPIR;  Surgeon: Rob Marion DPM;  Location: [facility] OR;  Service:     REPAIR TENDON ACHILLES Right 11/3/2017    Procedure: WITH SECONDARY ACHILLES TENDON REPAIR;  Surgeon: Rob Marion DPM;  Location: [facility] OR;  Service:     [facility] APPENDECTOMY      Description: Appendectomy;  Recorded: 10/18/2008;    [facility]  DELIVERY ONLY      Description:  Section;  Recorded: 10/18/2008;     Social History     Tobacco Use    Smoking status: Every Day     Current packs/day: 0.50     Average packs/day: 0.5 packs/day for 43.0 years (21.5 ttl pk-yrs)     Types: Cigarettes    Smokeless tobacco: Never    Tobacco comments:     seen by TTS 3/3/2022   Substance Use Topics    Alcohol use: No     Comment: Alcoholic Drinks/day: sober since      Current Outpatient Medications   Medication Sig Dispense Refill    atorvastatin (LIPITOR) 40 MG tablet Take  0.5 tablets by mouth daily      cholecalciferol, vitamin D3, (VITAMIN D3) 2,000 unit Tab [CHOLECALCIFEROL, VITAMIN D3, (VITAMIN D3) 2,000 UNIT TAB] Take 2,000 Units by mouth at bedtime.       metFORMIN (GLUCOPHAGE XR) 500 MG 24 hr tablet TAKE 2 TABLETS BY MOUTH TWICE DAILY FOR DIABETES      omeprazole (PRILOSEC) 40 MG DR capsule Take 1 capsule by mouth once daily 90 capsule 1    pregabalin (LYRICA) 150 MG capsule Take 150 mg by mouth 2 times daily 150 2x day       No current facility-administered medications for this visit.     Allergies   Allergen Reactions    Amoxicillin Hives    Penicillins Swelling and Itching     Annotation: Swelling of face/eyes, itching      Shellfish Containing Products [Shellfish-Derived Products] Anaphylaxis       10 point ROS of systems were all negative except for pertinent positives noted in my HPI.      Exam:   BP (!) 142/89   Pulse 86   Temp 98.3  F (36.8  C) (Oral)   Resp 18   Wt 109.9 kg (242 lb 3.2 oz)   LMP  (LMP Unknown)   SpO2 96%   BMI 35.27 kg/m    Constitutional: healthy, alert and no distress  Head: Normocephalic, atraumatic.  Eyes: conjunctiva clear, no drainage  ENT: MMM. Throat without tonsillar hypertrophy or erythema  Neck: neck is supple, no cervical lymphadenopathy or nuchal rigidity  Cardiovascular: RRR  Respiratory: CTA bilaterally, no rhonchi or rales  Gastrointestinal: soft.  Discomfort on the right lateral aspect of her chest and mid abdomen.  Some right upper quadrant pain.  No rebound, guarding or rigidity.  Pain is made worse with twisting as well.  Skin: no rashes  Neurologic: Speech clear, gait normal. Moves all extremities.    Results for orders placed or performed during the hospital encounter of 10/13/24   XR Ribs & Chest Right G/E 3 Views     Status: None    Narrative    EXAM: XR RIBS AND CHEST RIGHT 3 VIEW  LOCATION: [facility]  DATE: 10/13/2024    INDICATION: Right-sided chest wall pain, lateral aspect.  COMPARISON:  None.      Impression    IMPRESSION: Lungs are clear. No pleural effusion. Heart size and pulmonary vascularity within normal limits. No rib fractures. Bridging endplate osteophyte formation thoracic spine.     Results for orders placed or performed in visit on 10/13/24   UA Macroscopic with reflex to Microscopic and Culture - Clinic Collect     Status: Abnormal    Specimen: Urine, Clean Catch   Result Value Ref Range    Color Urine Yellow Colorless, Straw, Light Yellow, Yellow    Appearance Urine Clear Clear    Glucose Urine Negative Negative mg/dL    Bilirubin Urine Negative Negative    Ketones Urine Negative Negative mg/dL    Specific Gravity Urine <=1.005 1.005 - 1.030    Blood Urine Trace (A) Negative    pH Urine 5.5 5.0 - 8.0    Protein Albumin Urine Negative Negative mg/dL    Urobilinogen Urine 0.2 0.2, 1.0 E.U./dL    Nitrite Urine Negative Negative    Leukocyte Esterase Urine Negative Negative   CBC with platelets     Status: Abnormal   Result Value Ref Range    WBC Count 10.6 4.0 - 11.0 10e3/uL    RBC Count 4.58 3.80 - 5.20 10e6/uL    Hemoglobin 13.1 11.7 - 15.7 g/dL    Hematocrit 40.5 35.0 - 47.0 %    MCV 88 78 - 100 fL    MCH 28.6 26.5 - 33.0 pg    MCHC 32.3 31.5 - 36.5 g/dL    RDW 15.8 (H) 10.0 - 15.0 %    Platelet Count 226 150 - 450 10e3/uL   Comprehensive metabolic panel     Status: Abnormal   Result Value Ref Range    Sodium 139 135 - 145 mmol/L    Potassium 4.2 3.4 - 5.3 mmol/L    Carbon Dioxide (CO2) 26 22 - 29 mmol/L    Anion Gap 10 7 - 15 mmol/L    Urea Nitrogen 8.6 8.0 - 23.0 mg/dL    Creatinine 0.80 0.51 - 0.95 mg/dL    GFR Estimate 83 >60 mL/min/1.73m2    Calcium 9.5 8.8 - 10.4 mg/dL    Chloride 103 98 - 107 mmol/L    Glucose 105 (H) 70 - 99 mg/dL    Alkaline Phosphatase 64 40 - 150 U/L    AST 23 0 - 45 U/L    ALT 27 0 - 50 U/L    Protein Total 7.9 6.4 - 8.3 g/dL    Albumin 4.7 3.5 - 5.2 g/dL    Bilirubin Total 0.3 <=1.2 mg/dL   UA Microscopic with Reflex to Culture     Status: Abnormal    Result Value Ref Range    Bacteria Urine Few (A) None Seen /HPF    RBC Urine 0-2 0-2 /HPF /HPF    WBC Urine None Seen 0-5 /HPF /HPF    Squamous Epithelials Urine Few (A) None Seen /LPF    Narrative    Urine Culture not indicated

## 2024-10-13 NOTE — LETTER
October 13, 2024      Catherine Farrell  [address] [place] [id number]        To Whom It May Concern:    Catherine Farrell was seen in our clinic. Please excuse from work 10/14/24.       Sincerely,        Lilian Granda PA-C

## 2024-10-14 ENCOUNTER — HOSPITAL ENCOUNTER (OUTPATIENT)
Dept: CT IMAGING | Facility: HOSPITAL | Age: 59
Discharge: HOME | End: 2024-10-14
Attending: FAMILY MEDICINE | Admitting: FAMILY MEDICINE
Payer: COMMERCIAL

## 2024-10-14 ENCOUNTER — OFFICE VISIT (OUTPATIENT)
Dept: PEDIATRICS | Facility: CLINIC | Age: 59
End: 2024-10-14
Payer: COMMERCIAL

## 2024-10-14 VITALS
SYSTOLIC BLOOD PRESSURE: 134 MMHG | WEIGHT: 244.4 LBS | DIASTOLIC BLOOD PRESSURE: 82 MMHG | RESPIRATION RATE: 18 BRPM | TEMPERATURE: 98 F | BODY MASS INDEX: 35.59 KG/M2 | OXYGEN SATURATION: 98 % | HEART RATE: 72 BPM

## 2024-10-14 DIAGNOSIS — R10.11 RUQ ABDOMINAL PAIN: ICD-10-CM

## 2024-10-14 DIAGNOSIS — R10.9 RIGHT FLANK PAIN: ICD-10-CM

## 2024-10-14 DIAGNOSIS — R10.11 RUQ ABDOMINAL PAIN: Primary | ICD-10-CM

## 2024-10-14 LAB
ALBUMIN UR-MCNC: NEGATIVE MG/DL
APPEARANCE UR: CLEAR
BASOPHILS # BLD AUTO: 0.1 10E3/UL (ref 0–0.2)
BASOPHILS NFR BLD AUTO: 1 %
BILIRUB UR QL STRIP: NEGATIVE
COLOR UR AUTO: YELLOW
CRP SERPL-MCNC: 5.4 MG/L
EOSINOPHIL # BLD AUTO: 0.2 10E3/UL (ref 0–0.7)
EOSINOPHIL NFR BLD AUTO: 2 %
ERYTHROCYTE [DISTWIDTH] IN BLOOD BY AUTOMATED COUNT: 15.5 % (ref 10–15)
ERYTHROCYTE [SEDIMENTATION RATE] IN BLOOD BY WESTERGREN METHOD: 23 MM/HR (ref 0–30)
GLUCOSE UR STRIP-MCNC: NEGATIVE MG/DL
HCT VFR BLD AUTO: 38.4 % (ref 35–47)
HGB BLD-MCNC: 12.4 G/DL (ref 11.7–15.7)
HGB UR QL STRIP: NEGATIVE
IMM GRANULOCYTES # BLD: 0 10E3/UL
IMM GRANULOCYTES NFR BLD: 0 %
KETONES UR STRIP-MCNC: NEGATIVE MG/DL
LEUKOCYTE ESTERASE UR QL STRIP: NEGATIVE
LYMPHOCYTES # BLD AUTO: 3.2 10E3/UL (ref 0.8–5.3)
LYMPHOCYTES NFR BLD AUTO: 32 %
MCH RBC QN AUTO: 28.6 PG (ref 26.5–33)
MCHC RBC AUTO-ENTMCNC: 32.3 G/DL (ref 31.5–36.5)
MCV RBC AUTO: 89 FL (ref 78–100)
MONOCYTES # BLD AUTO: 0.5 10E3/UL (ref 0–1.3)
MONOCYTES NFR BLD AUTO: 5 %
NEUTROPHILS # BLD AUTO: 5.9 10E3/UL (ref 1.6–8.3)
NEUTROPHILS NFR BLD AUTO: 60 %
NITRATE UR QL: NEGATIVE
PH UR STRIP: 5.5 [PH] (ref 5–8)
PLATELET # BLD AUTO: 225 10E3/UL (ref 150–450)
RBC # BLD AUTO: 4.34 10E6/UL (ref 3.8–5.2)
SP GR UR STRIP: <=1.005 (ref 1–1.03)
UROBILINOGEN UR STRIP-ACNC: 0.2 E.U./DL
WBC # BLD AUTO: 9.9 10E3/UL (ref 4–11)

## 2024-10-14 PROCEDURE — 81003 URINALYSIS AUTO W/O SCOPE: CPT | Performed by: FAMILY MEDICINE

## 2024-10-14 PROCEDURE — 250N000009 HC RX 250: Performed by: FAMILY MEDICINE

## 2024-10-14 PROCEDURE — 36415 COLL VENOUS BLD VENIPUNCTURE: CPT | Performed by: FAMILY MEDICINE

## 2024-10-14 PROCEDURE — 74177 CT ABD & PELVIS W/CONTRAST: CPT

## 2024-10-14 PROCEDURE — 99215 OFFICE O/P EST HI 40 MIN: CPT | Mod: 25 | Performed by: FAMILY MEDICINE

## 2024-10-14 PROCEDURE — 85652 RBC SED RATE AUTOMATED: CPT | Performed by: FAMILY MEDICINE

## 2024-10-14 PROCEDURE — 96374 THER/PROPH/DIAG INJ IV PUSH: CPT | Performed by: FAMILY MEDICINE

## 2024-10-14 PROCEDURE — 85025 COMPLETE CBC W/AUTO DIFF WBC: CPT | Performed by: FAMILY MEDICINE

## 2024-10-14 PROCEDURE — 86140 C-REACTIVE PROTEIN: CPT | Performed by: FAMILY MEDICINE

## 2024-10-14 PROCEDURE — 250N000011 HC RX IP 250 OP 636: Performed by: FAMILY MEDICINE

## 2024-10-14 RX ORDER — DICLOFENAC POTASSIUM 25 MG/1
25 TABLET, FILM COATED ORAL PRN
COMMUNITY

## 2024-10-14 RX ORDER — KETOROLAC TROMETHAMINE 30 MG/ML
30 INJECTION, SOLUTION INTRAMUSCULAR; INTRAVENOUS ONCE
Status: COMPLETED | OUTPATIENT
Start: 2024-10-14 | End: 2024-10-14

## 2024-10-14 RX ORDER — IOPAMIDOL 755 MG/ML
90 INJECTION, SOLUTION INTRAVASCULAR ONCE
Status: COMPLETED | OUTPATIENT
Start: 2024-10-14 | End: 2024-10-14

## 2024-10-14 RX ADMIN — KETOROLAC TROMETHAMINE 30 MG: 30 INJECTION, SOLUTION INTRAMUSCULAR; INTRAVENOUS at 11:36

## 2024-10-14 RX ADMIN — SODIUM CHLORIDE 72 ML: 9 INJECTION, SOLUTION INTRAVENOUS at 12:08

## 2024-10-14 RX ADMIN — IOPAMIDOL 90 ML: 755 INJECTION, SOLUTION INTRAVENOUS at 12:08

## 2024-10-14 ASSESSMENT — PAIN SCALES - GENERAL: PAINLEVEL: WORST PAIN (10)

## 2024-10-14 NOTE — PATIENT INSTRUCTIONS
Acetaminophen (Tylenol) 500mg tablets.  You may take 2 tabs every 4-6 hours as needed    Ibuprofen (Advil, Motrin) 200mg tablets.  You may take 3 tabs every 6-8 hours as needed with food. Diclofenac is in this same family.      Keep a close eye on your symptoms and follow up if you are getting any worse.

## 2024-10-14 NOTE — LETTER
October 14, 2024      Catherine Farrell  [address] [place] [address] [address] [facility] [id number]        To Whom It May Concern:    Catherine Farrell  was seen on 10/14/24.  Please excuse her from work until seen by primary later this week due to illness.        Sincerely,        Amee Aguayo MD

## 2024-10-14 NOTE — PROGRESS NOTES
Acute and Diagnostic Services Clinic Visit    Assessment & Plan     RUQ abdominal pain  Patient seen yesterday at urgent care and had normal labs including CMP, amylase, lipase, CBC and UA.  Today in clinic patient with ongoing severe abdominal pain.  CBC is stable and UA continues to be negative.  Pain improved a bit with ketorolac 30 mg IV x 1.  Sed rate is normal and CRP is mildly elevated.  CT abdomen pelvis has no acute findings to explain pain  Recommended:  Acetaminophen (Tylenol) 500mg tablets.  You may take 2 tabs every 4-6 hours as needed  Ibuprofen (Advil, Motrin) 200mg tablets.  You may take 3 tabs every 6-8 hours as needed with food. Diclofenac is in this same family.    Keep a close eye on your symptoms and follow up if you are getting any worse.     Note done for patient to be off work until she is rechecked  Scheduled patient for recheck in 2 days at primary care office  Certainly follow-up sooner to ER or urgent care or ADS if symptoms are worsening  Patient verbalized understanding  - CBC with platelets differential; Future  - CRP inflammation; Future  - Erythrocyte sedimentation rate auto; Future  - UA Macroscopic with reflex to Microscopic and Culture - Clinic Collect  - CT Abdomen Pelvis w Contrast; Future  - sodium chloride (PF) 0.9% PF flush 3 mL  - ketorolac (TORADOL) injection 30 mg  - Erythrocyte sedimentation rate auto  - CRP inflammation  - CBC with platelets differential    Right flank pain  See above  - CBC with platelets differential; Future  - CRP inflammation; Future  - Erythrocyte sedimentation rate auto; Future  - UA Macroscopic with reflex to Microscopic and Culture - Clinic Collect  - CT Abdomen Pelvis w Contrast; Future  - sodium chloride (PF) 0.9% PF flush 3 mL  - ketorolac (TORADOL) injection 30 mg  - Erythrocyte sedimentation rate auto  - CRP inflammation  - CBC with platelets differential    Over 40 minutes were spent doing chart review, history and exam, documentation and  "further activities per the note.       BMI  Estimated body mass index is 35.59 kg/m  as calculated from the following:    Height as of 24: 1.765 m (5' 9.49\").    Weight as of this encounter: 110.9 kg (244 lb 6.4 oz).   Weight management plan: Not discussed at today's visit          No follow-ups on file.    Alexander Alexander is a 59 year old, presenting for the following health issues:  Abdominal Pain (RUQ)    HPI     Abdominal/Flank Pain  Onset/Duration: 3 weeks ago  Description:   Character: Dull ache and Burning  Location: right upper quadrant  Radiation: right flank and hip  Intensity: severe the last few days  Progression of Symptoms:  worsening  Accompanying Signs & Symptoms:  Fever/chills: no   Gas/Bloating: no   Nausea: YES- this morning  Vomitting: no   Diarrhea: YES- loose stools, no diarrhea  Constipation:no   Dysuria: no            Hematuria: no            Frequency: no            Incontinence of urine: no   History:            Last bowel movement: today - this morning  Trauma: no   Previous similar pain: no    Previous tests done: x-ray, labs and UA           Previous Abdominal surgery: YES- appendix removal and   Precipitating factors:   Does the pain change with:     Food: no      Bowel Movement: no     Urination: no              Other factors: YES- Walking, moving around makes it worse.  Therapies tried and outcome:  diclofenac, not really helped    When food last eaten: About an hour ago - crackers    Patient is a 59-year-old female who has had right sided abdominal pain for about 3 weeks.  She initially thought she pulled a muscle stretching but the last few days the pain has gotten much worse.  She cannot lean to the right side.  She is not eating as much.  She is developing loose stools.  She had a cervical spine steroid injection about 9 days ago.  Appendectomy as a kid  Denies fevers and chills  Was seen in urgent care yesterday and had normal labs including a normal CBC, CMP, " amylase, lipase, and UA.  Note from yesterday and labs are reviewed.  She was referred to ADS today for possible imaging.    She points out the pain as right sided in the midaxillary line starting at the flank into the right gluteal area.  Denies UTI symptoms.    Review of Systems  negative except listed in HPI       Objective    /82 (BP Location: Right arm, Patient Position: Sitting, Cuff Size: Adult Large)   Pulse 72   Temp 98  F (36.7  C) (Oral)   Resp 18   Wt 110.9 kg (244 lb 6.4 oz)   LMP  (LMP Unknown)   SpO2 98%   BMI 35.59 kg/m    Body mass index is 35.59 kg/m .  Physical Exam   Vitals noted and within normal limits except for elevated systolic blood pressure today and BMI at 35.6  In general she is alert, oriented, and in no acute distress if she is not moving  Mouth mucous members are pink and moist  Neck is supple with no cervical lymphadenopathy  Heart has a regular rate and rhythm with no murmurs  Lungs are clear to auscultation bilaterally with good air entry  Back with right-sided CVA tenderness and tenderness to palpation of the right sided paraspinal muscles, right flank.  Abdomen with normal bowel sounds.  Soft and nondistended.  There is tenderness to palpation of the right upper quadrant and right lower quadrant.  Legs with no edema  UA is within normal limits  CBC with differential is normal  Sed rate: Normal at 23  CRP: Mildly elevated  CT abdomen pelvis with contrast: no acute findings.  Diverticulosis.  Results for orders placed or performed during the hospital encounter of 10/14/24   CT Abdomen Pelvis w Contrast     Status: None    Narrative    EXAM: CT ABDOMEN PELVIS W CONTRAST  LOCATION: [facility]  DATE: 10/14/2024    INDICATION:  RUQ abdominal pain, Right flank pain  COMPARISON: 4/11/2024 CT  TECHNIQUE: CT scan of the abdomen and pelvis was performed following injection of IV contrast. Multiplanar reformats were obtained. Dose reduction techniques  were used.  CONTRAST: 90ml Isovue 370    FINDINGS:   LOWER CHEST: Normal.    HEPATOBILIARY: Normal.    PANCREAS: Normal.    SPLEEN: Normal.    ADRENAL GLANDS: Normal.    KIDNEYS/BLADDER: No renal stones and no hydronephrosis.    BOWEL: Moderate diverticulosis throughout the colon most prominent in the sigmoid colon. Resolution of the previous minimal changes of diverticulitis since 4/11/2024.    LYMPH NODES: Normal.    VASCULATURE: Normal.    PELVIC ORGANS: Normal.    MUSCULOSKELETAL: Normal.      Impression    IMPRESSION:   1.  Moderate diverticulosis with resolved changes of acute diverticulitis since 4/11/2024.    2.  No acute new findings.   Results for orders placed or performed in visit on 10/14/24   UA Macroscopic with reflex to Microscopic and Culture - Clinic Collect     Status: Normal    Specimen: Urine, Clean Catch   Result Value Ref Range    Color Urine Yellow Colorless, Straw, Light Yellow, Yellow    Appearance Urine Clear Clear    Glucose Urine Negative Negative mg/dL    Bilirubin Urine Negative Negative    Ketones Urine Negative Negative mg/dL    Specific Gravity Urine <=1.005 1.005 - 1.030    Blood Urine Negative Negative    pH Urine 5.5 5.0 - 8.0    Protein Albumin Urine Negative Negative mg/dL    Urobilinogen Urine 0.2 0.2, 1.0 E.U./dL    Nitrite Urine Negative Negative    Leukocyte Esterase Urine Negative Negative    Narrative    Microscopic not indicated   Erythrocyte sedimentation rate auto     Status: Normal   Result Value Ref Range    Erythrocyte Sedimentation Rate 23 0 - 30 mm/hr   CRP inflammation     Status: Abnormal   Result Value Ref Range    CRP Inflammation 5.40 (H) <5.00 mg/L   CBC with platelets and differential     Status: Abnormal   Result Value Ref Range    WBC Count 9.9 4.0 - 11.0 10e3/uL    RBC Count 4.34 3.80 - 5.20 10e6/uL    Hemoglobin 12.4 11.7 - 15.7 g/dL    Hematocrit 38.4 35.0 - 47.0 %    MCV 89 78 - 100 fL    MCH 28.6 26.5 - 33.0 pg    MCHC 32.3 31.5 - 36.5 g/dL    RDW  15.5 (H) 10.0 - 15.0 %    Platelet Count 225 150 - 450 10e3/uL    % Neutrophils 60 %    % Lymphocytes 32 %    % Monocytes 5 %    % Eosinophils 2 %    % Basophils 1 %    % Immature Granulocytes 0 %    Absolute Neutrophils 5.9 1.6 - 8.3 10e3/uL    Absolute Lymphocytes 3.2 0.8 - 5.3 10e3/uL    Absolute Monocytes 0.5 0.0 - 1.3 10e3/uL    Absolute Eosinophils 0.2 0.0 - 0.7 10e3/uL    Absolute Basophils 0.1 0.0 - 0.2 10e3/uL    Absolute Immature Granulocytes 0.0 <=0.4 10e3/uL   CBC with platelets differential     Status: Abnormal    Narrative    The following orders were created for panel order CBC with platelets differential.  Procedure                               Abnormality         Status                     ---------                               -----------         ------                     CBC with platelets and d...[[id number]]  Abnormal            Final result                 Please view results for these tests on the individual orders.                 Signed Electronically by: Amee Aguayo MD

## 2024-10-16 ENCOUNTER — OFFICE VISIT (OUTPATIENT)
Dept: FAMILY MEDICINE | Facility: CLINIC | Age: 59
End: 2024-10-16
Payer: COMMERCIAL

## 2024-10-16 VITALS
SYSTOLIC BLOOD PRESSURE: 149 MMHG | RESPIRATION RATE: 16 BRPM | BODY MASS INDEX: 36.58 KG/M2 | WEIGHT: 247 LBS | HEIGHT: 69 IN | HEART RATE: 98 BPM | OXYGEN SATURATION: 100 % | TEMPERATURE: 97.8 F | DIASTOLIC BLOOD PRESSURE: 83 MMHG

## 2024-10-16 DIAGNOSIS — J44.1 ACUTE EXACERBATION OF CHRONIC OBSTRUCTIVE AIRWAYS DISEASE (H): ICD-10-CM

## 2024-10-16 DIAGNOSIS — J01.00 ACUTE NON-RECURRENT MAXILLARY SINUSITIS: ICD-10-CM

## 2024-10-16 DIAGNOSIS — R10.11 RUQ ABDOMINAL PAIN: Primary | ICD-10-CM

## 2024-10-16 DIAGNOSIS — F17.210 CIGARETTE NICOTINE DEPENDENCE WITHOUT COMPLICATION: ICD-10-CM

## 2024-10-16 DIAGNOSIS — E11.65 TYPE 2 DIABETES MELLITUS WITH HYPERGLYCEMIA, WITHOUT LONG-TERM CURRENT USE OF INSULIN (H): ICD-10-CM

## 2024-10-16 LAB
ALBUMIN SERPL BCG-MCNC: 4.6 G/DL (ref 3.5–5.2)
ALP SERPL-CCNC: 57 U/L (ref 40–150)
ALT SERPL W P-5'-P-CCNC: 23 U/L (ref 0–50)
AST SERPL W P-5'-P-CCNC: 24 U/L (ref 0–45)
BASOPHILS # BLD AUTO: 0.1 10E3/UL (ref 0–0.2)
BASOPHILS NFR BLD AUTO: 1 %
BILIRUB DIRECT SERPL-MCNC: <0.2 MG/DL (ref 0–0.3)
BILIRUB SERPL-MCNC: 0.2 MG/DL
CRP SERPL-MCNC: 3.24 MG/L
EOSINOPHIL # BLD AUTO: 0.2 10E3/UL (ref 0–0.7)
EOSINOPHIL NFR BLD AUTO: 2 %
ERYTHROCYTE [DISTWIDTH] IN BLOOD BY AUTOMATED COUNT: 15.5 % (ref 10–15)
EST. AVERAGE GLUCOSE BLD GHB EST-MCNC: 131 MG/DL
HBA1C MFR BLD: 6.2 % (ref 0–5.6)
HCT VFR BLD AUTO: 39.4 % (ref 35–47)
HGB BLD-MCNC: 12.5 G/DL (ref 11.7–15.7)
IMM GRANULOCYTES # BLD: 0 10E3/UL
IMM GRANULOCYTES NFR BLD: 0 %
LYMPHOCYTES # BLD AUTO: 3.2 10E3/UL (ref 0.8–5.3)
LYMPHOCYTES NFR BLD AUTO: 29 %
MCH RBC QN AUTO: 28.1 PG (ref 26.5–33)
MCHC RBC AUTO-ENTMCNC: 31.7 G/DL (ref 31.5–36.5)
MCV RBC AUTO: 89 FL (ref 78–100)
MONOCYTES # BLD AUTO: 0.7 10E3/UL (ref 0–1.3)
MONOCYTES NFR BLD AUTO: 6 %
NEUTROPHILS # BLD AUTO: 6.9 10E3/UL (ref 1.6–8.3)
NEUTROPHILS NFR BLD AUTO: 62 %
PLATELET # BLD AUTO: 218 10E3/UL (ref 150–450)
PROT SERPL-MCNC: 7.7 G/DL (ref 6.4–8.3)
RBC # BLD AUTO: 4.45 10E6/UL (ref 3.8–5.2)
WBC # BLD AUTO: 11.1 10E3/UL (ref 4–11)

## 2024-10-16 PROCEDURE — 80076 HEPATIC FUNCTION PANEL: CPT | Performed by: FAMILY MEDICINE

## 2024-10-16 PROCEDURE — 99214 OFFICE O/P EST MOD 30 MIN: CPT | Performed by: FAMILY MEDICINE

## 2024-10-16 PROCEDURE — 36415 COLL VENOUS BLD VENIPUNCTURE: CPT | Performed by: FAMILY MEDICINE

## 2024-10-16 PROCEDURE — 86140 C-REACTIVE PROTEIN: CPT | Performed by: FAMILY MEDICINE

## 2024-10-16 PROCEDURE — 83036 HEMOGLOBIN GLYCOSYLATED A1C: CPT | Performed by: FAMILY MEDICINE

## 2024-10-16 PROCEDURE — 85025 COMPLETE CBC W/AUTO DIFF WBC: CPT | Performed by: FAMILY MEDICINE

## 2024-10-16 RX ORDER — DOXYCYCLINE HYCLATE 100 MG
100 TABLET ORAL 2 TIMES DAILY
Qty: 20 TABLET | Refills: 0 | Status: SHIPPED | OUTPATIENT
Start: 2024-10-16 | End: 2024-10-26

## 2024-10-16 NOTE — LETTER
10/16/2024     Catherine Farrell   1965  [address] [facility] [address]       To Whom It May Concern:    Please excuse Catherine from work through October 25, 2024, due to illness.     If you have further questions, please do not hesitate to contact me.     Sincerely,        ZIGGY CARABALLO MD

## 2024-10-16 NOTE — PROGRESS NOTES
"1. RUQ abdominal pain (Primary)  This is a 60 yo female with RUQ abdominal pain - was seen for acute evaluation of pain - labs showed elevated CRP.  Otherwise normal.  Has h/o severe diverticulitis, but pain now in RUQ - CT scan was negative but will check ultrasound and refer to surgery to re-evaluate.  Patient needs letter for work.    - Hepatic panel (Albumin, ALT, AST, Bili, Alk Phos, TP); Future  - CBC with Platelets & Differential; Future  - Adult Gen Surg  Referral; Future  - CRP, inflammation; Future  - Hepatic panel (Albumin, ALT, AST, Bili, Alk Phos, TP)  - CBC with Platelets & Differential  - CRP, inflammation    2. Acute non-recurrent maxillary sinusitis  Patient complains of significant sinus pain/pressure.  Has \"yearly\" sinus symptoms - worried that it will continue to get worse with time.  Discussed.  Will treat with antibiotics.    - doxycycline hyclate (VIBRA-TABS) 100 MG tablet; Take 1 tablet (100 mg) by mouth 2 times daily for 10 days.  Dispense: 20 tablet; Refill: 0    3. Acute exacerbation of chronic obstructive airways disease (H)  H/o COPD - generally stable, with recent increased cough/wheeze.      4. Cigarette nicotine dependence without complication  Still smoking - occasional - discussed     5. Type 2 diabetes mellitus with hyperglycemia, without long-term current use of insulin (H)  Type II DM - A1c has been stable - will check A1c   - HEMOGLOBIN A1C; Future  - HEMOGLOBIN A1C      Alexander Alexander is a 59 year old, presenting for the following health issues:  RECHECK (In a lot of pain )        10/16/2024     2:41 PM   Additional Questions   Roomed by juliette   Accompanied by self     Via the Health Maintenance questionnaire, the patient has reported the following services have been completed , this information has been sent to the abstraction team.  Food is aggravating her  Not the diverticulitis  No fever; doesn't vomit    All right sided pain  Puffy on right   Can't lay or lean " "on the side      History of Present Illness       Reason for visit:  Right side pain  Symptom onset:  3-4 weeks ago  Symptoms include:  Right side pain  Symptom intensity:  Severe  Symptom progression:  Staying the same   She is taking medications regularly.       Review of Systems   Constitutional:  Negative for chills and fever.   HENT:  Positive for congestion, sinus pressure and sinus pain. Negative for ear pain and sore throat.    Eyes:  Negative for pain and visual disturbance.   Respiratory:  Negative for cough and shortness of breath.    Cardiovascular:  Negative for chest pain and palpitations.   Gastrointestinal:  Positive for abdominal pain (mostly RUQ) and nausea. Negative for vomiting.   Genitourinary:  Negative for dysuria and hematuria.   Musculoskeletal:  Negative for arthralgias and back pain.   Skin:  Negative for color change and rash.   Neurological:  Negative for seizures and syncope.   All other systems reviewed and are negative.          Objective    BP (!) 149/83   Pulse 98   Temp 97.8  F (36.6  C) (Temporal)   Resp 16   Ht 1.753 m (5' 9\")   Wt 112 kg (247 lb)   LMP  (LMP Unknown)   SpO2 100%   BMI 36.48 kg/m    Body mass index is 36.48 kg/m .  Physical Exam  Vitals reviewed.   Constitutional:       General: She is not in acute distress.     Appearance: Normal appearance.   HENT:      Head: Normocephalic.      Right Ear: Tympanic membrane, ear canal and external ear normal.      Left Ear: Tympanic membrane, ear canal and external ear normal.      Nose: Nose normal.      Mouth/Throat:      Mouth: Mucous membranes are moist.      Pharynx: No posterior oropharyngeal erythema.   Eyes:      Extraocular Movements: Extraocular movements intact.      Conjunctiva/sclera: Conjunctivae normal.      Pupils: Pupils are equal, round, and reactive to light.   Cardiovascular:      Rate and Rhythm: Normal rate and regular rhythm.      Pulses: Normal pulses.      Heart sounds: Normal heart sounds. No " murmur heard.  Pulmonary:      Effort: Pulmonary effort is normal.      Breath sounds: Normal breath sounds.   Abdominal:      Palpations: Abdomen is soft. There is no mass.      Tenderness: There is abdominal tenderness (mild tenderness - RUQ). There is no guarding or rebound.   Musculoskeletal:         General: No deformity. Normal range of motion.      Cervical back: Normal range of motion and neck supple.   Lymphadenopathy:      Cervical: No cervical adenopathy.   Skin:     General: Skin is warm and dry.   Neurological:      General: No focal deficit present.      Mental Status: She is alert.   Psychiatric:         Mood and Affect: Mood normal.         Behavior: Behavior normal.            Results for orders placed or performed in visit on 10/16/24   HEMOGLOBIN A1C     Status: Abnormal   Result Value Ref Range    Estimated Average Glucose 131 (H) <117 mg/dL    Hemoglobin A1C 6.2 (H) 0.0 - 5.6 %   Hepatic panel (Albumin, ALT, AST, Bili, Alk Phos, TP)     Status: Normal   Result Value Ref Range    Protein Total 7.7 6.4 - 8.3 g/dL    Albumin 4.6 3.5 - 5.2 g/dL    Bilirubin Total 0.2 <=1.2 mg/dL    Alkaline Phosphatase 57 40 - 150 U/L    AST 24 0 - 45 U/L    ALT 23 0 - 50 U/L    Bilirubin Direct <0.20 0.00 - 0.30 mg/dL   CRP, inflammation     Status: Normal   Result Value Ref Range    CRP Inflammation 3.24 <5.00 mg/L   CBC with platelets and differential     Status: Abnormal   Result Value Ref Range    WBC Count 11.1 (H) 4.0 - 11.0 10e3/uL    RBC Count 4.45 3.80 - 5.20 10e6/uL    Hemoglobin 12.5 11.7 - 15.7 g/dL    Hematocrit 39.4 35.0 - 47.0 %    MCV 89 78 - 100 fL    MCH 28.1 26.5 - 33.0 pg    MCHC 31.7 31.5 - 36.5 g/dL    RDW 15.5 (H) 10.0 - 15.0 %    Platelet Count 218 150 - 450 10e3/uL    % Neutrophils 62 %    % Lymphocytes 29 %    % Monocytes 6 %    % Eosinophils 2 %    % Basophils 1 %    % Immature Granulocytes 0 %    Absolute Neutrophils 6.9 1.6 - 8.3 10e3/uL    Absolute Lymphocytes 3.2 0.8 - 5.3 10e3/uL     Absolute Monocytes 0.7 0.0 - 1.3 10e3/uL    Absolute Eosinophils 0.2 0.0 - 0.7 10e3/uL    Absolute Basophils 0.1 0.0 - 0.2 10e3/uL    Absolute Immature Granulocytes 0.0 <=0.4 10e3/uL   CBC with Platelets & Differential     Status: Abnormal    Narrative    The following orders were created for panel order CBC with Platelets & Differential.  Procedure                               Abnormality         Status                     ---------                               -----------         ------                     CBC with platelets and d...[[id number]]  Abnormal            Final result                 Please view results for these tests on the individual orders.       Prior to immunization administration, verified patients identity using patient s name and date of birth. Please see Immunization Activity for additional information.     Screening Questionnaire for Adult Immunization    Are you sick today?   No   Do you have allergies to medications, food, a vaccine component or latex?   No   Have you ever had a serious reaction after receiving a vaccination?   Yes   Do you have a long-term health problem with heart, lung, kidney, or metabolic disease (e.g., diabetes), asthma, a blood disorder, no spleen, complement component deficiency, a cochlear implant, or a spinal fluid leak?  Are you on long-term aspirin therapy?   Yes   Do you have cancer, leukemia, HIV/AIDS, or any other immune system problem?   No   Do you have a parent, brother, or sister with an immune system problem?   No   In the past 3 months, have you taken medications that affect  your immune system, such as prednisone, other steroids, or anticancer drugs; drugs for the treatment of rheumatoid arthritis, Crohn s disease, or psoriasis; or have you had radiation treatments?   No   Have you had a seizure, or a brain or other nervous system problem?   No   During the past year, have you received a transfusion of blood or blood    products, or been given immune  (gamma) globulin or antiviral drug?   No   For women: Are you pregnant or is there a chance you could become       pregnant during the next month?   No   Have you received any vaccinations in the past 4 weeks?   No     Immunization questionnaire was positive for at least one answer.  Notified .      Patient instructed to remain in clinic for 15 minutes afterwards, and to report any adverse reactions.     Screening performed by Anabela Reed MA on 10/16/2024 at 2:48 PM.        Signed Electronically by: ZIGGY CARABALLO MD

## 2024-10-17 ENCOUNTER — HOSPITAL ENCOUNTER (OUTPATIENT)
Dept: ULTRASOUND IMAGING | Facility: HOSPITAL | Age: 59
Discharge: HOME | End: 2024-10-17
Attending: FAMILY MEDICINE | Admitting: FAMILY MEDICINE
Payer: COMMERCIAL

## 2024-10-17 DIAGNOSIS — R10.11 RUQ ABDOMINAL PAIN: ICD-10-CM

## 2024-10-17 PROBLEM — O24.419 GESTATIONAL DIABETES MELLITUS: Status: ACTIVE | Noted: 2022-06-29

## 2024-10-17 PROBLEM — R73.02 IMPAIRED GLUCOSE TOLERANCE: Status: ACTIVE | Noted: 2022-07-07

## 2024-10-17 PROBLEM — R07.81 RIB PAIN: Status: ACTIVE | Noted: 2023-05-01

## 2024-10-17 PROBLEM — J10.1 INFLUENZA A VIRUS PRESENT: Status: ACTIVE | Noted: 2023-01-02

## 2024-10-17 PROBLEM — R42 VERTIGO: Status: ACTIVE | Noted: 2023-07-25

## 2024-10-17 PROCEDURE — 76705 ECHO EXAM OF ABDOMEN: CPT

## 2024-10-21 ENCOUNTER — TRANSFERRED RECORDS (OUTPATIENT)
Dept: HEALTH INFORMATION MANAGEMENT | Facility: CLINIC | Age: 59
End: 2024-10-21
Payer: COMMERCIAL

## 2024-10-22 ENCOUNTER — OFFICE VISIT (OUTPATIENT)
Dept: SURGERY | Facility: CLINIC | Age: 59
End: 2024-10-22
Payer: COMMERCIAL

## 2024-10-22 VITALS
BODY MASS INDEX: 35.22 KG/M2 | HEIGHT: 70 IN | OXYGEN SATURATION: 97 % | DIASTOLIC BLOOD PRESSURE: 90 MMHG | WEIGHT: 246 LBS | SYSTOLIC BLOOD PRESSURE: 160 MMHG

## 2024-10-22 DIAGNOSIS — R10.11 RUQ ABDOMINAL PAIN: Primary | ICD-10-CM

## 2024-10-22 PROCEDURE — G2211 COMPLEX E/M VISIT ADD ON: HCPCS | Performed by: SURGERY

## 2024-10-22 PROCEDURE — 99204 OFFICE O/P NEW MOD 45 MIN: CPT | Performed by: SURGERY

## 2024-10-22 NOTE — LETTER
10/22/2024      Catherine Farrell  [facility] [facility] [address]      Dear Colleague,    Thank you for referring your patient, Catherine Farrell, to the [facility] [place]. Please see a copy of my visit note below.    General Surgery H&P  Catherine Farrell MRN# [id number]   Age/Sex: 59 year old female YOB: 1965     Reason for visit: Right upper quadrant abdominal pain       Referring physician: Dr. Karel Pradhan                    Assessment and Plan:   Assessment:  Right upper quadrant abdominal pain  Right flank pain    59-year-old female presenting with right upper quadrant abdominal pain and right flank pain.  The etiology is unclear.  Differentials could be gallbladder disease versus possibly passed kidney stone or muscular strain.  The physical exam does not have any significant findings.  The CT scan and ultrasound did not reveal any gallbladder disease or muscular and abdominal wall abnormalities that would explain the findings in the right upper quadrant.    Plan:  -I am ordering a HIDA scan with ejection fraction to rule out possible biliary dyskinesia  -Patient is to follow-up as needed.  -The recommendations to follow once HIDA scan is completed.          Chief Complaint:     Chief Complaint   Patient presents with     consult     Gallbladder         History is obtained from the patient    HPI:   Catherine Farrell is a 59 year old female who presents to the general surgery team today for evaluation regarding right upper quadrant abdominal pain and right flank pain.  Patient states that she has had this for about a month now.  Patient continues to have some discomfort over the right upper quadrant.  It does not necessarily occur with eating.  The pain is intermittent.  Patient has no trauma to the area.  Patient believes that she has had similar pains before multiple years ago.  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          Past Medical History:     Past Medical History:   Diagnosis Date     Anemia      Bilateral calcaneal spurs     surgery 2015     Bursitis of hip      Chronic back pain      DDD (degenerative disc disease)      Depression      Diverticulitis      Environmental allergies      Fibromyalgia      History of transfusion 1984    With      HLD (hyperlipidemia)      Obesity      Other chronic pain      Vertigo               Past Surgical History:     Past Surgical History:   Procedure Laterality Date     ENDOMETRIAL ABLATION  2010     EXCISE GANGLION FOOT Right 2022    Procedure: 4th Metatarsal Ostectomy of Right Foot;  Surgeon: Mario Ely DPM;  Location: [facility] REMOVAL HEEL SPUR, CALCANEUS Left 2015    Procedure: LEFT POSTERIOR CALCANEAL SPUR RESECTION;  Surgeon: Rob Marion DPM;  Location: [facility];  Service: Podiatry     HC REMOVAL OF TONSILS,<11 Y/O      Description: Tonsillectomy;  Recorded: 10/18/2008;     REPAIR TENDON ACHILLES Left 2015    Procedure: WITH SECONDARY ACHILLES TENDON REAPIR;  Surgeon: Rob Marion DPM;  Location: [facility] OR;  Service:      REPAIR TENDON ACHILLES Right 11/3/2017    Procedure: WITH SECONDARY ACHILLES TENDON REPAIR;  Surgeon: Rob Marion DPM;  [place] [facility] [place]  Service:      [facility] APPENDECTOMY      Description: Appendectomy;  Recorded: 10/18/2008;     [facility]  DELIVERY ONLY      Description:  Section;  Recorded: 10/18/2008;             Social History:    reports that she has been smoking cigarettes. She has a 21.5 pack-year smoking history. She has never used smokeless tobacco. She reports that she does not drink alcohol and does not use drugs.           Family History:     Family History   Problem Relation Age of Onset     Lung Cancer Father      Alcoholism Father      Alcoholism Son      Hypertension Mother      Stomach Cancer  Maternal Aunt      Breast Cancer Maternal Aunt      Breast Cancer Maternal Aunt               Allergies:     Allergies   Allergen Reactions     Amoxicillin Hives     Penicillins Swelling and Itching     Annotation: Swelling of face/eyes, itching       Shellfish Containing Products [Shellfish-Derived Products] Anaphylaxis              Medications:     Prior to Admission medications    Medication Sig Start Date End Date Taking? Authorizing Provider   atorvastatin (LIPITOR) 40 MG tablet Take 0.5 tablets by mouth daily   Yes Reported, Patient   cholecalciferol, vitamin D3, (VITAMIN D3) 2,000 unit Tab [CHOLECALCIFEROL, VITAMIN D3, (VITAMIN D3) 2,000 UNIT TAB] Take 2,000 Units by mouth at bedtime.  11/3/17  Yes Provider, Historical   diclofenac (VOLTAREN) 50 MG EC tablet Take 1 tablet by mouth 2 times daily as needed.   Yes Reported, Patient   Diclofenac Potassium 25 MG TABS Take 25 mg by mouth as needed.   Yes Reported, Patient   doxycycline hyclate (VIBRA-TABS) 100 MG tablet Take 1 tablet (100 mg) by mouth 2 times daily for 10 days. 10/16/24 10/26/24 Yes Annetta Hernandez MD   metFORMIN (GLUCOPHAGE XR) 500 MG 24 hr tablet TAKE 2 TABLETS BY MOUTH TWICE DAILY FOR DIABETES 4/3/24  Yes Reported, Patient   methocarbamol (ROBAXIN) 500 MG tablet Take 500 mg by mouth 3 times daily as needed. 8/27/24  Yes Reported, Patient   omeprazole (PRILOSEC) 40 MG DR capsule Take 1 capsule by mouth once daily 5/6/24  Yes Annetta Hernandez MD   predniSONE (DELTASONE) 10 MG tablet TAKE 6 PILLS THE FIRST DAY THEN DECREASE BY 1 PILL EACH DAY UNTIL GONE 8/12/24  Yes Reported, Patient   pregabalin (LYRICA) 150 MG capsule Take 150 mg by mouth 2 times daily 150 2x day 10/26/21  Yes Reported, Patient              Review of Systems:   A 12 point Review of Systems is negative other than noted in the HPI            Physical Exam:   Patient Vitals for the past 24 hrs:   BP SpO2 Height Weight   10/22/24 1014 (!) 160/90 97 %  "1.778 m (5' 10\") 111.6 kg (246 lb)        [unfilled]   Constitutional:   awake, alert, cooperative, no apparent distress, and appears stated age       Eyes:   PERRL, conjunctiva/corneas clear, EOM's intact; no scleral edema or icterus noted        ENT:   Normocephalic, without obvious abnormality, atraumatic, Lips, mucosa, and tongue normal        Hematologic / Lymphatic:   No lymphadenopathy       Lungs:   Normal respiratory effort, no accessory muscle use       Cardiovascular:   Regular rate and rhythm       Abdomen:   Soft, nondistended, nontender to palpation.  Obese abdomen.       Musculoskeletal:   No obvious swelling, bruising or deformity       Skin:   Skin color and texture normal for patient, no rashes or lesions              Data:         All imaging studies reviewed by me. I reviewed the images, and I agree with no identifiable hernias were gallbladder pathology on CT scan or ultrasound.      DO Shyam Marshall DO  General Surgeon  [facility]  Surgery [facility] - [address] [address]?  Office: [phone]  Employed by - [facility] [employer]  Pager: [phone]       Again, thank you for allowing me to participate in the care of your patient.        Sincerely,        Shyam Roth DO  "

## 2024-10-22 NOTE — PROGRESS NOTES
General Surgery H&P  Catherine Farrell MRN# [id number]   Age/Sex: 59 year old female YOB: 1965     Reason for visit: Right upper quadrant abdominal pain       Referring physician: Dr. Karel Pradhan                    Assessment and Plan:   Assessment:  Right upper quadrant abdominal pain  Right flank pain    59-year-old female presenting with right upper quadrant abdominal pain and right flank pain.  The etiology is unclear.  Differentials could be gallbladder disease versus possibly passed kidney stone or muscular strain.  The physical exam does not have any significant findings.  The CT scan and ultrasound did not reveal any gallbladder disease or muscular and abdominal wall abnormalities that would explain the findings in the right upper quadrant.    Plan:  -I am ordering a HIDA scan with ejection fraction to rule out possible biliary dyskinesia  -Patient is to follow-up as needed.  -The recommendations to follow once HIDA scan is completed.          Chief Complaint:     Chief Complaint   Patient presents with    consult     Gallbladder         History is obtained from the patient    HPI:   Catherine Farrell is a 59 year old female who presents to the general surgery team today for evaluation regarding right upper quadrant abdominal pain and right flank pain.  Patient states that she has had this for about a month now.  Patient continues to have some discomfort over the right upper quadrant.  It does not necessarily occur with eating.  The pain is intermittent.  Patient has no trauma to the area.  Patient believes that she has had similar pains before multiple years ago.  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          Past Medical History:     Past Medical History:   Diagnosis Date    Anemia     Bilateral calcaneal spurs     surgery Nov. 2015    Bursitis of hip     Chronic back pain     DDD (degenerative disc disease)     Depression      Diverticulitis     Environmental allergies     Fibromyalgia     History of transfusion 1984    With     HLD (hyperlipidemia)     Obesity     Other chronic pain     Vertigo               Past Surgical History:     Past Surgical History:   Procedure Laterality Date    ENDOMETRIAL ABLATION  2010    EXCISE GANGLION FOOT Right 2022    Procedure: 4th Metatarsal Ostectomy of Right Foot;  Surgeon: Mario Ely DPM;  Location: [facility] REMOVAL HEEL SPUR, CALCANEUS Left 2015    Procedure: LEFT POSTERIOR CALCANEAL SPUR RESECTION;  Surgeon: Rob Marion DPM;  [facility]  Service[facility]    HC REMOVAL OF TONSILS,<11 Y/O      Description: Tonsillectomy;  Recorded: 10/18/2008;    REPAIR TENDON ACHILLES Left 2015    Procedure: WITH SECONDARY ACHILLES TENDON REAPIR;  Surgeon: Rob Marion DPM;  [facility];  Service:     REPAIR TENDON ACHILLES Right 11/3/2017    Procedure: WITH SECONDARY ACHILLES TENDON REPAIR;  Surgeon: Rob Marion DPM;  [facility] [place];  Service:     [facility] APPENDECTOMY      Description: Appendectomy;  Recorded: 10/18/2008;    [facility]  DELIVERY ONLY      Description:  Section;  Recorded: 10/18/2008;             Social History:    reports that she has been smoking cigarettes. She has a 21.5 pack-year smoking history. She has never used smokeless tobacco. She reports that she does not drink alcohol and does not use drugs.           Family History:     Family History   Problem Relation Age of Onset    Lung Cancer Father     Alcoholism Father     Alcoholism Son     Hypertension Mother     Stomach Cancer Maternal Aunt     Breast Cancer Maternal Aunt     Breast Cancer Maternal Aunt               Allergies:     Allergies   Allergen Reactions    Amoxicillin Hives    Penicillins Swelling and Itching     Annotation: Swelling of face/eyes, itching      Shellfish Containing Products [Shellfish-Derived Products]  "Anaphylaxis              Medications:     Prior to Admission medications    Medication Sig Start Date End Date Taking? Authorizing Provider   atorvastatin (LIPITOR) 40 MG tablet Take 0.5 tablets by mouth daily   Yes Reported, Patient   cholecalciferol, vitamin D3, (VITAMIN D3) 2,000 unit Tab [CHOLECALCIFEROL, VITAMIN D3, (VITAMIN D3) 2,000 UNIT TAB] Take 2,000 Units by mouth at bedtime.  11/3/17  Yes Provider, Historical   diclofenac (VOLTAREN) 50 MG EC tablet Take 1 tablet by mouth 2 times daily as needed.   Yes Reported, Patient   Diclofenac Potassium 25 MG TABS Take 25 mg by mouth as needed.   Yes Reported, Patient   doxycycline hyclate (VIBRA-TABS) 100 MG tablet Take 1 tablet (100 mg) by mouth 2 times daily for 10 days. 10/16/24 10/26/24 Yes Annetta Hernandez MD   metFORMIN (GLUCOPHAGE XR) 500 MG 24 hr tablet TAKE 2 TABLETS BY MOUTH TWICE DAILY FOR DIABETES 4/3/24  Yes Reported, Patient   methocarbamol (ROBAXIN) 500 MG tablet Take 500 mg by mouth 3 times daily as needed. 8/27/24  Yes Reported, Patient   omeprazole (PRILOSEC) 40 MG DR capsule Take 1 capsule by mouth once daily 5/6/24  Yes Annetta Hernandez MD   predniSONE (DELTASONE) 10 MG tablet TAKE 6 PILLS THE FIRST DAY THEN DECREASE BY 1 PILL EACH DAY UNTIL GONE 8/12/24  Yes Reported, Patient   pregabalin (LYRICA) 150 MG capsule Take 150 mg by mouth 2 times daily 150 2x day 10/26/21  Yes Reported, Patient              Review of Systems:   A 12 point Review of Systems is negative other than noted in the HPI            Physical Exam:   Patient Vitals for the past 24 hrs:   BP SpO2 Height Weight   10/22/24 1014 (!) 160/90 97 % 1.778 m (5' 10\") 111.6 kg (246 lb)        [unfilled]   Constitutional:   awake, alert, cooperative, no apparent distress, and appears stated age       Eyes:   PERRL, conjunctiva/corneas clear, EOM's intact; no scleral edema or icterus noted        ENT:   Normocephalic, without obvious abnormality, " atraumatic, Lips, mucosa, and tongue normal        Hematologic / Lymphatic:   No lymphadenopathy       Lungs:   Normal respiratory effort, no accessory muscle use       Cardiovascular:   Regular rate and rhythm       Abdomen:   Soft, nondistended, nontender to palpation.  Obese abdomen.       Musculoskeletal:   No obvious swelling, bruising or deformity       Skin:   Skin color and texture normal for patient, no rashes or lesions              Data:         All imaging studies reviewed by me. I reviewed the images, and I agree with no identifiable hernias were gallbladder pathology on CT scan or ultrasound.      DO Shyam Marshall DO  General Surgeon  [facility]  Surgery [address] [address]?  Office: [phone]  Employed by - [facility]  Pager: [phone]

## 2024-10-25 ENCOUNTER — HOSPITAL ENCOUNTER (OUTPATIENT)
Dept: NUCLEAR MEDICINE | Facility: HOSPITAL | Age: 59
Discharge: HOME | End: 2024-10-25
Attending: SURGERY | Admitting: SURGERY
Payer: COMMERCIAL

## 2024-10-25 DIAGNOSIS — K21.00 REFLUX ESOPHAGITIS: ICD-10-CM

## 2024-10-25 DIAGNOSIS — R10.11 RUQ ABDOMINAL PAIN: ICD-10-CM

## 2024-10-25 PROCEDURE — A9537 TC99M MEBROFENIN: HCPCS | Performed by: SURGERY

## 2024-10-25 PROCEDURE — 78227 HEPATOBIL SYST IMAGE W/DRUG: CPT

## 2024-10-25 PROCEDURE — 343N000001 HC RX 343 MED OP 636: Performed by: SURGERY

## 2024-10-25 PROCEDURE — 250N000011 HC RX IP 250 OP 636: Performed by: SURGERY

## 2024-10-25 RX ORDER — SINCALIDE 5 UG/5ML
0.02 INJECTION, POWDER, LYOPHILIZED, FOR SOLUTION INTRAVENOUS ONCE
Status: COMPLETED | OUTPATIENT
Start: 2024-10-25 | End: 2024-10-25

## 2024-10-25 RX ORDER — OMEPRAZOLE 40 MG/1
CAPSULE, DELAYED RELEASE ORAL
Qty: 90 CAPSULE | Refills: 0 | Status: SHIPPED | OUTPATIENT
Start: 2024-10-25

## 2024-10-25 RX ORDER — KIT FOR THE PREPARATION OF TECHNETIUM TC 99M MEBROFENIN 45 MG/10ML
4-6 INJECTION, POWDER, LYOPHILIZED, FOR SOLUTION INTRAVENOUS ONCE
Status: COMPLETED | OUTPATIENT
Start: 2024-10-25 | End: 2024-10-25

## 2024-10-25 RX ADMIN — MEBROFENIN 5.7 MILLICURIE: 45 INJECTION, POWDER, LYOPHILIZED, FOR SOLUTION INTRAVENOUS at 07:17

## 2024-10-25 RX ADMIN — SINCALIDE 2.2 MCG: 5 INJECTION, POWDER, LYOPHILIZED, FOR SOLUTION INTRAVENOUS at 08:41

## 2024-10-27 ASSESSMENT — ENCOUNTER SYMPTOMS
FEVER: 0
COUGH: 0
NAUSEA: 1
ARTHRALGIAS: 0
SHORTNESS OF BREATH: 0
VOMITING: 0
HEMATURIA: 0
CHILLS: 0
ABDOMINAL PAIN: 1
BACK PAIN: 0
COLOR CHANGE: 0
DYSURIA: 0
SINUS PRESSURE: 1
SINUS PAIN: 1
SORE THROAT: 0
PALPITATIONS: 0
EYE PAIN: 0
SEIZURES: 0

## 2024-10-29 NOTE — RESULT ENCOUNTER NOTE
Called patient and left a voicemail regarding the results of the HIDA scan.  HIDA scan does not show acute cholecystitis and the gallbladder has normal function.  I do not believe that her symptoms are biliary in nature nor does she require any surgical intervention at this time.  Patient can follow-up with her primary care team for any further workup of her ongoing symptoms.    Shyam Roth DO  General Surgeon  [facility]  Surgery [facility] - [address] [address]?  Office: [phone]  Employed by - [facility]  Pager: [phone]

## 2025-01-08 DIAGNOSIS — K21.00 REFLUX ESOPHAGITIS: ICD-10-CM

## 2025-01-08 RX ORDER — OMEPRAZOLE 40 MG/1
CAPSULE, DELAYED RELEASE ORAL
Qty: 90 CAPSULE | Refills: 2 | Status: SHIPPED | OUTPATIENT
Start: 2025-01-08

## 2025-01-18 ENCOUNTER — HEALTH MAINTENANCE LETTER (OUTPATIENT)
Age: 60
End: 2025-01-18

## 2025-01-22 NOTE — ANESTHESIA PREPROCEDURE EVALUATION
Anesthesia Evaluation      Patient summary reviewed     Airway   Mallampati: II  Neck ROM: full   Pulmonary - negative ROS and normal exam    breath sounds clear to auscultation  (+) a smoker                         Cardiovascular - normal exam  (+) hypertension, ,     Rhythm: regular  Rate: normal,         Neuro/Psych    (+) neuromuscular disease,  depression,     Endo/Other    (+) arthritis, obesity,      GI/Hepatic/Renal - negative ROS           Dental    (+) chipped                       Anesthesia Plan  Planned anesthetic: general endotracheal    ASA 2   Induction: intravenous   Anesthetic plan and risks discussed with: patient    Post-op plan: routine recovery           Pt verified name and .    Pt requesting appointment for missed menses. Pt is unsure of LMP, but believes mid-November. Pt scheduled for missed menses appointment  with Melody Berg. Pt aware of scheduling details.

## 2025-04-26 ENCOUNTER — HEALTH MAINTENANCE LETTER (OUTPATIENT)
Age: 60
End: 2025-04-26

## 2025-05-08 ENCOUNTER — PATIENT OUTREACH (OUTPATIENT)
Dept: CARE COORDINATION | Facility: CLINIC | Age: 60
End: 2025-05-08
Payer: COMMERCIAL

## 2025-05-25 ENCOUNTER — OFFICE VISIT (OUTPATIENT)
Dept: URGENT CARE | Facility: URGENT CARE | Age: 60
End: 2025-05-25
Payer: COMMERCIAL

## 2025-05-25 VITALS
RESPIRATION RATE: 16 BRPM | DIASTOLIC BLOOD PRESSURE: 80 MMHG | HEART RATE: 103 BPM | SYSTOLIC BLOOD PRESSURE: 130 MMHG | TEMPERATURE: 99 F | OXYGEN SATURATION: 98 %

## 2025-05-25 DIAGNOSIS — M54.50 ACUTE RIGHT-SIDED LOW BACK PAIN WITHOUT SCIATICA: Primary | ICD-10-CM

## 2025-05-25 DIAGNOSIS — R50.9 FEVER, UNSPECIFIED FEVER CAUSE: ICD-10-CM

## 2025-05-25 LAB
ALBUMIN UR-MCNC: NEGATIVE MG/DL
APPEARANCE UR: CLEAR
BACTERIA #/AREA URNS HPF: ABNORMAL /HPF
BASOPHILS # BLD AUTO: 0 10E3/UL (ref 0–0.2)
BASOPHILS NFR BLD AUTO: 0 %
BILIRUB UR QL STRIP: NEGATIVE
COLOR UR AUTO: YELLOW
EOSINOPHIL # BLD AUTO: 0.1 10E3/UL (ref 0–0.7)
EOSINOPHIL NFR BLD AUTO: 1 %
ERYTHROCYTE [DISTWIDTH] IN BLOOD BY AUTOMATED COUNT: 14.6 % (ref 10–15)
GLUCOSE UR STRIP-MCNC: NEGATIVE MG/DL
HCT VFR BLD AUTO: 34.2 % (ref 35–47)
HGB BLD-MCNC: 11.2 G/DL (ref 11.7–15.7)
HGB UR QL STRIP: ABNORMAL
IMM GRANULOCYTES # BLD: 0 10E3/UL
IMM GRANULOCYTES NFR BLD: 0 %
KETONES UR STRIP-MCNC: NEGATIVE MG/DL
LEUKOCYTE ESTERASE UR QL STRIP: NEGATIVE
LYMPHOCYTES # BLD AUTO: 3.1 10E3/UL (ref 0.8–5.3)
LYMPHOCYTES NFR BLD AUTO: 26 %
MCH RBC QN AUTO: 29 PG (ref 26.5–33)
MCHC RBC AUTO-ENTMCNC: 32.7 G/DL (ref 31.5–36.5)
MCV RBC AUTO: 89 FL (ref 78–100)
MONOCYTES # BLD AUTO: 1 10E3/UL (ref 0–1.3)
MONOCYTES NFR BLD AUTO: 8 %
NEUTROPHILS # BLD AUTO: 7.7 10E3/UL (ref 1.6–8.3)
NEUTROPHILS NFR BLD AUTO: 65 %
NITRATE UR QL: NEGATIVE
PH UR STRIP: 6 [PH] (ref 5–8)
PLATELET # BLD AUTO: 200 10E3/UL (ref 150–450)
RBC # BLD AUTO: 3.86 10E6/UL (ref 3.8–5.2)
RBC #/AREA URNS AUTO: ABNORMAL /HPF
SP GR UR STRIP: 1.01 (ref 1–1.03)
SQUAMOUS #/AREA URNS AUTO: ABNORMAL /LPF
UROBILINOGEN UR STRIP-ACNC: 0.2 E.U./DL
WBC # BLD AUTO: 12 10E3/UL (ref 4–11)
WBC #/AREA URNS AUTO: ABNORMAL /HPF

## 2025-05-25 PROCEDURE — 85025 COMPLETE CBC W/AUTO DIFF WBC: CPT | Performed by: PHYSICIAN ASSISTANT

## 2025-05-25 PROCEDURE — 3079F DIAST BP 80-89 MM HG: CPT | Performed by: PHYSICIAN ASSISTANT

## 2025-05-25 PROCEDURE — 36415 COLL VENOUS BLD VENIPUNCTURE: CPT | Performed by: PHYSICIAN ASSISTANT

## 2025-05-25 PROCEDURE — 3075F SYST BP GE 130 - 139MM HG: CPT | Performed by: PHYSICIAN ASSISTANT

## 2025-05-25 PROCEDURE — 81001 URINALYSIS AUTO W/SCOPE: CPT | Performed by: PHYSICIAN ASSISTANT

## 2025-05-25 PROCEDURE — 96372 THER/PROPH/DIAG INJ SC/IM: CPT | Performed by: PHYSICIAN ASSISTANT

## 2025-05-25 PROCEDURE — 99214 OFFICE O/P EST MOD 30 MIN: CPT | Mod: 25 | Performed by: PHYSICIAN ASSISTANT

## 2025-05-25 RX ORDER — METHOCARBAMOL 500 MG/1
500-1000 TABLET, FILM COATED ORAL 3 TIMES DAILY
Qty: 30 TABLET | Refills: 0 | Status: SHIPPED | OUTPATIENT
Start: 2025-05-25

## 2025-05-25 RX ORDER — KETOROLAC TROMETHAMINE 30 MG/ML
30 INJECTION, SOLUTION INTRAMUSCULAR; INTRAVENOUS ONCE
Status: COMPLETED | OUTPATIENT
Start: 2025-05-25 | End: 2025-05-25

## 2025-05-25 RX ADMIN — KETOROLAC TROMETHAMINE 30 MG: 30 INJECTION, SOLUTION INTRAMUSCULAR; INTRAVENOUS at 20:00

## 2025-05-26 NOTE — PATIENT INSTRUCTIONS
If fevers, shaking chills, or not improved pain go to the ER.    Ice, heat, lidocaine patch, trial of the muscle relaxant.

## 2025-05-26 NOTE — PROGRESS NOTES
Assessment & Plan     Acute right-sided low back pain without sciatica  UA no sign of infection.   CBC obtained due to temp of .3 was minimally elevated without shift, she is non-toxic appearing and not having any systemic sx, shaking chills, night sweats or fever at home.  No abdomen pain and benign abdomen exam.    I recommend observation, however if there are high fevers or persistent low grade fevers, not improving as expected she should be seen in the ED.    Back pain has no red flags of B/B function problems, no saddle anesthesia or radicular features.  Toradol in clinic was mildly helpful and pt will continue to monitor at home.  She will do ice, heat, relative rest, voltaren may be started tomorrow and robaxin for pain.  Should take voltaren with food.    At the end of the encounter, I discussed results, diagnosis, medications. Discussed red flags for immediate return to clinic/ER, as well as indications for follow up if no improvement. Patient understood and agreed to plan. PI given and discussed for back pain        - UA Macroscopic with reflex to Microscopic and Culture - Clinic Collect  - CBC with platelets and differential  - ketorolac (TORADOL) injection 30 mg  - UA Microscopic with Reflex to Culture  - CBC with platelets and differential  - methocarbamol (ROBAXIN) 500 MG tablet  Dispense: 30 tablet; Refill: 0    Fever, unspecified fever cause  Very low grade temp tonight, temp taken initiall 99.3/taken again 100.3, and third time 99.0 here in clinic. I suspect this due to normal variation in the evening.  Pt has not had fever at home and systemically otherwise well without uri sx, urinary sx. Will obtain urine and CBC.  She has no abdomen pain or vomiting.  She had similar sx in October 2024 with full work up and CT scan which returned normal.  She is comfortable observing but understands if sx worsening or not improving may need ED evaluation as above.          Marybel Mcfarlane PA-C  [facility]  [facility] JOANN Alexander is a 59 year old female who presents to clinic today for the following health issues:  Chief Complaint   Patient presents with    Back Pain     Low back pain x today  Pt also complains of nausea         HPI  Pt presents to the clinic with concern re: back pain. Onset x 1 day, started when she bent to  a laundry basket. Also grandchildren present today and has to bend to  off ground toddlers.    Lidocaine, tylenol, lyrica not helpful  No urine sx : no frequency, urgency, hematuria.    No hx of kidney stones.   Nausea pain related.   No vomiting.   No diarrhea.    No respiratory sx    Different : off to the R side, chronic pain is more midline feels different.    No B/B function problems    Last had CT scan of the abdomen and pelvis for similar sx and no kidney stones at that time  Hx of diverticulitis, does not feel similar.    No tick bies.   Not aware of any fever at home:  here 99, 100.3 and 99.      Review of Systems  Constitutional, HEENT, cardiovascular, pulmonary, gi and gu systems are negative, except as otherwise noted.      Objective    /80 (BP Location: Right arm, Patient Position: Sitting, Cuff Size: Adult Large)   Pulse 103   Temp 99  F (37.2  C) (Oral)   Resp 16   LMP  (LMP Unknown)   SpO2 98%   Physical Exam   Pt is in no acute distress and appears well  Ears patent B:  TM s intact, non-injected. All land marks easily visibile    Nasal mucosa is non-edematous, no discharge.    Pharynx: non erythematous, tonsils non hypertrophied, No exudate   Neck supple: no adenopathy  Lungs: CTA  Heart: RRR, no murmur, no thrills or heaves   Ext: no edema  Skin: no rashes    Ambulates slowly, position changes slowly, without antalgic gait, able to rise up and off exam table with stand by assist.   No midline T or L spine TTP.    R lumbar perivertebral muscle TTP   Muscular strength, sensation and DTR for LE are symetrical and WNL for the  BLE with the exception of dorsi flexion 3/5 on the R  which pt reports is chronic and decreased sensation (previous RLE neuropathy with chronic deficit)   SLR negative B  Figure 4 negative B.   Peripheral pulses intact, cap refill brisk.    Results for orders placed or performed in visit on 05/25/25   UA Macroscopic with reflex to Microscopic and Culture - Clinic Collect     Status: Abnormal    Specimen: Urine, Clean Catch   Result Value Ref Range    Color Urine Yellow Colorless, Straw, Light Yellow, Yellow    Appearance Urine Clear Clear    Glucose Urine Negative Negative mg/dL    Bilirubin Urine Negative Negative    Ketones Urine Negative Negative mg/dL    Specific Gravity Urine 1.015 1.005 - 1.030    Blood Urine Trace (A) Negative    pH Urine 6.0 5.0 - 8.0    Protein Albumin Urine Negative Negative mg/dL    Urobilinogen Urine 0.2 0.2, 1.0 E.U./dL    Nitrite Urine Negative Negative    Leukocyte Esterase Urine Negative Negative   UA Microscopic with Reflex to Culture     Status: Abnormal   Result Value Ref Range    Bacteria Urine Few (A) None Seen /HPF    RBC Urine 0-2 0-2 /HPF /HPF    WBC Urine 0-5 0-5 /HPF /HPF    Squamous Epithelials Urine Few (A) None Seen /LPF    Narrative    Urine Culture not indicated   CBC with platelets and differential     Status: Abnormal   Result Value Ref Range    WBC Count 12.0 (H) 4.0 - 11.0 10e3/uL    RBC Count 3.86 3.80 - 5.20 10e6/uL    Hemoglobin 11.2 (L) 11.7 - 15.7 g/dL    Hematocrit 34.2 (L) 35.0 - 47.0 %    MCV 89 78 - 100 fL    MCH 29.0 26.5 - 33.0 pg    MCHC 32.7 31.5 - 36.5 g/dL    RDW 14.6 10.0 - 15.0 %    Platelet Count 200 150 - 450 10e3/uL    % Neutrophils 65 %    % Lymphocytes 26 %    % Monocytes 8 %    % Eosinophils 1 %    % Basophils 0 %    % Immature Granulocytes 0 %    Absolute Neutrophils 7.7 1.6 - 8.3 10e3/uL    Absolute Lymphocytes 3.1 0.8 - 5.3 10e3/uL    Absolute Monocytes 1.0 0.0 - 1.3 10e3/uL    Absolute Eosinophils 0.1 0.0 - 0.7 10e3/uL    Absolute  Basophils 0.0 0.0 - 0.2 10e3/uL    Absolute Immature Granulocytes 0.0 <=0.4 10e3/uL   CBC with platelets and differential     Status: Abnormal    Narrative    The following orders were created for panel order CBC with platelets and differential.  Procedure                               Abnormality         Status                     ---------                               -----------         ------                     CBC with platelets and ...[[id number]]  Abnormal            Final result                 Please view results for these tests on the individual orders.

## 2025-05-26 NOTE — PROGRESS NOTES
Urgent Care Clinic Visit    Chief Complaint   Patient presents with    Back Pain     Low back pain x today  Pt also complains of nausea

## 2025-06-28 ENCOUNTER — HEALTH MAINTENANCE LETTER (OUTPATIENT)
Age: 60
End: 2025-06-28

## 2025-07-21 ENCOUNTER — TELEPHONE (OUTPATIENT)
Dept: FAMILY MEDICINE | Facility: CLINIC | Age: 60
End: 2025-07-21
Payer: COMMERCIAL

## 2025-07-21 NOTE — TELEPHONE ENCOUNTER
Contacts       Contact Date/Time Type Contact Phone/Fax    07/21/2025 06:37 PM CDT Phone (Outgoing) Catherine Farrell (Self) [phone] (M)          Attempted to reach patient to: reminded patient via VM of her appt 7/23/25 @7:00 with Dr Medellin

## 2025-07-23 ENCOUNTER — OFFICE VISIT (OUTPATIENT)
Dept: FAMILY MEDICINE | Facility: CLINIC | Age: 60
End: 2025-07-23
Payer: COMMERCIAL

## 2025-07-23 VITALS
DIASTOLIC BLOOD PRESSURE: 84 MMHG | HEART RATE: 91 BPM | OXYGEN SATURATION: 96 % | SYSTOLIC BLOOD PRESSURE: 147 MMHG | BODY MASS INDEX: 36.58 KG/M2 | TEMPERATURE: 97.3 F | WEIGHT: 247 LBS | HEIGHT: 69 IN

## 2025-07-23 DIAGNOSIS — M79.671 RIGHT FOOT PAIN: Primary | ICD-10-CM

## 2025-07-23 DIAGNOSIS — F17.200 TOBACCO USE DISORDER: ICD-10-CM

## 2025-07-23 DIAGNOSIS — R10.9 RIGHT SIDED ABDOMINAL PAIN: ICD-10-CM

## 2025-07-23 DIAGNOSIS — F17.210 CIGARETTE NICOTINE DEPENDENCE WITHOUT COMPLICATION: ICD-10-CM

## 2025-07-23 DIAGNOSIS — E11.65 TYPE 2 DIABETES MELLITUS WITH HYPERGLYCEMIA, WITHOUT LONG-TERM CURRENT USE OF INSULIN (H): Primary | ICD-10-CM

## 2025-07-23 DIAGNOSIS — R42 VERTIGO: ICD-10-CM

## 2025-07-23 DIAGNOSIS — E11.65 TYPE 2 DIABETES MELLITUS WITH HYPERGLYCEMIA, WITHOUT LONG-TERM CURRENT USE OF INSULIN (H): ICD-10-CM

## 2025-07-23 DIAGNOSIS — E78.2 MIXED HYPERLIPIDEMIA: ICD-10-CM

## 2025-07-23 DIAGNOSIS — E55.9 VITAMIN D DEFICIENCY: ICD-10-CM

## 2025-07-23 DIAGNOSIS — M51.372 DEGENERATION OF INTERVERTEBRAL DISC OF LUMBOSACRAL REGION WITH DISCOGENIC BACK PAIN AND LOWER EXTREMITY PAIN: ICD-10-CM

## 2025-07-23 LAB
ALBUMIN SERPL BCG-MCNC: 4.4 G/DL (ref 3.5–5.2)
ALP SERPL-CCNC: 58 U/L (ref 40–150)
ALT SERPL W P-5'-P-CCNC: 22 U/L (ref 0–50)
ANION GAP SERPL CALCULATED.3IONS-SCNC: 11 MMOL/L (ref 7–15)
AST SERPL W P-5'-P-CCNC: 26 U/L (ref 0–45)
BASOPHILS # BLD AUTO: 0.1 10E3/UL (ref 0–0.2)
BASOPHILS NFR BLD AUTO: 1 %
BILIRUB SERPL-MCNC: 0.2 MG/DL
BUN SERPL-MCNC: 13 MG/DL (ref 8–23)
CALCIUM SERPL-MCNC: 10.1 MG/DL (ref 8.8–10.4)
CHLORIDE SERPL-SCNC: 103 MMOL/L (ref 98–107)
CHOLEST SERPL-MCNC: 151 MG/DL
CREAT SERPL-MCNC: 0.81 MG/DL (ref 0.51–0.95)
CREAT UR-MCNC: 16 MG/DL
EGFRCR SERPLBLD CKD-EPI 2021: 82 ML/MIN/1.73M2
EOSINOPHIL # BLD AUTO: 0.2 10E3/UL (ref 0–0.7)
EOSINOPHIL NFR BLD AUTO: 1 %
ERYTHROCYTE [DISTWIDTH] IN BLOOD BY AUTOMATED COUNT: 15.2 % (ref 10–15)
EST. AVERAGE GLUCOSE BLD GHB EST-MCNC: 143 MG/DL
FASTING STATUS PATIENT QL REPORTED: NO
FASTING STATUS PATIENT QL REPORTED: NO
GLUCOSE SERPL-MCNC: 121 MG/DL (ref 70–99)
HBA1C MFR BLD: 6.6 % (ref 0–5.6)
HCO3 SERPL-SCNC: 26 MMOL/L (ref 22–29)
HCT VFR BLD AUTO: 36.9 % (ref 35–47)
HDLC SERPL-MCNC: 31 MG/DL
HGB BLD-MCNC: 12 G/DL (ref 11.7–15.7)
IMM GRANULOCYTES # BLD: 0 10E3/UL
IMM GRANULOCYTES NFR BLD: 0 %
LDLC SERPL CALC-MCNC: 63 MG/DL
LYMPHOCYTES # BLD AUTO: 3.3 10E3/UL (ref 0.8–5.3)
LYMPHOCYTES NFR BLD AUTO: 23 %
MCH RBC QN AUTO: 28.2 PG (ref 26.5–33)
MCHC RBC AUTO-ENTMCNC: 32.5 G/DL (ref 31.5–36.5)
MCV RBC AUTO: 87 FL (ref 78–100)
MICROALBUMIN UR-MCNC: <12 MG/L
MICROALBUMIN/CREAT UR: NORMAL MG/G{CREAT}
MONOCYTES # BLD AUTO: 0.9 10E3/UL (ref 0–1.3)
MONOCYTES NFR BLD AUTO: 6 %
NEUTROPHILS # BLD AUTO: 10.1 10E3/UL (ref 1.6–8.3)
NEUTROPHILS NFR BLD AUTO: 69 %
NONHDLC SERPL-MCNC: 120 MG/DL
PLATELET # BLD AUTO: 234 10E3/UL (ref 150–450)
POTASSIUM SERPL-SCNC: 4.3 MMOL/L (ref 3.4–5.3)
PROT SERPL-MCNC: 7.7 G/DL (ref 6.4–8.3)
RBC # BLD AUTO: 4.25 10E6/UL (ref 3.8–5.2)
SODIUM SERPL-SCNC: 140 MMOL/L (ref 135–145)
TRIGL SERPL-MCNC: 283 MG/DL
VIT D+METAB SERPL-MCNC: 70 NG/ML (ref 20–50)
WBC # BLD AUTO: 14.6 10E3/UL (ref 4–11)

## 2025-07-23 PROCEDURE — 82306 VITAMIN D 25 HYDROXY: CPT | Performed by: FAMILY MEDICINE

## 2025-07-23 PROCEDURE — 80053 COMPREHEN METABOLIC PANEL: CPT | Performed by: FAMILY MEDICINE

## 2025-07-23 PROCEDURE — 82570 ASSAY OF URINE CREATININE: CPT | Performed by: FAMILY MEDICINE

## 2025-07-23 PROCEDURE — 82043 UR ALBUMIN QUANTITATIVE: CPT | Performed by: FAMILY MEDICINE

## 2025-07-23 PROCEDURE — 85025 COMPLETE CBC W/AUTO DIFF WBC: CPT | Performed by: FAMILY MEDICINE

## 2025-07-23 PROCEDURE — 36415 COLL VENOUS BLD VENIPUNCTURE: CPT | Performed by: FAMILY MEDICINE

## 2025-07-23 PROCEDURE — 80061 LIPID PANEL: CPT | Performed by: FAMILY MEDICINE

## 2025-07-23 PROCEDURE — 83036 HEMOGLOBIN GLYCOSYLATED A1C: CPT | Performed by: FAMILY MEDICINE

## 2025-07-23 SDOH — HEALTH STABILITY: PHYSICAL HEALTH: ON AVERAGE, HOW MANY DAYS PER WEEK DO YOU ENGAGE IN MODERATE TO STRENUOUS EXERCISE (LIKE A BRISK WALK)?: 3 DAYS

## 2025-07-23 ASSESSMENT — ENCOUNTER SYMPTOMS
NUMBNESS: 1
ABDOMINAL PAIN: 1
BACK PAIN: 1
PSYCHIATRIC NEGATIVE: 1
BRUISES/BLEEDS EASILY: 0

## 2025-07-23 ASSESSMENT — SOCIAL DETERMINANTS OF HEALTH (SDOH): HOW OFTEN DO YOU GET TOGETHER WITH FRIENDS OR RELATIVES?: TWICE A WEEK

## 2025-07-23 NOTE — PROGRESS NOTES
{PROVIDER CHARTING PREFERENCE:786843}  Lung Cancer Screening Shared Decision Making Visit     Catherine Farrell { :9024} eligible for lung cancer screening on the basis of:   { :379997} not experienced symptoms suggestive of lung cancer.   born on 1965, 59 year old years old.   smoked *** packs of cigarettes for *** years for a total of *** pack-years   { :541209} quit smoking *** years ago/is currently smoking.      I { :847176:o} discussed with patient the risks and benefits of screening for lung cancer with low-dose CT.     The risks include:   radiation exposure    false positives     over-diagnosis    The benefit of early detection of lung cancer is contingent upon adherence to annual screening or more frequent follow up if indicated.     Furthermore, reaping the benefits of screening requires Catherine Farrell to be willing and able to undergo diagnostic procedures, if indicated.     We { :559808:o} discuss that the only way to prevent lung cancer is to not smoke. Smoking cessation assistance { :072530:o} offered.      Subjective   Catherine is a 59 year old, presenting for the following health issues:  foot issues and right side pain      7/23/2025     7:14 AM   Additional Questions   Roomed by Selin   Accompanied by self     Not working -   But no time for anything else    Has pain in right side - goes to   Recurent   Appears out of nowhere  Goes away out of nowhere  Lasts 2-7 days when it coms  Sores in right flank  No kidney stones   Has tried roll ons, patches , heat , cold -   Hard to get out of bed -             Review of Systems   Gastrointestinal:  Positive for abdominal pain (recurrent).   Musculoskeletal:  Positive for back pain.   Neurological:  Positive for numbness (right lower extremity to just distal to knee).   Hematological:  Does not bruise/bleed easily.   Psychiatric/Behavioral: Negative.     All other systems reviewed and are negative.          Objective    BP (!) 147/84 (BP Location: Left  "arm, Patient Position: Sitting, Cuff Size: Adult Regular)   Pulse 91   Temp 97.3  F (36.3  C) (Temporal)   Ht 1.76 m (5' 9.29\")   Wt 112 kg (247 lb)   LMP  (LMP Unknown)   SpO2 96%   BMI 36.17 kg/m    Body mass index is 36.17 kg/m .  Physical Exam  Vitals reviewed.   Constitutional:       General: She is not in acute distress.     Appearance: Normal appearance.   HENT:      Head: Normocephalic.      Right Ear: Tympanic membrane, ear canal and external ear normal.      Left Ear: Tympanic membrane, ear canal and external ear normal.      Nose: Nose normal.      Mouth/Throat:      Mouth: Mucous membranes are moist.      Pharynx: No posterior oropharyngeal erythema.   Eyes:      Extraocular Movements: Extraocular movements intact.      Conjunctiva/sclera: Conjunctivae normal.      Pupils: Pupils are equal, round, and reactive to light.   Cardiovascular:      Rate and Rhythm: Normal rate and regular rhythm.      Pulses: Normal pulses.      Heart sounds: Normal heart sounds. No murmur heard.  Pulmonary:      Effort: Pulmonary effort is normal.      Breath sounds: Normal breath sounds.   Abdominal:      Palpations: Abdomen is soft. There is no mass.      Tenderness: There is no abdominal tenderness. There is no guarding or rebound.   Musculoskeletal:         General: No deformity. Normal range of motion.      Cervical back: Normal range of motion and neck supple.   Lymphadenopathy:      Cervical: No cervical adenopathy.   Skin:     General: Skin is warm and dry.   Neurological:      General: No focal deficit present.      Mental Status: She is alert.      Sensory: Sensory deficit (right lower extremity below knee) present.   Psychiatric:         Mood and Affect: Mood normal.         Behavior: Behavior normal.        {Exam List (Optional):201767}    {Diagnostic Test Results (Optional):981559}        Signed Electronically by: ZIGGY CARABALLO MD  {Email feedback regarding this note to " [email or web address]   :281921}          Behavior: Behavior normal.            Results for orders placed or performed in visit on 07/23/25   Hemoglobin A1c     Status: Abnormal   Result Value Ref Range    Estimated Average Glucose 143 (H) <117 mg/dL    Hemoglobin A1C 6.6 (H) 0.0 - 5.6 %   Lipid panel reflex to direct LDL Non-fasting     Status: Abnormal   Result Value Ref Range    Cholesterol 151 <200 mg/dL    Triglycerides 283 (H) <150 mg/dL    Direct Measure HDL 31 (L) >=50 mg/dL    LDL Cholesterol Calculated 63 <100 mg/dL    Non HDL Cholesterol 120 <130 mg/dL    Patient Fasting > 8hrs? No     Narrative    Cholesterol  Desirable: < 200 mg/dL  Borderline High: 200 - 239 mg/dL  High: >= 240 mg/dL    Triglycerides  Normal: < 150 mg/dL  Borderline High: 150 - 199 mg/dL  High: 200-499 mg/dL  Very High: >= 500 mg/dL    Direct Measure HDL  Female: >= 50 mg/dL   Male: >= 40 mg/dL    LDL Cholesterol  Desirable: < 100 mg/dL  Above Desirable: 100 - 129 mg/dL   Borderline High: 130 - 159 mg/dL   High:  160 - 189 mg/dL   Very High: >= 190 mg/dL    Non HDL Cholesterol  Desirable: < 130 mg/dL  Above Desirable: 130 - 159 mg/dL  Borderline High: 160 - 189 mg/dL  High: 190 - 219 mg/dL  Very High: >= 220 mg/dL   Vitamin D Deficiency     Status: Abnormal   Result Value Ref Range    Vitamin D, Total (25-Hydroxy) 70 (H) 20 - 50 ng/mL    Narrative    Season, race, dietary intake, and treatment affect the concentration of 25-hydroxy-Vitamin D. Values may decrease during winter months and increase during summer months.    Vitamin D determination is routinely performed by an immunoassay specific for 25 hydroxyvitamin D3.  If an individual is on vitamin D2(ergocalciferol) supplementation, please specify 25 OH vitamin D2 and D3 level determination by LCMSMS test VITD23.     Comprehensive metabolic panel (BMP + Alb, Alk Phos, ALT, AST, Total. Bili, TP)     Status: Abnormal   Result Value Ref Range    Sodium 140 135 - 145 mmol/L    Potassium 4.3 3.4 - 5.3 mmol/L    Carbon  Dioxide (CO2) 26 22 - 29 mmol/L    Anion Gap 11 7 - 15 mmol/L    Urea Nitrogen 13.0 8.0 - 23.0 mg/dL    Creatinine 0.81 0.51 - 0.95 mg/dL    GFR Estimate 82 >60 mL/min/1.73m2    Calcium 10.1 8.8 - 10.4 mg/dL    Chloride 103 98 - 107 mmol/L    Glucose 121 (H) 70 - 99 mg/dL    Alkaline Phosphatase 58 40 - 150 U/L    AST 26 0 - 45 U/L    ALT 22 0 - 50 U/L    Protein Total 7.7 6.4 - 8.3 g/dL    Albumin 4.4 3.5 - 5.2 g/dL    Bilirubin Total 0.2 <=1.2 mg/dL    Patient Fasting > 8hrs? No    CBC with platelets and differential     Status: Abnormal   Result Value Ref Range    WBC Count 14.6 (H) 4.0 - 11.0 10e3/uL    RBC Count 4.25 3.80 - 5.20 10e6/uL    Hemoglobin 12.0 11.7 - 15.7 g/dL    Hematocrit 36.9 35.0 - 47.0 %    MCV 87 78 - 100 fL    MCH 28.2 26.5 - 33.0 pg    MCHC 32.5 31.5 - 36.5 g/dL    RDW 15.2 (H) 10.0 - 15.0 %    Platelet Count 234 150 - 450 10e3/uL    % Neutrophils 69 %    % Lymphocytes 23 %    % Monocytes 6 %    % Eosinophils 1 %    % Basophils 1 %    % Immature Granulocytes 0 %    Absolute Neutrophils 10.1 (H) 1.6 - 8.3 10e3/uL    Absolute Lymphocytes 3.3 0.8 - 5.3 10e3/uL    Absolute Monocytes 0.9 0.0 - 1.3 10e3/uL    Absolute Eosinophils 0.2 0.0 - 0.7 10e3/uL    Absolute Basophils 0.1 0.0 - 0.2 10e3/uL    Absolute Immature Granulocytes 0.0 <=0.4 10e3/uL   Albumin Random Urine Quantitative with Creat Ratio     Status: None   Result Value Ref Range    Creatinine Urine mg/dL 16.0 mg/dL    Albumin Urine mg/L <12.0 mg/L    Albumin Urine mg/g Cr     CBC with Platelets & Differential     Status: Abnormal    Narrative    The following orders were created for panel order CBC with Platelets & Differential.  Procedure                               Abnormality         Status                     ---------                               -----------         ------                     CBC with platelets and ...[[id number]]  Abnormal            Final result                 Please view results for these tests on the  individual orders.           Signed Electronically by: ZIGGY CARABALLO MD

## 2025-07-23 NOTE — PROGRESS NOTES
"Prior to immunization administration, verified patients identity using patient s name and date of birth. Please see Immunization Activity for additional information.     Screening Questionnaire for Adult Immunization    Are you sick today?   No   Do you have allergies to medications, food, a vaccine component or latex?   Yes   Have you ever had a serious reaction after receiving a vaccination?   No   Do you have a long-term health problem with heart, lung, kidney, or metabolic disease (e.g., diabetes), asthma, a blood disorder, no spleen, complement component deficiency, a cochlear implant, or a spinal fluid leak?  Are you on long-term aspirin therapy?   Yes   Do you have cancer, leukemia, HIV/AIDS, or any other immune system problem?   No   Do you have a parent, brother, or sister with an immune system problem?   No   In the past 3 months, have you taken medications that affect  your immune system, such as prednisone, other steroids, or anticancer drugs; drugs for the treatment of rheumatoid arthritis, Crohn s disease, or psoriasis; or have you had radiation treatments?   Yes   Have you had a seizure, or a brain or other nervous system problem?   No   During the past year, have you received a transfusion of blood or blood    products, or been given immune (gamma) globulin or antiviral drug?   No   For women: Are you pregnant or is there a chance you could become       pregnant during the next month?   No   Have you received any vaccinations in the past 4 weeks?   No     Immunization questionnaire { :312107::\"answers were all negative.\"}      Patient instructed to remain in clinic for 15 minutes afterwards, and to report any adverse reactions.     Screening performed by Selin Staples MA on 7/23/2025 at 7:23 AM.   "

## 2025-07-29 ENCOUNTER — TRANSFERRED RECORDS (OUTPATIENT)
Dept: HEALTH INFORMATION MANAGEMENT | Facility: CLINIC | Age: 60
End: 2025-07-29
Payer: COMMERCIAL

## 2025-08-01 ENCOUNTER — HOSPITAL ENCOUNTER (OUTPATIENT)
Dept: CT IMAGING | Facility: HOSPITAL | Age: 60
Discharge: HOME | End: 2025-08-01
Attending: FAMILY MEDICINE
Payer: COMMERCIAL

## 2025-08-01 ENCOUNTER — ANCILLARY PROCEDURE (OUTPATIENT)
Dept: MAMMOGRAPHY | Facility: HOSPITAL | Age: 60
End: 2025-08-01
Attending: FAMILY MEDICINE
Payer: COMMERCIAL

## 2025-08-01 DIAGNOSIS — Z12.31 BREAST CANCER SCREENING BY MAMMOGRAM: ICD-10-CM

## 2025-08-01 DIAGNOSIS — F17.210 CIGARETTE NICOTINE DEPENDENCE WITHOUT COMPLICATION: ICD-10-CM

## 2025-08-01 PROCEDURE — 71271 CT THORAX LUNG CANCER SCR C-: CPT

## 2025-08-01 PROCEDURE — 77063 BREAST TOMOSYNTHESIS BI: CPT

## 2025-08-11 ENCOUNTER — TRANSFERRED RECORDS (OUTPATIENT)
Dept: HEALTH INFORMATION MANAGEMENT | Facility: CLINIC | Age: 60
End: 2025-08-11
Payer: COMMERCIAL

## (undated) DEVICE — GLOVE SURG PI ULTRA TOUCH M SZ 8-1/2 LF

## (undated) DEVICE — CUSTOM PACK UPPER EXTREMITY SOP5BUEHEC

## (undated) DEVICE — SU MONOCRYL 3-0 PS-2 18" UND MCP497G

## (undated) DEVICE — NDL BLUNT 18GA 1.5" W/O FILTER 305180

## (undated) DEVICE — PREP CHLORAPREP 26ML TINTED HI-LITE ORANGE 930815

## (undated) DEVICE — CUFF TOURN 18IN STRL DISP

## (undated) DEVICE — BNDG KLING 3" 2232

## (undated) DEVICE — DRSG ADAPTIC 3X3" 6112

## (undated) DEVICE — SYR 10ML LL W/O NDL 302995

## (undated) DEVICE — DRSG STERI STRIP 1/2X4" R1547

## (undated) DEVICE — BLADE KNIFE SURG 10 371110

## (undated) DEVICE — SOL NACL 0.9% IRRIG 1000ML BOTTLE 2F7124

## (undated) DEVICE — GLOVE BIOGEL PI INDICATOR 8.0 LF 41680

## (undated) DEVICE — SUCTION MANIFOLD NEPTUNE 2 SYS 1 PORT 702-025-000

## (undated) DEVICE — GOWN XXL 9575

## (undated) DEVICE — NDL 25GA 1.5" 305127

## (undated) DEVICE — DRSG GAUZE 4X4" TRAY 6939

## (undated) DEVICE — Device

## (undated) DEVICE — SOL WATER IRRIG 1000ML BOTTLE 2F7114

## (undated) DEVICE — DRAPE STERI 23X17 NON STERILE 1010NSD

## (undated) RX ORDER — WATER 10 ML/10ML
INJECTION INTRAMUSCULAR; INTRAVENOUS; SUBCUTANEOUS
Status: DISPENSED
Start: 2024-10-25

## (undated) RX ORDER — DEXAMETHASONE SODIUM PHOSPHATE 10 MG/ML
INJECTION INTRAMUSCULAR; INTRAVENOUS
Status: DISPENSED
Start: 2022-07-11

## (undated) RX ORDER — BUPIVACAINE HYDROCHLORIDE 5 MG/ML
INJECTION, SOLUTION EPIDURAL; INTRACAUDAL
Status: DISPENSED
Start: 2022-07-11

## (undated) RX ORDER — PROPOFOL 10 MG/ML
INJECTION, EMULSION INTRAVENOUS
Status: DISPENSED
Start: 2022-07-11

## (undated) RX ORDER — POVIDONE-IODINE 10 MG/G
OINTMENT TOPICAL
Status: DISPENSED
Start: 2022-07-11

## (undated) RX ORDER — SINCALIDE 5 UG/5ML
INJECTION, POWDER, LYOPHILIZED, FOR SOLUTION INTRAVENOUS
Status: DISPENSED
Start: 2024-10-25

## (undated) RX ORDER — FENTANYL CITRATE 50 UG/ML
INJECTION, SOLUTION INTRAMUSCULAR; INTRAVENOUS
Status: DISPENSED
Start: 2022-07-11

## (undated) RX ORDER — LIDOCAINE HYDROCHLORIDE 20 MG/ML
INJECTION, SOLUTION INFILTRATION; PERINEURAL
Status: DISPENSED
Start: 2022-07-11

## (undated) RX ORDER — ONDANSETRON 2 MG/ML
INJECTION INTRAMUSCULAR; INTRAVENOUS
Status: DISPENSED
Start: 2022-07-11